# Patient Record
Sex: FEMALE | Race: BLACK OR AFRICAN AMERICAN | NOT HISPANIC OR LATINO | Employment: FULL TIME | URBAN - METROPOLITAN AREA
[De-identification: names, ages, dates, MRNs, and addresses within clinical notes are randomized per-mention and may not be internally consistent; named-entity substitution may affect disease eponyms.]

---

## 2017-01-23 ENCOUNTER — DOCUMENTATION ONLY (OUTPATIENT)
Dept: FAMILY MEDICINE | Facility: CLINIC | Age: 62
End: 2017-01-23

## 2017-01-23 NOTE — PROGRESS NOTES
Pre-Visit Chart Review  For Appointment Scheduled on 1/27/17.    Health Maintenance Due   Topic Date Due    Influenza Vaccine  08/01/2016    Foot Exam  09/02/2016    Zoster Vaccine  09/16/2016    Hemoglobin A1c  10/25/2016

## 2017-03-22 RX ORDER — PEN NEEDLE, DIABETIC 30 GX3/16"
1 NEEDLE, DISPOSABLE MISCELLANEOUS
Qty: 100 EACH | Refills: 11 | Status: SHIPPED | OUTPATIENT
Start: 2017-03-22

## 2018-01-05 ENCOUNTER — LAB VISIT (OUTPATIENT)
Dept: LAB | Facility: HOSPITAL | Age: 63
End: 2018-01-05
Attending: PHYSICIAN ASSISTANT
Payer: COMMERCIAL

## 2018-01-05 ENCOUNTER — OFFICE VISIT (OUTPATIENT)
Dept: ENDOCRINOLOGY | Facility: CLINIC | Age: 63
End: 2018-01-05
Payer: COMMERCIAL

## 2018-01-05 VITALS
HEART RATE: 91 BPM | RESPIRATION RATE: 18 BRPM | SYSTOLIC BLOOD PRESSURE: 159 MMHG | DIASTOLIC BLOOD PRESSURE: 85 MMHG | BODY MASS INDEX: 33.21 KG/M2 | WEIGHT: 219.13 LBS | HEIGHT: 68 IN

## 2018-01-05 DIAGNOSIS — E11.8 UNCONTROLLED TYPE 2 DIABETES MELLITUS WITH COMPLICATION, UNSPECIFIED LONG TERM INSULIN USE STATUS: Primary | ICD-10-CM

## 2018-01-05 DIAGNOSIS — E11.65 UNCONTROLLED TYPE 2 DIABETES MELLITUS WITH COMPLICATION, UNSPECIFIED LONG TERM INSULIN USE STATUS: Primary | ICD-10-CM

## 2018-01-05 DIAGNOSIS — E11.65 UNCONTROLLED TYPE 2 DIABETES MELLITUS WITH COMPLICATION, UNSPECIFIED LONG TERM INSULIN USE STATUS: ICD-10-CM

## 2018-01-05 DIAGNOSIS — E11.59 HYPERTENSION ASSOCIATED WITH DIABETES: ICD-10-CM

## 2018-01-05 DIAGNOSIS — Z78.0 POSTMENOPAUSAL: ICD-10-CM

## 2018-01-05 DIAGNOSIS — E78.5 HYPERLIPIDEMIA LDL GOAL <70: ICD-10-CM

## 2018-01-05 DIAGNOSIS — E11.8 UNCONTROLLED TYPE 2 DIABETES MELLITUS WITH COMPLICATION, UNSPECIFIED LONG TERM INSULIN USE STATUS: ICD-10-CM

## 2018-01-05 DIAGNOSIS — I15.2 HYPERTENSION ASSOCIATED WITH DIABETES: ICD-10-CM

## 2018-01-05 DIAGNOSIS — G62.9 NEUROPATHY: ICD-10-CM

## 2018-01-05 DIAGNOSIS — E66.9 OBESITY, CLASS I, BMI 30-34.9: ICD-10-CM

## 2018-01-05 LAB
25(OH)D3+25(OH)D2 SERPL-MCNC: 23 NG/ML
ALBUMIN SERPL BCP-MCNC: 3.6 G/DL
ALP SERPL-CCNC: 71 U/L
ALT SERPL W/O P-5'-P-CCNC: 28 U/L
ANION GAP SERPL CALC-SCNC: 7 MMOL/L
AST SERPL-CCNC: 24 U/L
BASOPHILS # BLD AUTO: 0.06 K/UL
BASOPHILS NFR BLD: 0.7 %
BILIRUB SERPL-MCNC: 0.5 MG/DL
BUN SERPL-MCNC: 19 MG/DL
C PEPTIDE SERPL-MCNC: 3.56 NG/ML
CA-I BLDV-SCNC: 1.26 MMOL/L
CALCIUM SERPL-MCNC: 9.7 MG/DL
CHLORIDE SERPL-SCNC: 107 MMOL/L
CHOLEST SERPL-MCNC: 180 MG/DL
CHOLEST/HDLC SERPL: 3.2 {RATIO}
CO2 SERPL-SCNC: 28 MMOL/L
CREAT SERPL-MCNC: 1 MG/DL
DIFFERENTIAL METHOD: ABNORMAL
EOSINOPHIL # BLD AUTO: 0.2 K/UL
EOSINOPHIL NFR BLD: 1.9 %
ERYTHROCYTE [DISTWIDTH] IN BLOOD BY AUTOMATED COUNT: 14 %
EST. GFR  (AFRICAN AMERICAN): >60 ML/MIN/1.73 M^2
EST. GFR  (NON AFRICAN AMERICAN): >60 ML/MIN/1.73 M^2
ESTIMATED AVG GLUCOSE: 192 MG/DL
GLUCOSE SERPL-MCNC: 135 MG/DL
HBA1C MFR BLD HPLC: 8.3 %
HCT VFR BLD AUTO: 36.4 %
HDLC SERPL-MCNC: 57 MG/DL
HDLC SERPL: 31.7 %
HGB BLD-MCNC: 11.5 G/DL
IMM GRANULOCYTES # BLD AUTO: 0.03 K/UL
IMM GRANULOCYTES NFR BLD AUTO: 0.3 %
LDLC SERPL CALC-MCNC: 94.2 MG/DL
LYMPHOCYTES # BLD AUTO: 4 K/UL
LYMPHOCYTES NFR BLD: 43.5 %
MCH RBC QN AUTO: 28.1 PG
MCHC RBC AUTO-ENTMCNC: 31.6 G/DL
MCV RBC AUTO: 89 FL
MONOCYTES # BLD AUTO: 0.5 K/UL
MONOCYTES NFR BLD: 5.6 %
NEUTROPHILS # BLD AUTO: 4.4 K/UL
NEUTROPHILS NFR BLD: 48 %
NONHDLC SERPL-MCNC: 123 MG/DL
NRBC BLD-RTO: 0 /100 WBC
PLATELET # BLD AUTO: 333 K/UL
PMV BLD AUTO: 10.1 FL
POTASSIUM SERPL-SCNC: 4 MMOL/L
PROT SERPL-MCNC: 8.4 G/DL
PTH-INTACT SERPL-MCNC: 141 PG/ML
RBC # BLD AUTO: 4.09 M/UL
SODIUM SERPL-SCNC: 142 MMOL/L
TRIGL SERPL-MCNC: 144 MG/DL
TSH SERPL DL<=0.005 MIU/L-ACNC: 1.3 UIU/ML
WBC # BLD AUTO: 9.14 K/UL

## 2018-01-05 PROCEDURE — 80061 LIPID PANEL: CPT

## 2018-01-05 PROCEDURE — 82985 ASSAY OF GLYCATED PROTEIN: CPT

## 2018-01-05 PROCEDURE — 82330 ASSAY OF CALCIUM: CPT

## 2018-01-05 PROCEDURE — 83970 ASSAY OF PARATHORMONE: CPT

## 2018-01-05 PROCEDURE — 84681 ASSAY OF C-PEPTIDE: CPT

## 2018-01-05 PROCEDURE — 99999 PR PBB SHADOW E&M-EST. PATIENT-LVL III: CPT | Mod: PBBFAC,,, | Performed by: PHYSICIAN ASSISTANT

## 2018-01-05 PROCEDURE — 84443 ASSAY THYROID STIM HORMONE: CPT

## 2018-01-05 PROCEDURE — 36415 COLL VENOUS BLD VENIPUNCTURE: CPT | Mod: PO

## 2018-01-05 PROCEDURE — 83036 HEMOGLOBIN GLYCOSYLATED A1C: CPT

## 2018-01-05 PROCEDURE — 85025 COMPLETE CBC W/AUTO DIFF WBC: CPT

## 2018-01-05 PROCEDURE — 80053 COMPREHEN METABOLIC PANEL: CPT

## 2018-01-05 PROCEDURE — 99213 OFFICE O/P EST LOW 20 MIN: CPT | Mod: S$GLB,,, | Performed by: PHYSICIAN ASSISTANT

## 2018-01-05 PROCEDURE — 82306 VITAMIN D 25 HYDROXY: CPT

## 2018-01-05 PROCEDURE — 84378 SUGARS SINGLE QUANT: CPT

## 2018-01-05 RX ORDER — DULAGLUTIDE 1.5 MG/.5ML
INJECTION, SOLUTION SUBCUTANEOUS
COMMUNITY
Start: 2017-12-26 | End: 2018-01-22 | Stop reason: SDUPTHER

## 2018-01-05 RX ORDER — CARVEDILOL 12.5 MG/1
12.5 TABLET ORAL DAILY
Qty: 90 TABLET | Refills: 3 | Status: SHIPPED | OUTPATIENT
Start: 2018-01-05 | End: 2019-01-15 | Stop reason: SDUPTHER

## 2018-01-05 RX ORDER — BACLOFEN 10 MG/1
TABLET ORAL
Refills: 0 | COMMUNITY
Start: 2017-10-26 | End: 2019-11-22

## 2018-01-05 NOTE — PROGRESS NOTES
"CC: This 62 y.o. Black or  female  is here for evaluation of  T2DM along with comorbidities indicated in the Visit Diagnosis section of this encounter.    HPI: Yeny Coats was diagnosed with T2DM in 2006. Metformin started at the time of diagnosis then switched to Janumet. No hospitalizations for DM. + FHx of DM brother, sister, mother. Mother and sister had thyroid disease.    Last seen by CORI Booker in 8/2016. New to me today.    PMHx, PSHx: reviewed in epic.    Social Hx: no ETOH/tobacco use. 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    LAST DIABETES EDUCATION: 9/2015    CURRENT DIABETES MEDICATIONS: Trulicity 1.5 mg weekly, Janumet  mg BID, Glipizide 10 mg BID    Missing medications: 1-2x week. She does not forget to take the Trulicity.    DM COMPLICATIONS: peripheral neuropathy    SIGNIFICANT DIABETES MED HISTORY:   Glimepiride-generalized pain    BG checks 1x/day in the morning. Recall: 107-140    No hypoglycemia.     CURRENT DIET:   BF-oatmeal, grits, eggs, sausage, biscuit   LH- spaghetti with chicken, she usually cooks  DN-leftovers from lunch  Snacks on popcorn and nuts during the day. She drinks milk, juice, water, iced tea.     EXERCISE: walking and riding a stationary bike 15 min 2x/week    BP (!) 159/85   Pulse 91   Resp 18   Ht 5' 7.5" (1.715 m)   Wt 99.4 kg (219 lb 2.2 oz)   BMI 33.82 kg/m²     Last eye exam: 10/17  Last Podiatry exam: n/a    Declines flu vaccine. She is utd on pneumonia vaccine.    Pt had Dexa 2 weeks ago. She will send results.    ROS:   CONSTITUTIONAL:  Appetite good, denies fatigue, no wt changes.  EYES: denies visual disturbances.   RESPIRATORY: No shortness of breath or cough.  CARDIAC: No chest pain or palpitations.   GI: No n/v/d  : no  dysuria   NEURO: + int numbness to feet   Musc: + muscle pain in right arm, muscle spasms in midback (saw ortho)  Endo: no polyphagia, polydipsia, polyuria  OTHER: n/a    PHYSICAL EXAM:  GENERAL: Well " developed, well nourished. No acute distress, AAO x 3.  NEURO: Cranial nerves grossly intact. Speech clear, no tremor.   NECK: Trachea midline, no thyromegaly or lymphadenopathy.   CHEST: Respirations even and unlabored. CTAB.  CARDIOVASCULAR: Regular rate and rhythm. No bruits. No murmur. No edema.   ABDOMEN: Soft, non-distended. Mild obese abdominal cavity.   MS: Gait steady. No clubbing.   SKIN: Normal skin turgor. Skin warm and dry. No areas of breakdown. + acanthosis nigricans.  Foot Exam: no sores or macerations noted.    Protective Sensation (w/ 10 gram monofilament):  Right: Intact  Left: Intact    Visual Inspection:  Normal -  Bilateral, Nails Intact - without Evidence of Foot Deformity- Bilateral, Callus -  Bilateral and Dry Skin -  Bilateral    Pedal Pulses:   Right: Present  Left: Present    Posterior tibialis:   Right:Present  Left: Present     Vibratory Sensation  Right:Positive  Left:Decreased    Hemoglobin A1C   Date Value Ref Range Status   07/25/2016 10.6 (H) 4.5 - 6.2 % Final     Comment:     According to ADA guidelines, hemoglobin A1C <7.0% represents  optimal control in non-pregnant diabetic patients.  Different  metrics may apply to specific populations.   Standards of Medical Care in Diabetes - 2016.  For the purpose of screening for the presence of diabetes:  <5.7%     Consistent with the absence of diabetes  5.7-6.4%  Consistent with increasing risk for diabetes   (prediabetes)  >or=6.5%  Consistent with diabetes  Currently no consensus exists for use of hemoglobin A1C  for diagnosis of diabetes for children.     04/21/2016 9.3 (H) 4.5 - 6.2 % Final   01/29/2016 10.2 (H) 4.5 - 6.2 % Final      Lab Results   Component Value Date    CHOL 286 (H) 07/25/2016    CHOL 275 (H) 04/21/2016    CHOL 294 (H) 09/02/2015     Lab Results   Component Value Date    HDL 52 07/25/2016    HDL 45 04/21/2016    HDL 51 09/02/2015     Lab Results   Component Value Date    LDLCALC 193.4 (H) 07/25/2016    LDLCALC  189.0 (H) 04/21/2016    LDLCALC 168.0 (H) 09/02/2015     Lab Results   Component Value Date    TRIG 203 (H) 07/25/2016    TRIG 205 (H) 04/21/2016    TRIG 375 (H) 09/02/2015     Lab Results   Component Value Date    CHOLHDL 18.2 (L) 07/25/2016    CHOLHDL 16.4 (L) 04/21/2016    CHOLHDL 17.3 (L) 09/02/2015      Lab Results   Component Value Date    TSH 1.122 07/25/2016        Chemistry        Component Value Date/Time     07/25/2016 1122    K 4.0 07/25/2016 1122     07/25/2016 1122    CO2 26 07/25/2016 1122    BUN 13 07/25/2016 1122    CREATININE 0.9 07/25/2016 1122     (H) 07/25/2016 1122        Component Value Date/Time    CALCIUM 9.8 07/25/2016 1122    ALKPHOS 86 07/25/2016 1122    AST 34 07/25/2016 1122    ALT 41 07/25/2016 1122    BILITOT 0.6 07/25/2016 1122    ESTGFRAFRICA >60.0 07/25/2016 1122    EGFRNONAA >60.0 07/25/2016 1122         ASSESSMENT and PLAN:    A1C goal: <7%  Fasting/premeal blood glucose goal:   2 hour post-meal blood glucose goal: less than 180    1. Uncontrolled type 2 diabetes mellitus with complication, unspecified long term insulin use status  Ambulatory consult to Diabetic Education    TSH    Comprehensive metabolic panel    CBC auto differential    C-peptide    HM DIABETES FOOT EXAM    Fructosamine    GlycoMark (TM)    Microalbumin/creatinine urine ratio    Hemoglobin A1c   2. Neuropathy     3. Hyperlipidemia LDL goal <70  CBC auto differential    Lipid panel   4. Hypertension associated with diabetes  CBC auto differential   5. Obesity, Class I, BMI 30-34.9     6. Postmenopausal  Vitamin D    PTH, intact    Calcium, ionized      Type 2 DM  A1c today  Continue current regimen  Will adjust according to a1c    Neuropathy  Optimize glycemic control    Hyperlipidemia  Chronic-LP today  Continue crestor  Advised to take aspirin    Hypertension  Chronic-worsening-start coreg 12. 5 mg daily  Start taking BP at home daily    Obesity  Body mass index is 33.82 kg/m².    Increases insulin resistance    Postmenopausal  Obtain Dexa results  Labs today    Return in about 3 months (around 4/5/2018).

## 2018-01-06 NOTE — PROGRESS NOTES
I have reviewed the notes, assessments, and/or procedures performed by Mayelin Monson PA-C. I agree with the documented clinical case summary and the suggested management plan.

## 2018-01-09 LAB — FRUCTOSAMINE SERPL-SCNC: 301 UMOL /L (ref 0–285)

## 2018-01-10 LAB — GLYCOMARK (TM): 6.7 UG/ML

## 2018-01-22 DIAGNOSIS — E11.8 UNCONTROLLED TYPE 2 DIABETES MELLITUS WITH COMPLICATION, UNSPECIFIED LONG TERM INSULIN USE STATUS: Primary | ICD-10-CM

## 2018-01-22 DIAGNOSIS — E11.65 UNCONTROLLED TYPE 2 DIABETES MELLITUS WITH COMPLICATION, UNSPECIFIED LONG TERM INSULIN USE STATUS: Primary | ICD-10-CM

## 2018-01-22 RX ORDER — METFORMIN HYDROCHLORIDE 500 MG/1
1000 TABLET, EXTENDED RELEASE ORAL 2 TIMES DAILY WITH MEALS
Qty: 360 TABLET | Refills: 3 | Status: SHIPPED | OUTPATIENT
Start: 2018-01-22 | End: 2018-03-23

## 2018-01-22 RX ORDER — GLIPIZIDE 10 MG/1
10 TABLET ORAL 2 TIMES DAILY
Refills: 0 | COMMUNITY
Start: 2017-10-20 | End: 2018-01-22 | Stop reason: SDUPTHER

## 2018-01-22 RX ORDER — SITAGLIPTIN AND METFORMIN HYDROCHLORIDE 500; 50 MG/1; MG/1
TABLET, FILM COATED ORAL
Refills: 0 | COMMUNITY
Start: 2017-10-23 | End: 2018-01-22

## 2018-01-22 RX ORDER — GLIPIZIDE 10 MG/1
10 TABLET ORAL 2 TIMES DAILY
Qty: 180 TABLET | Refills: 3 | Status: SHIPPED | OUTPATIENT
Start: 2018-01-22 | End: 2018-02-02 | Stop reason: SDUPTHER

## 2018-01-22 RX ORDER — SITAGLIPTIN AND METFORMIN HYDROCHLORIDE 500; 50 MG/1; MG/1
1 TABLET, FILM COATED ORAL 2 TIMES DAILY WITH MEALS
Qty: 60 TABLET | Refills: 11 | Status: CANCELLED | OUTPATIENT
Start: 2018-01-22 | End: 2019-01-22

## 2018-01-22 NOTE — TELEPHONE ENCOUNTER
Spoke to pt about change in medication.    ----- Message from Lavonne Argueta sent at 1/22/2018  2:14 PM CST -----  Contact: self  Patient called requesting a medication refill. Please see details below.  Medication Name:Trulicity; and her two oral diabetes medication  Medication Strength:  Preferred pharmacy:[facility]  First time calling about this refill (y/n):y  Call Back Number:[phone] or [phone]  Patient would like to  one TRULICITY pen today due to it missed fired and when all over her bed.  [facility] - [place], [place] - [address]  [address]  [place] [address]  Phone: [phone] Fax: [phone]

## 2018-02-02 DIAGNOSIS — E11.8 UNCONTROLLED TYPE 2 DIABETES MELLITUS WITH COMPLICATION, UNSPECIFIED LONG TERM INSULIN USE STATUS: ICD-10-CM

## 2018-02-02 DIAGNOSIS — E11.65 UNCONTROLLED TYPE 2 DIABETES MELLITUS WITH COMPLICATION, UNSPECIFIED LONG TERM INSULIN USE STATUS: ICD-10-CM

## 2018-02-02 RX ORDER — GLIPIZIDE 10 MG/1
10 TABLET ORAL 2 TIMES DAILY
Qty: 180 TABLET | Refills: 3 | Status: SHIPPED | OUTPATIENT
Start: 2018-02-02 | End: 2019-01-18 | Stop reason: SDUPTHER

## 2018-02-02 NOTE — TELEPHONE ENCOUNTER
----- Message from Rosario Castano sent at 1/31/2018  9:52 AM CST -----  Contact: self [phone]  She only received the metformin.  She thought you were ordering 2 medications for her.  Please call her to clarify.  Thank you!

## 2018-03-16 RX ORDER — SITAGLIPTIN AND METFORMIN HYDROCHLORIDE 500; 50 MG/1; MG/1
TABLET, FILM COATED ORAL
Qty: 180 TABLET | Refills: 3 | Status: SHIPPED | OUTPATIENT
Start: 2018-03-16 | End: 2018-06-01

## 2018-03-23 ENCOUNTER — OFFICE VISIT (OUTPATIENT)
Dept: ENDOCRINOLOGY | Facility: CLINIC | Age: 63
End: 2018-03-23
Payer: COMMERCIAL

## 2018-03-23 ENCOUNTER — LAB VISIT (OUTPATIENT)
Dept: LAB | Facility: HOSPITAL | Age: 63
End: 2018-03-23
Attending: INTERNAL MEDICINE
Payer: COMMERCIAL

## 2018-03-23 ENCOUNTER — HOSPITAL ENCOUNTER (OUTPATIENT)
Dept: RADIOLOGY | Facility: CLINIC | Age: 63
Discharge: HOME | End: 2018-03-23
Attending: PHYSICIAN ASSISTANT
Payer: COMMERCIAL

## 2018-03-23 VITALS
DIASTOLIC BLOOD PRESSURE: 85 MMHG | HEIGHT: 67 IN | WEIGHT: 215.38 LBS | BODY MASS INDEX: 33.8 KG/M2 | SYSTOLIC BLOOD PRESSURE: 157 MMHG | RESPIRATION RATE: 18 BRPM | HEART RATE: 83 BPM

## 2018-03-23 DIAGNOSIS — G62.9 NEUROPATHY: ICD-10-CM

## 2018-03-23 DIAGNOSIS — Z78.0 POSTMENOPAUSAL: ICD-10-CM

## 2018-03-23 DIAGNOSIS — E04.1 NODULAR THYROID DISEASE: Primary | ICD-10-CM

## 2018-03-23 DIAGNOSIS — E66.9 OBESITY, CLASS I, BMI 30-34.9: ICD-10-CM

## 2018-03-23 DIAGNOSIS — I15.2 HYPERTENSION ASSOCIATED WITH DIABETES: ICD-10-CM

## 2018-03-23 DIAGNOSIS — E04.1 NODULAR THYROID DISEASE: ICD-10-CM

## 2018-03-23 DIAGNOSIS — E55.9 HYPOVITAMINOSIS D: ICD-10-CM

## 2018-03-23 DIAGNOSIS — E11.8 UNCONTROLLED TYPE 2 DIABETES MELLITUS WITH COMPLICATION, UNSPECIFIED LONG TERM INSULIN USE STATUS: Primary | ICD-10-CM

## 2018-03-23 DIAGNOSIS — E11.65 UNCONTROLLED TYPE 2 DIABETES MELLITUS WITH COMPLICATION, UNSPECIFIED LONG TERM INSULIN USE STATUS: Primary | ICD-10-CM

## 2018-03-23 DIAGNOSIS — E11.65 UNCONTROLLED TYPE 2 DIABETES MELLITUS WITH COMPLICATION, UNSPECIFIED LONG TERM INSULIN USE STATUS: ICD-10-CM

## 2018-03-23 DIAGNOSIS — E78.5 HYPERLIPIDEMIA LDL GOAL <70: ICD-10-CM

## 2018-03-23 DIAGNOSIS — E11.8 UNCONTROLLED TYPE 2 DIABETES MELLITUS WITH COMPLICATION, UNSPECIFIED LONG TERM INSULIN USE STATUS: ICD-10-CM

## 2018-03-23 DIAGNOSIS — E11.59 HYPERTENSION ASSOCIATED WITH DIABETES: ICD-10-CM

## 2018-03-23 LAB
25(OH)D3+25(OH)D2 SERPL-MCNC: 21 NG/ML
ANION GAP SERPL CALC-SCNC: 9 MMOL/L
BUN SERPL-MCNC: 14 MG/DL
CA-I BLDV-SCNC: 1.33 MMOL/L
CALCIUM SERPL-MCNC: 10 MG/DL
CHLORIDE SERPL-SCNC: 104 MMOL/L
CO2 SERPL-SCNC: 26 MMOL/L
CREAT SERPL-MCNC: 0.8 MG/DL
EST. GFR  (AFRICAN AMERICAN): >60 ML/MIN/1.73 M^2
EST. GFR  (NON AFRICAN AMERICAN): >60 ML/MIN/1.73 M^2
GLUCOSE SERPL-MCNC: 119 MG/DL
POTASSIUM SERPL-SCNC: 3.7 MMOL/L
PTH-INTACT SERPL-MCNC: 57 PG/ML
SODIUM SERPL-SCNC: 139 MMOL/L

## 2018-03-23 PROCEDURE — 99214 OFFICE O/P EST MOD 30 MIN: CPT | Mod: S$GLB,,, | Performed by: PHYSICIAN ASSISTANT

## 2018-03-23 PROCEDURE — 36415 COLL VENOUS BLD VENIPUNCTURE: CPT | Mod: PO

## 2018-03-23 PROCEDURE — 82306 VITAMIN D 25 HYDROXY: CPT

## 2018-03-23 PROCEDURE — 3077F SYST BP >= 140 MM HG: CPT | Mod: CPTII,S$GLB,, | Performed by: PHYSICIAN ASSISTANT

## 2018-03-23 PROCEDURE — 3045F PR MOST RECENT HEMOGLOBIN A1C LEVEL 7.0-9.0%: CPT | Mod: CPTII,S$GLB,, | Performed by: PHYSICIAN ASSISTANT

## 2018-03-23 PROCEDURE — 84378 SUGARS SINGLE QUANT: CPT

## 2018-03-23 PROCEDURE — 82330 ASSAY OF CALCIUM: CPT

## 2018-03-23 PROCEDURE — 82985 ASSAY OF GLYCATED PROTEIN: CPT

## 2018-03-23 PROCEDURE — 76536 US EXAM OF HEAD AND NECK: CPT | Mod: TC,PO

## 2018-03-23 PROCEDURE — 3079F DIAST BP 80-89 MM HG: CPT | Mod: CPTII,S$GLB,, | Performed by: PHYSICIAN ASSISTANT

## 2018-03-23 PROCEDURE — 99999 PR PBB SHADOW E&M-EST. PATIENT-LVL III: CPT | Mod: PBBFAC,,, | Performed by: PHYSICIAN ASSISTANT

## 2018-03-23 PROCEDURE — 80048 BASIC METABOLIC PNL TOTAL CA: CPT

## 2018-03-23 PROCEDURE — 83970 ASSAY OF PARATHORMONE: CPT

## 2018-03-23 PROCEDURE — 76536 US EXAM OF HEAD AND NECK: CPT | Mod: 26,,, | Performed by: RADIOLOGY

## 2018-03-23 RX ORDER — METFORMIN HYDROCHLORIDE 500 MG/1
1000 TABLET, EXTENDED RELEASE ORAL 2 TIMES DAILY WITH MEALS
Qty: 360 TABLET | Refills: 3 | Status: ON HOLD | OUTPATIENT
Start: 2018-03-23 | End: 2019-01-31 | Stop reason: CLARIF

## 2018-03-23 RX ORDER — KETOCONAZOLE 20 MG/ML
SHAMPOO, SUSPENSION TOPICAL
Qty: 120 ML | Refills: 5 | Status: SHIPPED | OUTPATIENT
Start: 2018-03-23 | End: 2019-03-01 | Stop reason: SDUPTHER

## 2018-03-23 NOTE — PROGRESS NOTES
"CC: This 62 y.o. Black or  female  is here for evaluation of  T2DM along with comorbidities indicated in the Visit Diagnosis section of this encounter.    HPI: Yanci Coats was diagnosed with T2DM in . Metformin started at the time of diagnosis then switched to Janumet. No hospitalizations for DM. + FHx of DM brother, sister, mother. Mother and sister had thyroid disease.    PMHx, PSHx: reviewed in epic.    Social Hx: no ETOH/tobacco use. 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    LAST DIABETES EDUCATION: 2015    CURRENT DIABETES MEDICATIONS: Trulicity 1.5 mg weekly, metformin 1 g BID , Glipizide 10 mg BID     BG checks 2x per week.   Fastin-150    Missing medications: She does forget to take the medication occassionally.    She is waiting for her blood pressure medication to come in the mail.    DM COMPLICATIONS: peripheral neuropathy    SIGNIFICANT DIABETES MED HISTORY:   Glimepiride-generalized pain    No hypoglycemia.     CURRENT DIET:   BF-eggs, meat and english muffin or oatmeal with fruit, grits (weekend)  LH- skips  DN-meat and vegetable, beans with brown rice, string beans  Snacks on cheese and nuts during the day. She drinks milk, water, iced tea.     EXERCISE: riding a stationary bike almost every day    BP (!) 157/85 (BP Location: Left arm, Patient Position: Sitting, BP Method: Large (Automatic))   Pulse 83   Resp 18   Ht 5' 7" (1.702 m)   Wt 97.7 kg (215 lb 6.2 oz)   BMI 33.73 kg/m²     Last eye exam: 10/17  Last Podiatry exam: n/a    Last Dexa:     She had a thyroid u/s in  that showed two thyroid nodules that required an FNA, however she never received one.    ROS:   CONSTITUTIONAL:  Appetite good, denies fatigue, no wt changes.  EYES: + floater in left eye (chronic), denies visual disturbances.   RESPIRATORY: No shortness of breath or cough.  CARDIAC: No chest pain or palpitations.   GI: +nausea in morning, +diarrhea, no vomiting  : no  dysuria "   NEURO: + int numbness to feet   Skin: rash in back  Endo: no polyphagia, polydipsia, polyuria  OTHER: n/a    PHYSICAL EXAM:  GENERAL: Well developed, well nourished. No acute distress, AAO x 3.  NEURO: Cranial nerves grossly intact. Speech clear, no tremor. NECK: Trachea midline, no thyromegaly or lymphadenopathy. No AN. Multiple skin tags.Palpable nodule in right thyroid.  CHEST: Respirations even and unlabored. CTAB.  CARDIOVASCULAR: Regular rate and rhythm. No bruits. No murmur. No edema.   ABDOMEN: Soft, non-distended. Mild obese abdominal cavity.   MS: Gait steady. No clubbing.   SKIN: Normal skin turgor. Skin warm and dry. No areas of breakdown. + acanthosis nigricans.  Foot Exam: Previous 1/5/2018, no sores or macerations noted.    Protective Sensation (w/ 10 gram monofilament):  Right: Intact  Left: Intact    Visual Inspection:  Normal -  Bilateral, Nails Intact - without Evidence of Foot Deformity- Bilateral, Callus -  Bilateral and Dry Skin -  Bilateral    Pedal Pulses:   Right: Present  Left: Present    Posterior tibialis:   Right:Present  Left: Present     Vibratory Sensation  Right:Positive  Left:Decreased    Hemoglobin A1C   Date Value Ref Range Status   01/05/2018 8.3 (H) 4.0 - 5.6 % Final     Comment:     According to ADA guidelines, hemoglobin A1c <7.0% represents  optimal control in non-pregnant diabetic patients. Different  metrics may apply to specific patient populations.   Standards of Medical Care in Diabetes-2016.  For the purpose of screening for the presence of diabetes:  <5.7%     Consistent with the absence of diabetes  5.7-6.4%  Consistent with increasing risk for diabetes   (prediabetes)  >or=6.5%  Consistent with diabetes  Currently, no consensus exists for use of hemoglobin A1c  for diagnosis of diabetes for children.  This Hemoglobin A1c assay has significant interference with fetal   hemoglobin   (HbF). The results are invalid for patients with abnormal amounts of   HbF,    including those with known Hereditary Persistence   of Fetal Hemoglobin. Heterozygous hemoglobin variants (HbAS, HbAC,   HbAD, HbAE, HbA2) do not significantly interfere with this assay;   however, presence of multiple variants in a sample may impact the %   interference.     07/25/2016 10.6 (H) 4.5 - 6.2 % Final     Comment:     According to ADA guidelines, hemoglobin A1C <7.0% represents  optimal control in non-pregnant diabetic patients.  Different  metrics may apply to specific populations.   Standards of Medical Care in Diabetes - 2016.  For the purpose of screening for the presence of diabetes:  <5.7%     Consistent with the absence of diabetes  5.7-6.4%  Consistent with increasing risk for diabetes   (prediabetes)  >or=6.5%  Consistent with diabetes  Currently no consensus exists for use of hemoglobin A1C  for diagnosis of diabetes for children.     04/21/2016 9.3 (H) 4.5 - 6.2 % Final      Lab Results   Component Value Date    CHOL 180 01/05/2018    CHOL 286 (H) 07/25/2016    CHOL 275 (H) 04/21/2016     Lab Results   Component Value Date    HDL 57 01/05/2018    HDL 52 07/25/2016    HDL 45 04/21/2016     Lab Results   Component Value Date    LDLCALC 94.2 01/05/2018    LDLCALC 193.4 (H) 07/25/2016    LDLCALC 189.0 (H) 04/21/2016     Lab Results   Component Value Date    TRIG 144 01/05/2018    TRIG 203 (H) 07/25/2016    TRIG 205 (H) 04/21/2016     Lab Results   Component Value Date    CHOLHDL 31.7 01/05/2018    CHOLHDL 18.2 (L) 07/25/2016    CHOLHDL 16.4 (L) 04/21/2016      Lab Results   Component Value Date    TSH 1.298 01/05/2018        Chemistry        Component Value Date/Time     01/05/2018 1037    K 4.0 01/05/2018 1037     01/05/2018 1037    CO2 28 01/05/2018 1037    BUN 19 01/05/2018 1037    CREATININE 1.0 01/05/2018 1037     (H) 01/05/2018 1037        Component Value Date/Time    CALCIUM 9.7 01/05/2018 1037    ALKPHOS 71 01/05/2018 1037    AST 24 01/05/2018 1037    ALT 28 01/05/2018  1037    BILITOT 0.5 01/05/2018 1037    ESTGFRAFRICA >60.0 01/05/2018 1037    EGFRNONAA >60.0 01/05/2018 1037         ASSESSMENT and PLAN:    A1C goal: <7%  Fasting/premeal blood glucose goal:   2 hour post-meal blood glucose goal: less than 180    1. Uncontrolled type 2 diabetes mellitus with complication, unspecified long term insulin use status  Basic metabolic panel    Fructosamine    GlycoMark (TM)    metFORMIN (GLUCOPHAGE-XR) 500 MG 24 hr tablet    dulaglutide (TRULICITY) 1.5 mg/0.5 mL PnIj   2. Neuropathy     3. Hyperlipidemia LDL goal <70     4. Hypertension associated with diabetes  empagliflozin 10 mg Tab   5. Obesity, Class I, BMI 30-34.9     6. Postmenopausal  PTH, intact    Calcium, ionized   7. Hypovitaminosis D  Vitamin D   8. Nodular thyroid disease  US Soft Tissue Head Neck Thyroid      Type 2 DM  Labs today  Continue metformin, glipizide and Trulicity  Start Jardiance 10 mg daily  Referral to DE.  BG checks 2x daily.  Logs to clinic.   Hypoglycemia discussed.    Neuropathy  Optimize glycemic control    Hyperlipidemia  Continue crestor  Advised to take aspirin    Hypertension  Chronic-elevated-Advised to take blood pressure medication.    Obesity  Body mass index is 33.73 kg/m².   Increases insulin resistance  Continue exercise.    Postmenopausal  Obtain Dexa results  Labs today    Hypovitaminosis D  Check vitamin D    Nodular thyroid disease  Thyroid u/s    F/u in 3 months

## 2018-03-23 NOTE — PROGRESS NOTES
I have reviewed the notes, assessments, and/or procedures performed by  Mayelin Monson PA-C. I agree with the documented clinical cases summary and the suggested management plan.

## 2018-03-26 LAB — FRUCTOSAMINE SERPL-SCNC: 320 UMOL /L (ref 151–300)

## 2018-03-27 ENCOUNTER — TELEPHONE (OUTPATIENT)
Dept: ENDOCRINOLOGY | Facility: CLINIC | Age: 63
End: 2018-03-27

## 2018-03-27 NOTE — TELEPHONE ENCOUNTER
----- Message from Ana Paula Sotelo sent at 3/26/2018 12:14 PM CDT -----  Prescription was sent to [facility] / pharmacy advised that she needs a prior authorization  / call  [phone] to advise   [facility] - [place], [place] - [address]  [address]  [place] [address]  Phone: [phone] Fax: [phone]

## 2018-03-28 LAB — GLYCOMARK (TM): 12.7 UG/ML

## 2018-04-12 ENCOUNTER — TELEPHONE (OUTPATIENT)
Dept: ENDOCRINOLOGY | Facility: CLINIC | Age: 63
End: 2018-04-12

## 2018-04-12 NOTE — TELEPHONE ENCOUNTER
----- Message from Angel Guzman sent at 4/12/2018  2:36 PM CDT -----  Contact: pt  She's calling in regards to her scheduled biopsy on Friday 4/13/18, pls advise, [phone] (cell)

## 2018-04-13 NOTE — TELEPHONE ENCOUNTER
----- Message from Shanell Gilliam sent at 4/12/2018  4:45 PM CDT -----  Contact: self  Patient called regarding missed call. Please contact [phone] (higw)

## 2018-06-01 ENCOUNTER — OFFICE VISIT (OUTPATIENT)
Dept: ENDOCRINOLOGY | Facility: CLINIC | Age: 63
End: 2018-06-01
Payer: COMMERCIAL

## 2018-06-01 ENCOUNTER — DOCUMENTATION ONLY (OUTPATIENT)
Dept: ENDOCRINOLOGY | Facility: CLINIC | Age: 63
End: 2018-06-01

## 2018-06-01 ENCOUNTER — LAB VISIT (OUTPATIENT)
Dept: LAB | Facility: HOSPITAL | Age: 63
End: 2018-06-01
Attending: INTERNAL MEDICINE
Payer: COMMERCIAL

## 2018-06-01 VITALS
HEART RATE: 73 BPM | BODY MASS INDEX: 33.35 KG/M2 | HEIGHT: 67 IN | RESPIRATION RATE: 18 BRPM | DIASTOLIC BLOOD PRESSURE: 79 MMHG | WEIGHT: 212.5 LBS | SYSTOLIC BLOOD PRESSURE: 139 MMHG

## 2018-06-01 DIAGNOSIS — G62.9 NEUROPATHY: ICD-10-CM

## 2018-06-01 DIAGNOSIS — K21.9 GASTROESOPHAGEAL REFLUX DISEASE, ESOPHAGITIS PRESENCE NOT SPECIFIED: ICD-10-CM

## 2018-06-01 DIAGNOSIS — E04.1 NODULAR THYROID DISEASE: ICD-10-CM

## 2018-06-01 DIAGNOSIS — E11.65 TYPE 2 DIABETES MELLITUS WITH HYPERGLYCEMIA, WITHOUT LONG-TERM CURRENT USE OF INSULIN: Primary | ICD-10-CM

## 2018-06-01 DIAGNOSIS — E11.59 HYPERTENSION ASSOCIATED WITH DIABETES: ICD-10-CM

## 2018-06-01 DIAGNOSIS — Z78.0 POSTMENOPAUSAL: ICD-10-CM

## 2018-06-01 DIAGNOSIS — E11.69 HYPERLIPIDEMIA ASSOCIATED WITH TYPE 2 DIABETES MELLITUS: ICD-10-CM

## 2018-06-01 DIAGNOSIS — E78.00 HYPERCHOLESTEROLEMIA: ICD-10-CM

## 2018-06-01 DIAGNOSIS — E55.9 HYPOVITAMINOSIS D: ICD-10-CM

## 2018-06-01 DIAGNOSIS — E66.9 OBESITY, CLASS I, BMI 30-34.9: ICD-10-CM

## 2018-06-01 DIAGNOSIS — K76.0 NAFLD (NONALCOHOLIC FATTY LIVER DISEASE): ICD-10-CM

## 2018-06-01 DIAGNOSIS — I15.2 HYPERTENSION ASSOCIATED WITH DIABETES: ICD-10-CM

## 2018-06-01 DIAGNOSIS — E11.65 TYPE 2 DIABETES MELLITUS WITH HYPERGLYCEMIA, WITHOUT LONG-TERM CURRENT USE OF INSULIN: ICD-10-CM

## 2018-06-01 DIAGNOSIS — E78.5 HYPERLIPIDEMIA ASSOCIATED WITH TYPE 2 DIABETES MELLITUS: ICD-10-CM

## 2018-06-01 DIAGNOSIS — R80.9 PROTEINURIA, UNSPECIFIED TYPE: ICD-10-CM

## 2018-06-01 LAB
25(OH)D3+25(OH)D2 SERPL-MCNC: 26 NG/ML
CA-I BLDV-SCNC: 1.3 MMOL/L
PTH-INTACT SERPL-MCNC: 89 PG/ML
THYROGLOB AB SERPL IA-ACNC: <4 IU/ML
THYROPEROXIDASE IGG SERPL-ACNC: <6 IU/ML
TSH SERPL DL<=0.005 MIU/L-ACNC: 1.12 UIU/ML
URATE SERPL-MCNC: 8.3 MG/DL

## 2018-06-01 PROCEDURE — 86800 THYROGLOBULIN ANTIBODY: CPT

## 2018-06-01 PROCEDURE — 86376 MICROSOMAL ANTIBODY EACH: CPT

## 2018-06-01 PROCEDURE — 86316 IMMUNOASSAY TUMOR OTHER: CPT

## 2018-06-01 PROCEDURE — 3008F BODY MASS INDEX DOCD: CPT | Mod: CPTII,S$GLB,, | Performed by: INTERNAL MEDICINE

## 2018-06-01 PROCEDURE — 3078F DIAST BP <80 MM HG: CPT | Mod: CPTII,S$GLB,, | Performed by: INTERNAL MEDICINE

## 2018-06-01 PROCEDURE — 3075F SYST BP GE 130 - 139MM HG: CPT | Mod: CPTII,S$GLB,, | Performed by: INTERNAL MEDICINE

## 2018-06-01 PROCEDURE — 84443 ASSAY THYROID STIM HORMONE: CPT

## 2018-06-01 PROCEDURE — 83018 HEAVY METAL QUAN EACH NES: CPT

## 2018-06-01 PROCEDURE — 82306 VITAMIN D 25 HYDROXY: CPT

## 2018-06-01 PROCEDURE — 82330 ASSAY OF CALCIUM: CPT

## 2018-06-01 PROCEDURE — 82308 ASSAY OF CALCITONIN: CPT

## 2018-06-01 PROCEDURE — 86800 THYROGLOBULIN ANTIBODY: CPT | Mod: 91

## 2018-06-01 PROCEDURE — 99214 OFFICE O/P EST MOD 30 MIN: CPT | Mod: S$GLB,,, | Performed by: INTERNAL MEDICINE

## 2018-06-01 PROCEDURE — 84550 ASSAY OF BLOOD/URIC ACID: CPT

## 2018-06-01 PROCEDURE — 3045F PR MOST RECENT HEMOGLOBIN A1C LEVEL 7.0-9.0%: CPT | Mod: CPTII,S$GLB,, | Performed by: INTERNAL MEDICINE

## 2018-06-01 PROCEDURE — 83970 ASSAY OF PARATHORMONE: CPT

## 2018-06-01 PROCEDURE — 36415 COLL VENOUS BLD VENIPUNCTURE: CPT | Mod: PO

## 2018-06-01 PROCEDURE — 99999 PR PBB SHADOW E&M-EST. PATIENT-LVL V: CPT | Mod: PBBFAC,,, | Performed by: INTERNAL MEDICINE

## 2018-06-01 NOTE — PROGRESS NOTES
I have reviewed the DEXA the patient had done @ [facility] 12/17  Which was normal. Her next ffup study thus should be for ~ 12/19.  The full details of the results are in the media tab section.

## 2018-06-01 NOTE — PROGRESS NOTES
Subjective:      Patient ID: Yanci Coats is a 62 y.o. female.    Chief Complaint:  Diabetes    62 yr old lady with type 2 diabetes and thyroid nodular disease seen in [facility] today.      History of Present Illness    62 yr old postmenopausal lady with type 2 diabetes and thyroid nodular disease seen in [facility] today.  She in addition has the [facility] associated comorbidities;  Patient Active Problem List   Diagnosis    Rhinitis, allergic    GERD (gastroesophageal reflux disease)    Nuclear sclerosis - Both Eyes    Cortical cataract - Right Eye    Blurred vision    Posterior vitreous detachment, right eye    Hyperlipidemia LDL goal <70    Neuropathy    Diabetes mellitus type 2, uncontrolled, with complications    Hypertension associated with diabetes    Hyperlipidemia associated with type 2 diabetes mellitus    NAFLD (nonalcoholic fatty liver disease)    Uterine fibroid    Renal cyst, left    Obesity, Class I, BMI 30-34.9    BMI 32.0-32.9,adult    Postmenopausal    Type 2 diabetes mellitus with hyperglycemia    Hypercholesterolemia     Her latest HBA1c from 01/18 was 8.3 and her current antidiabetic medication regimen consists of metformin XR 1g BID (not taking presently) actually taking Janumet 5-500 BID, jardiance 10mg QD (not actually taking this presently) , Trulicity 1.5mg weekly and glipizide 10mg BID.  She received pneumovax in 2016 and ?? Flu vaccination. Her last opthalmology appt appears to have been from 08/16 with Dr Leonides Moraes. ?? Most recent DEXA was this year which was reportedly normal.. Her most recent thyroid USS from 03/18 showed thyroid nodular disease with several dominant nodules for which she had USS guided FNAB which is till pending.  Patient has not had the biopsy done because of concerns regarding her out of pocket costs.       Review of Systems   Constitutional: Negative for diaphoresis and fatigue.   HENT: Negative for facial swelling, trouble swallowing and voice change.   "  Eyes: Negative for visual disturbance.   Respiratory: Negative for cough and shortness of breath.    Cardiovascular: Negative for chest pain, palpitations and leg swelling.   Gastrointestinal: Negative for abdominal pain, nausea and vomiting.   Endocrine: Negative for polyuria.   Genitourinary: Negative for dysuria and menstrual problem (postmenopausal).   Musculoskeletal: Negative for back pain, gait problem and myalgias.   Skin: Negative for color change, pallor and rash.   Neurological: Negative for seizures and headaches.   Hematological: Does not bruise/bleed easily.   Psychiatric/Behavioral: Negative for sleep disturbance.       Objective: /79 (BP Location: Left arm, Patient Position: Sitting, BP Method: Large (Automatic))   Pulse 73   Resp 18   Ht 5' 7" (1.702 m)   Wt 96.4 kg (212 lb 8.4 oz)   BMI 33.29 kg/m²  Body surface area is 2.13 meters squared. waist circ; 43.5" hip circ; 45" neck circ; 14.5 " waist to hip ratio; 0.97         Physical Exam   Constitutional: She is oriented to person, place, and time. She appears well-developed and well-nourished. No distress.   Pleasant middle aged lady. Not pale, anicteric and afebrile.  Well hydrated. Not in any acute distress.   HENT:   Head: Normocephalic and atraumatic.   Eyes: Conjunctivae and EOM are normal. Pupils are equal, round, and reactive to light. No scleral icterus.   Neck: Normal range of motion. Neck supple. No JVD present. No thyromegaly present.   Cardiovascular: Normal rate, regular rhythm and normal heart sounds.    Pulmonary/Chest: Effort normal and breath sounds normal. No respiratory distress. She has no wheezes.   Abdominal: Soft.   Obese anterior abdominal wall.   Musculoskeletal: Normal range of motion. She exhibits no edema.   Neurological: She is alert and oriented to person, place, and time. No cranial nerve deficit.   Skin: Skin is warm and dry. No rash noted. She is not diaphoretic. No erythema. No pallor.   Psychiatric: " She has a normal mood and affect. Her behavior is normal. Judgment and thought content normal.   Vitals reviewed.      Lab Review:     Results for HARDY BUTLER (MRN [id number]) as of 6/1/2018 08:54   Ref. Range 1/5/2018 10:37 1/5/2018 13:57 3/23/2018 10:58 3/23/2018 13:00   WBC Latest Ref Range: 3.90 - 12.70 K/uL 9.14      RBC Latest Ref Range: 4.00 - 5.40 M/uL 4.09      Hemoglobin Latest Ref Range: 12.0 - 16.0 g/dL 11.5 (L)      Hematocrit Latest Ref Range: 37.0 - 48.5 % 36.4 (L)      MCV Latest Ref Range: 82 - 98 fL 89      MCH Latest Ref Range: 27.0 - 31.0 pg 28.1      MCHC Latest Ref Range: 32.0 - 36.0 g/dL 31.6 (L)      RDW Latest Ref Range: 11.5 - 14.5 % 14.0      Platelets Latest Ref Range: 150 - 350 K/uL 333      MPV Latest Ref Range: 9.2 - 12.9 fL 10.1      Gran% Latest Ref Range: 38.0 - 73.0 % 48.0      Gran # (ANC) Latest Ref Range: 1.8 - 7.7 K/uL 4.4      Immature Granulocytes Latest Ref Range: 0.0 - 0.5 % 0.3      Immature Grans (Abs) Latest Ref Range: 0.00 - 0.04 K/uL 0.03      Lymph% Latest Ref Range: 18.0 - 48.0 % 43.5      Lymph # Latest Ref Range: 1.0 - 4.8 K/uL 4.0      Mono% Latest Ref Range: 4.0 - 15.0 % 5.6      Mono # Latest Ref Range: 0.3 - 1.0 K/uL 0.5      Eosinophil% Latest Ref Range: 0.0 - 8.0 % 1.9      Eos # Latest Ref Range: 0.0 - 0.5 K/uL 0.2      Basophil% Latest Ref Range: 0.0 - 1.9 % 0.7      Baso # Latest Ref Range: 0.00 - 0.20 K/uL 0.06      nRBC Latest Ref Range: 0 /100 WBC 0      Sodium Latest Ref Range: 136 - 145 mmol/L 142  139    Potassium Latest Ref Range: 3.5 - 5.1 mmol/L 4.0  3.7    Chloride Latest Ref Range: 95 - 110 mmol/L 107  104    CO2 Latest Ref Range: 23 - 29 mmol/L 28  26    Anion Gap Latest Ref Range: 8 - 16 mmol/L 7 (L)  9    BUN, Bld Latest Ref Range: 8 - 23 mg/dL 19  14    Creatinine Latest Ref Range: 0.5 - 1.4 mg/dL 1.0  0.8    eGFR if non African American Latest Ref Range: >60 mL/min/1.73 m^2 >60.0  >60.0    eGFR if African American Latest Ref Range: >60  mL/min/1.73 m^2 >60.0  >60.0    Glucose Latest Ref Range: 70 - 110 mg/dL 135 (H)  119 (H)    Calcium Latest Ref Range: 8.7 - 10.5 mg/dL 9.7  10.0    Calcium, Ion Latest Ref Range: 1.06 - 1.42 mmol/L 1.26  1.33    Alkaline Phosphatase Latest Ref Range: 55 - 135 U/L 71      Total Protein Latest Ref Range: 6.0 - 8.4 g/dL 8.4      Albumin Latest Ref Range: 3.5 - 5.2 g/dL 3.6      Total Bilirubin Latest Ref Range: 0.1 - 1.0 mg/dL 0.5      AST Latest Ref Range: 10 - 40 U/L 24      ALT Latest Ref Range: 10 - 44 U/L 28      Triglycerides Latest Ref Range: 30 - 150 mg/dL 144      Cholesterol Latest Ref Range: 120 - 199 mg/dL 180      HDL Latest Ref Range: 40 - 75 mg/dL 57      LDL Cholesterol Latest Ref Range: 63.0 - 159.0 mg/dL 94.2      Total Cholesterol/HDL Ratio Latest Ref Range: 2.0 - 5.0  3.2      Vit D, 25-Hydroxy Latest Ref Range: 30 - 96 ng/mL 23 (L)  21 (L)    Hemoglobin A1C Latest Ref Range: 4.0 - 5.6 % 8.3 (H)      Estimated Avg Glucose Latest Ref Range: 68 - 131 mg/dL 192 (H)      C-Peptide Latest Ref Range: 0.78 - 5.19 ng/mL 3.56      GlycoMark (TM) Latest Units: ug/mL 6.7 (L)  12.7    TSH Latest Ref Range: 0.400 - 4.000 uIU/mL 1.298      PTH Latest Ref Range: 9.0 - 77.0 pg/mL 141.0 (H)  57.0    Microalbum.,U,Random Latest Units: ug/mL  255.0     Microalb Creat Ratio Latest Ref Range: 0.0 - 30.0 ug/mg  167.8 (H)     Fructosamine Latest Ref Range: 151 - 300 umol /L 301 (H)  320 (H)    US SOFT TISSUE HEAD NECK THYROID Unknown    Rpt   Creatinine, Random Ur Latest Ref Range: 15.0 - 325.0 mg/dL  152.0     Differential Method Unknown Automated      HDL/Chol Ratio Latest Ref Range: 20.0 - 50.0 % 31.7      Non-HDL Cholesterol Latest Units: mg/dL 123          Assessment:     1. Type 2 diabetes mellitus with hyperglycemia, without long-term current use of insulin  Calcitonin    Uric acid    Urinalysis    Microalbumin/creatinine urine ratio    Urinalysis    GlycoMark (TM)    Hemoglobin A1c    Fructosamine    Amylase     Lipase    Gamma GT    SITagliptan-metformin (JANUMET)  mg per tablet   2. Neuropathy     3. Hypertension associated with diabetes  Uric acid    Urinalysis    Microalbumin/creatinine urine ratio    Urinalysis   4. Hyperlipidemia associated with type 2 diabetes mellitus  Amylase    Lipase   5. Hypercholesterolemia  Amylase    Lipase   6. Postmenopausal  DXA Bone Density Spine And Hip   7. Obesity, Class I, BMI 30-34.9     8. NAFLD (nonalcoholic fatty liver disease)  Gamma GT   9. Gastroesophageal reflux disease, esophagitis presence not specified  DXA Bone Density Spine And Hip   10. Nodular thyroid disease  Anti-thyroglobulin antibody    Thyroglobulin    Thyroid peroxidase antibody    Calcitonin    Chromogranin A    Iodine, Serum    TSH    Uric acid   11. Hypovitaminosis D  Vitamin D    Calcium, ionized    PTH, intact    DXA Bone Density Spine And Hip   12. Proteinuria, unspecified type          Type 2 DM  Labs today  Continue Janumet, glipizide and Trulicity  May Start Jardiance 10 mg daily if latest HBA1c is not at goal  Referral to DE.  BG checks 2x daily.  Logs to clinic.   Hypoglycemia discussed.     Neuropathy  Optimize glycemic control     Hyperlipidemia  Continue crestor  Advised to take aspirin     Hypertension  Chronic-elevated-Advised to take blood pressure medication.     Obesity  Body mass index is 33.73 kg/m².   Increases insulin resistance  Continue exercise.     Postmenopausal  To retrieve outside Dexa results for our review       Hypovitaminosis D  Check vitamin D     Nodular thyroid disease  To await insurance approval for patient to have USS guided FNAB.    Plan:       FFup in ~ 3mths

## 2018-06-02 LAB — CALCIT SERPL-MCNC: <5 PG/ML

## 2018-06-04 LAB
CGA SERPL-MCNC: 63 NG/ML (ref 0–95)
IODINE SERPL-MCNC: 79 NG/ML (ref 40–92)
THRYOGLOBULIN INTERPRETATION: ABNORMAL
THYROGLOB AB SERPL-ACNC: <1.8 IU/ML
THYROGLOB SERPL-MCNC: 445 NG/ML

## 2018-06-21 DIAGNOSIS — Z12.39 BREAST CANCER SCREENING: ICD-10-CM

## 2018-10-09 RX ORDER — AMLODIPINE BESYLATE 10 MG/1
10 TABLET ORAL DAILY
Qty: 90 TABLET | Refills: 3 | Status: SHIPPED | OUTPATIENT
Start: 2018-10-09 | End: 2019-07-07 | Stop reason: SDUPTHER

## 2018-10-09 NOTE — TELEPHONE ENCOUNTER
Called patient and she says that she does not have any other provider at the moment and is out of her Amlodipine 10 mg medication and would like to know if Ermias CHU will send a prescription to the [facility] pharmacy on file.

## 2018-10-09 NOTE — TELEPHONE ENCOUNTER
----- Message from Jagdish Cheung sent at 10/9/2018  2:25 PM CDT -----  Contact: pt  Pt is requesting a refill on her amlodipine (NORVASC) 10 MG tablet, pt is currently out of medication,pls call back and advise  Call Back#[phone]  Thanks    [facility] - WILL[facility] Inman - [address] AT [address] [place] [address]  [facility] [address]  Phone: [phone] Fax: [phone]

## 2018-10-18 RX ORDER — VALSARTAN AND HYDROCHLOROTHIAZIDE 320; 25 MG/1; MG/1
1 TABLET, FILM COATED ORAL DAILY
Qty: 90 TABLET | Refills: 3 | Status: SHIPPED | OUTPATIENT
Start: 2018-10-18 | End: 2018-10-19 | Stop reason: SDUPTHER

## 2018-10-18 NOTE — TELEPHONE ENCOUNTER
----- Message from Shanell Gilliam sent at 10/18/2018 10:28 AM CDT -----  Type:  RX Refill Request    Who Called:  Patient  Refill or New Rx:  refill  RX Name and Strength:  valsartan-hydrochlorothiazide (DIOVAN-HCT) 320-25 mg per tablet  How is the patient currently taking it? (ex. 1XDay):  Na  Is this a 30 day or 90 day RX:  90  Preferred Pharmacy with phone number:    [facility] - [place], [place] - [address]  [address]  [place] [address]  Phone: [phone] Fax: [phone]  Local or Mail Order: local  Ordering Provider: Ermias  Best Call Back Number:  [phone] (home)     Additional Information:  Stating she is out of the medication

## 2018-10-19 RX ORDER — VALSARTAN AND HYDROCHLOROTHIAZIDE 320; 25 MG/1; MG/1
1 TABLET, FILM COATED ORAL DAILY
Qty: 30 TABLET | Refills: 11 | Status: SHIPPED | OUTPATIENT
Start: 2018-10-19 | End: 2019-03-01

## 2018-10-19 NOTE — TELEPHONE ENCOUNTER
----- Message from Shanell Gilliam sent at 10/19/2018 12:10 PM CDT -----  Type:  RX Refill Request    Who Called:Patient  Refill or New Rx:  refill  RX Name and Strength:  valsartan-hydrochlorothiazide (DIOVAN-HCT) 320-25 mg per tablet  How is the patient currently taking it? (ex. 1XDay):  Na  Is this a 30 day or 90 day RX:  30  Preferred Pharmacy with phone number:    [facility] [place] - [address]  [address]  [place] [address]  Phone: [phone] Fax: [phone]  Local or Mail Order: [facility]  Ordering Provider:  Ermias Atkinson Call Back Number:  [phone] (home)       Additional Information:  Stating submitted multiple messages no callback, need medication refilled as soon as possible. Almost out

## 2018-11-01 ENCOUNTER — HOSPITAL ENCOUNTER (OUTPATIENT)
Dept: RADIOLOGY | Facility: CLINIC | Age: 63
Discharge: HOME | End: 2018-11-01
Attending: PHYSICIAN ASSISTANT
Payer: COMMERCIAL

## 2018-11-01 ENCOUNTER — OFFICE VISIT (OUTPATIENT)
Dept: ENDOCRINOLOGY | Facility: CLINIC | Age: 63
End: 2018-11-01
Payer: COMMERCIAL

## 2018-11-01 VITALS
SYSTOLIC BLOOD PRESSURE: 144 MMHG | WEIGHT: 218.94 LBS | DIASTOLIC BLOOD PRESSURE: 76 MMHG | TEMPERATURE: 98 F | BODY MASS INDEX: 34.36 KG/M2 | HEART RATE: 67 BPM | RESPIRATION RATE: 18 BRPM | HEIGHT: 67 IN

## 2018-11-01 DIAGNOSIS — E04.1 NODULAR THYROID DISEASE: ICD-10-CM

## 2018-11-01 DIAGNOSIS — Z88.9 H/O MULTIPLE ALLERGIES: ICD-10-CM

## 2018-11-01 DIAGNOSIS — I15.2 HYPERTENSION ASSOCIATED WITH DIABETES: ICD-10-CM

## 2018-11-01 DIAGNOSIS — E55.9 HYPOVITAMINOSIS D: ICD-10-CM

## 2018-11-01 DIAGNOSIS — E78.5 HYPERLIPIDEMIA ASSOCIATED WITH TYPE 2 DIABETES MELLITUS: ICD-10-CM

## 2018-11-01 DIAGNOSIS — G62.9 NEUROPATHY: ICD-10-CM

## 2018-11-01 DIAGNOSIS — E11.69 HYPERLIPIDEMIA ASSOCIATED WITH TYPE 2 DIABETES MELLITUS: ICD-10-CM

## 2018-11-01 DIAGNOSIS — Z78.0 POSTMENOPAUSAL: ICD-10-CM

## 2018-11-01 DIAGNOSIS — E66.9 OBESITY, CLASS I, BMI 30-34.9: ICD-10-CM

## 2018-11-01 DIAGNOSIS — E11.65 TYPE 2 DIABETES MELLITUS WITH HYPERGLYCEMIA, WITHOUT LONG-TERM CURRENT USE OF INSULIN: Primary | ICD-10-CM

## 2018-11-01 DIAGNOSIS — E11.59 HYPERTENSION ASSOCIATED WITH DIABETES: ICD-10-CM

## 2018-11-01 PROCEDURE — 76536 US EXAM OF HEAD AND NECK: CPT | Mod: TC,PO

## 2018-11-01 PROCEDURE — 3077F SYST BP >= 140 MM HG: CPT | Mod: CPTII,S$GLB,, | Performed by: PHYSICIAN ASSISTANT

## 2018-11-01 PROCEDURE — 99214 OFFICE O/P EST MOD 30 MIN: CPT | Mod: S$GLB,,, | Performed by: PHYSICIAN ASSISTANT

## 2018-11-01 PROCEDURE — 3008F BODY MASS INDEX DOCD: CPT | Mod: CPTII,S$GLB,, | Performed by: PHYSICIAN ASSISTANT

## 2018-11-01 PROCEDURE — 76536 US EXAM OF HEAD AND NECK: CPT | Mod: 26,,, | Performed by: RADIOLOGY

## 2018-11-01 PROCEDURE — 99999 PR PBB SHADOW E&M-EST. PATIENT-LVL IV: CPT | Mod: PBBFAC,,, | Performed by: PHYSICIAN ASSISTANT

## 2018-11-01 PROCEDURE — 3045F PR MOST RECENT HEMOGLOBIN A1C LEVEL 7.0-9.0%: CPT | Mod: CPTII,S$GLB,, | Performed by: PHYSICIAN ASSISTANT

## 2018-11-01 PROCEDURE — 3078F DIAST BP <80 MM HG: CPT | Mod: CPTII,S$GLB,, | Performed by: PHYSICIAN ASSISTANT

## 2018-11-01 RX ORDER — FLUTICASONE PROPIONATE 50 MCG
2 SPRAY, SUSPENSION (ML) NASAL DAILY
Qty: 3 BOTTLE | Refills: 3 | Status: SHIPPED | OUTPATIENT
Start: 2018-11-01 | End: 2018-11-30

## 2018-11-01 RX ORDER — ROSUVASTATIN CALCIUM 5 MG/1
5 TABLET, COATED ORAL DAILY
Qty: 90 TABLET | Refills: 3 | Status: SHIPPED | OUTPATIENT
Start: 2018-11-01 | End: 2019-09-12 | Stop reason: SDUPTHER

## 2018-11-01 RX ORDER — GABAPENTIN 600 MG/1
600 TABLET ORAL 3 TIMES DAILY
COMMUNITY
End: 2018-11-01 | Stop reason: SDUPTHER

## 2018-11-01 RX ORDER — GABAPENTIN 600 MG/1
600 TABLET ORAL 3 TIMES DAILY
Qty: 270 TABLET | Refills: 3 | Status: SHIPPED | OUTPATIENT
Start: 2018-11-01 | End: 2019-09-06 | Stop reason: ALTCHOICE

## 2018-11-01 NOTE — PROGRESS NOTES
Subjective:      Patient ID: Yanci Coats is a 63 y.o. female.    Chief Complaint:  Type 2 DM/nodular thyroid disease        History of Present Illness  Yanic Coats was diagnosed with T2DM in . Metformin started at the time of diagnosis then switched to Janumet. No hospitalizations for DM. + FHx of DM brother, sister, mother. Mother and sister had thyroid disease.    Last HBA1c from  was 8.3 and her current antidiabetic medication regimen consists of actually taking Janumet  BID, Trulicity 1.5mg weekly and glipizide 10mg BID. Jardiance 10 mg (not been able to receive yet).    BG checks 1x per day. Fastin-150  Hypoglycemia: none    Thyroid USS from  showed thyroid nodular disease with several dominant nodules for which she had USS guided FNAB which is till pending.  Patient has not had the biopsy done because of concerns regarding her out of pocket costs.     She is walking one mile five times per week at a track by her house.   DEXA  wnl at [facility]. Not taking vitamin D.  Diabetes education: 9/15  Last eye exam: Dr. Purvis     She received pneumovax in  and flu vaccination .    Review of Systems   Constitutional: Negative for diaphoresis and fatigue.   HENT: Negative for facial swelling, trouble swallowing and voice change.    Eyes: Negative for visual disturbance.   Respiratory: Negative for cough and shortness of breath.    Cardiovascular: Negative for chest pain, palpitations and leg swelling.   Gastrointestinal: Positive for diarrhea. Negative for abdominal pain, nausea and vomiting.   Endocrine: Negative for polyuria.   Genitourinary: Negative for dysuria and menstrual problem (postmenopausal).   Musculoskeletal: Positive for back pain. Negative for gait problem and myalgias.   Skin: Negative for color change, pallor and rash.   Neurological: Negative for seizures and headaches.   Hematological: Does not bruise/bleed easily.   Psychiatric/Behavioral: Negative for  "sleep disturbance.     Objective: BP (!) 144/76 (BP Location: Right arm, Patient Position: Sitting, BP Method: Large (Automatic))   Pulse 67   Temp 98.4 °F (36.9 °C) (Oral)   Resp 18   Ht 5' 7" (1.702 m)   Wt 99.3 kg (218 lb 14.7 oz)   BMI 34.29 kg/m²  Body surface area is 2.17 meters squared.        PE:  GENERAL: middle aged female, well developed, well nourished  NECK: Supple neck, normal thyroid. No bruit  LYMPHATIC: No cervical or supraclavicular lymphadenopathy  CARDIOVASCULAR: Normal heart sounds, no pedal edema  RESPIRATORY: Normal effort, clear to auscultation  ABDOMEN: soft, non-tender, non-distended.  FEET: appropriate footwear.   PSYCH: normal mood and affect    Lab Review:   Personally reviewed labs below:  No visits with results within 3 Month(s) from this visit.   Latest known visit with results is:   Lab Visit on 06/01/2018   Component Date Value Ref Range Status    Thyroglobulin Ab Screen 06/01/2018 <4.0  0.0 - 3.9 IU/mL Final    Thyroglobulin, Tumor Marker 06/01/2018 445* ng/mL Final    Comment: -------------------REFERENCE VALUE--------------------------  Athyrotic <0.1   Intact Thyroid <=33      Thyroglobulin Antibody Screen 06/01/2018 <1.8  <4.0 IU/mL Final    Thyroglobulin Interpretation 06/01/2018 SEE BELOW   Final    Comment: Thyroglobulin (Tg) levels must be interpreted in the context  of TSH levels, serial Tg measurements and radioiodine   ablation status.  Tg levels of > or = 10 ng/mL in athyrotic  individuals on suppressive therapy indicate a significant   (>25%) risk of clinically detectable recurrent   papillary/follicular thyroid cancer.  -------------------ADDITIONAL INFORMATION-------------------  PLEASE NOTE: Thyroglobulin flagging is based on athyrotic  reference values.  The thyroglobulin and thyroglobulin antibody testing   methods are immunoenzymatic assays manufactured by [employer] and performed on the [employer] .  Values obtained from different assay " methods or kits  may be different and cannot be used interchangeably.  The results cannot be interpreted as absolute evidence  for the presence or absence of malignant disease.  Test Performed by:  [facility] - [address] [address]      Thyroperoxidase Antibodies 06/01/2018 <6.0  <6.0 IU/mL Final    Calcitonin 06/01/2018 <5.0  <=7.6 pg/mL Final    Comment: -------------------ADDITIONAL INFORMATION-------------------  The testing method is an electrochemiluminescence   assay manufactured by [employer] and   performed on the [employer] system.   Values obtained with different assay methods or kits   may be different and cannot be used interchangeably.  Test results cannot be interpreted as absolute evidence   for the presence or absence of malignant disease.  Test Performed by:  [facility] - [address] [address]      Chromogranin A 06/01/2018 63  0 - 95 ng/mL Final    Comment: INTERPRETIVE INFORMATION:  Chromogranin A  This test is performed using the [employer] [id number] kit.   Results obtained with different methods or kits cannot be   used interchangeably.  See Compliance Statement D: [employer]/  Performed by [facility],                              [address] [place] [id number] [phone]                    [email or web address], Jame Sharma MD - Lab. Director      Iodine, Serum 06/01/2018 79  40 - 92 ng/mL Final    Comment: -------------------ADDITIONAL INFORMATION-------------------  This test was developed and its performance characteristics   determined by [facility] in a manner consistent with CLIA   requirements. This test has not been cleared or approved by   the U.S. Food and Drug Administration.  Test Performed by:  [facility] - [address] [address]      TSH 06/01/2018 1.123  0.400 - 4.000 uIU/mL  Final    Uric Acid 06/01/2018 8.3* 2.4 - 5.7 mg/dL Final    Vit D, 25-Hydroxy 06/01/2018 26* 30 - 96 ng/mL Final    Comment: Vitamin D deficiency.........<10 ng/mL                              Vitamin D insufficiency......10-29 ng/mL       Vitamin D sufficiency........> or equal to 30 ng/mL  Vitamin D toxicity............>100 ng/mL      Calcium, Ion 06/01/2018 1.30  1.06 - 1.42 mmol/L Final    PTH, Intact 06/01/2018 89.0* 9.0 - 77.0 pg/mL Final      Lab Results   Component Value Date    CHOL 180 01/05/2018    CHOL 286 (H) 07/25/2016    CHOL 275 (H) 04/21/2016     Lab Results   Component Value Date    HDL 57 01/05/2018    HDL 52 07/25/2016    HDL 45 04/21/2016     Lab Results   Component Value Date    LDLCALC 94.2 01/05/2018    LDLCALC 193.4 (H) 07/25/2016    LDLCALC 189.0 (H) 04/21/2016     Lab Results   Component Value Date    TRIG 144 01/05/2018    TRIG 203 (H) 07/25/2016    TRIG 205 (H) 04/21/2016     Lab Results   Component Value Date    CHOLHDL 31.7 01/05/2018    CHOLHDL 18.2 (L) 07/25/2016    CHOLHDL 16.4 (L) 04/21/2016        Assessment:     1. Type 2 diabetes mellitus with hyperglycemia, without long-term current use of insulin  Hemoglobin A1c    GlycoMark (TM)    Fructosamine    Basic metabolic panel    Ambulatory referral to Ophthalmology    empagliflozin (JARDIANCE) 10 mg Tab   2. Neuropathy  gabapentin (NEURONTIN) 600 MG tablet   3. Hyperlipidemia associated with type 2 diabetes mellitus  rosuvastatin (CRESTOR) 5 MG tablet   4. Hypertension associated with diabetes     5. Obesity, Class I, BMI 30-34.9     6. Postmenopausal     7. Hypovitaminosis D  Vitamin D   8. Nodular thyroid disease  US Soft Tissue Head Neck Thyroid   9. H/O multiple allergies  fluticasone (FLONASE) 50 mcg/actuation nasal spray      Type 2 DM-A1c today.Continue Janumet, glipizide and Trulicity  Resend jardiance. Declines DE. Referral to DE. BG checks 2x daily.  Logs to clinic.    Neuropathy-Continue gabapentin.Optimize glycemic  control   Rfgmmpnjtzfoxw-topbpc-Fwmqyrqc crestor. Advised to take aspirin   Hypertension-Chronic-elevated-send log in 2 weeks  Obesity-Body mass index is 34.29 kg/m². Continue walking. Increase to 2 miles daily.  Increases insulin resistance  Postmenopausal-repeat DEXA 12/19  Hypovitaminosis D-Check vitamin D  Nodular thyroid disease-repeat thyroid u/s. Will decide if FNA is still needed.  Allergies-continue flonase  Plan:   F/u in ~ 3 mths

## 2018-11-30 ENCOUNTER — OFFICE VISIT (OUTPATIENT)
Dept: FAMILY MEDICINE | Facility: CLINIC | Age: 63
End: 2018-11-30
Payer: COMMERCIAL

## 2018-11-30 VITALS
SYSTOLIC BLOOD PRESSURE: 134 MMHG | HEART RATE: 82 BPM | OXYGEN SATURATION: 97 % | DIASTOLIC BLOOD PRESSURE: 75 MMHG | TEMPERATURE: 98 F

## 2018-11-30 DIAGNOSIS — J32.4 PANSINUSITIS, UNSPECIFIED CHRONICITY: Primary | ICD-10-CM

## 2018-11-30 PROCEDURE — 99214 OFFICE O/P EST MOD 30 MIN: CPT | Mod: S$GLB,,, | Performed by: FAMILY MEDICINE

## 2018-11-30 PROCEDURE — 99999 PR PBB SHADOW E&M-EST. PATIENT-LVL III: CPT | Mod: PBBFAC,,, | Performed by: FAMILY MEDICINE

## 2018-11-30 PROCEDURE — 3075F SYST BP GE 130 - 139MM HG: CPT | Mod: CPTII,S$GLB,, | Performed by: FAMILY MEDICINE

## 2018-11-30 PROCEDURE — 3078F DIAST BP <80 MM HG: CPT | Mod: CPTII,S$GLB,, | Performed by: FAMILY MEDICINE

## 2018-11-30 RX ORDER — FLUTICASONE PROPIONATE 50 MCG
2 SPRAY, SUSPENSION (ML) NASAL DAILY
Qty: 1 BOTTLE | Refills: 5 | Status: SHIPPED | OUTPATIENT
Start: 2018-11-30 | End: 2020-03-13 | Stop reason: SDUPTHER

## 2018-11-30 RX ORDER — DOXYCYCLINE 100 MG/1
100 CAPSULE ORAL 2 TIMES DAILY
Qty: 20 CAPSULE | Refills: 0 | Status: SHIPPED | OUTPATIENT
Start: 2018-11-30 | End: 2018-12-04

## 2018-11-30 NOTE — PROGRESS NOTES
Subjective:       Patient ID: Yanci Coats is a 63 y.o. female.    Chief Complaint: Cough and Nasal Congestion    New to me patient here for UC visit.      Sinusitis   This is a new problem. The current episode started in the past 7 days. The problem has been gradually worsening since onset. Maximum temperature: low grade. The fever has been present for 3 to 4 days. Associated symptoms include congestion, coughing, sinus pressure and a sore throat. Pertinent negatives include no shortness of breath. (Purulent PND and jaw/tooth pain)     Review of Systems   Constitutional: Negative for fever.   HENT: Positive for congestion, sinus pressure and sore throat.    Respiratory: Positive for cough. Negative for shortness of breath.    Cardiovascular: Negative for chest pain.   Gastrointestinal: Positive for nausea. Negative for abdominal pain and vomiting.   Skin: Negative for rash.   All other systems reviewed and are negative.      Objective:      Physical Exam   Constitutional: She appears well-developed. No distress.   HENT:   Right Ear: Tympanic membrane normal. Tympanic membrane is not erythematous.   Left Ear: Tympanic membrane normal. Tympanic membrane is not erythematous.   Nose: Mucosal edema present. Right sinus exhibits maxillary sinus tenderness. Left sinus exhibits maxillary sinus tenderness.   Mouth/Throat: Posterior oropharyngeal erythema present.   Neck: Neck supple.   Cardiovascular: Normal rate and regular rhythm.   No murmur heard.  Pulmonary/Chest: Effort normal and breath sounds normal.   Lymphadenopathy:     She has no cervical adenopathy.   Skin: Skin is warm and dry.       Assessment:       1. Pansinusitis, unspecified chronicity        Plan:       Yanci was seen today for cough and nasal congestion.    Diagnoses and all orders for this visit:    Pansinusitis, unspecified chronicity    Other orders  -     doxycycline (MONODOX) 100 MG capsule; Take 1 capsule (100 mg total) by mouth 2 (two)  times daily.  -     fluticasone (FLONASE) 50 mcg/actuation nasal spray; 2 sprays (100 mcg total) by Each Nare route once daily.      There are no Patient Instructions on file for this visit.

## 2018-11-30 NOTE — LETTER
November 30, 2018    Employer               [place] [facility]  Family Medicine  [address]  [place] [address]  Phone: [phone]  Fax: [phone]   November 30, 2018     Patient: Yanci Coats   YOB: 1955   Date of Visit: 11/30/2018       To Whom it May Concern:    Yanci Coats was seen in my clinic on 11/30/2018. She may return to work on 11/4/2018.    If you have any questions or concerns, please don't hesitate to call.    Sincerely,         Mahesh Kevin MD

## 2018-12-03 ENCOUNTER — TELEPHONE (OUTPATIENT)
Dept: FAMILY MEDICINE | Facility: CLINIC | Age: 63
End: 2018-12-03

## 2018-12-03 ENCOUNTER — DOCUMENTATION ONLY (OUTPATIENT)
Dept: FAMILY MEDICINE | Facility: CLINIC | Age: 63
End: 2018-12-03

## 2018-12-03 NOTE — TELEPHONE ENCOUNTER
KATERINA Israel LPN   Caller: Unspecified (Today, 11:05 AM)               Thank you for the information. She should see Dr. Kevin again if her symptoms are not improving.

## 2018-12-03 NOTE — TELEPHONE ENCOUNTER
"The pt was seen by Dr. Kevin on Friday 11/30/18.Rx of doxycycline and fluticasone was given to pt. She stated that she's still not feeling better. She's still very congested. Advised pt to finish the abx treatment, the pt stated that doxycycline is "too weak and not doing anything." Advised pt that I will send message to her PCP, Dr. Araya, the pt stated that she only saw Dr. Araya once. She stated "that lady didn't do anything for me." Pt further stated "Ermias is my PCP" and insist message be sent to Ermias. Please advise.   "

## 2018-12-03 NOTE — TELEPHONE ENCOUNTER
Called pt, informed her of Ermias's message. Scheduled pt to come in tomorrow 12/04/18. Pt verbalized understanding.

## 2018-12-03 NOTE — TELEPHONE ENCOUNTER
----- Message from Roger Borden sent at 12/3/2018 10:48 AM CST -----  Type: Needs Medical Advice    Who Called:  Patient  Best Call Back Number: [phone] (home)   Additional Information: Patient not getting any better

## 2018-12-03 NOTE — PROGRESS NOTES
Pre-Visit Chart Review  For Appointment Scheduled on 12/04/2018    Health Maintenance Due   Topic Date Due    Zoster Vaccine  09/16/2016    Eye Exam  08/31/2017    Mammogram  09/04/2017    Colonoscopy  04/26/2018    Pap Smear with HPV Cotest  09/25/2018    Foot Exam  01/05/2019

## 2018-12-04 ENCOUNTER — HOSPITAL ENCOUNTER (OUTPATIENT)
Dept: RADIOLOGY | Facility: CLINIC | Age: 63
Discharge: HOME | End: 2018-12-04
Attending: PHYSICIAN ASSISTANT
Payer: COMMERCIAL

## 2018-12-04 ENCOUNTER — OFFICE VISIT (OUTPATIENT)
Dept: FAMILY MEDICINE | Facility: CLINIC | Age: 63
End: 2018-12-04
Payer: COMMERCIAL

## 2018-12-04 VITALS
HEIGHT: 67 IN | BODY MASS INDEX: 33.74 KG/M2 | DIASTOLIC BLOOD PRESSURE: 79 MMHG | SYSTOLIC BLOOD PRESSURE: 133 MMHG | OXYGEN SATURATION: 98 % | WEIGHT: 214.94 LBS | TEMPERATURE: 98 F | HEART RATE: 90 BPM

## 2018-12-04 DIAGNOSIS — J32.4 PANSINUSITIS, UNSPECIFIED CHRONICITY: ICD-10-CM

## 2018-12-04 DIAGNOSIS — H65.90 FLUID LEVEL BEHIND TYMPANIC MEMBRANE, UNSPECIFIED LATERALITY: ICD-10-CM

## 2018-12-04 DIAGNOSIS — J32.4 PANSINUSITIS, UNSPECIFIED CHRONICITY: Primary | ICD-10-CM

## 2018-12-04 PROCEDURE — 70220 X-RAY EXAM OF SINUSES: CPT | Mod: 26,,, | Performed by: RADIOLOGY

## 2018-12-04 PROCEDURE — 99999 PR PBB SHADOW E&M-EST. PATIENT-LVL V: CPT | Mod: PBBFAC,,, | Performed by: PHYSICIAN ASSISTANT

## 2018-12-04 PROCEDURE — 70220 X-RAY EXAM OF SINUSES: CPT | Mod: TC,FY,PO

## 2018-12-04 PROCEDURE — 3075F SYST BP GE 130 - 139MM HG: CPT | Mod: CPTII,S$GLB,, | Performed by: PHYSICIAN ASSISTANT

## 2018-12-04 PROCEDURE — 99213 OFFICE O/P EST LOW 20 MIN: CPT | Mod: S$GLB,,, | Performed by: PHYSICIAN ASSISTANT

## 2018-12-04 PROCEDURE — 3078F DIAST BP <80 MM HG: CPT | Mod: CPTII,S$GLB,, | Performed by: PHYSICIAN ASSISTANT

## 2018-12-04 PROCEDURE — 3008F BODY MASS INDEX DOCD: CPT | Mod: CPTII,S$GLB,, | Performed by: PHYSICIAN ASSISTANT

## 2018-12-04 RX ORDER — AZITHROMYCIN 250 MG/1
TABLET, FILM COATED ORAL
Qty: 6 TABLET | Refills: 0 | Status: SHIPPED | OUTPATIENT
Start: 2018-12-04 | End: 2018-12-04

## 2018-12-04 RX ORDER — GUAIFENESIN 1200 MG/1
1 TABLET, EXTENDED RELEASE ORAL 2 TIMES DAILY
Qty: 20 TABLET | Refills: 0 | Status: SHIPPED | OUTPATIENT
Start: 2018-12-04 | End: 2018-12-14

## 2018-12-04 RX ORDER — AZITHROMYCIN 250 MG/1
TABLET, FILM COATED ORAL
Qty: 6 TABLET | Refills: 0 | Status: SHIPPED | OUTPATIENT
Start: 2018-12-04 | End: 2018-12-10

## 2018-12-04 NOTE — PROGRESS NOTES
Subjective:       Patient ID: Yanci Coats is a 63 y.o. female.    Chief Complaint: Chest Congestion    Sinus Problem   This is a recurrent problem. The current episode started more than 1 month ago. The problem is unchanged. There has been no fever. Associated symptoms include congestion, coughing, headaches and sinus pressure. Pertinent negatives include no chills, diaphoresis, ear pain, hoarse voice, neck pain, shortness of breath, sneezing, sore throat or swollen glands. Past treatments include antibiotics. The treatment provided no relief.     Review of Systems   Constitutional: Positive for activity change, appetite change and fatigue. Negative for chills, diaphoresis, fever and unexpected weight change.   HENT: Positive for congestion, rhinorrhea, sinus pressure and sinus pain. Negative for ear pain, hoarse voice, postnasal drip, sneezing, sore throat, tinnitus, trouble swallowing and voice change.    Respiratory: Positive for cough. Negative for chest tightness, shortness of breath and wheezing.    Cardiovascular: Negative for chest pain, palpitations and leg swelling.   Gastrointestinal: Positive for nausea. Negative for abdominal pain and vomiting.   Musculoskeletal: Negative for neck pain.   Neurological: Positive for headaches. Negative for dizziness, weakness and light-headedness.       Objective:      Physical Exam   Constitutional: She appears well-developed and well-nourished. She is cooperative. She appears ill. No distress.   HENT:   Head: Normocephalic and atraumatic.   Right Ear: External ear and ear canal normal. A middle ear effusion is present.   Left Ear: External ear and ear canal normal. A middle ear effusion is present.   Nose: Mucosal edema present. No rhinorrhea. Right sinus exhibits maxillary sinus tenderness and frontal sinus tenderness. Left sinus exhibits maxillary sinus tenderness and frontal sinus tenderness.   Mouth/Throat: Oropharynx is clear and moist. Mucous membranes are  not dry. No oropharyngeal exudate, posterior oropharyngeal edema or posterior oropharyngeal erythema.   Cardiovascular: Normal rate and regular rhythm.   No murmur heard.  Pulmonary/Chest: Effort normal and breath sounds normal. She has no wheezes. She has no rales.   Lymphadenopathy:        Head (right side): No tonsillar adenopathy present.        Head (left side): No tonsillar adenopathy present.     She has no cervical adenopathy.   Neurological: She is alert.   Skin: Skin is warm and dry.   Nursing note and vitals reviewed.      Assessment:       1. Pansinusitis, unspecified chronicity    2. Fluid level behind tympanic membrane, unspecified laterality        Plan:       Yanci was seen today for chest congestion.    Diagnoses and all orders for this visit:    Pansinusitis, unspecified chronicity  -     X-Ray Sinuses Min 3 Views; Future    Fluid level behind tympanic membrane, unspecified laterality  -     X-Ray Sinuses Min 3 Views; Future      -     guaiFENesin (MUCINEX) 1,200 mg Ta12; Take 1 tablet by mouth 2 (two) times daily. for 10 days  -     azithromycin (ZITHROMAX Z-LINDA) 250 MG tablet; Tad

## 2018-12-04 NOTE — PATIENT INSTRUCTIONS

## 2018-12-05 ENCOUNTER — TELEPHONE (OUTPATIENT)
Dept: FAMILY MEDICINE | Facility: CLINIC | Age: 63
End: 2018-12-05

## 2018-12-05 NOTE — TELEPHONE ENCOUNTER
Patient stated that after she took the 2 antibiotic pills today she started feeling a little better and her mucus was getting a little better but now the color started to get darker again. She wanted to know if Arabella could increase her dose and she can take 2 pills everyday. I advised the patient that she needs to take the medication as directed and it works over a 10 day period. She wanted to know if I had discussed this with Arabella or not. I advised her that I did not, it's going to take more than one day for her symptoms to get better and she has a follow up with Arabella on Monday for her to be reassesed and at that point she will determine if additional antibiotics are need or not. Patient verbalized understanding.

## 2018-12-07 ENCOUNTER — TELEPHONE (OUTPATIENT)
Dept: FAMILY MEDICINE | Facility: CLINIC | Age: 63
End: 2018-12-07

## 2018-12-07 ENCOUNTER — DOCUMENTATION ONLY (OUTPATIENT)
Dept: FAMILY MEDICINE | Facility: CLINIC | Age: 63
End: 2018-12-07

## 2018-12-07 NOTE — PROGRESS NOTES
Pre-Visit Chart Review  For Appointment Scheduled on 12/10/2018    Health Maintenance Due   Topic Date Due    Zoster Vaccine  09/16/2016    Eye Exam  08/31/2017    Mammogram  09/04/2017    Colonoscopy  04/26/2018    Pap Smear with HPV Cotest  09/25/2018    Foot Exam  01/05/2019

## 2018-12-07 NOTE — TELEPHONE ENCOUNTER
Spoke with patient advised that she should continue the entire course of antibiotics.  I would not expect much improvement after only 1 days treatment.  Advised that taken antibiotics will give her the cumulative affect.  She is on a Z-Samir and the antibiotic will last for 10 days.  She has not been taking Mucinex because she cannot find it without the decongestant.  I encouraged her to look around as this will help to thin her mucus.  I also encouraged her to increase her fluid intake and use nasal saline spray every 3-4 hour she will follow up as scheduled on Monday  She was agreeable to this plan

## 2018-12-10 ENCOUNTER — OFFICE VISIT (OUTPATIENT)
Dept: FAMILY MEDICINE | Facility: CLINIC | Age: 63
End: 2018-12-10
Payer: COMMERCIAL

## 2018-12-10 ENCOUNTER — TELEPHONE (OUTPATIENT)
Dept: FAMILY MEDICINE | Facility: CLINIC | Age: 63
End: 2018-12-10

## 2018-12-10 VITALS
SYSTOLIC BLOOD PRESSURE: 130 MMHG | WEIGHT: 213.19 LBS | BODY MASS INDEX: 33.46 KG/M2 | HEIGHT: 67 IN | DIASTOLIC BLOOD PRESSURE: 77 MMHG | HEART RATE: 94 BPM | TEMPERATURE: 98 F

## 2018-12-10 DIAGNOSIS — J01.01 ACUTE RECURRENT MAXILLARY SINUSITIS: Primary | ICD-10-CM

## 2018-12-10 DIAGNOSIS — Z12.11 SCREEN FOR COLON CANCER: ICD-10-CM

## 2018-12-10 DIAGNOSIS — Z12.4 SCREENING FOR CERVICAL CANCER: ICD-10-CM

## 2018-12-10 DIAGNOSIS — Z12.39 SCREENING FOR BREAST CANCER: ICD-10-CM

## 2018-12-10 PROCEDURE — 3008F BODY MASS INDEX DOCD: CPT | Mod: CPTII,S$GLB,, | Performed by: PHYSICIAN ASSISTANT

## 2018-12-10 PROCEDURE — 3078F DIAST BP <80 MM HG: CPT | Mod: CPTII,S$GLB,, | Performed by: PHYSICIAN ASSISTANT

## 2018-12-10 PROCEDURE — 3075F SYST BP GE 130 - 139MM HG: CPT | Mod: CPTII,S$GLB,, | Performed by: PHYSICIAN ASSISTANT

## 2018-12-10 PROCEDURE — 99213 OFFICE O/P EST LOW 20 MIN: CPT | Mod: S$GLB,,, | Performed by: PHYSICIAN ASSISTANT

## 2018-12-10 PROCEDURE — 99999 PR PBB SHADOW E&M-EST. PATIENT-LVL V: CPT | Mod: PBBFAC,,, | Performed by: PHYSICIAN ASSISTANT

## 2018-12-10 NOTE — PROGRESS NOTES
Subjective:       Patient ID: Yanci Coats is a 63 y.o. female.    Chief Complaint: Follow-up    Sinus Problem   This is a recurrent problem. The current episode started 1 to 4 weeks ago. The problem has been gradually improving since onset. There has been no fever. Associated symptoms include congestion. Pertinent negatives include no chills, coughing, ear pain, shortness of breath, sinus pressure or sneezing. Past treatments include antibiotics and oral decongestants. The treatment provided moderate relief.     Review of Systems   Constitutional: Positive for fatigue. Negative for activity change, appetite change, chills and fever.   HENT: Positive for congestion. Negative for ear pain, postnasal drip, rhinorrhea, sinus pressure, sneezing and voice change.    Respiratory: Negative for cough, chest tightness, shortness of breath and wheezing.    Cardiovascular: Negative for chest pain.   Gastrointestinal: Negative for nausea and vomiting.       Objective:      Physical Exam   Constitutional: She appears well-developed and well-nourished. No distress.   HENT:   Head: Normocephalic and atraumatic.   Right Ear: Tympanic membrane, external ear and ear canal normal.   Left Ear: Tympanic membrane, external ear and ear canal normal.   Nose: Mucosal edema present. No rhinorrhea. Right sinus exhibits no maxillary sinus tenderness and no frontal sinus tenderness. Left sinus exhibits no maxillary sinus tenderness and no frontal sinus tenderness.   Mouth/Throat: Oropharynx is clear and moist. Mucous membranes are not dry. No oropharyngeal exudate, posterior oropharyngeal edema or posterior oropharyngeal erythema. Tonsils are 1+ on the right. Tonsils are 1+ on the left.   Cardiovascular: Normal rate and regular rhythm.   No murmur heard.  Pulmonary/Chest: Effort normal and breath sounds normal. She has no wheezes. She has no rales.   Lymphadenopathy:     She has no cervical adenopathy.   Skin: Skin is warm and dry.    Nursing note and vitals reviewed.      Assessment:       1. Acute recurrent maxillary sinusitis        Plan:     Yanci was seen today for follow-up.    Diagnoses and all orders for this visit:    Acute recurrent maxillary sinusitis clinically resolved  -     Ambulatory referral to ENT    Continue Mucinex and Flonase

## 2018-12-10 NOTE — TELEPHONE ENCOUNTER
Please let patient know that I did a review of her routine health maintenance and she is due for mammogram, Pap smear, colonoscopy.  I have placed sees orders please help her schedule.  Also asked her who her eye doctor is so that we can obtain most recent eye exam report

## 2018-12-12 NOTE — TELEPHONE ENCOUNTER
Rcvd mammogram result from [facility], pt's last mammogram was done on 12/15/17. Advised pt of last mammo date, she stated that she doesn't want to do it this year. Already scheduled pt for colonoscopy and pap smear.

## 2018-12-12 NOTE — TELEPHONE ENCOUNTER
Scheduled pt for Colonoscopy and Pap smear. She stated that she got her mammo done last year at [facility]. Faxed over record request @ [phone] - will call pt back to schedule mammo once last mammo date is obtained. The pt doesn't remember which eye doctor she saw last. She stated she is seeing one in January and will give us a call once she saw them.

## 2018-12-19 ENCOUNTER — OFFICE VISIT (OUTPATIENT)
Dept: SPINE | Facility: CLINIC | Age: 63
End: 2018-12-19
Payer: COMMERCIAL

## 2018-12-19 VITALS
DIASTOLIC BLOOD PRESSURE: 86 MMHG | BODY MASS INDEX: 33.46 KG/M2 | HEIGHT: 67 IN | SYSTOLIC BLOOD PRESSURE: 160 MMHG | WEIGHT: 213.19 LBS | HEART RATE: 77 BPM

## 2018-12-19 DIAGNOSIS — M54.41 CHRONIC MIDLINE LOW BACK PAIN WITH RIGHT-SIDED SCIATICA: Primary | ICD-10-CM

## 2018-12-19 DIAGNOSIS — G89.29 CHRONIC MIDLINE LOW BACK PAIN WITH RIGHT-SIDED SCIATICA: Primary | ICD-10-CM

## 2018-12-19 DIAGNOSIS — M54.2 NECK PAIN: ICD-10-CM

## 2018-12-19 PROCEDURE — 3008F BODY MASS INDEX DOCD: CPT | Mod: ,,, | Performed by: PHYSICAL MEDICINE & REHABILITATION

## 2018-12-19 PROCEDURE — 99204 OFFICE O/P NEW MOD 45 MIN: CPT | Mod: ,,, | Performed by: PHYSICAL MEDICINE & REHABILITATION

## 2018-12-19 PROCEDURE — 3077F SYST BP >= 140 MM HG: CPT | Mod: ,,, | Performed by: PHYSICAL MEDICINE & REHABILITATION

## 2018-12-19 PROCEDURE — 3079F DIAST BP 80-89 MM HG: CPT | Mod: ,,, | Performed by: PHYSICAL MEDICINE & REHABILITATION
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
## 2018-12-21 ENCOUNTER — OFFICE VISIT (OUTPATIENT)
Dept: FAMILY MEDICINE | Facility: CLINIC | Age: 63
End: 2018-12-21
Payer: COMMERCIAL

## 2018-12-21 VITALS
BODY MASS INDEX: 33.49 KG/M2 | HEART RATE: 81 BPM | SYSTOLIC BLOOD PRESSURE: 163 MMHG | WEIGHT: 213.38 LBS | DIASTOLIC BLOOD PRESSURE: 80 MMHG | HEIGHT: 67 IN

## 2018-12-21 DIAGNOSIS — Z01.419 ENCOUNTER FOR WELL WOMAN EXAM: Primary | ICD-10-CM

## 2018-12-21 PROCEDURE — 87624 HPV HI-RISK TYP POOLED RSLT: CPT

## 2018-12-21 PROCEDURE — 99999 PR PBB SHADOW E&M-EST. PATIENT-LVL V: CPT | Mod: PBBFAC,,, | Performed by: NURSE PRACTITIONER

## 2018-12-21 PROCEDURE — 99386 PREV VISIT NEW AGE 40-64: CPT | Mod: S$GLB,,, | Performed by: NURSE PRACTITIONER

## 2018-12-21 PROCEDURE — 3079F DIAST BP 80-89 MM HG: CPT | Mod: CPTII,S$GLB,, | Performed by: NURSE PRACTITIONER

## 2018-12-21 PROCEDURE — 3077F SYST BP >= 140 MM HG: CPT | Mod: CPTII,S$GLB,, | Performed by: NURSE PRACTITIONER

## 2018-12-21 PROCEDURE — 88175 CYTOPATH C/V AUTO FLUID REDO: CPT

## 2018-12-21 NOTE — PROGRESS NOTES
Chief Complaint   Patient presents with    Well Woman       History of Present Illness: Yanci Coats is a 63 y.o. female that presents today 12/21/2018 for well gyn visit.  Pt presents today for well woman exam. G0. Pt has not had an exam in 15 years. She denies any abnormal pap smears in the past. She stopped having cycles at age 59 and has never been on any hormone replacement. Pt is due for a mammogram screening, but reports that she does not wish to have one done. She had one at [facility] imaging last year - will request record. No other complaints or concerns noted.       Past Medical History:   Diagnosis Date    Allergy history unknown     Back pain     Diabetes mellitus type II     GERD (gastroesophageal reflux disease)     Hypertension     LPRD (laryngopharyngeal reflux disease)     Nuclear sclerosis - Both Eyes 7/9/2013       Past Surgical History:   Procedure Laterality Date    COLONOSCOPY N/A 4/26/2013    Performed by [facility] at [facility] END[facility] (4TH FLR)    EGD (ESOPHAGOGASTRODUODENOSCOPY) N/A 4/26/2013    Performed by Bryan Lynn MD at [facility] ENDO (4TH FLR)       Family History   Problem Relation Age of Onset    ANNA disease Father     Heart disease Father     Heart disease Mother     Diabetes Mother     Blindness Mother     Cataracts Mother     Hypertension Mother     Cholelithiasis Sister     ANNA disease Sister     Blindness Maternal Aunt     Diabetes Maternal Aunt     Blindness Paternal Aunt     Stroke Paternal Grandmother     Glaucoma Paternal Grandmother     Cervical cancer Unknown         aunt    Celiac disease Neg Hx     Cirrhosis Neg Hx     Colon cancer Neg Hx     Colon polyps Neg Hx     Crohn's disease Neg Hx     Cystic fibrosis Neg Hx     Esophageal cancer Neg Hx     Hemochromatosis Neg Hx     Inflammatory bowel disease Neg Hx     Irritable bowel syndrome Neg Hx     Liver cancer Neg Hx     Liver disease Neg Hx     Rectal cancer Neg Hx     Stomach  cancer Neg Hx     Ulcerative colitis Neg Hx     Clive's disease Neg Hx     Melanoma Neg Hx     Amblyopia Neg Hx     Cancer Neg Hx     Macular degeneration Neg Hx     Retinal detachment Neg Hx     Strabismus Neg Hx     Thyroid disease Neg Hx     Psoriasis Neg Hx     Lupus Neg Hx     Eczema Neg Hx        Social History     Socioeconomic History    Marital status: Single     Spouse name: None    Number of children: None    Years of education: None    Highest education level: None   Social Needs    Financial resource strain: None    Food insecurity - worry: None    Food insecurity - inability: None    Transportation needs - medical: None    Transportation needs - non-medical: None   Occupational History     Employer: [employer]   Tobacco Use    Smoking status: Never Smoker    Smokeless tobacco: Never Used   Substance and Sexual Activity    Alcohol use: Yes     Comment: occ    Drug use: No    Sexual activity: Not Currently     Partners: Male     Birth control/protection: Post-menopausal   Other Topics Concern    Are you pregnant or think you may be? No    Breast-feeding No   Social History Narrative    Lives with significant other.    No kids.    3 dogs and cat.    No exercise.    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       OB History    Para Term  AB Living   0 0 0 0 0 0   SAB TAB Ectopic Multiple Live Births   0 0 0 0               Current Outpatient Medications   Medication Sig Dispense Refill    amLODIPine (NORVASC) 10 MG tablet Take 1 tablet (10 mg total) by mouth once daily. 90 tablet 3    azelastine (ASTELIN) 137 mcg nasal spray 2 sprays (274 mcg total) by Nasal route 2 (two) times daily. 30 mL 12    baclofen (LIORESAL) 10 MG tablet take 1 tablet by mouth three times a day with food or milk  0    blood sugar diagnostic Strp Test glucose 2x/day. Dispense strips and fastclix lancets for accuchek evelyn meter. 200 each 3    carvedilol (COREG) 12.5 MG tablet Take 1 tablet  "(12.5 mg total) by mouth once daily. 90 tablet 3    dulaglutide (TRULICITY) 1.5 mg/0.5 mL PnIj Inject 1.5 mg into the skin once a week. 12 Syringe 3    empagliflozin (JARDIANCE) 10 mg Tab Take 10 mg by mouth before breakfast. 90 tablet 3    fluticasone (FLONASE) 50 mcg/actuation nasal spray 2 sprays (100 mcg total) by Each Nare route once daily. 1 Bottle 5    furosemide (LASIX) 20 MG tablet Take 2 tablets (40 mg total) by mouth daily as needed. 180 tablet 3    gabapentin (NEURONTIN) 600 MG tablet Take 1 tablet (600 mg total) by mouth 3 (three) times daily. 270 tablet 3    glipiZIDE (GLUCOTROL) 10 MG tablet Take 1 tablet (10 mg total) by mouth 2 (two) times daily. 180 tablet 3    hydrocodone-acetaminophen 7.5-325mg (NORCO) 7.5-325 mg per tablet Take 1 tablet by mouth 2 (two) times daily as needed.       ketoconazole (NIZORAL) 2 % shampoo Lather scalp, let sit 5 min before rinsing. Use 2-3 times per week. 120 mL 5    lancets Misc Please dispense accu chek evelyn lancets , patient test twice a day, ICD E11.9 ,.92 180 each 3    linaclotide (LINZESS) 145 mcg Cap capsule Take 145 mcg by mouth once daily.      metFORMIN (GLUCOPHAGE-XR) 500 MG 24 hr tablet Take 2 tablets (1,000 mg total) by mouth 2 (two) times daily with meals. 360 tablet 3    omeprazole (PRILOSEC) 20 MG capsule Take 2 capsules (40 mg total) by mouth 2 (two) times daily. 180 capsule 1    orphenadrine (NORFLEX) 100 mg tablet Take 100 mg by mouth 2 (two) times daily as needed.       pen needle, diabetic (BD ULTRA-FINE EVELYN PEN NEEDLES) 32 gauge x 5/32" Ndle 1 Device by Misc.(Non-Drug; Combo Route) route 4 (four) times daily with meals and nightly. 100 each 11    rosuvastatin (CRESTOR) 5 MG tablet Take 1 tablet (5 mg total) by mouth once daily. 90 tablet 3    SITagliptan-metformin (JANUMET)  mg per tablet Take 1 tablet by mouth 2 (two) times daily with meals. 180 tablet 3    valsartan-hydrochlorothiazide (DIOVAN-HCT) 320-25 mg per " "tablet Take 1 tablet by mouth once daily. 30 tablet 11     No current facility-administered medications for this visit.        Review of patient's allergies indicates:   Allergen Reactions    Lipitor [atorvastatin]     Penicillins Other (See Comments)     Unknown reaction       Review of Symptoms:  GENERAL: Denies weight gain or weight loss. Feeling well overall.   SKIN: Denies rash or lesions.   HEAD: Denies head injury or headache.   CHEST: Denies chest pain or shortness of breath.   CARDIOVASCULAR: Denies palpitations or left sided chest pain.   ABDOMEN: No abdominal pain, constipation, diarrhea, nausea, vomiting or rectal bleeding.   URINARY: No frequency, dysuria, hematuria, or burning on urination.    BP (!) 163/80   Pulse 81   Ht 5' 7" (1.702 m)   Wt 96.8 kg (213 lb 6.5 oz)     Physical Exam:  APPEARANCE: Well nourished, well developed, in no acute distress.  SKIN: Normal skin turgor, no lesions.  RESPIRATORY: Normal respiratory effort with no retractions or use of accessory muscles  ABDOMEN: Soft. No tenderness or masses. No hepatosplenomegaly. No hernias.  BREASTS: Symmetrical, no skin changes or visible lesions. No palpable masses, nipple discharge or adenopathy bilaterally.  PELVIC: Normal external female genitalia without lesions. Normal hair distribution. Adequate perineal body, normal urethral meatus. Urethra with no masses.  Bladder nontender. Vagina dry with minimal rugae; without lesions or discharge. Cervix pink and without lesions. No significant cystocele or rectocele. Bimanual exam showed uterus normal size, shape, position, mobile and nontender. Adnexa without masses or tenderness. Urethra and bladder normal. PAP DONE    ASSESSMENT/PLAN:  Encounter for well woman exam  -     Liquid-based pap smear, screening  -     HPV High Risk Genotypes, PCR          Patient was counseled today on Pap guidelines, recommendation for pelvic exams, mammograms starting annually at age 40, Colonoscopy after " the age of 50, Dexa Bone Scan and calcium and vitamin D supplementation in menopause and to see her PCP for other health maintenance.       Follow-up:  RTC in 1 year for well woman exam or as needed

## 2018-12-28 LAB
HPV HR 12 DNA CVX QL NAA+PROBE: NEGATIVE
HPV16 AG SPEC QL: NEGATIVE
HPV18 DNA SPEC QL NAA+PROBE: NEGATIVE

## 2019-01-04 ENCOUNTER — OFFICE VISIT (OUTPATIENT)
Dept: OPTOMETRY | Facility: CLINIC | Age: 64
End: 2019-01-04
Payer: COMMERCIAL

## 2019-01-04 ENCOUNTER — OFFICE VISIT (OUTPATIENT)
Dept: SPINE | Facility: CLINIC | Age: 64
End: 2019-01-04
Payer: COMMERCIAL

## 2019-01-04 VITALS — WEIGHT: 213.38 LBS | HEIGHT: 67 IN | BODY MASS INDEX: 33.49 KG/M2

## 2019-01-04 DIAGNOSIS — H35.371 EPIRETINAL MEMBRANE (ERM) OF RIGHT EYE: ICD-10-CM

## 2019-01-04 DIAGNOSIS — M54.12 CERVICAL RADICULOPATHY AT C5: Primary | ICD-10-CM

## 2019-01-04 DIAGNOSIS — H25.13 NUCLEAR SCLEROSIS, BILATERAL: ICD-10-CM

## 2019-01-04 DIAGNOSIS — E11.3391 MODERATE NONPROLIFERATIVE DIABETIC RETINOPATHY OF RIGHT EYE WITHOUT MACULAR EDEMA ASSOCIATED WITH TYPE 2 DIABETES MELLITUS: Primary | ICD-10-CM

## 2019-01-04 DIAGNOSIS — M54.41 CHRONIC MIDLINE LOW BACK PAIN WITH RIGHT-SIDED SCIATICA: ICD-10-CM

## 2019-01-04 DIAGNOSIS — H52.7 REFRACTIVE ERROR: ICD-10-CM

## 2019-01-04 DIAGNOSIS — E11.3292 MILD NONPROLIFERATIVE DIABETIC RETINOPATHY OF LEFT EYE WITHOUT MACULAR EDEMA ASSOCIATED WITH TYPE 2 DIABETES MELLITUS: ICD-10-CM

## 2019-01-04 DIAGNOSIS — G89.29 CHRONIC MIDLINE LOW BACK PAIN WITH RIGHT-SIDED SCIATICA: ICD-10-CM

## 2019-01-04 PROCEDURE — 99999 PR PBB SHADOW E&M-EST. PATIENT-LVL II: ICD-10-PCS | Mod: PBBFAC,,, | Performed by: OPTOMETRIST

## 2019-01-04 PROCEDURE — 92134 POSTERIOR SEGMENT OCT RETINA (OCULAR COHERENCE TOMOGRAPHY)-BOTH EYES: ICD-10-PCS | Mod: S$GLB,,, | Performed by: OPTOMETRIST

## 2019-01-04 PROCEDURE — 99213 OFFICE O/P EST LOW 20 MIN: CPT | Mod: ,,, | Performed by: PHYSICAL MEDICINE & REHABILITATION

## 2019-01-04 PROCEDURE — 3008F BODY MASS INDEX DOCD: CPT | Mod: ,,, | Performed by: PHYSICAL MEDICINE & REHABILITATION

## 2019-01-04 PROCEDURE — 92015 PR REFRACTION: ICD-10-PCS | Mod: S$GLB,,, | Performed by: OPTOMETRIST

## 2019-01-04 PROCEDURE — 92015 DETERMINE REFRACTIVE STATE: CPT | Mod: S$GLB,,, | Performed by: OPTOMETRIST

## 2019-01-04 PROCEDURE — 3008F PR BODY MASS INDEX (BMI) DOCUMENTED: ICD-10-PCS | Mod: ,,, | Performed by: PHYSICAL MEDICINE & REHABILITATION

## 2019-01-04 PROCEDURE — 92134 CPTRZ OPH DX IMG PST SGM RTA: CPT | Mod: S$GLB,,, | Performed by: OPTOMETRIST

## 2019-01-04 PROCEDURE — 99213 PR OFFICE/OUTPT VISIT, EST, LEVL III, 20-29 MIN: ICD-10-PCS | Mod: ,,, | Performed by: PHYSICAL MEDICINE & REHABILITATION

## 2019-01-04 PROCEDURE — 92014 PR EYE EXAM, EST PATIENT,COMPREHESV: ICD-10-PCS | Mod: S$GLB,,, | Performed by: OPTOMETRIST

## 2019-01-04 PROCEDURE — 99999 PR PBB SHADOW E&M-EST. PATIENT-LVL II: CPT | Mod: PBBFAC,,, | Performed by: OPTOMETRIST

## 2019-01-04 PROCEDURE — 92014 COMPRE OPH EXAM EST PT 1/>: CPT | Mod: S$GLB,,, | Performed by: OPTOMETRIST

## 2019-01-04 NOTE — LETTER
January 4, 2019      KATHERINE Monson PA-C  [id number] [facility] [address]           [facility] - Optometry  [address] [place] [address] Drive [address]  [facility] [address]  Phone: [phone]          Patient: Yanci Coats   MR Number: [id number]   YOB: 1955   Date of Visit: 1/4/2019       Dear KATHERINE Monson:    Thank you for referring Yanci Coats to me for evaluation. Attached you will find relevant portions of my assessment and plan of care.    If you have questions, please do not hesitate to call me. I look forward to following Yanci Coats along with you.    Sincerely,    Leonides Moraes, OD    Enclosure  CC:  No Recipients    If you would like to receive this communication electronically, please contact [email or web address] or [phone] to request more information on [employer] access.    For providers and/or their staff who would like to refer a patient to [facility], please contact us through our one-stop-shop provider referral line, Julio Oscar, at [phone].    If you feel you have received this communication in error or would no longer like to receive these types of communications, please e-mail [email or web address]

## 2019-01-04 NOTE — PROGRESS NOTES
SUBJECTIVE:    Patient ID: Yanci Coats is a 63 y.o. female.    Chief Complaint: Results (MRI)    She is here to review her cervical and lumbar MRIs which were done to evaluate her complaints of posterior neck discomfort radiating into the shoulders on both sides.  Also her complaint of low back pain on the right at the lumbosacral junction radiating into the right leg down to the calf.  Unfortunately the images are not available.  I have they report that shows at L3-4 diffuse disc bulge with facet disease causing moderate right neural foraminal narrowing 1 bilateral lateral recess encroachment.  L4-5 diffuse disc bulge with facet disease causing moderate left neural foraminal narrowing mild right neural foraminal narrowing and bilateral lateral recess encroachment.  L5-S1 mild facet disease without disc bulge disc protrusion or evidence of neural foraminal stenosis/central canal stenosis.  Incidentally noted was a large pelvic mass likely uterine fibroid.  Patient says that she is aware that she has fibroids.  I still instructed her to take a copy of the MRI report to her gynecologist.  She can't recall who that is at this time.  The MRI of the cervical spine shows cervical spondylosis primarily at C4-3 for an C4-5.  At C3-4 there is diffuse disc osteophyte complex causing mild left neural foraminal narrowing and at C4-5 diffuse disc osteophyte complex and facet disease causing moderate neural foraminal narrowing bilaterally and mild central canal encroachment.              Past Medical History:   Diagnosis Date    Allergy history unknown     Back pain     Diabetes mellitus type II     GERD (gastroesophageal reflux disease)     Hypertension     LPRD (laryngopharyngeal reflux disease)     Nuclear sclerosis - Both Eyes 7/9/2013     Social History     Socioeconomic History    Marital status: Single     Spouse name: Not on file    Number of children: Not on file    Years of education: Not on file     Highest education level: Not on file   Social Needs    Financial resource strain: Not on file    Food insecurity - worry: Not on file    Food insecurity - inability: Not on file    Transportation needs - medical: Not on file    Transportation needs - non-medical: Not on file   Occupational History     Employer: [employer]   Tobacco Use    Smoking status: Never Smoker    Smokeless tobacco: Never Used   Substance and Sexual Activity    Alcohol use: Yes     Comment: occ    Drug use: No    Sexual activity: Not Currently     Partners: Male     Birth control/protection: Post-menopausal   Other Topics Concern    Are you pregnant or think you may be? No    Breast-feeding No   Social History Narrative    Lives with significant other.    No kids.    3 dogs and cat.    No exercise.    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     Past Surgical History:   Procedure Laterality Date    COLONOSCOPY N/A 4/26/2013    Performed by Bryan Lynn MD at [facility] (4TH FLR)    EGD (ESOPHAGOGASTRODUODENOSCOPY) N/A 4/26/2013    Performed by Bryan Lynn MD at [facility] (4TH FLR)     Family History   Problem Relation Age of Onset    ANNA disease Father     Heart disease Father     Heart disease Mother     Diabetes Mother     Blindness Mother     Cataracts Mother     Hypertension Mother     Cholelithiasis Sister     ANNA disease Sister     Blindness Maternal Aunt     Diabetes Maternal Aunt     Blindness Paternal Aunt     Stroke Paternal Grandmother     Glaucoma Paternal Grandmother     Cervical cancer Unknown         aunt    Celiac disease Neg Hx     Cirrhosis Neg Hx     Colon cancer Neg Hx     Colon polyps Neg Hx     Crohn's disease Neg Hx     Cystic fibrosis Neg Hx     Esophageal cancer Neg Hx     Hemochromatosis Neg Hx     Inflammatory bowel disease Neg Hx     Irritable bowel syndrome Neg Hx     Liver cancer Neg Hx     Liver disease Neg Hx     Rectal cancer Neg Hx     Stomach cancer Neg Hx      "Ulcerative colitis Neg Hx     Clive's disease Neg Hx     Melanoma Neg Hx     Amblyopia Neg Hx     Cancer Neg Hx     Macular degeneration Neg Hx     Retinal detachment Neg Hx     Strabismus Neg Hx     Thyroid disease Neg Hx     Psoriasis Neg Hx     Lupus Neg Hx     Eczema Neg Hx      Vitals:    01/04/19 1031   Weight: 96.8 kg (213 lb 6.5 oz)   Height: 5' 7" (1.702 m)       Review of Systems   Constitutional: Negative for chills, diaphoresis, fatigue, fever and unexpected weight change.   HENT: Negative for trouble swallowing.    Eyes: Negative for visual disturbance.   Respiratory: Negative for shortness of breath.    Cardiovascular: Negative for chest pain.   Gastrointestinal: Negative for abdominal pain, constipation, nausea and vomiting.   Genitourinary: Negative for difficulty urinating.   Musculoskeletal: Negative for arthralgias, back pain, gait problem, joint swelling, myalgias, neck pain and neck stiffness.   Neurological: Negative for dizziness, speech difficulty, weakness, light-headedness, numbness and headaches.          Objective:      Physical Exam   Constitutional: She appears well-developed and well-nourished.   No change           Assessment:       1. Cervical radiculopathy at C5    2. Chronic midline low back pain with right-sided sciatica           Plan:     we will get EMG and nerve conduction study looking for evidence of C5 radiculopathy and also of right S1 radiculopathy.  I will review her films when they become available.  She can follow up with me after the study.      Cervical radiculopathy at C5  -     EMG W/ ULTRASOUND AND NERVE CONDUCTION TEST 3 Extremities; Future    Chronic midline low back pain with right-sided sciatica  -     EMG W/ ULTRASOUND AND NERVE CONDUCTION TEST 3 Extremities; Future        "

## 2019-01-04 NOTE — PROGRESS NOTES
HPI     Presenting Complaint: Pt here today for yearly diabetic eye exam.    Lab Results       Component                Value               Date                       HGBA1C                   7.4 (H)             11/01/2018                 HGBA1C                   8.3 (H)             01/05/2018                 HGBA1C                   10.6 (H)            07/25/2016              Pt stats vision has been stable with currents.     Ophthalmic medication / drops: Allergies drops as needed for itching.    (-) headaches  (-) diplopia   (-) flashes / (+) hx of floaters / right eye worse than left       Last edited by Leonides Moraes, OD on 1/4/2019  2:07 PM. (History)            Assessment /Plan     For exam results, see Encounter Report.    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 Mild NPDR OS. No CSME OU. Discussed possible ocular affects of uncontrolled blood sugar with patient. Recommended continued strong blood sugar control and continued care with PCP. Return in 4-6 months for DM DFE.    Mild NS OU, not yet visually significant. Discussed possible ocular affects of cataracts. Acceptable BCVA OU. Discussed treatment options. Surgery not recommended at this time. Monitor yearly.     Dispensed updated spectacle Rx. Discussed various spectacle lens options. Discussed adaptation period to new specs.     ERM OD. Discussed findings and treatment options. BCVA 20/25 OD. Recheck in 4-6 months.      RTC in 4-6 months for DFE, or sooner prn.

## 2019-01-07 ENCOUNTER — DOCUMENTATION ONLY (OUTPATIENT)
Dept: FAMILY MEDICINE | Facility: CLINIC | Age: 64
End: 2019-01-07

## 2019-01-07 ENCOUNTER — TELEPHONE (OUTPATIENT)
Dept: GASTROENTEROLOGY | Facility: CLINIC | Age: 64
End: 2019-01-07

## 2019-01-07 ENCOUNTER — OFFICE VISIT (OUTPATIENT)
Dept: FAMILY MEDICINE | Facility: CLINIC | Age: 64
End: 2019-01-07
Payer: COMMERCIAL

## 2019-01-07 VITALS
DIASTOLIC BLOOD PRESSURE: 91 MMHG | SYSTOLIC BLOOD PRESSURE: 151 MMHG | HEIGHT: 67 IN | TEMPERATURE: 98 F | WEIGHT: 211.88 LBS | HEART RATE: 80 BPM | BODY MASS INDEX: 33.26 KG/M2

## 2019-01-07 DIAGNOSIS — J01.01 ACUTE RECURRENT MAXILLARY SINUSITIS: Primary | ICD-10-CM

## 2019-01-07 PROCEDURE — 99213 OFFICE O/P EST LOW 20 MIN: CPT | Mod: S$GLB,,, | Performed by: PHYSICIAN ASSISTANT

## 2019-01-07 PROCEDURE — 3080F PR MOST RECENT DIASTOLIC BLOOD PRESSURE >= 90 MM HG: ICD-10-PCS | Mod: CPTII,S$GLB,, | Performed by: PHYSICIAN ASSISTANT

## 2019-01-07 PROCEDURE — 99999 PR PBB SHADOW E&M-EST. PATIENT-LVL V: ICD-10-PCS | Mod: PBBFAC,,, | Performed by: PHYSICIAN ASSISTANT

## 2019-01-07 PROCEDURE — 3077F PR MOST RECENT SYSTOLIC BLOOD PRESSURE >= 140 MM HG: ICD-10-PCS | Mod: CPTII,S$GLB,, | Performed by: PHYSICIAN ASSISTANT

## 2019-01-07 PROCEDURE — 99999 PR PBB SHADOW E&M-EST. PATIENT-LVL V: CPT | Mod: PBBFAC,,, | Performed by: PHYSICIAN ASSISTANT

## 2019-01-07 PROCEDURE — 3008F PR BODY MASS INDEX (BMI) DOCUMENTED: ICD-10-PCS | Mod: CPTII,S$GLB,, | Performed by: PHYSICIAN ASSISTANT

## 2019-01-07 PROCEDURE — 3077F SYST BP >= 140 MM HG: CPT | Mod: CPTII,S$GLB,, | Performed by: PHYSICIAN ASSISTANT

## 2019-01-07 PROCEDURE — 3008F BODY MASS INDEX DOCD: CPT | Mod: CPTII,S$GLB,, | Performed by: PHYSICIAN ASSISTANT

## 2019-01-07 PROCEDURE — 3080F DIAST BP >= 90 MM HG: CPT | Mod: CPTII,S$GLB,, | Performed by: PHYSICIAN ASSISTANT

## 2019-01-07 PROCEDURE — 99213 PR OFFICE/OUTPT VISIT, EST, LEVL III, 20-29 MIN: ICD-10-PCS | Mod: S$GLB,,, | Performed by: PHYSICIAN ASSISTANT

## 2019-01-07 RX ORDER — AZITHROMYCIN 250 MG/1
TABLET, FILM COATED ORAL
Qty: 6 TABLET | Refills: 0 | Status: ON HOLD | OUTPATIENT
Start: 2019-01-07 | End: 2019-01-31 | Stop reason: CLARIF

## 2019-01-07 NOTE — TELEPHONE ENCOUNTER
Contacted pt in regards to scheduling a nurse visit for screening colonoscopy. Nurse visit scheduled for 1/14/19, ok for pt to come in at 1pm.

## 2019-01-07 NOTE — PROGRESS NOTES
Pre-Visit Chart Review  For Appointment Scheduled on 01/07/2019    Health Maintenance Due   Topic Date Due    Zoster Vaccine  09/16/2016    Mammogram  09/04/2017    Colonoscopy  04/26/2018    Foot Exam  01/05/2019    Lipid Panel  01/05/2019

## 2019-01-07 NOTE — PROGRESS NOTES
Subjective:       Patient ID: Yanci Coats is a 63 y.o. female.    Chief Complaint: URI    Sinus Problem   This is a new problem. The current episode started 1 to 4 weeks ago. The problem is unchanged. There has been no fever. Associated symptoms include congestion, ear pain and sinus pressure. Pertinent negatives include no chills, coughing, shortness of breath, sneezing or sore throat. Past treatments include spray decongestants and oral decongestants. The treatment provided no relief.     Review of Systems   Constitutional: Positive for fatigue. Negative for activity change, appetite change, chills and fever.   HENT: Positive for congestion, ear pain, postnasal drip, sinus pressure and sinus pain. Negative for rhinorrhea, sneezing, sore throat and voice change.    Respiratory: Negative for cough, shortness of breath and wheezing.    Cardiovascular: Negative for chest pain.   Gastrointestinal: Negative for nausea and vomiting.       Objective:      Physical Exam   Constitutional: She appears well-developed and well-nourished. She is cooperative. She does not have a sickly appearance. She appears ill. No distress.   HENT:   Head: Normocephalic and atraumatic.   Right Ear: External ear and ear canal normal. A middle ear effusion is present.   Left Ear: External ear and ear canal normal.  No middle ear effusion.   Nose: Mucosal edema present. No rhinorrhea. Right sinus exhibits maxillary sinus tenderness and frontal sinus tenderness. Left sinus exhibits maxillary sinus tenderness and frontal sinus tenderness.   Mouth/Throat: Mucous membranes are not dry. Posterior oropharyngeal edema present. No oropharyngeal exudate or posterior oropharyngeal erythema. Tonsils are 2+ on the right. Tonsils are 2+ on the left.   Cardiovascular: Normal rate and regular rhythm.   No murmur heard.  Pulmonary/Chest: Effort normal and breath sounds normal. She has no wheezes. She has no rales.   Lymphadenopathy:        Head (right  side): No tonsillar adenopathy present.        Head (left side): No tonsillar adenopathy present.     She has no cervical adenopathy.   Neurological: She is alert.   Skin: Skin is warm and dry.   Nursing note and vitals reviewed.      Assessment:       1. Acute recurrent maxillary sinusitis         Plan:       Yanci was seen today for uri.    Diagnoses and all orders for this visit:    Acute recurrent maxillary sinusitis  -     azithromycin (ZITHROMAX Z-LINDA) 250 MG tablet; Tad    Continue flonase

## 2019-01-15 ENCOUNTER — PROCEDURE VISIT (OUTPATIENT)
Dept: PHYSICAL MEDICINE AND REHAB | Facility: CLINIC | Age: 64
End: 2019-01-15
Payer: COMMERCIAL

## 2019-01-15 DIAGNOSIS — Z86.010 HISTORY OF COLON POLYPS: Primary | ICD-10-CM

## 2019-01-15 DIAGNOSIS — G89.29 CHRONIC MIDLINE LOW BACK PAIN WITH RIGHT-SIDED SCIATICA: ICD-10-CM

## 2019-01-15 DIAGNOSIS — M54.16 LUMBAR RADICULITIS: ICD-10-CM

## 2019-01-15 DIAGNOSIS — M54.12 CERVICAL RADICULOPATHY AT C5: ICD-10-CM

## 2019-01-15 DIAGNOSIS — M54.16 LUMBAR RADICULOPATHY: ICD-10-CM

## 2019-01-15 DIAGNOSIS — M54.41 CHRONIC MIDLINE LOW BACK PAIN WITH RIGHT-SIDED SCIATICA: ICD-10-CM

## 2019-01-15 PROCEDURE — 95886 PR EMG COMPLETE, W/ NERVE CONDUCTION STUDIES, 5+ MUSCLES: ICD-10-PCS | Mod: S$GLB,,, | Performed by: PHYSICAL MEDICINE & REHABILITATION

## 2019-01-15 PROCEDURE — 95912 NRV CNDJ TEST 11-12 STUDIES: CPT | Mod: S$GLB,,, | Performed by: PHYSICAL MEDICINE & REHABILITATION

## 2019-01-15 PROCEDURE — 95912 PR NERVE CONDUCTION STUDY; 11 -12 STUDIES: ICD-10-PCS | Mod: S$GLB,,, | Performed by: PHYSICAL MEDICINE & REHABILITATION

## 2019-01-15 PROCEDURE — 95886 MUSC TEST DONE W/N TEST COMP: CPT | Mod: S$GLB,,, | Performed by: PHYSICAL MEDICINE & REHABILITATION

## 2019-01-15 RX ORDER — CARVEDILOL 12.5 MG/1
TABLET ORAL
Qty: 90 TABLET | Refills: 3 | Status: SHIPPED | OUTPATIENT
Start: 2019-01-15 | End: 2020-03-11

## 2019-01-15 NOTE — PROCEDURES
Procedures     [facility]  Brie England D.O.  Physical Medicine and Rehab  Phone: [phone]  Fax: [phone]    Neurography & Electromyography Report              Patient: Yanci Coats   Patient ID: [id number]   Sex:     Date of Birth:     Age:     Notes:     Last visit date: 1/15/2019             Visit date and time: 1/15/2019 08:48   Patient Age on Visit Date:     Referring Physician:     Diagnoses:        Arm and leg pain and weakness        Sensory NCS      Nerve / Sites Rec. Site Onset Lat Peak Lat Ref. NP Amp Ref. PP Amp Ref. Segments Distance Velocity     ms ms ms µV µV µV µV  cm m/s   R Median - Digit II (Antidromic)      Wrist Dig II 3.65 4.17 ?3.40 11.1 ?15.0 43.8 ?20.0 Wrist - Dig II 13 36   L Median - Digit II (Antidromic)      Wrist Dig II 3.18 3.91 ?3.40 16.5 ?15.0 42.9 ?20.0 Wrist - Dig II 13 41   R Ulnar - Digit V (Antidromic)      Wrist Dig V 2.40 3.07 ?3.10 22.3 ?10.0 53.0 ?15.0 Wrist - Dig V 11 46   L Ulnar - Digit V (Antidromic)      Wrist Dig V 2.24 3.02 ?3.10 21.1 ?10.0 45.2 ?15.0 Wrist - Dig V 11 49   R Sural - Ankle (Calf)      Calf Ankle 3.07 3.75 ?4.20 3.2 ?5.0 10.2 ?5.0 Calf - Ankle 14 46   R Superficial peroneal - Ankle      Lat leg Ankle NR NR ?4.40 NR ?5.0 NR ?5.0 Lat leg - Ankle 14 NR           Motor NCS      Nerve / Sites Muscle Latency Ref. Amplitude Ref. Amp % Duration Segments Distance Lat Diff Velocity Ref.     ms ms mV mV % ms  cm ms m/s m/s   L Median - APB      Wrist APB 3.39 ?4.40 12.2 ?4.0 100 7.14 Wrist - APB 7         Elbow APB 7.86  12.1  99 7.03 Elbow - Wrist 23 4.48 51 ?49   R Median - APB      Wrist APB 3.70 ?4.40 12.6 ?4.0 100 7.76 Wrist - APB 7         Elbow APB 8.75  12.5  98.9 6.72 Elbow - Wrist 23 5.05 46 ?49   L Ulnar - ADM      Wrist ADM 3.07 ?3.60 7.2 ?5.0 100 7.19 Wrist - ADM 7         B.Elbow ADM 6.98  7.1  98.3 7.81 B.Elbow - Wrist 23 3.91 59 ?49      A.Elbow ADM 9.11  7.1  98.6 7.81 A.Elbow - B.Elbow 10 2.14 47 ?49   R Ulnar - ADM       Wrist ADM 2.97 ?3.60 10.0 ?5.0 100 6.93 Wrist - ADM 7         B.Elbow ADM 7.40  9.7  97.1 7.08 B.Elbow - Wrist 22 4.43 50 ?49      A.Elbow ADM 9.27  9.3  93.2 7.40 A.Elbow - B.Elbow 10 1.88 53 ?49   R Peroneal - EDB      Ankle EDB 4.74 ?6.20 5.3 ?2.0 100 5.63 Ankle - EDB 8         Fib head EDB 13.33  2.6  48.4  Fib head - Ankle 33 8.59 38 ?39      Pop fossa EDB 14.79  3.8  70.7  Pop fossa - Fib head 10 1.46 69 ?39   R Tibial - AH      Ankle AH 5.73 ?6.00 1.2 ?3.0 100 6.56 Ankle - AH 8         Pop fossa AH 12.08  0.4  36.6  Pop fossa - Ankle 36 6.35 57 ?39           F  Wave      Nerve Fmin Ref.    ms ms   R Tibial - AH 66.15 ?58.00           EMG Summary Table     Spontaneous MUAP Recruitment   Muscle IA Fib PSW Fasc H.F. Amp Dur. PPP Pattern   L. Triceps brachii N None None None None N N N N   R. Triceps brachii N None None None None N N N N   L. Deltoid N None None None None 1+ 1+ 1+ Reduced   R. Deltoid N None None None None 1+ 1+ 1+ Reduced   L. Biceps brachii N None None None None N N N N   R. Biceps brachii N None None None None N N N Reduced   L. Extensor carpi radialis brevis N None None None None N N N N   R. Extensor carpi radialis brevis N None None None None N N N N   L. Brachioradialis N None None None None 1+ 1+ 1+ Reduced   R. Brachioradialis N None None None None 1+ 1+ 1+ Reduced   L. Pronator teres N None None None None N N N N   R. Pronator teres N None None None None 1+ 1+ 1+ Reduced   L. Cervical paraspinals (mid) 1+ None None None None N N N N   R. Cervical paraspinals (mid) 1+ None None None None N N N N   L. Cervical paraspinals (low) 1+ None None None None N N N N   R. Cervical paraspinals (low) 1+ None None None None N N N N   L. First dorsal interosseous N None None None None N N N N   R. First dorsal interosseous N None None None None N N N N   R. Vastus lateralis N None None None None 1+ 1+ 1+ Reduced   R. Biceps femoris (short head) N None None None None 1+ 1+ 1+ Reduced   R. Rectus  femoris N None None None None 1+ 1+ 1+ Reduced   R. Gastrocnemius (Medial head) N None None None None N N N N   R. Tibialis anterior N None None None None 1+ 1+ 1+ Reduced   R. Lumbar paraspinals (mid) 1+ None None None None N N N N   R. Lumbar paraspinals (low) 1+ None None None None N N N N           Summary    The motor conduction test was performed on 6 nerve(s). The results were normal in 2 nerve(s): L Median - APB, R Ulnar - ADM. Results outside the specified normal range were found in 4 nerve(s), as follows:   In the R Median - APB study  o the take off velocity result was reduced for Elbow - Wrist segment   In the L Ulnar - ADM study  o the take off velocity result was reduced for A.Elbow - B.Elbow segment   In the R Peroneal - EDB study  o the take off velocity result was reduced for Fib head - Ankle segment   In the R Tibial - AH study  o the peak amplitude result was reduced for Ankle stimulation    The sensory conduction test was performed on 6 nerve(s). The results were normal in 2 nerve(s): R Ulnar - Digit V (Antidromic), L Ulnar - Digit V (Antidromic). Results outside the specified normal range were found in 4 nerve(s), as follows:   In the R Median - Digit II (Antidromic) study  o the peak latency result was increased for Wrist stimulation  o the peak amplitude result was reduced for Wrist stimulation   In the L Median - Digit II (Antidromic) study  o the peak latency result was increased for Wrist stimulation   In the R Sural - Ankle (Calf) study  o the peak amplitude result was reduced for Calf stimulation   In the R Superficial peroneal - Ankle study  o the response was considered absent for Lat leg stimulation    The F wave study was performed on 2 nerve(s). There were no results within the specified normal range. Findings were unremarkable in 1 nerve(s): R Peroneal - EDB. Results outside the specified normal range were found in 1 nerve(s), as follows:   In the R Tibial - AH  study  o cursor 1 latency result was increased    The needle EMG examination was performed in 25 muscles. It was normal in 9 muscle(s): L. Triceps brachii, R. Triceps brachii, L. Biceps brachii, L. Extensor carpi radialis brevis, R. Extensor carpi radialis brevis, L. Pronator teres, L. First dorsal interosseous, R. First dorsal interosseous, R. Gastrocnemius (Medial head). The study was abnormal in 16 muscle(s), with the following distribution:   Abnormal spontaneous/insertional activity was found in L. Cervical paraspinals (mid), R. Cervical paraspinals (mid), L. Cervical paraspinals (low), R. Cervical paraspinals (low), R. Lumbar paraspinals (mid), R. Lumbar paraspinals (low).   The MUP waveform abnormality was found in L. Deltoid, R. Deltoid, L. Brachioradialis, R. Brachioradialis, R. Pronator teres, R. Vastus lateralis, R. Biceps femoris (short head), R. Rectus femoris, R. Tibialis anterior.   Abnormal interference pattern was found in L. Deltoid, R. Deltoid, R. Biceps brachii, L. Brachioradialis, R. Brachioradialis, R. Pronator teres, R. Vastus lateralis, R. Biceps femoris (short head), R. Rectus femoris, R. Tibialis anterior.              Conclusion:     Moderate right carpal tunnel syndrome  Mild left carpal tunnel syndrome  Mild left ulnar neuropathy at elbow  Moderate bilateral C5/C6 chronic radiculopathy with no active denervation  Moderate right L4/L5 chronic radiculopathy with no active denervation  Superimposed mild sensorimotor axonal neuropathy        ____________________________  Brie England D.O.

## 2019-01-18 ENCOUNTER — TELEPHONE (OUTPATIENT)
Dept: PAIN MEDICINE | Facility: CLINIC | Age: 64
End: 2019-01-18

## 2019-01-18 ENCOUNTER — OFFICE VISIT (OUTPATIENT)
Dept: SPINE | Facility: CLINIC | Age: 64
End: 2019-01-18
Payer: COMMERCIAL

## 2019-01-18 VITALS — HEIGHT: 67 IN | WEIGHT: 211.88 LBS | BODY MASS INDEX: 33.26 KG/M2

## 2019-01-18 DIAGNOSIS — E11.65 UNCONTROLLED TYPE 2 DIABETES MELLITUS WITH HYPERGLYCEMIA: ICD-10-CM

## 2019-01-18 DIAGNOSIS — G56.03 CARPAL TUNNEL SYNDROME, BILATERAL: ICD-10-CM

## 2019-01-18 DIAGNOSIS — M54.12 CERVICAL RADICULOPATHY AT C5: Primary | ICD-10-CM

## 2019-01-18 DIAGNOSIS — M54.41 CHRONIC MIDLINE LOW BACK PAIN WITH RIGHT-SIDED SCIATICA: ICD-10-CM

## 2019-01-18 DIAGNOSIS — G89.29 CHRONIC MIDLINE LOW BACK PAIN WITH RIGHT-SIDED SCIATICA: ICD-10-CM

## 2019-01-18 PROCEDURE — 99213 PR OFFICE/OUTPT VISIT, EST, LEVL III, 20-29 MIN: ICD-10-PCS | Mod: ,,, | Performed by: PHYSICAL MEDICINE & REHABILITATION

## 2019-01-18 PROCEDURE — 99213 OFFICE O/P EST LOW 20 MIN: CPT | Mod: ,,, | Performed by: PHYSICAL MEDICINE & REHABILITATION

## 2019-01-18 PROCEDURE — 3008F BODY MASS INDEX DOCD: CPT | Mod: ,,, | Performed by: PHYSICAL MEDICINE & REHABILITATION

## 2019-01-18 PROCEDURE — 3008F PR BODY MASS INDEX (BMI) DOCUMENTED: ICD-10-PCS | Mod: ,,, | Performed by: PHYSICAL MEDICINE & REHABILITATION

## 2019-01-18 RX ORDER — GLIPIZIDE 10 MG/1
10 TABLET ORAL 2 TIMES DAILY
Qty: 180 TABLET | Refills: 3 | Status: SHIPPED | OUTPATIENT
Start: 2019-01-18 | End: 2019-03-04 | Stop reason: SDUPTHER

## 2019-01-18 NOTE — PROGRESS NOTES
SUBJECTIVE:    Patient ID: Yanci Coats is a 63 y.o. female.    Chief Complaint: Follow-up (MRI and EMG )    She is here to review her EMG and nerve conduction studies which were done to evaluate her complaints of bilateral upper arm discomfort and right leg discomfort.  We reviewed the EMG line by line and also had the opportunity to review her cervical and lumbar MRIs.  The EMG shows moderate right carpal tunnel syndrome and mild left carpal tunnel syndrome.  The patient continues to have intermittent numbness and tingling in both hands.  I will send her for evaluation with Dr. England to consider carpal tunnel injection.  There was a finding of mild left ulnar neuropathy at the elbow.  The patient has no clear symptoms referable to the ulnar nerve distribution.  I am not recommending further intervention for that issue at this time.  There was moderate bilateral C5/C6 chronic radiculopathy with no active denervation.  The patient continues to have some discomfort into the bilateral shoulders which maybe C5 related.  I am recommending interlaminar epidural steroid injection of the cervical spine.  There was moderate right L4/L5 chronic radiculopathy with no active denervation.  Clinically the patient has intermittent right leg discomfort that at times radiates into the posterior portion of the calf.  Recommendations there will be right transforaminal epidural injection at L4-5 and L5-S1.  Lastly she had a superimposed mild sensory motor axonal neuropathy likely on the basis of her 15 year history of diabetes.           Past Medical History:   Diagnosis Date    Allergy history unknown     Back pain     Diabetes mellitus type II     GERD (gastroesophageal reflux disease)     Hypertension     LPRD (laryngopharyngeal reflux disease)     Nuclear sclerosis - Both Eyes 7/9/2013     Social History     Socioeconomic History    Marital status: Single     Spouse name: Not on file    Number of children: Not on  file    Years of education: Not on file    Highest education level: Not on file   Social Needs    Financial resource strain: Not on file    Food insecurity - worry: Not on file    Food insecurity - inability: Not on file    Transportation needs - medical: Not on file    Transportation needs - non-medical: Not on file   Occupational History     Employer: [employer]   Tobacco Use    Smoking status: Never Smoker    Smokeless tobacco: Never Used   Substance and Sexual Activity    Alcohol use: Yes     Comment: occ    Drug use: No    Sexual activity: Not Currently     Partners: Male     Birth control/protection: Post-menopausal   Other Topics Concern    Are you pregnant or think you may be? No    Breast-feeding No   Social History Narrative    Lives with significant other.    No kids.    3 dogs and cat.    No exercise.    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     Past Surgical History:   Procedure Laterality Date    COLONOSCOPY N/A 4/26/2013    Performed by [facility] at [facility] (4TH FLR)    EGD (ESOPHAGOGASTRODUODENOSCOPY) N/A 4/26/2013    Performed by Bryan [facility] at [facility] ENDO (4TH FLR)     Family History   Problem Relation Age of Onset    ANNA disease Father     Heart disease Father     Heart disease Mother     Diabetes Mother     Blindness Mother     Cataracts Mother     Hypertension Mother     Cholelithiasis Sister     ANNA disease Sister     Blindness Maternal Aunt     Diabetes Maternal Aunt     Blindness Paternal Aunt     Stroke Paternal Grandmother     Glaucoma Paternal Grandmother     Cervical cancer Unknown         aunt    Retinal detachment Brother     Celiac disease Neg Hx     Cirrhosis Neg Hx     Colon cancer Neg Hx     Colon polyps Neg Hx     Crohn's disease Neg Hx     Cystic fibrosis Neg Hx     Esophageal cancer Neg Hx     Hemochromatosis Neg Hx     Inflammatory bowel disease Neg Hx     Irritable bowel syndrome Neg Hx     Liver cancer Neg Hx      "Liver disease Neg Hx     Rectal cancer Neg Hx     Stomach cancer Neg Hx     Ulcerative colitis Neg Hx     Clive's disease Neg Hx     Melanoma Neg Hx     Amblyopia Neg Hx     Cancer Neg Hx     Macular degeneration Neg Hx     Strabismus Neg Hx     Thyroid disease Neg Hx     Psoriasis Neg Hx     Lupus Neg Hx     Eczema Neg Hx      Vitals:    01/18/19 1049   Weight: 96.1 kg (211 lb 13.8 oz)   Height: 5' 7" (1.702 m)       Review of Systems   Constitutional: Negative for chills, diaphoresis, fatigue, fever and unexpected weight change.   HENT: Negative for trouble swallowing.    Eyes: Negative for visual disturbance.   Respiratory: Negative for shortness of breath.    Cardiovascular: Negative for chest pain.   Gastrointestinal: Negative for abdominal pain, constipation, nausea and vomiting.   Genitourinary: Negative for difficulty urinating.   Musculoskeletal: Negative for arthralgias, back pain, gait problem, joint swelling, myalgias, neck pain and neck stiffness.   Neurological: Negative for dizziness, speech difficulty, weakness, light-headedness, numbness and headaches.          Objective:      Physical Exam   Constitutional: She appears well-developed and well-nourished.   Not examined           Assessment:       1. Cervical radiculopathy at C5    2. Chronic midline low back pain with right-sided sciatica    3. Carpal tunnel syndrome, bilateral           Plan:     I am going to send her for consultation with Dr. Barrientos to consider injection of the carpal tunnels.  The we will arrange transforaminal epidural steroid injections on the right at L4-5 and L5-S1.  The patient is concerned about her employment in so much as she occasionally needs to work from home due to flare ups of low back pain.  Patient certainly has enough degenerative changes in the lumbar spine and EMG evidence of radiculopathy to expect intermittent bouts of low back pain which I suspect at times could be debilitating enough to " require her to work from home.        Cervical radiculopathy at C5    Chronic midline low back pain with right-sided sciatica    Carpal tunnel syndrome, bilateral

## 2019-01-18 NOTE — TELEPHONE ENCOUNTER
----- Message from Ayana Flores sent at 1/18/2019 11:54 AM CST -----  Contact: [phone]  Patient requesting a refill on glipizide.    Patient will be using   [facility] - [place], [place] - [address]  [address]  [place] [address]  Phone: [phone] Fax: [phone]    Please call patient at [phone]. Thanks!

## 2019-01-18 NOTE — TELEPHONE ENCOUNTER
----- Message from Chaitanya Mc MD sent at 1/18/2019 11:15 AM CST -----  Please schedule for transforaminal SANTY at L4-5 and L5-S1 on the right

## 2019-01-24 ENCOUNTER — INITIAL CONSULT (OUTPATIENT)
Dept: PHYSICAL MEDICINE AND REHAB | Facility: CLINIC | Age: 64
End: 2019-01-24
Payer: COMMERCIAL

## 2019-01-24 VITALS
SYSTOLIC BLOOD PRESSURE: 154 MMHG | HEIGHT: 67 IN | HEART RATE: 90 BPM | WEIGHT: 211 LBS | BODY MASS INDEX: 33.12 KG/M2 | DIASTOLIC BLOOD PRESSURE: 84 MMHG

## 2019-01-24 DIAGNOSIS — R52 PAIN: Primary | ICD-10-CM

## 2019-01-24 PROCEDURE — 99499 UNLISTED E&M SERVICE: CPT | Mod: S$GLB,,, | Performed by: PHYSICAL MEDICINE & REHABILITATION

## 2019-01-24 PROCEDURE — 99999 PR PBB SHADOW E&M-EST. PATIENT-LVL IV: ICD-10-PCS | Mod: PBBFAC,,, | Performed by: PHYSICAL MEDICINE & REHABILITATION

## 2019-01-24 PROCEDURE — 99499 NO LOS: ICD-10-PCS | Mod: S$GLB,,, | Performed by: PHYSICAL MEDICINE & REHABILITATION

## 2019-01-24 PROCEDURE — 99999 PR PBB SHADOW E&M-EST. PATIENT-LVL IV: CPT | Mod: PBBFAC,,, | Performed by: PHYSICAL MEDICINE & REHABILITATION

## 2019-01-28 ENCOUNTER — OFFICE VISIT (OUTPATIENT)
Dept: SPINE | Facility: CLINIC | Age: 64
End: 2019-01-28
Payer: COMMERCIAL

## 2019-01-28 VITALS
BODY MASS INDEX: 33.04 KG/M2 | HEART RATE: 74 BPM | SYSTOLIC BLOOD PRESSURE: 154 MMHG | WEIGHT: 211 LBS | DIASTOLIC BLOOD PRESSURE: 85 MMHG

## 2019-01-28 DIAGNOSIS — M54.5 ACUTE LEFT-SIDED LOW BACK PAIN, WITH SCIATICA PRESENCE UNSPECIFIED: Primary | ICD-10-CM

## 2019-01-28 PROCEDURE — 3079F PR MOST RECENT DIASTOLIC BLOOD PRESSURE 80-89 MM HG: ICD-10-PCS | Mod: ,,, | Performed by: PHYSICAL MEDICINE & REHABILITATION

## 2019-01-28 PROCEDURE — 3008F BODY MASS INDEX DOCD: CPT | Mod: ,,, | Performed by: PHYSICAL MEDICINE & REHABILITATION

## 2019-01-28 PROCEDURE — 3008F PR BODY MASS INDEX (BMI) DOCUMENTED: ICD-10-PCS | Mod: ,,, | Performed by: PHYSICAL MEDICINE & REHABILITATION

## 2019-01-28 PROCEDURE — 99213 OFFICE O/P EST LOW 20 MIN: CPT | Mod: ,,, | Performed by: PHYSICAL MEDICINE & REHABILITATION

## 2019-01-28 PROCEDURE — 3077F SYST BP >= 140 MM HG: CPT | Mod: ,,, | Performed by: PHYSICAL MEDICINE & REHABILITATION

## 2019-01-28 PROCEDURE — 3077F PR MOST RECENT SYSTOLIC BLOOD PRESSURE >= 140 MM HG: ICD-10-PCS | Mod: ,,, | Performed by: PHYSICAL MEDICINE & REHABILITATION

## 2019-01-28 PROCEDURE — 99213 PR OFFICE/OUTPT VISIT, EST, LEVL III, 20-29 MIN: ICD-10-PCS | Mod: ,,, | Performed by: PHYSICAL MEDICINE & REHABILITATION

## 2019-01-28 PROCEDURE — 3079F DIAST BP 80-89 MM HG: CPT | Mod: ,,, | Performed by: PHYSICAL MEDICINE & REHABILITATION

## 2019-01-28 RX ORDER — METHYLPREDNISOLONE ACETATE 40 MG/ML
40 INJECTION, SUSPENSION INTRA-ARTICULAR; INTRALESIONAL; INTRAMUSCULAR; SOFT TISSUE
Status: DISCONTINUED | OUTPATIENT
Start: 2019-01-28 | End: 2019-05-08

## 2019-01-28 NOTE — PROGRESS NOTES
SUBJECTIVE:    Patient ID: Yanci Coats is a 63 y.o. female.    Chief Complaint: Back Pain (mid lower back pain radiating in L leg  )    She is here with complaints of acute primarily left-sided low back pain.  She says that she picked up a 12 lb box yesterday and had mild pain at the lumbosacral junction.  This morning she woke up still feeling a bit of pain in the lower back.  She managed to go to work today and decided she would go for a walk at lunch and when she went to put her left leg into her sweat pants at got hung up on the inside of the pat leg and she had acute worsening of the left-sided low back pain.  She presents to me now with primarily left-sided low back pain at the lumbosacral junction with some radiating discomfort into the left leg particularly the left lateral hip.  She has no new or progressive neurological problems.  Of note she was scheduled to have a carpal tunnel injection with Dr. Barrientos but the patient was rescheduled due to having an elevated blood pressure.  Also I note that she is scheduled for a colonoscopy this week so we need to avoid nonsteroidal drugs.          Past Medical History:   Diagnosis Date    Allergy history unknown     Back pain     Diabetes mellitus type II     GERD (gastroesophageal reflux disease)     Hypertension     LPRD (laryngopharyngeal reflux disease)     Nuclear sclerosis - Both Eyes 7/9/2013     Social History     Socioeconomic History    Marital status: Single     Spouse name: Not on file    Number of children: Not on file    Years of education: Not on file    Highest education level: Not on file   Social Needs    Financial resource strain: Not on file    Food insecurity - worry: Not on file    Food insecurity - inability: Not on file    Transportation needs - medical: Not on file    Transportation needs - non-medical: Not on file   Occupational History     Employer: [employer]   Tobacco Use    Smoking status: Never Smoker     Smokeless tobacco: Never Used   Substance and Sexual Activity    Alcohol use: Yes     Comment: occ    Drug use: No    Sexual activity: Not Currently     Partners: Male     Birth control/protection: Post-menopausal   Other Topics Concern    Are you pregnant or think you may be? No    Breast-feeding No   Social History Narrative    Lives with significant other.    No kids.    3 dogs and cat.    No exercise.    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     Past Surgical History:   Procedure Laterality Date    COLONOSCOPY N/A 4/26/2013    Performed by [facility] at [facility] (4TH FLR)    EGD (ESOPHAGOGASTRODUODENOSCOPY) N/A 4/26/2013    Performed by Bryan [facility] at [facility] END[facility] (4TH FLR)     Family History   Problem Relation Age of Onset    ANNA disease Father     Heart disease Father     Heart disease Mother     Diabetes Mother     Blindness Mother     Cataracts Mother     Hypertension Mother     Cholelithiasis Sister     ANNA disease Sister     Blindness Maternal Aunt     Diabetes Maternal Aunt     Blindness Paternal Aunt     Stroke Paternal Grandmother     Glaucoma Paternal Grandmother     Cervical cancer Unknown         aunt    Retinal detachment Brother     Celiac disease Neg Hx     Cirrhosis Neg Hx     Colon cancer Neg Hx     Colon polyps Neg Hx     Crohn's disease Neg Hx     Cystic fibrosis Neg Hx     Esophageal cancer Neg Hx     Hemochromatosis Neg Hx     Inflammatory bowel disease Neg Hx     Irritable bowel syndrome Neg Hx     Liver cancer Neg Hx     Liver disease Neg Hx     Rectal cancer Neg Hx     Stomach cancer Neg Hx     Ulcerative colitis Neg Hx     Clive's disease Neg Hx     Melanoma Neg Hx     Amblyopia Neg Hx     Cancer Neg Hx     Macular degeneration Neg Hx     Strabismus Neg Hx     Thyroid disease Neg Hx     Psoriasis Neg Hx     Lupus Neg Hx     Eczema Neg Hx      Vitals:    01/28/19 1305   BP: (!) 154/85   Pulse: 74   Weight: 95.7 kg (210 lb 15.7  oz)       Review of Systems   Constitutional: Negative for chills, diaphoresis, fatigue, fever and unexpected weight change.   HENT: Negative for trouble swallowing.    Eyes: Negative for visual disturbance.   Respiratory: Negative for shortness of breath.    Cardiovascular: Negative for chest pain.   Gastrointestinal: Negative for abdominal pain, constipation, nausea and vomiting.   Genitourinary: Negative for difficulty urinating.   Musculoskeletal: Negative for arthralgias, back pain, gait problem, joint swelling, myalgias, neck pain and neck stiffness.   Neurological: Negative for dizziness, speech difficulty, weakness, light-headedness, numbness and headaches.          Objective:      Physical Exam   Constitutional: She appears well-developed and well-nourished.   No change           Assessment:       1. Acute left-sided low back pain, with sciatica presence unspecified           Plan:     she is having an acute flare up of chronic low back pain.  We will give her shot of Depo-Medrol here in the office.  I do not think that her current blood pressure prohibits the use of steroids.  She can continue gabapentin which seems to work for her.  She is reluctant to take tramadol which has not traditionally helped her or hydrocodone which causes too much sedation.  Consider outpatient physical therapy.  She can contact the office in 1-2 days with an update on her progress.  Consider trying to get her bumped up for epidural steroid injection      Acute left-sided low back pain, with sciatica presence unspecified    Other orders  -     methylPREDNISolone acetate injection 40 mg

## 2019-01-31 ENCOUNTER — HOSPITAL ENCOUNTER (OUTPATIENT)
Facility: HOSPITAL | Age: 64
Discharge: HOME | End: 2019-01-31
Attending: INTERNAL MEDICINE | Admitting: INTERNAL MEDICINE
Payer: COMMERCIAL

## 2019-01-31 ENCOUNTER — ANESTHESIA (OUTPATIENT)
Dept: ENDOSCOPY | Facility: HOSPITAL | Age: 64
End: 2019-01-31
Payer: COMMERCIAL

## 2019-01-31 ENCOUNTER — ANESTHESIA EVENT (OUTPATIENT)
Dept: ENDOSCOPY | Facility: HOSPITAL | Age: 64
End: 2019-01-31
Payer: COMMERCIAL

## 2019-01-31 VITALS
DIASTOLIC BLOOD PRESSURE: 74 MMHG | OXYGEN SATURATION: 100 % | SYSTOLIC BLOOD PRESSURE: 147 MMHG | RESPIRATION RATE: 18 BRPM | TEMPERATURE: 98 F | HEART RATE: 76 BPM | WEIGHT: 208 LBS | HEIGHT: 68 IN | BODY MASS INDEX: 31.52 KG/M2

## 2019-01-31 DIAGNOSIS — K57.30 DIVERTICULOSIS OF LARGE INTESTINE WITHOUT HEMORRHAGE: ICD-10-CM

## 2019-01-31 DIAGNOSIS — K63.5 POLYP OF COLON, UNSPECIFIED PART OF COLON, UNSPECIFIED TYPE: Primary | ICD-10-CM

## 2019-01-31 DIAGNOSIS — Z86.010 HISTORY OF COLON POLYPS: ICD-10-CM

## 2019-01-31 DIAGNOSIS — K64.8 INTERNAL HEMORRHOIDS: ICD-10-CM

## 2019-01-31 PROBLEM — Z86.0100 HISTORY OF COLON POLYPS: Status: ACTIVE | Noted: 2019-01-31

## 2019-01-31 PROCEDURE — 45385 COLONOSCOPY W/LESION REMOVAL: CPT | Mod: 22,,, | Performed by: INTERNAL MEDICINE

## 2019-01-31 PROCEDURE — 27201028 HC NEEDLE, SCLERO: Performed by: INTERNAL MEDICINE

## 2019-01-31 PROCEDURE — 45381 PR COLONOSCPY,FLEX,W/DIR SUBMUC INJECT: ICD-10-PCS | Mod: 22,51,, | Performed by: INTERNAL MEDICINE

## 2019-01-31 PROCEDURE — 37000009 HC ANESTHESIA EA ADD 15 MINS: Performed by: INTERNAL MEDICINE

## 2019-01-31 PROCEDURE — D9220A PRA ANESTHESIA: ICD-10-PCS | Mod: 33,CRNA,, | Performed by: NURSE ANESTHETIST, CERTIFIED REGISTERED

## 2019-01-31 PROCEDURE — 27201089 HC SNARE, DISP (ANY): Performed by: INTERNAL MEDICINE

## 2019-01-31 PROCEDURE — 25000003 PHARM REV CODE 250: Performed by: INTERNAL MEDICINE

## 2019-01-31 PROCEDURE — D9220A PRA ANESTHESIA: Mod: 33,ANES,, | Performed by: ANESTHESIOLOGY

## 2019-01-31 PROCEDURE — 63600175 PHARM REV CODE 636 W HCPCS: Performed by: NURSE ANESTHETIST, CERTIFIED REGISTERED

## 2019-01-31 PROCEDURE — 37000008 HC ANESTHESIA 1ST 15 MINUTES: Performed by: INTERNAL MEDICINE

## 2019-01-31 PROCEDURE — 27200997: Performed by: INTERNAL MEDICINE

## 2019-01-31 PROCEDURE — 88305 TISSUE SPECIMEN TO PATHOLOGY - SURGERY: ICD-10-PCS | Mod: 26,,, | Performed by: PATHOLOGY

## 2019-01-31 PROCEDURE — D9220A PRA ANESTHESIA: Mod: 33,CRNA,, | Performed by: NURSE ANESTHETIST, CERTIFIED REGISTERED

## 2019-01-31 PROCEDURE — 45385 PR COLONOSCOPY,REMV LESN,SNARE: ICD-10-PCS | Mod: 22,,, | Performed by: INTERNAL MEDICINE

## 2019-01-31 PROCEDURE — D9220A PRA ANESTHESIA: ICD-10-PCS | Mod: 33,ANES,, | Performed by: ANESTHESIOLOGY

## 2019-01-31 PROCEDURE — 88305 TISSUE EXAM BY PATHOLOGIST: CPT | Performed by: PATHOLOGY

## 2019-01-31 PROCEDURE — 45385 COLONOSCOPY W/LESION REMOVAL: CPT | Performed by: INTERNAL MEDICINE

## 2019-01-31 PROCEDURE — 45381 COLONOSCOPY SUBMUCOUS NJX: CPT | Performed by: INTERNAL MEDICINE

## 2019-01-31 PROCEDURE — 45381 COLONOSCOPY SUBMUCOUS NJX: CPT | Mod: 22,51,, | Performed by: INTERNAL MEDICINE

## 2019-01-31 RX ORDER — LIDOCAINE HCL/PF 100 MG/5ML
SYRINGE (ML) INTRAVENOUS
Status: DISCONTINUED | OUTPATIENT
Start: 2019-01-31 | End: 2019-01-31

## 2019-01-31 RX ORDER — LIDOCAINE HYDROCHLORIDE 20 MG/ML
INJECTION, SOLUTION EPIDURAL; INFILTRATION; INTRACAUDAL; PERINEURAL
Status: DISCONTINUED
Start: 2019-01-31 | End: 2019-01-31 | Stop reason: HOSPADM

## 2019-01-31 RX ORDER — SODIUM CHLORIDE 9 MG/ML
INJECTION, SOLUTION INTRAVENOUS CONTINUOUS
Status: DISCONTINUED | OUTPATIENT
Start: 2019-01-31 | End: 2019-01-31 | Stop reason: HOSPADM

## 2019-01-31 RX ORDER — PROPOFOL 10 MG/ML
VIAL (ML) INTRAVENOUS
Status: DISCONTINUED | OUTPATIENT
Start: 2019-01-31 | End: 2019-01-31

## 2019-01-31 RX ORDER — PROPOFOL 10 MG/ML
INJECTION, EMULSION INTRAVENOUS
Status: COMPLETED
Start: 2019-01-31 | End: 2019-01-31

## 2019-01-31 RX ORDER — NAPROXEN SODIUM 220 MG/1
81 TABLET, FILM COATED ORAL DAILY
COMMUNITY

## 2019-01-31 RX ADMIN — LIDOCAINE HYDROCHLORIDE 100 MG: 20 INJECTION, SOLUTION INTRAVENOUS at 09:01

## 2019-01-31 RX ADMIN — PROPOFOL 50 MG: 10 INJECTION, EMULSION INTRAVENOUS at 09:01

## 2019-01-31 RX ADMIN — PROPOFOL 100 MG: 10 INJECTION, EMULSION INTRAVENOUS at 09:01

## 2019-01-31 RX ADMIN — SODIUM CHLORIDE: 0.9 INJECTION, SOLUTION INTRAVENOUS at 08:01

## 2019-01-31 RX ADMIN — PROPOFOL 50 MG: 10 INJECTION, EMULSION INTRAVENOUS at 10:01

## 2019-01-31 NOTE — TRANSFER OF CARE
"Anesthesia Transfer of Care Note    Patient: Yanci Coats    Procedure(s) Performed: Procedure(s) (LRB):  COLONOSCOPY (N/A)    Patient location: PACU    Anesthesia Type: general    Transport from OR: Transported from OR on room air with adequate spontaneous ventilation    Post pain: adequate analgesia    Post assessment: no apparent anesthetic complications and tolerated procedure well    Post vital signs: stable    Level of consciousness: awake, alert and oriented    Nausea/Vomiting: no nausea/vomiting    Complications: none    Transfer of care protocol was followed      Last vitals:   Visit Vitals  /72   Pulse 88   Temp 36.7 °C (98.1 °F) (Skin)   Resp 18   Ht 5' 8" (1.727 m)   Wt 94.3 kg (208 lb)   SpO2 (!) 94%   Breastfeeding? No   BMI 31.63 kg/m²     "

## 2019-01-31 NOTE — DISCHARGE INSTRUCTIONS
Discharge Instructions: Eating a High-Fiber Diet  Your health care provider has prescribed a high-fiber diet for you. Fiber is what gives strength and structure to plants. Most grains, beans, vegetables, and fruits contain fiber. Foods rich in fiber are often low in calories and fat, but they fill you up more. These foods may also reduce the risk of certain health problems.  There are two types of fiber:  · Insoluble fiber. This is found in whole grains, cereals, and certain fruits and vegetables (such as apple skins, corn, and beans). Insoluble fiber is made up mainly of plant cell walls. It may prevent constipation and reduce the risk of certain types of cancer.  · Soluble fiber. This type of fiber is found in oats, beans, nuts, and certain fruits and vegetables (such as strawberries and peas). Soluble fiber turns to gel in the digestive system, slowing the movement of the digestive tract. It helps control blood sugar levels and can reduce cholesterol, which may help lower the risk of heart disease. Soluble fiber can also help control appetite.     Home care  · Know how much fiber you need a day. The recommended daily amount of fiber is 25 grams for women and 38 grams for men. After age 50, daily fiber needs drop to 21 grams for women and 30 grams for men.  · Ask your doctor about a fiber supplement. (Always take fiber supplements with a large glass of water.)  · Keep track of how much fiber you eat.  · Eat a variety of foods high in fiber.  · Learn to read and understand food labels.  · Ask your healthcare provider how much water you should be drinking.  · Look for these high-fiber foods:  ¨ Whole-grain breads and cereals  § 6 ounces a day give you about 18 grams of fiber (1 ounce is equal to 1 slice of bread, 1 cup of dry cereal, or 1/2 cup of cooked rice).  § Include wheat and oat bran cereals, whole-wheat muffins or toast, and corn tortillas in your meals.  ¨ Fruits   § 2 cups a day give you about 8 grams of  fiber.  § Apples, oranges, strawberries, pears, and bananas are good sources.  § Fruit juice does not contain as much fiber as the fruit it was made from.  ¨ Vegetables  § 2½ cups a day give you about 11 grams of fiber. Add asparagus, carrots, broccoli, peas, and corn to your meals.  ¨ Legumes  § 1/4 cup a day (in place of meat) gives you about 4 grams of fiber. Try navy beans, lentils, chickpeas, and soybeans.  ¨ Seeds   § A small handful of seeds gives you about 3 grams of fiber. Try sunflower seeds.  Follow-up  Make a follow-up appointment with a nutritionist as directed by our staff.  Date Last Reviewed: 6/1/2015 © 2000-2017 [employer]. [address] [place] [address]. All rights reserved. This information is not intended as a substitute for professional medical care. Always follow your healthcare professional's instructions.        Diverticulosis    Diverticulosis means that small pouches have formed in the wall of your large intestine (colon). Most often, this problem causes no symptoms and is common as people age. But the pouches in the colon are at risk of becoming infected. When this happens, the condition is called diverticulitis. Although most people with diverticulosis never develop diverticulitis, it is still not uncommon. Rectal bleeding can also occur and in less common situations, a type of colon inflammation called colitis.  While most people do not have symptoms, some people with diverticulosis may have:  · Abdominal cramps and pain  · Bloating  · Constipation  · Change in bowel habits  Causes  The exact cause of diverticulosis (and diverticulitis) has not been proved, but a few things are associated with the condition:  · Low-fiber diet  · Constipation  · Lack of exercise  Your healthcare provider will talk with you about how to manage your condition. Diet changes may be all that are needed to help control diverticulosis and prevent progression to diverticulitis. If you  develop diverticulitis, you will likely need other treatments.  Home care  You may be told to take fiber supplements daily. Fiber adds bulk to the stool so that it passes through the colon more easily. Stool softeners may be recommended. You may also be given medications for pain relief. Be sure to take all medications as directed.  In the past, people were told to avoid corn, nuts, and seeds. This is no longer necessary.  Follow these guidelines when caring for yourself at home:  · Eat unprocessed foods that are high in fiber. Whole grains, fruits, and vegetables are good choices.  · Drink 6 to 8 glasses of water every day unless your healthcare provider has you limit how much fluid you should have.  · Watch for changes in your bowel movements. Tell your provider if you notice any changes.  · Begin an exercise program. Ask your provider how to get started. Generally, walking is the best.  · Get plenty of rest and sleep.  Follow-up care  Follow up with your healthcare provider, or as advised. Regular visits may be needed to check on your health. Sometimes special procedures such as colonoscopy, are needed after an episode of diverticulitis or blooding. Be sure to keep all your appointments.  If a stool sample was taken, or cultures were done, you should be told if they are positive, or if your treatment needs to be changed. You can call as directed for the results.  If X-rays were done, a radiologist will look at them. You will be told if there is a change in your treatment.  If antibiotics were prescribed, be sure to finish them all.  When to seek medical advice  Call your healthcare provider right away if any of these occur:  · Fever of 100.4°F (38°C) or higher, or as directed by your healthcare provider  · Severe cramps in the lower left side of the abdomen or pain that is getting worse  · Tenderness in the lower left side of the abdomen or worsening pain throughout the abdomen  · Diarrhea or constipation that  doesn't get better within 24 hours  · Nausea and vomiting  · Bleeding from the rectum  Call 911  Call emergency services if any of the following occur:  · Trouble breathing  · Confusion  · Very drowsy or trouble awakening  · Fainting or loss of consciousness  · Rapid heart rate  · Chest pain  Date Last Reviewed: 12/30/2015 © 2000-2017 [employer]. [address] [place] [address], [place] [address]. All rights reserved. This information is not intended as a substitute for professional medical care. Always follow your healthcare professional's instructions.        Hemorrhoids    Hemorrhoids are swollen and inflamed veins inside the rectum and near the anus. The rectum is the last several inches of the colon. The anus is the passage between the rectum and the outside of the body.  Causes  The veins can become swollen due to increased pressure in them. This is most often caused by:  · Chronic constipation or diarrhea  · Straining when having a bowel movement  · Sitting too long on the toilet  · A low-fiber diet  · Pregnancy  Symptoms  · Bleeding from the rectum (this may be noticeable after bowel movements)  · Lump near the anus  · Itching around the anus  · Pain around the anus  There are different types of hemorrhoids. Depending on the type you have and the severity, you may be able to treat yourself at home. In some cases, a procedure may be the best treatment option. Your healthcare provider can tell you more about this, if needed.  Home care  General care  · To get relief from pain or itching, try:  ¨ Topical products. Your healthcare provider may prescribe or recommend creams, ointments, or pads that can be applied to the hemorrhoid. Use these exactly as directed.  ¨ Medicines. Your healthcare provider may recommend stool softeners, suppositories, or laxatives to help manage constipation. Use these exactly as directed.  ¨ Sitz baths. A sitz bath involves sitting in a few inches of warm bath water. Be careful  not to make the water so hot that you burn yourself--test it before sitting in it. Soak for about 10 to 15 minutes a few times a day. This may help relieve pain.  Tips to help prevent hemorrhoids  · Eat more fiber. Fiber adds bulk to stool and absorbs water as it moves through your colon. This makes stool softer and easier to pass.  ¨ Increase the fiber in your diet with more fiber-rich foods. These include fresh fruit, vegetables, and whole grains.  ¨ Take a fiber supplement or bulking agent, if advised to by your provider. These include products such as psyllium or methylcellulose.  · Drink plenty of water, if directed to by your provider. This can help keep stool soft.  · Be more active. Frequent exercise aids digestion and helps prevent constipation. It may also help make bowel movements more regular.  · Dont strain during bowel movements. This can make hemorrhoids more likely. Also, dont sit on the toilet for long periods of time.  Follow-up care  Follow up with your healthcare provider, or as advised. If a culture or imaging tests were done, you will be notified of the results when they are ready. This may take a few days or longer.  When to seek medical advice  Call your healthcare provider right away if any of these occur:  · Increased bleeding from the rectum  · Increased pain around the rectum or anus  · Weakness or dizziness  Call 911  Call 911 or return to the emergency department right away if any of these occur:  · Trouble breathing or swallowing  · Fainting or loss of consciousness  · Unusually fast heart rate  · Vomiting blood  · Large amounts of blood in stool  Date Last Reviewed: 6/22/2015 © 2000-2017 [employer]. [address] [place] [address], [place] [address]. All rights reserved. This information is not intended as a substitute for professional medical care. Always follow your healthcare professional's instructions.        Understanding Colon and Rectal Polyps    The colon (also  called the large intestine) is a muscular tube that forms the last part of the digestive tract. It absorbs water and stores food waste. The colon is about 4 to 6 feet long. The rectum is the last 6 inches of the colon. The colon and rectum have a smooth lining composed of millions of cells. Changes in these cells can lead to growths in the colon that can become cancerous and should be removed. Multiple tests are available to screen for colon cancer, but the colonoscopy is the most recommended test. During colonoscopy, these polyps can be removed. How often you need this test depends on many things including your condition, your family history, symptoms, and what the findings were at the previous colonoscopy.   When the colon lining changes  Changes that happen in the cells that line the colon or rectum can lead to growths called polyps. Over a period of years, polyps can turn cancerous. Removing polyps early may prevent cancer from ever forming.  Polyps  Polyps are fleshy clumps of tissue that form on the lining of the colon or rectum. Small polyps are usually benign (not cancerous). However, over time, cells in a polyp can change and become cancerous. Certain types of polyps known as adenomatous polyps are premalignant. The risk for invasive cancer increases with the size of the polyp and certain cell and gene features. This means that they can become cancerous if they're not removed. Hyperplastic polyps are benign. They can grow quite large and not turn cancerous.   Cancer  Almost all colorectal cancers start when polyp cells begin growing abnormally. As a cancerous tumor grows, it may involve more and more of the colon or rectum. In time, cancer can also grow beyond the colon or rectum and spread to nearby organs or to glands called lymph nodes. The cells can also travel to other parts of the body. This is known as metastasis. The earlier a cancerous tumor is removed, the better the chance of preventing its  spread.    Date Last Reviewed: 8/1/2016  © 9140-0019 [employer]. [address] [place] [address], [place] [address]. All rights reserved. This information is not intended as a substitute for professional medical care. Always follow your healthcare professional's instructions.        Colonoscopy     A camera attached to a flexible tube with a viewing lens is used to take video pictures.     Colonoscopy is a test to view the inside of your lower digestive tract (colon and rectum). Sometimes it can show the last part of the small intestine (ileum). During the test, small pieces of tissue may be removed for testing. This is called a biopsy. Small growths, such as polyps, may also be removed.   Why is colonoscopy done?  The test is done to help look for colon cancer. And it can help find the source of abdominal pain, bleeding, and changes in bowel habits. It may be needed once a year, depending on factors such as your:  · Age  · Health history  · Family health history  · Symptoms  · Results from any prior colonoscopy  Risks and possible complications  These include:  · Bleeding               · A puncture or tear in the colon   · Risks of anesthesia  · A cancer lesion not being seen  Getting ready   To prepare for the test:  · Talk with your healthcare provider about the risks of the test (see below). Also ask your healthcare provider about alternatives to the test.  · Tell your healthcare provider about any medicines you take. Also tell him or her about any health conditions you may have.  · Make sure your rectum and colon are empty for the test. Follow the diet and bowel prep instructions exactly. If you dont, the test may need to be rescheduled.  · Plan for a friend or family member to drive you home after the test.     Colonoscopy provides an inside view of the entire colon.     You may discuss the results with your doctor right away or at a future visit.  During the test   The test is usually done in the  hospital on an outpatient basis. This means you go home the same day. The procedure takes about 30 minutes. During that time:  · You are given relaxing (sedating) medicine through an IV line. You may be drowsy, or fully asleep.  · The healthcare provider will first give you a physical exam to check for anal and rectal problems.  · Then the anus is lubricated and the scope inserted.  · If you are awake, you may have a feeling similar to needing to have a bowel movement. You may also feel pressure as air is pumped into the colon. Its OK to pass gas during the procedure.  · Biopsy, polyp removal, or other treatments may be done during the test.  After the test   You may have gas right after the test. It can help to try to pass it to help prevent later bloating. Your healthcare provider may discuss the results with you right away. Or you may need to schedule a follow-up visit to talk about the results. After the test, you can go back to your normal eating and other activities. You may be tired from the sedation and need to rest for a few hours.  Date Last Reviewed: 11/1/2016  © 0515-5014 [employer]. [address] [place] [address], [place] [address]. All rights reserved. This information is not intended as a substitute for professional medical care. Always follow your healthcare professional's instructions.

## 2019-01-31 NOTE — H&P
CC: History of colon polyps - last scope in 2013    63 year old female with above. States that symptoms are absent, no alleviating/exacerbating factors. No family history of CA. Positive personal history of polyps. No bleeding or weight loss.     ROS:  No headache, no fever/chills, no chest pain/SOB, no nausea/vomiting/diarrhea/constipation/GI bleeding/abdominal pain, no dysuria/hematuria.    VSSAF   Exam:   Alert and oriented x 3; no apparent distress   PERRLA, sclera anicteric  CV: Regular rate/rhythm, normal PMI   Lungs: Clear bilaterally with no wheeze/rales   Abdomen: Soft, NT/ND, normal bowel sounds   Ext: No cyanosis, clubbing     Impression:   As above    Plan:   Proceed with endoscopy. Further recs to follow.

## 2019-01-31 NOTE — ANESTHESIA PREPROCEDURE EVALUATION
01/31/2019  Yanci Coats is a 63 y.o., female.    Pre-op Assessment    I have reviewed the Patient Summary Reports.     I have reviewed the Nursing Notes.   I have reviewed the Medications.     Review of Systems  Anesthesia Hx:  No problems with previous Anesthesia    Hematology/Oncology:  Hematology Normal   Oncology Normal     EENT/Dental:EENT/Dental Normal   Cardiovascular:  Cardiovascular Normal Hypertension     Pulmonary:  Pulmonary Normal    Renal/:  Renal/ Normal     Hepatic/GI:  Hepatic/GI Normal GERD    Musculoskeletal:  Musculoskeletal Normal    Neurological:  Neurology Normal    Endocrine:  Endocrine Normal Diabetes    Dermatological:  Skin Normal    Psych:  Psychiatric Normal                                                                                                                    01/31/2019  Yanci Coats is a 63 y.o., female.    [facility] Anesthesia Evaluation               I have reviewed the Patient Summary Reports.               I have reviewed the Nursing Notes.   I have reviewed the Patient Summary Reports.     I have reviewed the Nursing Notes.   I have reviewed the Medications.                       Review of Systems      Anesthesia Hx:      No problems with previous Anesthesia                  Social:            Hematology/Oncology:      Hematology Normal    Oncology Normal          EENT/Dental:        EENT/Dental Normal                  Cardiovascular:      Cardiovascular Normal    Hypertension                               Pulmonary:      Pulmonary Normal                           Renal/:      Renal/ Normal                      Hepatic/GI:      Hepatic/GI Normal    GERD                   Endocrine:      Endocrine Normal  Diabetes                      Psych:      Psychiatric Normal               Neurological:      Neurology Normal                               Musculoskeletal:      Musculoskeletal Normal                             Dermatological:      Skin Normal              OB/PEDS:              Anesthesia Physical Exam    General:  Well nourished      Airway/Jaw/Neck:  Airway Findings:  Mouth Opening: Normal  Tongue: Normal  General Airway Assessment: Adult  Mallampati: II    Jaw/Neck Findings:  Neck ROM: Normal ROM    Eyes/Ears/Nose:  Eyes/Ears/Nose Findings:    Dental:  Dental Findings:  In tact    Chest/Lungs:      Heart/Vascular:      Abdomen:      Musculoskeletal:      Skin:      Mental Status:  Mental Status Findings:  Cooperative and Alert and Oriented                      Anesthesia Plan    Type of Anesthesia, risks & benefits discussed:    Anesthesia Type:  MAC  Patient's Preference:  Mac  Post Op Pain Control Plan:    Induction:   IV  Informed Consent:  Patient understands risks and agrees with Anesthesia plan.  Questions answered.    Anesthesia consent signed with patient.    ASA Score: 2       Day of Surgery Review of History & Physical:   and have interviewed and examined the patient.                 H&P update referred to the provider.    Ready For Surgery From Anesthesia Perspective.                                      Physical Exam  General:  Well nourished    Airway/Jaw/Neck:  Airway Findings: Mouth Opening: Normal Tongue: Normal  General Airway Assessment: Adult  Mallampati: II  Jaw/Neck Findings:  Neck ROM: Normal ROM     Eyes/Ears/Nose:  Eyes/Ears/Nose Findings:    Dental:  Dental Findings: In tact        Mental Status:  Mental Status Findings:  Cooperative, Alert and Oriented         Anesthesia Plan  Type of Anesthesia, risks & benefits discussed:  Anesthesia Type:  general  Patient's Preference: Mac  Intra-op Monitoring Plan:   Intra-op Monitoring Plan Comments:   Post Op Pain Control Plan:   Post Op Pain Control Plan Comments:   Induction:   IV  Beta Blocker:         Informed Consent: Patient understands risks and agrees with Anesthesia plan.   Questions answered. Anesthesia consent signed with patient.  ASA Score: 2     Day of Surgery Review of History & Physical:    H&P update referred to the provider.         Ready For Surgery From Anesthesia Perspective.

## 2019-01-31 NOTE — ANESTHESIA POSTPROCEDURE EVALUATION
"Anesthesia Post Evaluation    Patient: Yanci Coats    Procedure(s) Performed: Procedure(s) (LRB):  COLONOSCOPY (N/A)    Final Anesthesia Type: general  Patient location during evaluation: PACU  Patient participation: Yes- Able to Participate  Level of consciousness: awake and alert and oriented  Post-procedure vital signs: reviewed and stable  Pain management: adequate  Airway patency: patent  PONV status at discharge: No PONV  Anesthetic complications: no      Cardiovascular status: blood pressure returned to baseline  Respiratory status: unassisted, spontaneous ventilation and room air  Hydration status: euvolemic  Follow-up not needed.        Visit Vitals  BP (!) 144/68   Pulse 86   Temp 36.8 °C (98.3 °F) (Oral)   Resp 18   Ht 5' 8" (1.727 m)   Wt 94.3 kg (208 lb)   SpO2 100%   Breastfeeding? No   BMI 31.63 kg/m²       Pain/Soco Score: Soco Score: 10 (1/31/2019 10:30 AM)        "

## 2019-01-31 NOTE — [employer] PATIENT INSTRUCTIONS
Discharge Summary/Instructions after an Endoscopic Procedure  Patient Name: Yanci Coats  Patient MRN: [id number]  Patient YOB: 1955 Thursday, January 31, 2019  Ronak Avila MD  RESTRICTIONS:  During your procedure today, you received medications for sedation.  These   medications may affect your judgment, balance and coordination.  Therefore,   for 24 hours, you have the following restrictions:   - DO NOT drive a car, operate machinery, make legal/financial decisions,   sign important papers or drink alcohol.    ACTIVITY:  Today: no heavy lifting, straining or running due to procedural   sedation/anesthesia.  The following day: return to full activity including work.  DIET:  Eat and drink normally unless instructed otherwise.     TREATMENT FOR COMMON SIDE EFFECTS:  - Mild abdominal pain, nausea, belching, bloating or excessive gas:  rest,   eat lightly and use a heating pad.  - Sore Throat: treat with throat lozenges and/or gargle with warm salt   water.  - Because air was used during the procedure, expelling large amounts of air   from your rectum or belching is normal.  - If a bowel prep was taken, you may not have a bowel movement for 1-3 days.    This is normal.  SYMPTOMS TO WATCH FOR AND REPORT TO YOUR PHYSICIAN:  1. Abdominal pain or bloating, other than gas cramps.  2. Chest pain.  3. Back pain.  4. Signs of infection such as: chills or fever occurring within 24 hours   after the procedure.  5. Rectal bleeding, which would show as bright red, maroon, or black stools.   (A tablespoon of blood from the rectum is not serious, especially if   hemorrhoids are present.)  6. Vomiting.  7. Weakness or dizziness.  GO DIRECTLY TO THE NEAREST EMERGENCY ROOM IF YOU HAVE ANY OF THE FOLLOWING:      Difficulty breathing              Chills and/or fever over 101 F   Persistent vomiting and/or vomiting blood   Severe abdominal pain   Severe chest pain   Black, tarry stools   Bleeding- more than one  tablespoon   Any other symptom or condition that you feel may need urgent attention  Your doctor recommends these additional instructions:  If any biopsies were taken, your doctors clinic will contact you in 1 to 2   weeks with any results.  - Patient has a contact number available for emergencies.  The signs and   symptoms of potential delayed complications were discussed with the   patient.  Return to normal activities tomorrow.  Written discharge   instructions were provided to the patient.   - High fiber diet.   - No aspirin, ibuprofen, naproxen, or other non-steroidal anti-inflammatory   drugs for 2 weeks after polyp removal.   - Await pathology results.   - Repeat colonoscopy in 3 months with one hour time slot for surveillance   after piecemeal polypectomy.   - Discharge patient to home (ambulatory).   - Return to my office after studies are complete.  For questions, problems or results please call your physician - Ronak Avila MD at Work:  [phone].  [facility], EMERGENCY ROOM PHONE NUMBER: [phone]  IF A COMPLICATION OR EMERGENCY SITUATION ARISES AND YOU ARE UNABLE TO REACH   YOUR PHYSICIAN - GO DIRECTLY TO THE EMERGENCY ROOM.  Ronak Avila MD  1/31/2019 10:24:28 AM  This report has been verified and signed electronically.  [employer]

## 2019-02-12 ENCOUNTER — PATIENT MESSAGE (OUTPATIENT)
Dept: GASTROENTEROLOGY | Facility: CLINIC | Age: 64
End: 2019-02-12

## 2019-03-01 ENCOUNTER — TELEPHONE (OUTPATIENT)
Dept: ENDOCRINOLOGY | Facility: CLINIC | Age: 64
End: 2019-03-01

## 2019-03-01 ENCOUNTER — OFFICE VISIT (OUTPATIENT)
Dept: ENDOCRINOLOGY | Facility: CLINIC | Age: 64
End: 2019-03-01
Payer: COMMERCIAL

## 2019-03-01 ENCOUNTER — LAB VISIT (OUTPATIENT)
Dept: LAB | Facility: HOSPITAL | Age: 64
End: 2019-03-01
Attending: INTERNAL MEDICINE
Payer: COMMERCIAL

## 2019-03-01 VITALS
WEIGHT: 211.75 LBS | BODY MASS INDEX: 32.09 KG/M2 | HEART RATE: 67 BPM | TEMPERATURE: 98 F | SYSTOLIC BLOOD PRESSURE: 143 MMHG | RESPIRATION RATE: 18 BRPM | DIASTOLIC BLOOD PRESSURE: 86 MMHG | HEIGHT: 68 IN

## 2019-03-01 DIAGNOSIS — E55.9 HYPOVITAMINOSIS D: ICD-10-CM

## 2019-03-01 DIAGNOSIS — G62.9 NEUROPATHY: ICD-10-CM

## 2019-03-01 DIAGNOSIS — E04.1 NODULAR THYROID DISEASE: ICD-10-CM

## 2019-03-01 DIAGNOSIS — E11.59 HYPERTENSION ASSOCIATED WITH DIABETES: ICD-10-CM

## 2019-03-01 DIAGNOSIS — E11.69 HYPERLIPIDEMIA ASSOCIATED WITH TYPE 2 DIABETES MELLITUS: ICD-10-CM

## 2019-03-01 DIAGNOSIS — E11.65 TYPE 2 DIABETES MELLITUS WITH HYPERGLYCEMIA, WITHOUT LONG-TERM CURRENT USE OF INSULIN: Primary | ICD-10-CM

## 2019-03-01 DIAGNOSIS — Z78.0 POSTMENOPAUSAL: ICD-10-CM

## 2019-03-01 DIAGNOSIS — I15.2 HYPERTENSION ASSOCIATED WITH DIABETES: ICD-10-CM

## 2019-03-01 DIAGNOSIS — E66.9 OBESITY, CLASS I, BMI 30-34.9: ICD-10-CM

## 2019-03-01 DIAGNOSIS — E11.65 TYPE 2 DIABETES MELLITUS WITH HYPERGLYCEMIA, WITHOUT LONG-TERM CURRENT USE OF INSULIN: ICD-10-CM

## 2019-03-01 DIAGNOSIS — E78.5 HYPERLIPIDEMIA ASSOCIATED WITH TYPE 2 DIABETES MELLITUS: ICD-10-CM

## 2019-03-01 DIAGNOSIS — K59.00 CONSTIPATION, UNSPECIFIED CONSTIPATION TYPE: ICD-10-CM

## 2019-03-01 LAB
ALBUMIN SERPL BCP-MCNC: 3.7 G/DL
ANION GAP SERPL CALC-SCNC: 11 MMOL/L
BUN SERPL-MCNC: 23 MG/DL
CALCIUM SERPL-MCNC: 10.4 MG/DL
CHLORIDE SERPL-SCNC: 101 MMOL/L
CO2 SERPL-SCNC: 26 MMOL/L
CREAT SERPL-MCNC: 1 MG/DL
EST. GFR  (AFRICAN AMERICAN): >60 ML/MIN/1.73 M^2
EST. GFR  (NON AFRICAN AMERICAN): >60 ML/MIN/1.73 M^2
ESTIMATED AVG GLUCOSE: 171 MG/DL
GLUCOSE SERPL-MCNC: 145 MG/DL
HBA1C MFR BLD HPLC: 7.6 %
PHOSPHATE SERPL-MCNC: 3.7 MG/DL
POTASSIUM SERPL-SCNC: 4.1 MMOL/L
SODIUM SERPL-SCNC: 138 MMOL/L

## 2019-03-01 PROCEDURE — 3008F PR BODY MASS INDEX (BMI) DOCUMENTED: ICD-10-PCS | Mod: CPTII,S$GLB,, | Performed by: PHYSICIAN ASSISTANT

## 2019-03-01 PROCEDURE — 82985 ASSAY OF GLYCATED PROTEIN: CPT

## 2019-03-01 PROCEDURE — 3045F PR MOST RECENT HEMOGLOBIN A1C LEVEL 7.0-9.0%: ICD-10-PCS | Mod: CPTII,S$GLB,, | Performed by: PHYSICIAN ASSISTANT

## 2019-03-01 PROCEDURE — 3077F PR MOST RECENT SYSTOLIC BLOOD PRESSURE >= 140 MM HG: ICD-10-PCS | Mod: CPTII,S$GLB,, | Performed by: PHYSICIAN ASSISTANT

## 2019-03-01 PROCEDURE — 83036 HEMOGLOBIN GLYCOSYLATED A1C: CPT

## 2019-03-01 PROCEDURE — 3079F DIAST BP 80-89 MM HG: CPT | Mod: CPTII,S$GLB,, | Performed by: PHYSICIAN ASSISTANT

## 2019-03-01 PROCEDURE — 99214 PR OFFICE/OUTPT VISIT, EST, LEVL IV, 30-39 MIN: ICD-10-PCS | Mod: S$GLB,,, | Performed by: PHYSICIAN ASSISTANT

## 2019-03-01 PROCEDURE — 84378 SUGARS SINGLE QUANT: CPT

## 2019-03-01 PROCEDURE — 80069 RENAL FUNCTION PANEL: CPT

## 2019-03-01 PROCEDURE — 3008F BODY MASS INDEX DOCD: CPT | Mod: CPTII,S$GLB,, | Performed by: PHYSICIAN ASSISTANT

## 2019-03-01 PROCEDURE — 3079F PR MOST RECENT DIASTOLIC BLOOD PRESSURE 80-89 MM HG: ICD-10-PCS | Mod: CPTII,S$GLB,, | Performed by: PHYSICIAN ASSISTANT

## 2019-03-01 PROCEDURE — 99214 OFFICE O/P EST MOD 30 MIN: CPT | Mod: S$GLB,,, | Performed by: PHYSICIAN ASSISTANT

## 2019-03-01 PROCEDURE — 99999 PR PBB SHADOW E&M-EST. PATIENT-LVL IV: CPT | Mod: PBBFAC,,, | Performed by: PHYSICIAN ASSISTANT

## 2019-03-01 PROCEDURE — 3045F PR MOST RECENT HEMOGLOBIN A1C LEVEL 7.0-9.0%: CPT | Mod: CPTII,S$GLB,, | Performed by: PHYSICIAN ASSISTANT

## 2019-03-01 PROCEDURE — 3077F SYST BP >= 140 MM HG: CPT | Mod: CPTII,S$GLB,, | Performed by: PHYSICIAN ASSISTANT

## 2019-03-01 PROCEDURE — 99999 PR PBB SHADOW E&M-EST. PATIENT-LVL IV: ICD-10-PCS | Mod: PBBFAC,,, | Performed by: PHYSICIAN ASSISTANT

## 2019-03-01 RX ORDER — TELMISARTAN AND HYDROCHLORTHIAZIDE 80; 25 MG/1; MG/1
1 TABLET ORAL DAILY
Qty: 90 TABLET | Refills: 3 | Status: SHIPPED | OUTPATIENT
Start: 2019-03-01 | End: 2019-03-06 | Stop reason: SDUPTHER

## 2019-03-01 RX ORDER — KETOCONAZOLE 20 MG/ML
SHAMPOO, SUSPENSION TOPICAL
Qty: 120 ML | Refills: 5 | Status: SHIPPED | OUTPATIENT
Start: 2019-03-01 | End: 2019-11-04 | Stop reason: SDUPTHER

## 2019-03-01 RX ORDER — FUROSEMIDE 20 MG/1
40 TABLET ORAL DAILY PRN
Qty: 180 TABLET | Refills: 3 | Status: SHIPPED | OUTPATIENT
Start: 2019-03-01 | End: 2021-01-25

## 2019-03-01 RX ORDER — LANCETS
EACH MISCELLANEOUS
Qty: 180 EACH | Refills: 3 | Status: SHIPPED | OUTPATIENT
Start: 2019-03-01 | End: 2023-08-08 | Stop reason: SDUPTHER

## 2019-03-01 RX ORDER — FLASH GLUCOSE SCANNING READER
EACH MISCELLANEOUS
Qty: 1 EACH | Refills: 0 | Status: SHIPPED | OUTPATIENT
Start: 2019-03-01 | End: 2019-03-06 | Stop reason: SDUPTHER

## 2019-03-01 RX ORDER — FLASH GLUCOSE SENSOR
KIT MISCELLANEOUS
Qty: 6 KIT | Refills: 3 | Status: SHIPPED | OUTPATIENT
Start: 2019-03-01 | End: 2019-03-06 | Stop reason: SDUPTHER

## 2019-03-01 NOTE — PROGRESS NOTES
Subjective:      Subjective:      Patient ID: Yanci Coats is a 63 y.o. female.    Chief Complaint:  Type 2 DM/nodular thyroid disease    History of Present Illness  Yanci Coats was diagnosed with T2DM in . Metformin started at the time of diagnosis then switched to Janumet. No hospitalizations for DM. + FHx of DM brother, sister, mother. Mother and sister had thyroid disease.    She had a steroid injection in her back in .     Last HBA1c from  was 8.3 and her current antidiabetic medication regimen consists of actually taking Janumet  BID, Trulicity 1.5mg weekly and glipizide 10mg BID. Jardiance 10 mg (not been able to receive yet).    BG checks 1x per day.   Fastin    Hypoglycemia: none    Thyroid USS from  showed thyroid nodular disease with several dominant nodules for which she had USS guided FNAB which is till pending.  Patient has not had the biopsy done because of concerns regarding her out of pocket costs.     She has not been walking but plans to start today.     DEXA  wnl at [facility].   Diabetes education: 9/15  Last eye exam: Dr. Purvis     She received pneumovax in  and flu vaccination .    Review of Systems   Constitutional: Negative for diaphoresis and fatigue.   HENT: Negative for facial swelling, trouble swallowing and voice change.    Eyes: Negative for visual disturbance.   Respiratory: Negative for cough and shortness of breath.    Cardiovascular: Negative for chest pain, palpitations and leg swelling.   Gastrointestinal: Negative for abdominal pain, nausea and vomiting.   Endocrine: Negative for polyuria.   Genitourinary: Negative for dysuria and menstrual problem (postmenopausal).   Musculoskeletal: Positive for back pain. Negative for gait problem and myalgias.   Skin: Negative for color change, pallor and rash.   Neurological: Negative for seizures and headaches.   Hematological: Does not bruise/bleed easily.   Psychiatric/Behavioral:  "Negative for sleep disturbance.     Objective: BP (!) 143/86 (BP Location: Left arm, Patient Position: Sitting, BP Method: Medium (Automatic))   Pulse 67   Temp 98 °F (36.7 °C) (Oral)   Resp 18   Ht 5' 8" (1.727 m)   Wt 96.1 kg (211 lb 12 oz)   BMI 32.20 kg/m²  Body surface area is 2.15 meters squared.        PE:  GENERAL: middle aged female, well developed, well nourished  NECK: Supple neck, normal thyroid. No bruit  LYMPHATIC: No cervical or supraclavicular lymphadenopathy  CARDIOVASCULAR: Normal heart sounds, no pedal edema  RESPIRATORY: Normal effort, clear to auscultation  ABDOMEN: soft, non-tender, non-distended.  FEET: appropriate footwear.   PSYCH: normal mood and affect    Lab Review:   Personally reviewed labs below:  Office Visit on 12/21/2018   Component Date Value Ref Range Status    HPV High Risk type 16, PCR 12/21/2018 Negative  Negative Final    HPV High Risk type 18, PCR 12/21/2018 Negative  Negative Final    HPV other High Risk types, PCR 12/21/2018 Negative  Negative Final    Comment: Other HPV genotypes include:   31,33,35,39,45,51,52,56,58,59,66 and 68.        Lab Results   Component Value Date    CHOL 180 01/05/2018    CHOL 286 (H) 07/25/2016    CHOL 275 (H) 04/21/2016     Lab Results   Component Value Date    HDL 57 01/05/2018    HDL 52 07/25/2016    HDL 45 04/21/2016     Lab Results   Component Value Date    LDLCALC 94.2 01/05/2018    LDLCALC 193.4 (H) 07/25/2016    LDLCALC 189.0 (H) 04/21/2016     Lab Results   Component Value Date    TRIG 144 01/05/2018    TRIG 203 (H) 07/25/2016    TRIG 205 (H) 04/21/2016     Lab Results   Component Value Date    CHOLHDL 31.7 01/05/2018    CHOLHDL 18.2 (L) 07/25/2016    CHOLHDL 16.4 (L) 04/21/2016        Assessment:     1. Type 2 diabetes mellitus with hyperglycemia, without long-term current use of insulin  lancets Misc    Hemoglobin A1c    GlycoMark (TM)    Fructosamine    Renal function panel    Calcitonin    Hemoglobin A1c    GlycoMark (TM)    " Fructosamine    TSH    T4, free    T3    Microalbumin/creatinine urine ratio    FREESTYLE ANDRES 14 DAY SENSOR Kit    FREESTYLE ANDRES 14 DAY READER Misc   2. Neuropathy     3. Hyperlipidemia associated with type 2 diabetes mellitus     4. Hypertension associated with diabetes  telmisartan-hydrochlorothiazide (MICARDIS HCT) 80-25 mg per tablet    furosemide (LASIX) 20 MG tablet   5. Obesity, Class I, BMI 30-34.9     6. Postmenopausal     7. Hypovitaminosis D  Vitamin D   8. Nodular thyroid disease     9. Constipation, unspecified constipation type  linaclotide (LINZESS) 145 mcg Cap capsule      Type 2 DM-A1c today.Continue Janumet, glipizide and Trulicity  Resend jardiance. Declines DE. Referral to DE. BG checks 2x daily.  Logs to clinic.    Neuropathy-Continue gabapentin.Optimize glycemic control   Gozuvejukrioqd-zgexbs-Dtqdvmpu crestor. Advised to take aspirin   Hypertension-Chronic-elevated-change Diovan-HCT to Micardis-HCT 80-25 mg daily  Obesity-Body mass index is 32.2 kg/m². Continue walking. Increase to 2 miles daily.  Increases insulin resistance  Postmenopausal-repeat DEXA 12/19  Hypovitaminosis D-Check vitamin D  Nodular thyroid disease-FNA of nodules in right lobe  Constipation-continue Linzess    Plan:   F/u in ~ 3 mths

## 2019-03-02 DIAGNOSIS — E11.65 TYPE 2 DIABETES MELLITUS WITH HYPERGLYCEMIA, WITHOUT LONG-TERM CURRENT USE OF INSULIN: Primary | ICD-10-CM

## 2019-03-02 LAB — FRUCTOSAMINE SERPL-SCNC: 318 UMOL /L (ref 151–300)

## 2019-03-02 RX ORDER — METFORMIN HYDROCHLORIDE 500 MG/1
500 TABLET, EXTENDED RELEASE ORAL
Qty: 90 TABLET | Refills: 3 | Status: SHIPPED | OUTPATIENT
Start: 2019-03-02 | End: 2019-03-02 | Stop reason: SDUPTHER

## 2019-03-02 RX ORDER — METFORMIN HYDROCHLORIDE 500 MG/1
1000 TABLET, EXTENDED RELEASE ORAL 2 TIMES DAILY WITH MEALS
Qty: 360 TABLET | Refills: 3 | Status: SHIPPED | OUTPATIENT
Start: 2019-03-02 | End: 2019-03-06

## 2019-03-04 ENCOUNTER — TELEPHONE (OUTPATIENT)
Dept: ENDOCRINOLOGY | Facility: CLINIC | Age: 64
End: 2019-03-04

## 2019-03-04 DIAGNOSIS — E11.65 UNCONTROLLED TYPE 2 DIABETES MELLITUS WITH HYPERGLYCEMIA: ICD-10-CM

## 2019-03-04 RX ORDER — GLIPIZIDE 10 MG/1
10 TABLET ORAL 2 TIMES DAILY
Qty: 180 TABLET | Refills: 3 | Status: SHIPPED | OUTPATIENT
Start: 2019-03-04 | End: 2020-01-20

## 2019-03-04 NOTE — TELEPHONE ENCOUNTER
Patient states that she needs a refill on these 3 medications. She is still taking the janumet. It was discontinued from her medication list by you 3-28-18. Do you still want her to take it?

## 2019-03-04 NOTE — TELEPHONE ENCOUNTER
Called [facility] pharmacy and they are wanting to verify the directions on the patients metformin medication.

## 2019-03-04 NOTE — TELEPHONE ENCOUNTER
----- Message from Twila Lobato sent at 3/4/2019  8:48 AM CST -----  Contact: Gretchen from [facility] prescription  Type:  Pharmacy Calling to Clarify an RX    Name of Caller:  Gretchen  Pharmacy Name:    [facility] - [place] - [address] AT Portal to Registered [place] [address]  [facility] [address]  Phone: [phone] Fax: [phone]    Prescription Name:  metFORMIN (GLUCOPHAGE-XR) 500 MG 24 hr tablet  What do they need to clarify?:  Verify the instructions  Best Call Back Number:  [phone] ref number [id number]  Additional Information:

## 2019-03-06 ENCOUNTER — TELEPHONE (OUTPATIENT)
Dept: ENDOCRINOLOGY | Facility: CLINIC | Age: 64
End: 2019-03-06

## 2019-03-06 DIAGNOSIS — I15.2 HYPERTENSION ASSOCIATED WITH DIABETES: ICD-10-CM

## 2019-03-06 DIAGNOSIS — E11.59 HYPERTENSION ASSOCIATED WITH DIABETES: ICD-10-CM

## 2019-03-06 DIAGNOSIS — E11.65 TYPE 2 DIABETES MELLITUS WITH HYPERGLYCEMIA, WITHOUT LONG-TERM CURRENT USE OF INSULIN: Primary | ICD-10-CM

## 2019-03-06 LAB — GLYCOMARK (TM): 15 UG/ML

## 2019-03-06 RX ORDER — FLASH GLUCOSE SCANNING READER
EACH MISCELLANEOUS
Qty: 1 EACH | Refills: 0 | Status: SHIPPED | OUTPATIENT
Start: 2019-03-06 | End: 2023-05-24

## 2019-03-06 RX ORDER — TELMISARTAN AND HYDROCHLORTHIAZIDE 80; 25 MG/1; MG/1
1 TABLET ORAL DAILY
Qty: 90 TABLET | Refills: 3 | Status: SHIPPED | OUTPATIENT
Start: 2019-03-06 | End: 2020-03-11

## 2019-03-06 RX ORDER — FLASH GLUCOSE SENSOR
KIT MISCELLANEOUS
Qty: 6 KIT | Refills: 3 | Status: SHIPPED | OUTPATIENT
Start: 2019-03-06 | End: 2023-05-24

## 2019-03-06 NOTE — TELEPHONE ENCOUNTER
----- Message from Solange Gregory sent at 3/6/2019  8:08 AM CST -----  Contact: [facility] pharmacy[facility]  Placed call to Danelle garcía need to verify patient metformin please call back at [phone] option 2 reference number [id number]

## 2019-03-06 NOTE — TELEPHONE ENCOUNTER
Called the [facility] pharmacy and spoke to Pharmacist Sagrario and informed her of the directions per Ermias CHU for the patients Metformin 500 mg medication and discontinued the Janumet medication. Sagrario verbalized understanding. Called patient and she says that she can not take metformin as it causes her bad diarrhea. The patient is still taking the Janumet and glipizide. Also the patient would like the Free style marcello sensors and reader sent to [facility], it to expensive at her [facility] pharmacy. Patient says that [facility] does not have the recent blood pressure medication sent by Ermias CHU as well.

## 2019-03-08 NOTE — TELEPHONE ENCOUNTER
Called patient and informed her of the instructions from Ermias CHU. Patient verbalized understanding. Will call insurance and see if Jardiance is covered.

## 2019-03-21 ENCOUNTER — INITIAL CONSULT (OUTPATIENT)
Dept: PHYSICAL MEDICINE AND REHAB | Facility: CLINIC | Age: 64
End: 2019-03-21
Payer: COMMERCIAL

## 2019-03-21 VITALS
WEIGHT: 211 LBS | SYSTOLIC BLOOD PRESSURE: 146 MMHG | DIASTOLIC BLOOD PRESSURE: 83 MMHG | BODY MASS INDEX: 31.98 KG/M2 | HEART RATE: 76 BPM | HEIGHT: 68 IN

## 2019-03-21 DIAGNOSIS — G56.01 CARPAL TUNNEL SYNDROME OF RIGHT WRIST: ICD-10-CM

## 2019-03-21 PROCEDURE — 76942 PR U/S GUIDANCE FOR NEEDLE GUIDANCE: ICD-10-PCS | Mod: 26,S$GLB,, | Performed by: PHYSICAL MEDICINE & REHABILITATION

## 2019-03-21 PROCEDURE — 99999 PR PBB SHADOW E&M-EST. PATIENT-LVL IV: CPT | Mod: PBBFAC,,, | Performed by: PHYSICAL MEDICINE & REHABILITATION

## 2019-03-21 PROCEDURE — 3079F PR MOST RECENT DIASTOLIC BLOOD PRESSURE 80-89 MM HG: ICD-10-PCS | Mod: CPTII,S$GLB,, | Performed by: PHYSICAL MEDICINE & REHABILITATION

## 2019-03-21 PROCEDURE — 99213 PR OFFICE/OUTPT VISIT, EST, LEVL III, 20-29 MIN: ICD-10-PCS | Mod: 25,S$GLB,, | Performed by: PHYSICAL MEDICINE & REHABILITATION

## 2019-03-21 PROCEDURE — 3079F DIAST BP 80-89 MM HG: CPT | Mod: CPTII,S$GLB,, | Performed by: PHYSICAL MEDICINE & REHABILITATION

## 2019-03-21 PROCEDURE — 3008F BODY MASS INDEX DOCD: CPT | Mod: CPTII,S$GLB,, | Performed by: PHYSICAL MEDICINE & REHABILITATION

## 2019-03-21 PROCEDURE — 3077F PR MOST RECENT SYSTOLIC BLOOD PRESSURE >= 140 MM HG: ICD-10-PCS | Mod: CPTII,S$GLB,, | Performed by: PHYSICAL MEDICINE & REHABILITATION

## 2019-03-21 PROCEDURE — 99213 OFFICE O/P EST LOW 20 MIN: CPT | Mod: 25,S$GLB,, | Performed by: PHYSICAL MEDICINE & REHABILITATION

## 2019-03-21 PROCEDURE — 76942 ECHO GUIDE FOR BIOPSY: CPT | Mod: 26,S$GLB,, | Performed by: PHYSICAL MEDICINE & REHABILITATION

## 2019-03-21 PROCEDURE — 99999 PR PBB SHADOW E&M-EST. PATIENT-LVL IV: ICD-10-PCS | Mod: PBBFAC,,, | Performed by: PHYSICAL MEDICINE & REHABILITATION

## 2019-03-21 PROCEDURE — 3008F PR BODY MASS INDEX (BMI) DOCUMENTED: ICD-10-PCS | Mod: CPTII,S$GLB,, | Performed by: PHYSICAL MEDICINE & REHABILITATION

## 2019-03-21 PROCEDURE — 20526 THER INJECTION CARP TUNNEL: CPT | Mod: RT,S$GLB,, | Performed by: PHYSICAL MEDICINE & REHABILITATION

## 2019-03-21 PROCEDURE — 3077F SYST BP >= 140 MM HG: CPT | Mod: CPTII,S$GLB,, | Performed by: PHYSICAL MEDICINE & REHABILITATION

## 2019-03-21 PROCEDURE — 20526 PR INJECT CARPAL TUNNEL: ICD-10-PCS | Mod: RT,S$GLB,, | Performed by: PHYSICAL MEDICINE & REHABILITATION

## 2019-03-21 RX ORDER — METHYLPREDNISOLONE ACETATE 40 MG/ML
40 INJECTION, SUSPENSION INTRA-ARTICULAR; INTRALESIONAL; INTRAMUSCULAR; SOFT TISSUE
Status: COMPLETED | OUTPATIENT
Start: 2019-03-21 | End: 2019-03-21

## 2019-03-21 RX ADMIN — METHYLPREDNISOLONE ACETATE 40 MG: 40 INJECTION, SUSPENSION INTRA-ARTICULAR; INTRALESIONAL; INTRAMUSCULAR; SOFT TISSUE at 10:03

## 2019-03-21 NOTE — PROGRESS NOTES
HPI:  Patient is a 63 y.o. year old female w. R>L hand numbness. She states this started 12 yrs ago. It has worsened over time.  She describes pain along her wrist 6-7/10. It is worse at night and when waking up. Wearing a carpal tunnel splint helps. 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    EMG findings  Moderate right carpal tunnel syndrome  Mild left carpal tunnel syndrome  Mild left ulnar neuropathy at elbow  Moderate bilateral C5/C6 chronic radiculopathy with no active denervation  Moderate right L4/L5 chronic radiculopathy with no active denervation  Superimposed mild sensorimotor axonal neuropathy    Labs  hgbaic 7.6    Past Medical History:   Diagnosis Date    Allergy history unknown     Arthritis     DDD    Back pain     Diabetes mellitus type II     GERD (gastroesophageal reflux disease)     Hypertension     LPRD (laryngopharyngeal reflux disease)     Nuclear sclerosis - Both Eyes 7/9/2013     Past Surgical History:   Procedure Laterality Date    COLONOSCOPY N/A 1/31/2019    Per[facility]ios [facility] ENDO    COLONOSCOPY N/A 4/26/2013    Performed by [facility] at [facility] ENDO (4TH FLR)    EGD (ESOPHAGOGASTRODUODENOSCOPY) N/A 4/26/2013    Performed by Bryan Lynn MD at [facility] ENDO (4TH FLR)     Family History   Problem Relation Age of Onset    ANNA disease Father     Heart disease Father     Heart disease Mother     Diabetes Mother     Blindness Mother     Cataracts Mother     Hypertension Mother     Cholelithiasis Sister     ANNA disease Sister     Blindness Maternal Aunt     Diabetes Maternal Aunt     Blindness Paternal Aunt     Stroke Paternal Grandmother     Glaucoma Paternal Grandmother     Cervical cancer Unknown         aunt    Retinal detachment Brother     Celiac disease Neg Hx     Cirrhosis Neg Hx     Colon cancer Neg Hx     Colon polyps Neg Hx     Crohn's disease Neg Hx     Cystic fibrosis Neg Hx     Esophageal cancer Neg Hx      Hemochromatosis Neg Hx     Inflammatory bowel disease Neg Hx     Irritable bowel syndrome Neg Hx     Liver cancer Neg Hx     Liver disease Neg Hx     Rectal cancer Neg Hx     Stomach cancer Neg Hx     Ulcerative colitis Neg Hx     Clive's disease Neg Hx     Melanoma Neg Hx     Amblyopia Neg Hx     Cancer Neg Hx     Macular degeneration Neg Hx     Strabismus Neg Hx     Thyroid disease Neg Hx     Psoriasis Neg Hx     Lupus Neg Hx     Eczema Neg Hx      Social History     Socioeconomic History    Marital status: Single     Spouse name: Not on file    Number of children: Not on file    Years of education: Not on file    Highest education level: Not on file   Social Needs    Financial resource strain: Not on file    Food insecurity - worry: Not on file    Food insecurity - inability: Not on file    Transportation needs - medical: Not on file    Transportation needs - non-medical: Not on file   Occupational History     Employer: [employer]   Tobacco Use    Smoking status: Never Smoker    Smokeless tobacco: Never Used   Substance and Sexual Activity    Alcohol use: Yes     Comment: occ    Drug use: No    Sexual activity: Not Currently     Partners: Male     Birth control/protection: Post-menopausal   Other Topics Concern    Are you pregnant or think you may be? No    Breast-feeding No   Social History Narrative    Lives with significant other.    No kids.    3 dogs and cat.    No exercise.    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       Review of patient's allergies indicates:   Allergen Reactions    Lipitor [atorvastatin] Other (See Comments)     Muscle pain    Penicillins Other (See Comments)     Unknown reaction       Current Outpatient Medications:     amLODIPine (NORVASC) 10 MG tablet, Take 1 tablet (10 mg total) by mouth once daily., Disp: 90 tablet, Rfl: 3    aspirin 81 MG Chew, Take 81 mg by mouth once daily., Disp: , Rfl:     azelastine (ASTELIN) 137 mcg nasal spray, 2 sprays  (274 mcg total) by Nasal route 2 (two) times daily., Disp: 30 mL, Rfl: 12    baclofen (LIORESAL) 10 MG tablet, take 1 tablet by mouth three times a day with food or milk, Disp: , Rfl: 0    blood sugar diagnostic Strp, Test glucose 2x/day. Dispense strips and fastclix lancets for accuchek evelyn meter., Disp: 200 each, Rfl: 3    carvedilol (COREG) 12.5 MG tablet, TAKE ONE TABLET BY MOUTH ONCE DAILY, Disp: 90 tablet, Rfl: 3    dulaglutide (TRULICITY) 1.5 mg/0.5 mL PnIj, Inject 1.5 mg into the skin once a week., Disp: 12 Syringe, Rfl: 3    empagliflozin (JARDIANCE) 10 mg Tab, Take 10 mg by mouth before breakfast., Disp: 90 tablet, Rfl: 3    fluticasone (FLONASE) 50 mcg/actuation nasal spray, 2 sprays (100 mcg total) by Each Nare route once daily., Disp: 1 Bottle, Rfl: 5    FREESTYLE ANDRES 14 DAY READER Misc, Use to test glucoses., Disp: 1 each, Rfl: 0    FREESTYLE ANDRES 14 DAY SENSOR Kit, Wear one sensor for 14 days., Disp: 6 kit, Rfl: 3    furosemide (LASIX) 20 MG tablet, Take 2 tablets (40 mg total) by mouth daily as needed., Disp: 180 tablet, Rfl: 3    gabapentin (NEURONTIN) 600 MG tablet, Take 1 tablet (600 mg total) by mouth 3 (three) times daily., Disp: 270 tablet, Rfl: 3    glipiZIDE (GLUCOTROL) 10 MG tablet, Take 1 tablet (10 mg total) by mouth 2 (two) times daily., Disp: 180 tablet, Rfl: 3    hydrocodone-acetaminophen 7.5-325mg (NORCO) 7.5-325 mg per tablet, Take 1 tablet by mouth 2 (two) times daily as needed. , Disp: , Rfl:     ketoconazole (NIZORAL) 2 % shampoo, Lather scalp, let sit 5 min before rinsing. Use 2-3 times per week., Disp: 120 mL, Rfl: 5    lancets Misc, Please dispense accu chek evelyn lancets , patient test twice a day, ICD E11.9 ,.92, Disp: 180 each, Rfl: 3    linaclotide (LINZESS) 145 mcg Cap capsule, Take 1 capsule (145 mcg total) by mouth once daily., Disp: 90 capsule, Rfl: 0    omeprazole (PRILOSEC) 20 MG capsule, Take 2 capsules (40 mg total) by mouth 2 (two) times  "daily., Disp: 180 capsule, Rfl: 1    orphenadrine (NORFLEX) 100 mg tablet, Take 100 mg by mouth 2 (two) times daily as needed. , Disp: , Rfl:     pen needle, diabetic (BD ULTRA-FINE ANTONY PEN NEEDLES) 32 gauge x 5/32" Ndle, 1 Device by Misc.(Non-Drug; Combo Route) route 4 (four) times daily with meals and nightly., Disp: 100 each, Rfl: 11    rosuvastatin (CRESTOR) 5 MG tablet, Take 1 tablet (5 mg total) by mouth once daily., Disp: 90 tablet, Rfl: 3    SITagliptan-metformin (JANUMET) 50-1,000 mg per tablet, Take 1 tablet by mouth 2 (two) times daily with meals., Disp: 180 tablet, Rfl: 3    telmisartan-hydrochlorothiazide (MICARDIS HCT) 80-25 mg per tablet, Take 1 tablet by mouth once daily., Disp: 90 tablet, Rfl: 3    Current Facility-Administered Medications:     methylPREDNISolone acetate injection 40 mg, 40 mg, Intramuscular, 1 time in Clinic/HOD, Chaitanya Mc MD      Review of Systems:    No nausea, vomiting, + fevers, chills , contipation, diarrhea or sweats,no weight change,  no chest pain, no sob, no change of bowel or bladder habits,no coordination issues      Physical Exam:      Vitals:    03/21/19 0832   BP: (!) 146/83   Pulse: 76     alert and oriented ×4 follows commands answers all questions appropriately,affect wnl  Manual muscle test 5 out of 5 sensation to light touch grossly intact  No hypothenar or thenar eminence atrophy  +median compression right  +tinnels b/l elbows  -tinnels at elbow  -spurlings  Nl gait  No C/C/E        Assessment:  B/l cts R>L  DM2  Plan:  Carpal tunnel injection today- will use ultrasound to first visualize artery +nerve and neena area for injection accordingly  Schedule left side next eval  Keep strict diabetic diet as cortisone can increase her bg    PROCEDURE NOTE:  Risk and benefits of carpal tunnel injection given to patient. Ultrasound was used to first visualize artery +nerve and neena area for injection accordingly. Injection performed after sterile prep w. " chlorohexedine.  Verbal consent obtained.  Injections given to the the right carpal tunnel w. A 30G needle w. 0.75ml of prednisone 40mg. No complications.

## 2019-03-21 NOTE — LETTER
March 21, 2019      Chaitanya Mc MD  [address] [place] [address] Dr  [address]  [place] [address]           [place] [facility]  [address] [place] [address] [address]  [place] [address]  Phone: [phone]  Fax: [phone]          Patient: Yanci Coats   MR Number: [id number]   YOB: 1955   Date of Visit: 3/21/2019       Dear Dr. Chaitanya Mc:    Thank you for referring Yanci Coats to me for evaluation. Attached you will find relevant portions of my assessment and plan of care.    If you have questions, please do not hesitate to call me. I look forward to following Yanci Coats along with you.    Sincerely,    Brie England,     Enclosure  CC:  No Recipients    If you would like to receive this communication electronically, please contact [email or web address] or [phone] to request more information on [facility] access.    For providers and/or their staff who would like to refer a patient to [facility], please contact us through our one-stop-shop provider referral line, [facility] , at [phone].    If you feel you have received this communication in error or would no longer like to receive these types of communications, please e-mail [email or web address]

## 2019-03-25 NOTE — TELEPHONE ENCOUNTER
Called patients insurance and was informed that I will need to fill out a Step- Edit form and fax It back on the patients Jardiance medication. Paperwork filled out and ready for Ermias CHU to sign to be faxed back.

## 2019-03-28 ENCOUNTER — TELEPHONE (OUTPATIENT)
Dept: SPINE | Facility: CLINIC | Age: 64
End: 2019-03-28

## 2019-03-29 ENCOUNTER — OFFICE VISIT (OUTPATIENT)
Dept: OPTOMETRY | Facility: CLINIC | Age: 64
End: 2019-03-29
Payer: COMMERCIAL

## 2019-03-29 DIAGNOSIS — E11.3293 MILD NONPROLIFERATIVE DIABETIC RETINOPATHY OF BOTH EYES WITHOUT MACULAR EDEMA ASSOCIATED WITH TYPE 2 DIABETES MELLITUS: Primary | ICD-10-CM

## 2019-03-29 DIAGNOSIS — H25.13 NUCLEAR SCLEROSIS, BILATERAL: ICD-10-CM

## 2019-03-29 DIAGNOSIS — H53.10 SUBJECTIVE VISUAL DISTURBANCE: ICD-10-CM

## 2019-03-29 DIAGNOSIS — H35.371 EPIRETINAL MEMBRANE (ERM) OF RIGHT EYE: ICD-10-CM

## 2019-03-29 DIAGNOSIS — H26.9 CORTICAL CATARACT: ICD-10-CM

## 2019-03-29 DIAGNOSIS — H04.123 DRY EYE SYNDROME, BILATERAL: ICD-10-CM

## 2019-03-29 PROCEDURE — 92014 COMPRE OPH EXAM EST PT 1/>: CPT | Mod: S$GLB,,, | Performed by: OPTOMETRIST

## 2019-03-29 PROCEDURE — 99999 PR PBB SHADOW E&M-EST. PATIENT-LVL II: CPT | Mod: PBBFAC,,, | Performed by: OPTOMETRIST

## 2019-03-29 PROCEDURE — 92014 PR EYE EXAM, EST PATIENT,COMPREHESV: ICD-10-PCS | Mod: S$GLB,,, | Performed by: OPTOMETRIST

## 2019-03-29 PROCEDURE — 99999 PR PBB SHADOW E&M-EST. PATIENT-LVL II: ICD-10-PCS | Mod: PBBFAC,,, | Performed by: OPTOMETRIST

## 2019-03-29 NOTE — PROGRESS NOTES
HPI     Presenting Complaint: Pt here today 4 month dilated exam for moderate   nonproliferative diabetic retinopathy f/u. Pt states she noticed what   looks like water droplets in her vision on the left side over the last few   months occasionally, only when watching tv, has window off to the left   side.     (+) Pt states both eyes have been feeling dry and tired.     Ophthalmic medication / drops: None    Hemoglobin A1C       Date                     Value               Ref Range             Status                2019               7.6 (H)             4.0 - 5.6 %           Final                 2018               7.4 (H)             4.0 - 5.6 %           Final                 2018               8.3 (H)             4.0 - 5.6 %           Final              (-) headaches  (-) diplopia   (-) flashes / (+) hx of floaters right eye      Last edited by Leonides Moraes, OD on 3/29/2019 10:37 AM. (History)            Assessment /Plan     For exam results, see Encounter Report.    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 OD > OS. Discussed possible ocular affects of uncontrolled blood sugar with patient. Pt starting Jardiance, has not gotten yet. Recommended continued strong blood sugar control and continued care with PCP. Monitor yearly.     Mild NS OU, not yet visually significant. Discussed possible ocular affects of cataracts. Acceptable BCVA OU. Discussed treatment options. Surgery not recommended at this time. Monitor yearly.     ERM OD, stable from previous. Discussed findings and treatment options. BCVA 20/25 OD. Recheck in 4-6 months.     OS, notices occasionally, only when watching tv with window on left. Discussed possible etiologies.  Retina in tact, no holes, tears, breaks, RD. Return if worsening or no resolution.    DRISS OU. Discussed ocular affects of dry eyes. Recommend OTC blink tears four times a day in OU. Discussed chronicity of DRISS. RTC if symptoms not alleviated by continued use of artificial tears.       RTC in 5 months for DM DFE, or sooner prn.

## 2019-05-08 ENCOUNTER — OFFICE VISIT (OUTPATIENT)
Dept: SPINE | Facility: CLINIC | Age: 64
End: 2019-05-08
Payer: COMMERCIAL

## 2019-05-08 VITALS
SYSTOLIC BLOOD PRESSURE: 151 MMHG | HEART RATE: 84 BPM | WEIGHT: 211 LBS | DIASTOLIC BLOOD PRESSURE: 82 MMHG | BODY MASS INDEX: 31.98 KG/M2 | HEIGHT: 68 IN

## 2019-05-08 DIAGNOSIS — M54.5 ACUTE LEFT-SIDED LOW BACK PAIN, WITH SCIATICA PRESENCE UNSPECIFIED: Primary | ICD-10-CM

## 2019-05-08 PROCEDURE — 3077F PR MOST RECENT SYSTOLIC BLOOD PRESSURE >= 140 MM HG: ICD-10-PCS | Mod: ,,, | Performed by: PHYSICAL MEDICINE & REHABILITATION

## 2019-05-08 PROCEDURE — 3077F SYST BP >= 140 MM HG: CPT | Mod: ,,, | Performed by: PHYSICAL MEDICINE & REHABILITATION

## 2019-05-08 PROCEDURE — 3079F DIAST BP 80-89 MM HG: CPT | Mod: ,,, | Performed by: PHYSICAL MEDICINE & REHABILITATION

## 2019-05-08 PROCEDURE — 99213 OFFICE O/P EST LOW 20 MIN: CPT | Mod: 25,,, | Performed by: PHYSICAL MEDICINE & REHABILITATION

## 2019-05-08 PROCEDURE — 96372 THER/PROPH/DIAG INJ SC/IM: CPT | Mod: ,,, | Performed by: PHYSICAL MEDICINE & REHABILITATION

## 2019-05-08 PROCEDURE — 3008F PR BODY MASS INDEX (BMI) DOCUMENTED: ICD-10-PCS | Mod: ,,, | Performed by: PHYSICAL MEDICINE & REHABILITATION

## 2019-05-08 PROCEDURE — 3079F PR MOST RECENT DIASTOLIC BLOOD PRESSURE 80-89 MM HG: ICD-10-PCS | Mod: ,,, | Performed by: PHYSICAL MEDICINE & REHABILITATION

## 2019-05-08 PROCEDURE — 96372 PR INJECTION,THERAP/PROPH/DIAG2ST, IM OR SUBCUT: ICD-10-PCS | Mod: ,,, | Performed by: PHYSICAL MEDICINE & REHABILITATION

## 2019-05-08 PROCEDURE — 3008F BODY MASS INDEX DOCD: CPT | Mod: ,,, | Performed by: PHYSICAL MEDICINE & REHABILITATION

## 2019-05-08 PROCEDURE — 99213 PR OFFICE/OUTPT VISIT, EST, LEVL III, 20-29 MIN: ICD-10-PCS | Mod: 25,,, | Performed by: PHYSICAL MEDICINE & REHABILITATION

## 2019-05-08 RX ORDER — METHYLPREDNISOLONE ACETATE 40 MG/ML
40 INJECTION, SUSPENSION INTRA-ARTICULAR; INTRALESIONAL; INTRAMUSCULAR; SOFT TISSUE
Status: COMPLETED | OUTPATIENT
Start: 2019-05-08 | End: 2019-05-08

## 2019-05-08 RX ADMIN — METHYLPREDNISOLONE ACETATE 40 MG: 40 INJECTION, SUSPENSION INTRA-ARTICULAR; INTRALESIONAL; INTRAMUSCULAR; SOFT TISSUE at 03:05

## 2019-05-08 NOTE — PROGRESS NOTES
SUBJECTIVE:    Patient ID: Yanci Coats is a 63 y.o. female.    Chief Complaint: Back Pain (Lower left sidedx 1 day)    She presents today with acute onset of left-sided low back pain at the lumbosacral junction.  Similar to the episode she had on last office visit but not quite as severe.  She says she was walking her dog and the dog sort of jerked her yesterday.  No radicular symptoms or weakness.  No bowel or bladder dysfunction fever chills or sweats.  The last time she had this happen she responded well to Depo-Medrol injection.  So thats what she wants today        Past Medical History:   Diagnosis Date    Allergy history unknown     Arthritis     DDD    Back pain     Diabetes mellitus type II     GERD (gastroesophageal reflux disease)     Hypertension     LPRD (laryngopharyngeal reflux disease)     Nuclear sclerosis - Both Eyes 7/9/2013     Social History     Socioeconomic History    Marital status: Single     Spouse name: Not on file    Number of children: Not on file    Years of education: Not on file    Highest education level: Not on file   Occupational History     Employer: [employer]   Social Needs    Financial resource strain: Not on file    Food insecurity:     Worry: Not on file     Inability: Not on file    Transportation needs:     Medical: Not on file     Non-medical: Not on file   Tobacco Use    Smoking status: Never Smoker    Smokeless tobacco: Never Used   Substance and Sexual Activity    Alcohol use: Yes     Comment: occ    Drug use: No    Sexual activity: Not Currently     Partners: Male     Birth control/protection: Post-menopausal   Lifestyle    Physical activity:     Days per week: Not on file     Minutes per session: Not on file    Stress: Not on file   Relationships    Social connections:     Talks on phone: Not on file     Gets together: Not on file     Attends Latter day service: Not on file     Active member of club or organization: Not on file      "Attends meetings of clubs or organizations: Not on file     Relationship status: Not on file   Other Topics Concern    Are you pregnant or think you may be? No    Breast-feeding No   Social History Narrative    Lives with significant other.    No kids.    3 dogs and cat.    No exercise.    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     Past Surgical History:   Procedure Laterality Date    COLONOSCOPY N/A 1/31/2019    [facility]O    COLONOSCOPY N/A 4/26/2013    [facility] (4TH FLR)    EGD (ESOPHAGOGASTRODUODENOSCOPY) N/A 4/26/2013    Per[facility] by [facility] at [facility] ENDO (4TH FLR)     Family History   Problem Relation Age of Onset    ANNA disease Father     Heart disease Father     Heart disease Mother     Diabetes Mother     Blindness Mother     Cataracts Mother     Hypertension Mother     Cholelithiasis Sister     ANNA disease Sister     Blindness Maternal Aunt     Diabetes Maternal Aunt     Blindness Paternal Aunt     Stroke Paternal Grandmother     Glaucoma Paternal Grandmother     Cervical cancer Unknown         aunt    Retinal detachment Brother     Celiac disease Neg Hx     Cirrhosis Neg Hx     Colon cancer Neg Hx     Colon polyps Neg Hx     Crohn's disease Neg Hx     Cystic fibrosis Neg Hx     Esophageal cancer Neg Hx     Hemochromatosis Neg Hx     Inflammatory bowel disease Neg Hx     Irritable bowel syndrome Neg Hx     Liver cancer Neg Hx     Liver disease Neg Hx     Rectal cancer Neg Hx     Stomach cancer Neg Hx     Ulcerative colitis Neg Hx     Clive's disease Neg Hx     Melanoma Neg Hx     Amblyopia Neg Hx     Cancer Neg Hx     Macular degeneration Neg Hx     Strabismus Neg Hx     Thyroid disease Neg Hx     Psoriasis Neg Hx     Lupus Neg Hx     Eczema Neg Hx      Vitals:    05/08/19 1454   BP: (!) 151/82   Pulse: 84   Weight: 95.7 kg (210 lb 15.7 oz)   Height: 5' 8" (1.727 m)       Review of " Systems   Constitutional: Negative for chills, diaphoresis, fatigue, fever and unexpected weight change.   HENT: Negative for trouble swallowing.    Eyes: Negative for visual disturbance.   Respiratory: Negative for shortness of breath.    Cardiovascular: Negative for chest pain.   Gastrointestinal: Negative for abdominal pain, constipation, nausea and vomiting.   Genitourinary: Negative for difficulty urinating.   Musculoskeletal: Negative for arthralgias, back pain, gait problem, joint swelling, myalgias, neck pain and neck stiffness.   Neurological: Negative for dizziness, speech difficulty, weakness, light-headedness, numbness and headaches.          Objective:      Physical Exam   Constitutional: She is oriented to person, place, and time. She appears well-developed and well-nourished.   Neurological: She is alert and oriented to person, place, and time.   She is awake and in no acute distress  No point tenderness or palpable masses about the lumbar spine  Deep tendon reflexes +2 at the knees and +1 at the ankles  Strength is normal in both lower extremities           Assessment:       1. Acute left-sided low back pain, with sciatica presence unspecified           Plan:   We will give her a shot of Depo-Medrol here in the office today.  Follow-up by phone in 1 week.  I note that she did have some improvement in her carpal tunnel syndromes following the injection of the right carpal tunnel      Acute left-sided low back pain, with sciatica presence unspecified    Other orders  -     methylPREDNISolone acetate injection 40 mg

## 2019-05-22 ENCOUNTER — PATIENT OUTREACH (OUTPATIENT)
Dept: ADMINISTRATIVE | Facility: HOSPITAL | Age: 64
End: 2019-05-22

## 2019-07-08 ENCOUNTER — OFFICE VISIT (OUTPATIENT)
Dept: SPINE | Facility: CLINIC | Age: 64
End: 2019-07-08
Payer: COMMERCIAL

## 2019-07-08 VITALS
BODY MASS INDEX: 31.98 KG/M2 | SYSTOLIC BLOOD PRESSURE: 142 MMHG | HEART RATE: 92 BPM | DIASTOLIC BLOOD PRESSURE: 85 MMHG | HEIGHT: 68 IN | WEIGHT: 211 LBS

## 2019-07-08 DIAGNOSIS — M54.2 NECK PAIN: Primary | ICD-10-CM

## 2019-07-08 DIAGNOSIS — G89.29 CHRONIC MIDLINE LOW BACK PAIN WITH RIGHT-SIDED SCIATICA: ICD-10-CM

## 2019-07-08 DIAGNOSIS — M54.41 CHRONIC MIDLINE LOW BACK PAIN WITH RIGHT-SIDED SCIATICA: ICD-10-CM

## 2019-07-08 PROCEDURE — 3008F PR BODY MASS INDEX (BMI) DOCUMENTED: ICD-10-PCS | Mod: ,,, | Performed by: PHYSICAL MEDICINE & REHABILITATION

## 2019-07-08 PROCEDURE — 3079F PR MOST RECENT DIASTOLIC BLOOD PRESSURE 80-89 MM HG: ICD-10-PCS | Mod: ,,, | Performed by: PHYSICAL MEDICINE & REHABILITATION

## 2019-07-08 PROCEDURE — 3079F DIAST BP 80-89 MM HG: CPT | Mod: ,,, | Performed by: PHYSICAL MEDICINE & REHABILITATION

## 2019-07-08 PROCEDURE — 99213 PR OFFICE/OUTPT VISIT, EST, LEVL III, 20-29 MIN: ICD-10-PCS | Mod: 25,,, | Performed by: PHYSICAL MEDICINE & REHABILITATION

## 2019-07-08 PROCEDURE — 96372 PR INJECTION,THERAP/PROPH/DIAG2ST, IM OR SUBCUT: ICD-10-PCS | Mod: ,,, | Performed by: PHYSICAL MEDICINE & REHABILITATION

## 2019-07-08 PROCEDURE — 3077F PR MOST RECENT SYSTOLIC BLOOD PRESSURE >= 140 MM HG: ICD-10-PCS | Mod: ,,, | Performed by: PHYSICAL MEDICINE & REHABILITATION

## 2019-07-08 PROCEDURE — 96372 THER/PROPH/DIAG INJ SC/IM: CPT | Mod: ,,, | Performed by: PHYSICAL MEDICINE & REHABILITATION

## 2019-07-08 PROCEDURE — 3077F SYST BP >= 140 MM HG: CPT | Mod: ,,, | Performed by: PHYSICAL MEDICINE & REHABILITATION

## 2019-07-08 PROCEDURE — 3008F BODY MASS INDEX DOCD: CPT | Mod: ,,, | Performed by: PHYSICAL MEDICINE & REHABILITATION

## 2019-07-08 PROCEDURE — 99213 OFFICE O/P EST LOW 20 MIN: CPT | Mod: 25,,, | Performed by: PHYSICAL MEDICINE & REHABILITATION

## 2019-07-08 RX ORDER — METHYLPREDNISOLONE ACETATE 40 MG/ML
40 INJECTION, SUSPENSION INTRA-ARTICULAR; INTRALESIONAL; INTRAMUSCULAR; SOFT TISSUE ONCE
Status: COMPLETED | OUTPATIENT
Start: 2019-07-08 | End: 2019-07-08

## 2019-07-08 RX ORDER — AMLODIPINE BESYLATE 10 MG/1
TABLET ORAL
Qty: 90 TABLET | Refills: 3 | Status: SHIPPED | OUTPATIENT
Start: 2019-07-08 | End: 2020-03-12 | Stop reason: SDUPTHER

## 2019-07-08 RX ADMIN — METHYLPREDNISOLONE ACETATE 40 MG: 40 INJECTION, SUSPENSION INTRA-ARTICULAR; INTRALESIONAL; INTRAMUSCULAR; SOFT TISSUE at 02:07

## 2019-07-08 NOTE — PROGRESS NOTES
SUBJECTIVE:    Patient ID: Yanci Coats is a 63 y.o. female.    Chief Complaint: Back Pain and Leg Pain (right )    She complains of increased posterior neck and low back pain associated with right leg pain after cooking over the 4th of July.  No new or progressive problems.  She just wants a Depo-Medrol shot            Past Medical History:   Diagnosis Date    Allergy history unknown     Arthritis     DDD    Back pain     Diabetes mellitus type II     GERD (gastroesophageal reflux disease)     Hypertension     LPRD (laryngopharyngeal reflux disease)     Nuclear sclerosis - Both Eyes 7/9/2013     Social History     Socioeconomic History    Marital status: Single     Spouse name: Not on file    Number of children: Not on file    Years of education: Not on file    Highest education level: Not on file   Occupational History     Employer: [employer]   Social Needs    Financial resource strain: Not on file    Food insecurity:     Worry: Not on file     Inability: Not on file    Transportation needs:     Medical: Not on file     Non-medical: Not on file   Tobacco Use    Smoking status: Never Smoker    Smokeless tobacco: Never Used   Substance and Sexual Activity    Alcohol use: Yes     Comment: occ    Drug use: No    Sexual activity: Not Currently     Partners: Male     Birth control/protection: Post-menopausal   Lifestyle    Physical activity:     Days per week: Not on file     Minutes per session: Not on file    Stress: Not on file   Relationships    Social connections:     Talks on phone: Not on file     Gets together: Not on file     Attends Jain service: Not on file     Active member of club or organization: Not on file     Attends meetings of clubs or organizations: Not on file     Relationship status: Not on file   Other Topics Concern    Are you pregnant or think you may be? No    Breast-feeding No   Social History Narrative    Lives with significant other.    No kids.  "   3 dogs and cat.    No exercise.    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     Past Surgical History:   Procedure Laterality Date    COLONOSCOPY N/A 1/31/2019    Performed by [facility]acios [facility] at [facility] ENDO    COLONOSCOPY N/A 4/26/2013    Performed by [facility] at [facility] ENDO (4TH FLR)    EGD (ESOPHAGOGASTRODUODENOSCOPY) N/A 4/26/2013    Performed by [facility] at [facility] ENDO (4TH FLR)     Family History   Problem Relation Age of Onset    ANNA disease Father     Heart disease Father     Heart disease Mother     Diabetes Mother     Blindness Mother     Cataracts Mother     Hypertension Mother     Cholelithiasis Sister     ANNA disease Sister     Blindness Maternal Aunt     Diabetes Maternal Aunt     Blindness Paternal Aunt     Stroke Paternal Grandmother     Glaucoma Paternal Grandmother     Cervical cancer Unknown         aunt    Retinal detachment Brother     Celiac disease Neg Hx     Cirrhosis Neg Hx     Colon cancer Neg Hx     Colon polyps Neg Hx     Crohn's disease Neg Hx     Cystic fibrosis Neg Hx     Esophageal cancer Neg Hx     Hemochromatosis Neg Hx     Inflammatory bowel disease Neg Hx     Irritable bowel syndrome Neg Hx     Liver cancer Neg Hx     Liver disease Neg Hx     Rectal cancer Neg Hx     Stomach cancer Neg Hx     Ulcerative colitis Neg Hx     Clive's disease Neg Hx     Melanoma Neg Hx     Amblyopia Neg Hx     Cancer Neg Hx     Macular degeneration Neg Hx     Strabismus Neg Hx     Thyroid disease Neg Hx     Psoriasis Neg Hx     Lupus Neg Hx     Eczema Neg Hx      Vitals:    07/08/19 1416   BP: (!) 142/85   Pulse: 92   Weight: 95.7 kg (210 lb 15.7 oz)   Height: 5' 8" (1.727 m)       Review of Systems   Constitutional: Negative for chills, diaphoresis, fatigue, fever and unexpected weight change.   HENT: Negative for trouble swallowing.    Eyes: Negative for visual disturbance.   Respiratory: Negative for shortness of breath.    Cardiovascular: " Negative for chest pain.   Gastrointestinal: Negative for abdominal pain, constipation, nausea and vomiting.   Genitourinary: Negative for difficulty urinating.   Musculoskeletal: Negative for arthralgias, back pain, gait problem, joint swelling, myalgias, neck pain and neck stiffness.   Neurological: Negative for dizziness, speech difficulty, weakness, light-headedness, numbness and headaches.          Objective:      Physical Exam   Constitutional: She is oriented to person, place, and time. She appears well-developed and well-nourished.   Neurological: She is alert and oriented to person, place, and time.           Assessment:       1. Neck pain    2. Chronic midline low back pain with right-sided sciatica           Plan:     we will give her shot of Depo-Medrol.  She is not interested in epidural steroid injections.  Likewise not interested in surgery for carpal tunnel.  Follow-up p.r.n.      Neck pain    Chronic midline low back pain with right-sided sciatica    Other orders  -     methylPREDNISolone acetate injection 40 mg

## 2019-07-23 ENCOUNTER — TELEPHONE (OUTPATIENT)
Dept: ENDOCRINOLOGY | Facility: CLINIC | Age: 64
End: 2019-07-23

## 2019-07-23 NOTE — TELEPHONE ENCOUNTER
----- Message from Karla Reinoso sent at 7/23/2019  9:06 AM CDT -----  [facility]  Type:  Pharmacy Calling to Clarify an RX    Name of Caller:  Clementina  Pharmacy Name:  [facility]  Prescription Name:  [email or web address]   What do they need to clarify?:  Clarification for duplicate therapy  Best Call Back Number:  [phone]  Additional Information:  This is the pharmacy third and final attempt regarding the rx.

## 2019-09-06 ENCOUNTER — OFFICE VISIT (OUTPATIENT)
Dept: OPTOMETRY | Facility: CLINIC | Age: 64
End: 2019-09-06
Payer: COMMERCIAL

## 2019-09-06 ENCOUNTER — OFFICE VISIT (OUTPATIENT)
Dept: SPINE | Facility: CLINIC | Age: 64
End: 2019-09-06
Payer: COMMERCIAL

## 2019-09-06 VITALS
HEART RATE: 93 BPM | WEIGHT: 211 LBS | BODY MASS INDEX: 31.98 KG/M2 | SYSTOLIC BLOOD PRESSURE: 157 MMHG | HEIGHT: 68 IN | DIASTOLIC BLOOD PRESSURE: 91 MMHG

## 2019-09-06 DIAGNOSIS — H26.9 CORTICAL CATARACT: ICD-10-CM

## 2019-09-06 DIAGNOSIS — M54.5 ACUTE LEFT-SIDED LOW BACK PAIN, WITH SCIATICA PRESENCE UNSPECIFIED: Primary | ICD-10-CM

## 2019-09-06 DIAGNOSIS — H25.13 NUCLEAR SCLEROSIS, BILATERAL: ICD-10-CM

## 2019-09-06 DIAGNOSIS — H35.371 EPIRETINAL MEMBRANE (ERM) OF RIGHT EYE: ICD-10-CM

## 2019-09-06 DIAGNOSIS — E11.3293 MILD NONPROLIFERATIVE DIABETIC RETINOPATHY OF BOTH EYES WITHOUT MACULAR EDEMA ASSOCIATED WITH TYPE 2 DIABETES MELLITUS: Primary | ICD-10-CM

## 2019-09-06 PROCEDURE — 3077F PR MOST RECENT SYSTOLIC BLOOD PRESSURE >= 140 MM HG: ICD-10-PCS | Mod: S$GLB,,, | Performed by: PHYSICAL MEDICINE & REHABILITATION

## 2019-09-06 PROCEDURE — 99213 PR OFFICE/OUTPT VISIT, EST, LEVL III, 20-29 MIN: ICD-10-PCS | Mod: 25,S$GLB,, | Performed by: PHYSICAL MEDICINE & REHABILITATION

## 2019-09-06 PROCEDURE — 3008F PR BODY MASS INDEX (BMI) DOCUMENTED: ICD-10-PCS | Mod: S$GLB,,, | Performed by: PHYSICAL MEDICINE & REHABILITATION

## 2019-09-06 PROCEDURE — 3077F SYST BP >= 140 MM HG: CPT | Mod: S$GLB,,, | Performed by: PHYSICAL MEDICINE & REHABILITATION

## 2019-09-06 PROCEDURE — 92014 PR EYE EXAM, EST PATIENT,COMPREHESV: ICD-10-PCS | Mod: S$GLB,,, | Performed by: OPTOMETRIST

## 2019-09-06 PROCEDURE — 3080F DIAST BP >= 90 MM HG: CPT | Mod: S$GLB,,, | Performed by: PHYSICAL MEDICINE & REHABILITATION

## 2019-09-06 PROCEDURE — 3008F BODY MASS INDEX DOCD: CPT | Mod: S$GLB,,, | Performed by: PHYSICAL MEDICINE & REHABILITATION

## 2019-09-06 PROCEDURE — 3080F PR MOST RECENT DIASTOLIC BLOOD PRESSURE >= 90 MM HG: ICD-10-PCS | Mod: S$GLB,,, | Performed by: PHYSICAL MEDICINE & REHABILITATION

## 2019-09-06 PROCEDURE — 92014 COMPRE OPH EXAM EST PT 1/>: CPT | Mod: S$GLB,,, | Performed by: OPTOMETRIST

## 2019-09-06 PROCEDURE — 99999 PR PBB SHADOW E&M-EST. PATIENT-LVL II: ICD-10-PCS | Mod: PBBFAC,,, | Performed by: OPTOMETRIST

## 2019-09-06 PROCEDURE — 99213 OFFICE O/P EST LOW 20 MIN: CPT | Mod: 25,S$GLB,, | Performed by: PHYSICAL MEDICINE & REHABILITATION

## 2019-09-06 PROCEDURE — 99999 PR PBB SHADOW E&M-EST. PATIENT-LVL II: CPT | Mod: PBBFAC,,, | Performed by: OPTOMETRIST

## 2019-09-06 PROCEDURE — 96372 PR INJECTION,THERAP/PROPH/DIAG2ST, IM OR SUBCUT: ICD-10-PCS | Mod: S$GLB,,, | Performed by: PHYSICAL MEDICINE & REHABILITATION

## 2019-09-06 PROCEDURE — 96372 THER/PROPH/DIAG INJ SC/IM: CPT | Mod: S$GLB,,, | Performed by: PHYSICAL MEDICINE & REHABILITATION

## 2019-09-06 RX ORDER — PREGABALIN 75 MG/1
75 CAPSULE ORAL NIGHTLY
Qty: 30 CAPSULE | Refills: 0 | Status: SHIPPED | OUTPATIENT
Start: 2019-09-06 | End: 2019-11-22

## 2019-09-06 RX ORDER — METHYLPREDNISOLONE ACETATE 40 MG/ML
40 INJECTION, SUSPENSION INTRA-ARTICULAR; INTRALESIONAL; INTRAMUSCULAR; SOFT TISSUE
Status: COMPLETED | OUTPATIENT
Start: 2019-09-06 | End: 2019-09-06

## 2019-09-06 RX ADMIN — METHYLPREDNISOLONE ACETATE 40 MG: 40 INJECTION, SUSPENSION INTRA-ARTICULAR; INTRALESIONAL; INTRAMUSCULAR; SOFT TISSUE at 11:09

## 2019-09-06 NOTE — PROGRESS NOTES
SUBJECTIVE:    Patient ID: Yanci Coats is a 63 y.o. female.    Chief Complaint: Back Pain and Leg Pain (Left)    He presents today with complaints of left-sided low back pain and left leg pain radiating into the heel.  No weakness.  She has gabapentin available but has not been taking it consistently because it gives her bizarre dreams and makes her drowsy        Past Medical History:   Diagnosis Date    Allergy history unknown     Arthritis     DDD    Back pain     Cataract     OU    Diabetes mellitus type II     GERD (gastroesophageal reflux disease)     Hypertension     LPRD (laryngopharyngeal reflux disease)     Nuclear sclerosis - Both Eyes 7/9/2013     Social History     Socioeconomic History    Marital status: Single     Spouse name: Not on file    Number of children: Not on file    Years of education: Not on file    Highest education level: Not on file   Occupational History     Employer: [employer]   Social Needs    Financial resource strain: Not on file    Food insecurity:     Worry: Not on file     Inability: Not on file    Transportation needs:     Medical: Not on file     Non-medical: Not on file   Tobacco Use    Smoking status: Never Smoker    Smokeless tobacco: Never Used   Substance and Sexual Activity    Alcohol use: Yes     Comment: occ    Drug use: No    Sexual activity: Not Currently     Partners: Male     Birth control/protection: Post-menopausal   Lifestyle    Physical activity:     Days per week: Not on file     Minutes per session: Not on file    Stress: Not on file   Relationships    Social connections:     Talks on phone: Not on file     Gets together: Not on file     Attends Restorationist service: Not on file     Active member of club or organization: Not on file     Attends meetings of clubs or organizations: Not on file     Relationship status: Not on file   Other Topics Concern    Are you pregnant or think you may be? No    Breast-feeding No  "  Social History Narrative    Lives with significant other.    No kids.    3 dogs and cat.    No exercise.    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     Past Surgical History:   Procedure Laterality Date    COLONOSCOPY N/A 1/31/2019    [facility] ENDO    COLONOSCOPY N/A 4/26/2013    Performed by Bryan Lynn MD at [facility] ENDO (4TH FLR)    EGD (ESOPHAGOGASTRODUODENOSCOPY) N/A 4/26/2013    Performed by Bryan Lynn MD at [facility] ENDO (4TH FLR)     Family History   Problem Relation Age of Onset    ANNA disease Father     Heart disease Father     Heart disease Mother     Diabetes Mother     Blindness Mother     Cataracts Mother     Hypertension Mother     Cholelithiasis Sister     ANNA disease Sister     Blindness Maternal Aunt     Diabetes Maternal Aunt     Blindness Paternal Aunt     Stroke Paternal Grandmother     Glaucoma Paternal Grandmother     Cervical cancer Unknown         aunt    Retinal detachment Brother     Celiac disease Neg Hx     Cirrhosis Neg Hx     Colon cancer Neg Hx     Colon polyps Neg Hx     Crohn's disease Neg Hx     Cystic fibrosis Neg Hx     Esophageal cancer Neg Hx     Hemochromatosis Neg Hx     Inflammatory bowel disease Neg Hx     Irritable bowel syndrome Neg Hx     Liver cancer Neg Hx     Liver disease Neg Hx     Rectal cancer Neg Hx     Stomach cancer Neg Hx     Ulcerative colitis Neg Hx     Clive's disease Neg Hx     Melanoma Neg Hx     Amblyopia Neg Hx     Cancer Neg Hx     Macular degeneration Neg Hx     Strabismus Neg Hx     Thyroid disease Neg Hx     Psoriasis Neg Hx     Lupus Neg Hx     Eczema Neg Hx      Vitals:    09/06/19 1043   BP: (!) 157/91   Pulse: 93   Weight: 95.7 kg (210 lb 15.7 oz)   Height: 5' 8" (1.727 m)       Review of Systems   Constitutional: Negative for chills, diaphoresis, fatigue, fever and unexpected weight change.   HENT: Negative for trouble swallowing.    Eyes: Negative for visual disturbance. "   Respiratory: Negative for shortness of breath.    Cardiovascular: Negative for chest pain.   Gastrointestinal: Negative for abdominal pain, constipation, nausea and vomiting.   Genitourinary: Negative for difficulty urinating.   Musculoskeletal: Negative for arthralgias, back pain, gait problem, joint swelling, myalgias, neck pain and neck stiffness.   Neurological: Negative for dizziness, speech difficulty, weakness, light-headedness, numbness and headaches.          Objective:      Physical Exam   Constitutional: She is oriented to person, place, and time. She appears well-developed and well-nourished.   Neurological: She is alert and oriented to person, place, and time.           Assessment:       1. Acute left-sided low back pain, with sciatica presence unspecified           Plan:     we will give her shot of Depo-Medrol here in the office.  Discontinue gabapentin and start Lyrica 75 mg q.h.s. consider interlaminar epidural steroid injections at L5-S1.  She is still undecided about that.  She can eat contact the office by phone in 2 weeks and let me know how she is doing      Acute left-sided low back pain, with sciatica presence unspecified    Other orders  -     pregabalin (LYRICA) 75 MG capsule; Take 1 capsule (75 mg total) by mouth nightly.  Dispense: 30 capsule; Refill: 0  -     methylPREDNISolone acetate injection 40 mg

## 2019-09-06 NOTE — PROGRESS NOTES
HPI     Concerns About Ocular Health      Additional comments: DFE for NPDR // pt states no visual complaints               Headache      Additional comments: sinus related              Comments     Hemoglobin A1C       Date                     Value               Ref Range             Status                03/01/2019               7.6 (H)             4.0 - 5.6 %           Final                LBS ~157 AM fasting          Last edited by Leonides Moraes, OD on 9/6/2019  9:12 AM. (History)            Assessment /Plan     For exam results, see Encounter Report.    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 Improved from previous, few dot hemes seen. Discussed possible ocular affects of uncontrolled blood sugar with patient. Recommended continued strong blood sugar control and continued care with PCP. Return in 6 months for DM DFE.     Mild NS OU, not yet visually significant. Discussed possible ocular affects of cataracts. Acceptable BCVA OU. Discussed treatment options. Surgery not recommended at this time. Monitor yearly.     ERM OD, stable from previous. Discussed findings and treatment options. BCVA 20/25 OD. Recheck in 6 months.      RTC in 5 months for DM DFE, or sooner prn.

## 2019-09-12 DIAGNOSIS — E11.69 HYPERLIPIDEMIA ASSOCIATED WITH TYPE 2 DIABETES MELLITUS: ICD-10-CM

## 2019-09-12 DIAGNOSIS — E78.5 HYPERLIPIDEMIA ASSOCIATED WITH TYPE 2 DIABETES MELLITUS: ICD-10-CM

## 2019-09-12 RX ORDER — ROSUVASTATIN CALCIUM 5 MG/1
TABLET, COATED ORAL
Qty: 90 TABLET | Refills: 3 | Status: SHIPPED | OUTPATIENT
Start: 2019-09-12 | End: 2020-07-20

## 2019-09-20 ENCOUNTER — TELEPHONE (OUTPATIENT)
Dept: SPINE | Facility: CLINIC | Age: 64
End: 2019-09-20

## 2019-09-20 NOTE — TELEPHONE ENCOUNTER
Patient stated that the shot did not help much. Patient has not started Lyrica. Instructed patient to call about 2 weeks after she starts Lyrica to give update on how she is tolerating it. Patient wishes to discuss the option of injections at her next visit.

## 2019-09-20 NOTE — TELEPHONE ENCOUNTER
----- Message from Antonieta De Guzman LPN sent at 9/6/2019 11:10 AM CDT -----  Regarding: Check on status  Check on status as to how patient is doing

## 2019-10-25 ENCOUNTER — TELEPHONE (OUTPATIENT)
Dept: PAIN MEDICINE | Facility: CLINIC | Age: 64
End: 2019-10-25

## 2019-10-25 ENCOUNTER — OFFICE VISIT (OUTPATIENT)
Dept: SPINE | Facility: CLINIC | Age: 64
End: 2019-10-25
Payer: COMMERCIAL

## 2019-10-25 VITALS
BODY MASS INDEX: 31.83 KG/M2 | HEIGHT: 68 IN | HEART RATE: 98 BPM | WEIGHT: 210 LBS | SYSTOLIC BLOOD PRESSURE: 177 MMHG | DIASTOLIC BLOOD PRESSURE: 93 MMHG

## 2019-10-25 DIAGNOSIS — K21.9 LPRD (LARYNGOPHARYNGEAL REFLUX DISEASE): ICD-10-CM

## 2019-10-25 DIAGNOSIS — G89.29 CHRONIC MIDLINE LOW BACK PAIN WITH RIGHT-SIDED SCIATICA: Primary | ICD-10-CM

## 2019-10-25 DIAGNOSIS — M54.41 CHRONIC MIDLINE LOW BACK PAIN WITH RIGHT-SIDED SCIATICA: Primary | ICD-10-CM

## 2019-10-25 PROCEDURE — 99213 OFFICE O/P EST LOW 20 MIN: CPT | Mod: S$GLB,,, | Performed by: PHYSICAL MEDICINE & REHABILITATION

## 2019-10-25 PROCEDURE — 3077F SYST BP >= 140 MM HG: CPT | Mod: S$GLB,,, | Performed by: PHYSICAL MEDICINE & REHABILITATION

## 2019-10-25 PROCEDURE — 3008F BODY MASS INDEX DOCD: CPT | Mod: S$GLB,,, | Performed by: PHYSICAL MEDICINE & REHABILITATION

## 2019-10-25 PROCEDURE — 3008F PR BODY MASS INDEX (BMI) DOCUMENTED: ICD-10-PCS | Mod: S$GLB,,, | Performed by: PHYSICAL MEDICINE & REHABILITATION

## 2019-10-25 PROCEDURE — 99213 PR OFFICE/OUTPT VISIT, EST, LEVL III, 20-29 MIN: ICD-10-PCS | Mod: S$GLB,,, | Performed by: PHYSICAL MEDICINE & REHABILITATION

## 2019-10-25 PROCEDURE — 3080F PR MOST RECENT DIASTOLIC BLOOD PRESSURE >= 90 MM HG: ICD-10-PCS | Mod: S$GLB,,, | Performed by: PHYSICAL MEDICINE & REHABILITATION

## 2019-10-25 PROCEDURE — 3077F PR MOST RECENT SYSTOLIC BLOOD PRESSURE >= 140 MM HG: ICD-10-PCS | Mod: S$GLB,,, | Performed by: PHYSICAL MEDICINE & REHABILITATION

## 2019-10-25 PROCEDURE — 3080F DIAST BP >= 90 MM HG: CPT | Mod: S$GLB,,, | Performed by: PHYSICAL MEDICINE & REHABILITATION

## 2019-10-25 RX ORDER — OMEPRAZOLE 20 MG/1
40 CAPSULE, DELAYED RELEASE ORAL 2 TIMES DAILY
Qty: 180 CAPSULE | Refills: 1 | Status: SHIPPED | OUTPATIENT
Start: 2019-10-25 | End: 2020-03-13

## 2019-10-25 RX ORDER — OMEPRAZOLE 20 MG/1
40 CAPSULE, DELAYED RELEASE ORAL 2 TIMES DAILY
Qty: 180 CAPSULE | Refills: 1 | Status: SHIPPED | OUTPATIENT
Start: 2019-10-25 | End: 2019-10-25 | Stop reason: SDUPTHER

## 2019-10-25 NOTE — PROGRESS NOTES
SUBJECTIVE:    Patient ID: Yanci Coats is a 64 y.o. female.    Chief Complaint: Follow-up (having pains// pains occur when sitting for long period of time//PT caused the pain her (B) legs //)    She presents today with complaints of low low back pain at the lumbosacral junction radiating out into the left gluteal region.  No distal radicular symptoms.  Also complains of bilateral knee pains just started this week.  No swelling or increased warmth of the knees.        Past Medical History:   Diagnosis Date    Allergy history unknown     Arthritis     DDD    Back pain     Cataract     OU    Diabetes mellitus type II     GERD (gastroesophageal reflux disease)     Hypertension     LPRD (laryngopharyngeal reflux disease)     Nuclear sclerosis - Both Eyes 7/9/2013     Social History     Socioeconomic History    Marital status: Single     Spouse name: Not on file    Number of children: Not on file    Years of education: Not on file    Highest education level: Not on file   Occupational History     Employer: [employer]   Social Needs    Financial resource strain: Not on file    Food insecurity:     Worry: Not on file     Inability: Not on file    Transportation needs:     Medical: Not on file     Non-medical: Not on file   Tobacco Use    Smoking status: Never Smoker    Smokeless tobacco: Never Used   Substance and Sexual Activity    Alcohol use: Yes     Comment: occ    Drug use: No    Sexual activity: Not Currently     Partners: Male     Birth control/protection: Post-menopausal   Lifestyle    Physical activity:     Days per week: Not on file     Minutes per session: Not on file    Stress: Not on file   Relationships    Social connections:     Talks on phone: Not on file     Gets together: Not on file     Attends Episcopalian service: Not on file     Active member of club or organization: Not on file     Attends meetings of clubs or organizations: Not on file     Relationship status: Not  "on file   Other Topics Concern    Are you pregnant or think you may be? No    Breast-feeding No   Social History Narrative    Lives with significant other.    No kids.    3 dogs and cat.    No exercise.    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     Past Surgical History:   Procedure Laterality Date    COLONOSCOPY N/A 1/31/2019    Procedure: COLONOSCOPY;  Surgeon: Ronak Palacios MD;  Location: [facility];  Service: Endoscopy;  Laterality: N/A;     Family History   Problem Relation Age of Onset    ANNA disease Father     Heart disease Father     Heart disease Mother     Diabetes Mother     Blindness Mother     Cataracts Mother     Hypertension Mother     Cholelithiasis Sister     ANNA disease Sister     Blindness Maternal Aunt     Diabetes Maternal Aunt     Blindness Paternal Aunt     Stroke Paternal Grandmother     Glaucoma Paternal Grandmother     Cervical cancer Unknown         aunt    Retinal detachment Brother     Celiac disease Neg Hx     Cirrhosis Neg Hx     Colon cancer Neg Hx     Colon polyps Neg Hx     Crohn's disease Neg Hx     Cystic fibrosis Neg Hx     Esophageal cancer Neg Hx     Hemochromatosis Neg Hx     Inflammatory bowel disease Neg Hx     Irritable bowel syndrome Neg Hx     Liver cancer Neg Hx     Liver disease Neg Hx     Rectal cancer Neg Hx     Stomach cancer Neg Hx     Ulcerative colitis Neg Hx     Clive's disease Neg Hx     Melanoma Neg Hx     Amblyopia Neg Hx     Cancer Neg Hx     Macular degeneration Neg Hx     Strabismus Neg Hx     Thyroid disease Neg Hx     Psoriasis Neg Hx     Lupus Neg Hx     Eczema Neg Hx      Vitals:    10/25/19 1359   BP: (!) 177/93   Pulse: 98   Weight: 95.3 kg (210 lb)   Height: 5' 8" (1.727 m)       Review of Systems   Constitutional: Negative for chills, diaphoresis, fatigue, fever and unexpected weight change.   HENT: Negative for trouble swallowing.    Eyes: Negative for visual disturbance.   Respiratory: Negative for shortness of " breath.    Cardiovascular: Negative for chest pain.   Gastrointestinal: Negative for abdominal pain, constipation, nausea and vomiting.   Genitourinary: Negative for difficulty urinating.   Musculoskeletal: Negative for arthralgias, back pain, gait problem, joint swelling, myalgias, neck pain and neck stiffness.   Neurological: Negative for dizziness, speech difficulty, weakness, light-headedness, numbness and headaches.          Objective:      Physical Exam   Constitutional: She is oriented to person, place, and time. She appears well-developed and well-nourished.   Neurological: She is alert and oriented to person, place, and time.           Assessment:       1. Chronic midline low back pain with right-sided sciatica    2. LPRD (laryngopharyngeal reflux disease)           Plan:     will schedule her for interlaminar epidural steroid injection at L5-S1.  Consider medial branch blocks and subsequent radiofrequency ablation of the left L4-5 and L5-S1 facet joints.  She asked me to refill her Prilosec prescription as well.  I note her elevated blood pressure since she switch from Diovan to Micardis.  She should take that up with endocrinology who made the switch      Chronic midline low back pain with right-sided sciatica    LPRD (laryngopharyngeal reflux disease)  -     Discontinue: omeprazole (PRILOSEC) 20 MG capsule; Take 2 capsules (40 mg total) by mouth 2 (two) times daily.  Dispense: 180 capsule; Refill: 1  -     omeprazole (PRILOSEC) 20 MG capsule; Take 2 capsules (40 mg total) by mouth 2 (two) times daily.  Dispense: 180 capsule; Refill: 1

## 2019-10-25 NOTE — TELEPHONE ENCOUNTER
----- Message from Chaitanya Mc MD sent at 10/25/2019  2:23 PM CDT -----  Please schedule for interlaminar epidural steroid injection at L5-S1

## 2019-10-28 DIAGNOSIS — M54.16 LUMBAR RADICULOPATHY: Primary | ICD-10-CM

## 2019-10-31 ENCOUNTER — DOCUMENTATION ONLY (OUTPATIENT)
Dept: FAMILY MEDICINE | Facility: CLINIC | Age: 64
End: 2019-10-31

## 2019-10-31 NOTE — PROGRESS NOTES
Pre-Visit Chart Review  For Appointment Scheduled on (11/4/19)    Health Maintenance Due   Topic Date Due    Foot Exam  01/05/2019    Lipid Panel  01/05/2019    Mammogram  01/15/2019    Hemoglobin A1c  06/01/2019

## 2019-11-04 ENCOUNTER — OFFICE VISIT (OUTPATIENT)
Dept: FAMILY MEDICINE | Facility: CLINIC | Age: 64
End: 2019-11-04
Payer: COMMERCIAL

## 2019-11-04 VITALS
WEIGHT: 225.5 LBS | HEART RATE: 98 BPM | HEIGHT: 68 IN | SYSTOLIC BLOOD PRESSURE: 134 MMHG | BODY MASS INDEX: 34.17 KG/M2 | TEMPERATURE: 98 F | DIASTOLIC BLOOD PRESSURE: 72 MMHG | OXYGEN SATURATION: 97 %

## 2019-11-04 DIAGNOSIS — J30.9 ALLERGIC RHINITIS, UNSPECIFIED SEASONALITY, UNSPECIFIED TRIGGER: ICD-10-CM

## 2019-11-04 DIAGNOSIS — M54.40 CHRONIC MIDLINE LOW BACK PAIN WITH SCIATICA, SCIATICA LATERALITY UNSPECIFIED: ICD-10-CM

## 2019-11-04 DIAGNOSIS — E11.59 HYPERTENSION ASSOCIATED WITH DIABETES: Primary | ICD-10-CM

## 2019-11-04 DIAGNOSIS — E78.5 HYPERLIPIDEMIA LDL GOAL <70: ICD-10-CM

## 2019-11-04 DIAGNOSIS — I15.2 HYPERTENSION ASSOCIATED WITH DIABETES: Primary | ICD-10-CM

## 2019-11-04 DIAGNOSIS — G89.29 CHRONIC MIDLINE LOW BACK PAIN WITH SCIATICA, SCIATICA LATERALITY UNSPECIFIED: ICD-10-CM

## 2019-11-04 DIAGNOSIS — F32.A DEPRESSION, UNSPECIFIED DEPRESSION TYPE: ICD-10-CM

## 2019-11-04 DIAGNOSIS — K59.00 CONSTIPATION, UNSPECIFIED CONSTIPATION TYPE: ICD-10-CM

## 2019-11-04 PROCEDURE — 99213 OFFICE O/P EST LOW 20 MIN: CPT | Mod: 25,S$GLB,, | Performed by: PHYSICIAN ASSISTANT

## 2019-11-04 PROCEDURE — 96372 THER/PROPH/DIAG INJ SC/IM: CPT | Mod: S$GLB,,, | Performed by: PHYSICIAN ASSISTANT

## 2019-11-04 PROCEDURE — 3075F SYST BP GE 130 - 139MM HG: CPT | Mod: CPTII,S$GLB,, | Performed by: PHYSICIAN ASSISTANT

## 2019-11-04 PROCEDURE — 3008F PR BODY MASS INDEX (BMI) DOCUMENTED: ICD-10-PCS | Mod: CPTII,S$GLB,, | Performed by: PHYSICIAN ASSISTANT

## 2019-11-04 PROCEDURE — 3075F PR MOST RECENT SYSTOLIC BLOOD PRESS GE 130-139MM HG: ICD-10-PCS | Mod: CPTII,S$GLB,, | Performed by: PHYSICIAN ASSISTANT

## 2019-11-04 PROCEDURE — 3008F BODY MASS INDEX DOCD: CPT | Mod: CPTII,S$GLB,, | Performed by: PHYSICIAN ASSISTANT

## 2019-11-04 PROCEDURE — 99999 PR PBB SHADOW E&M-EST. PATIENT-LVL V: ICD-10-PCS | Mod: PBBFAC,,, | Performed by: PHYSICIAN ASSISTANT

## 2019-11-04 PROCEDURE — 96372 PR INJECTION,THERAP/PROPH/DIAG2ST, IM OR SUBCUT: ICD-10-PCS | Mod: S$GLB,,, | Performed by: PHYSICIAN ASSISTANT

## 2019-11-04 PROCEDURE — 99213 PR OFFICE/OUTPT VISIT, EST, LEVL III, 20-29 MIN: ICD-10-PCS | Mod: 25,S$GLB,, | Performed by: PHYSICIAN ASSISTANT

## 2019-11-04 PROCEDURE — 99999 PR PBB SHADOW E&M-EST. PATIENT-LVL V: CPT | Mod: PBBFAC,,, | Performed by: PHYSICIAN ASSISTANT

## 2019-11-04 PROCEDURE — 3078F DIAST BP <80 MM HG: CPT | Mod: CPTII,S$GLB,, | Performed by: PHYSICIAN ASSISTANT

## 2019-11-04 PROCEDURE — 3078F PR MOST RECENT DIASTOLIC BLOOD PRESSURE < 80 MM HG: ICD-10-PCS | Mod: CPTII,S$GLB,, | Performed by: PHYSICIAN ASSISTANT

## 2019-11-04 RX ORDER — DULOXETIN HYDROCHLORIDE 30 MG/1
30 CAPSULE, DELAYED RELEASE ORAL DAILY
Qty: 30 CAPSULE | Refills: 0 | Status: SHIPPED | OUTPATIENT
Start: 2019-11-04 | End: 2019-11-04

## 2019-11-04 RX ORDER — KETOCONAZOLE 20 MG/ML
SHAMPOO, SUSPENSION TOPICAL
Qty: 120 ML | Refills: 5 | Status: SHIPPED | OUTPATIENT
Start: 2019-11-04 | End: 2020-03-13 | Stop reason: SDUPTHER

## 2019-11-04 RX ORDER — METHYLPREDNISOLONE ACETATE 40 MG/ML
40 INJECTION, SUSPENSION INTRA-ARTICULAR; INTRALESIONAL; INTRAMUSCULAR; SOFT TISSUE
Status: COMPLETED | OUTPATIENT
Start: 2019-11-04 | End: 2019-11-04

## 2019-11-04 RX ORDER — DULOXETIN HYDROCHLORIDE 30 MG/1
30 CAPSULE, DELAYED RELEASE ORAL DAILY
Qty: 30 CAPSULE | Refills: 0 | Status: SHIPPED | OUTPATIENT
Start: 2019-11-04 | End: 2019-11-06 | Stop reason: SDUPTHER

## 2019-11-04 RX ADMIN — METHYLPREDNISOLONE ACETATE 40 MG: 40 INJECTION, SUSPENSION INTRA-ARTICULAR; INTRALESIONAL; INTRAMUSCULAR; SOFT TISSUE at 01:11

## 2019-11-04 NOTE — PROGRESS NOTES
Subjective:       Patient ID: Yanci Coats is a 64 y.o. female.    Chief Complaint: Hypertension; Back Pain (4 days); Sinus Problem; and Depression (intermit)    Patient with hypertension, type 2 diabetes, chronic low back pain presents for routine follow-up.  Patient states that her blood pressure has been elevated at times at other doctors visits.  She is compliant with her medications.  She has not been able to exercise as much as she would like secondary to her low back pain. She is scheduled to have epidural steroid injection.  She has currently having increased low back pain.  Pain radiates into the right lower extremity.  She is taking gabapentin 600 mg twice daily without relief of her pain. Patient is also complaining of intermittent depression.  She states that she has had this for several years.  She states there is no sees analyses to the depression.  She states that she has lack of motivation and loss of interest in pleasurable activities.  She denies any mood swings or crying spells.  She denies any nervousness or anxiety.  She has never been treated for depression.  She has chronic nasal allergies and is having increased nasal congestion postnasal drip and cough.  She is taking Flonase and over-the-counter antihistamine without relief  Patients patient medical/surgical, social and family histories have been reviewed       Review of Systems   Constitutional: Positive for fatigue. Negative for activity change, appetite change, chills, diaphoresis and fever.   HENT: Positive for congestion and postnasal drip. Negative for rhinorrhea, sinus pressure and sore throat.    Respiratory: Positive for cough. Negative for chest tightness, shortness of breath and wheezing.    Cardiovascular: Negative for chest pain, palpitations and leg swelling.   Musculoskeletal: Negative for gait problem.   Neurological: Negative for dizziness, light-headedness and headaches.   Psychiatric/Behavioral: Positive for  dysphoric mood and sleep disturbance.       Objective:      Physical Exam   Constitutional: She is cooperative.   Neurological: She is alert.   Psychiatric: Her speech is normal. Judgment and thought content normal. Her affect is blunt (flat ). She is slowed. Cognition and memory are normal.   Vitals reviewed.      Assessment:       1. Hypertension associated with diabetes    2. Hyperlipidemia LDL goal <70    3. Chronic midline low back pain with sciatica, sciatica laterality unspecified    4. Depression, unspecified depression type    5. Allergic rhinitis, unspecified seasonality, unspecified trigger        Plan:       Yanci was seen today for hypertension, back pain, sinus problem and depression.    Diagnoses and all orders for this visit:    Hypertension associated with diabetes  BP at goal,   Monitor and 4 week follow up   Hyperlipidemia LDL goal <70  Continue current med   Chronic midline low back pain with sciatica, sciatica laterality unspecified  -     methylPREDNISolone acetate injection 40 mg    Depression, unspecified depression type  -     DULoxetine (CYMBALTA) 30 MG capsule; Take 1 capsule (30 mg total) by mouth once daily.    Allergic rhinitis, unspecified seasonality, unspecified trigger  flonase otc antihistamine       Follow up for 4 weeks me follow up on new med - 40 min , estab PCP next avail .   DISCLAIMER: This note was prepared with Desall voice recognition transcription software. Garbled syntax, mangled pronouns, and other bizarre constructions may be attributed to that software system

## 2019-11-04 NOTE — PROGRESS NOTES
Identified patient's name and . Administered 40 mg methylprednisolone, IM. Patient tolerated well, aseptic technique maintained. Pain scale 0/10 with injection.

## 2019-11-06 DIAGNOSIS — F32.A DEPRESSION, UNSPECIFIED DEPRESSION TYPE: ICD-10-CM

## 2019-11-06 RX ORDER — DULOXETIN HYDROCHLORIDE 30 MG/1
30 CAPSULE, DELAYED RELEASE ORAL DAILY
Qty: 30 CAPSULE | Refills: 0 | Status: SHIPPED | OUTPATIENT
Start: 2019-11-06 | End: 2020-03-13

## 2019-11-06 NOTE — TELEPHONE ENCOUNTER
----- Message from Francesca Briggs sent at 11/6/2019  3:24 PM CST -----  Type: Needs Medical Advice    Who Called:  Patient  Best Call Back Number: [phone]  Additional Information: Patient stated medication:DULoxetine (CYMBALTA) 30 MG capsule was originally ordered through mail order but was suppose to be ordered at [facility]/patient cancelled mail order but needs three month supply reordered at [facility]/please reorder and call patient back to discuss other medications.

## 2019-11-07 ENCOUNTER — TELEPHONE (OUTPATIENT)
Dept: PAIN MEDICINE | Facility: CLINIC | Age: 64
End: 2019-11-07

## 2019-11-07 NOTE — TELEPHONE ENCOUNTER
Audrey Pemberton Staff             Case Status Medically Urgent     DOS:11/15/2019   Procedure:Pr Inj Lumbar/Sacral, W/Imaging Guidance     Pt decline  A payment plan, Pt declined being transferred to a financial counselor   Pt wants to cancel , Pt was transferred to      Please collect Deposit: $  914.24   Self Pay Balance     0   Collectible Bad Debt Balance   Pt verified demos/procedure date /insurance provider(s).          Pt has no additional medical coverage.

## 2019-11-07 NOTE — TELEPHONE ENCOUNTER
Spoke to pt states she wants to cancel procedure scheduled for 11/15/19 due to 5,000 cost. Pt scheduled follow up to discuss different options

## 2019-11-08 ENCOUNTER — TELEPHONE (OUTPATIENT)
Dept: FAMILY MEDICINE | Facility: CLINIC | Age: 64
End: 2019-11-08

## 2019-11-08 NOTE — TELEPHONE ENCOUNTER
PA for Linzess 145mcg has been DENIED due to the follow reason:  The dx of unspecified constipation does not establish medical necessity for this drug.     Please advise.

## 2019-11-08 NOTE — TELEPHONE ENCOUNTER
Please let patient know that we tried to get her Linzess approved but it was denied.  What else has she taken for chronic constipation?  Has she seen or is she followed by a gastroenterologist?

## 2019-11-11 NOTE — TELEPHONE ENCOUNTER
Pt appt scheduled for establishing care with PCP. Pt requested provider [facility].

## 2019-11-11 NOTE — TELEPHONE ENCOUNTER
Start MiraLax 1 scoop each evening.  If this does not help she can increase to every morning and evening.  Patient needs to schedule appointment to establish care with a PCP.

## 2019-11-11 NOTE — TELEPHONE ENCOUNTER
t advised of denial. Pt state that she has not taken anything else and she has not seen a GI provider. Please advise.

## 2019-11-15 ENCOUNTER — DOCUMENTATION ONLY (OUTPATIENT)
Dept: FAMILY MEDICINE | Facility: CLINIC | Age: 64
End: 2019-11-15

## 2019-11-15 NOTE — PROGRESS NOTES
Pre-Visit Chart Review  For Appointment Scheduled on 11/20/19    Health Maintenance Due   Topic Date Due    Foot Exam  01/05/2019    Lipid Panel  01/05/2019    Mammogram  01/15/2019    Hemoglobin A1c  06/01/2019

## 2019-11-20 ENCOUNTER — TELEPHONE (OUTPATIENT)
Dept: FAMILY MEDICINE | Facility: CLINIC | Age: 64
End: 2019-11-20

## 2019-11-20 NOTE — TELEPHONE ENCOUNTER
----- Message from Jocelyn Moralez sent at 11/19/2019  5:29 PM CST -----  Pt is requesting a call back.pt needs to reschedule appointment for November 20 for another day,pt prefer's a Friday.please call and advise        Thank you

## 2019-11-22 ENCOUNTER — OFFICE VISIT (OUTPATIENT)
Dept: FAMILY MEDICINE | Facility: CLINIC | Age: 64
End: 2019-11-22
Payer: COMMERCIAL

## 2019-11-22 VITALS
SYSTOLIC BLOOD PRESSURE: 130 MMHG | WEIGHT: 220.88 LBS | TEMPERATURE: 98 F | HEIGHT: 68 IN | DIASTOLIC BLOOD PRESSURE: 72 MMHG | OXYGEN SATURATION: 96 % | BODY MASS INDEX: 33.48 KG/M2 | HEART RATE: 82 BPM

## 2019-11-22 DIAGNOSIS — D21.9 FIBROIDS: ICD-10-CM

## 2019-11-22 DIAGNOSIS — E11.59 HYPERTENSION ASSOCIATED WITH DIABETES: ICD-10-CM

## 2019-11-22 DIAGNOSIS — E78.5 HYPERLIPIDEMIA ASSOCIATED WITH TYPE 2 DIABETES MELLITUS: ICD-10-CM

## 2019-11-22 DIAGNOSIS — E11.65 TYPE 2 DIABETES MELLITUS WITH HYPERGLYCEMIA, WITHOUT LONG-TERM CURRENT USE OF INSULIN: Primary | ICD-10-CM

## 2019-11-22 DIAGNOSIS — M54.50 CHRONIC BILATERAL LOW BACK PAIN WITHOUT SCIATICA: ICD-10-CM

## 2019-11-22 DIAGNOSIS — E11.69 HYPERLIPIDEMIA ASSOCIATED WITH TYPE 2 DIABETES MELLITUS: ICD-10-CM

## 2019-11-22 DIAGNOSIS — Z12.39 SCREENING FOR BREAST CANCER: ICD-10-CM

## 2019-11-22 DIAGNOSIS — I15.2 HYPERTENSION ASSOCIATED WITH DIABETES: ICD-10-CM

## 2019-11-22 DIAGNOSIS — R53.83 FATIGUE, UNSPECIFIED TYPE: ICD-10-CM

## 2019-11-22 DIAGNOSIS — J31.0 CHRONIC RHINITIS: ICD-10-CM

## 2019-11-22 DIAGNOSIS — M54.9 BACK PAIN, UNSPECIFIED BACK LOCATION, UNSPECIFIED BACK PAIN LATERALITY, UNSPECIFIED CHRONICITY: ICD-10-CM

## 2019-11-22 DIAGNOSIS — G89.29 CHRONIC BILATERAL LOW BACK PAIN WITHOUT SCIATICA: ICD-10-CM

## 2019-11-22 PROCEDURE — 99999 PR PBB SHADOW E&M-EST. PATIENT-LVL V: CPT | Mod: PBBFAC,,, | Performed by: FAMILY MEDICINE

## 2019-11-22 PROCEDURE — 99999 PR PBB SHADOW E&M-EST. PATIENT-LVL V: ICD-10-PCS | Mod: PBBFAC,,, | Performed by: FAMILY MEDICINE

## 2019-11-22 PROCEDURE — 3078F DIAST BP <80 MM HG: CPT | Mod: CPTII,S$GLB,, | Performed by: FAMILY MEDICINE

## 2019-11-22 PROCEDURE — 99213 PR OFFICE/OUTPT VISIT, EST, LEVL III, 20-29 MIN: ICD-10-PCS | Mod: S$GLB,,, | Performed by: FAMILY MEDICINE

## 2019-11-22 PROCEDURE — 3075F PR MOST RECENT SYSTOLIC BLOOD PRESS GE 130-139MM HG: ICD-10-PCS | Mod: CPTII,S$GLB,, | Performed by: FAMILY MEDICINE

## 2019-11-22 PROCEDURE — 3078F PR MOST RECENT DIASTOLIC BLOOD PRESSURE < 80 MM HG: ICD-10-PCS | Mod: CPTII,S$GLB,, | Performed by: FAMILY MEDICINE

## 2019-11-22 PROCEDURE — 3075F SYST BP GE 130 - 139MM HG: CPT | Mod: CPTII,S$GLB,, | Performed by: FAMILY MEDICINE

## 2019-11-22 PROCEDURE — 99213 OFFICE O/P EST LOW 20 MIN: CPT | Mod: S$GLB,,, | Performed by: FAMILY MEDICINE

## 2019-11-22 RX ORDER — CYCLOBENZAPRINE HCL 10 MG
10 TABLET ORAL NIGHTLY
Qty: 30 TABLET | Refills: 1 | Status: SHIPPED | OUTPATIENT
Start: 2019-11-22 | End: 2019-12-22

## 2019-11-22 RX ORDER — GABAPENTIN 300 MG/1
300 CAPSULE ORAL 3 TIMES DAILY PRN
Qty: 90 CAPSULE | Refills: 11
Start: 2019-11-22 | End: 2021-03-15 | Stop reason: SDUPTHER

## 2019-11-22 NOTE — PROGRESS NOTES
Subjective:   Patient ID: Yanci Coats is a 64 y.o. female     Chief Complaint:Establish Care      Patient with multiple complaints today.  Patient also here to establish care for checkup for diabetes hypertension.  Patient with lower back pain which is her main complaint.  Discussed in length treatment options for this.  Patient also with issues with her sinuses.  Patient also with issues with sleeping at night.  Patient also with issues with poor energy during the day.    Review of Systems   Constitutional: Positive for fatigue. Negative for chills and fever.   HENT: Negative for sore throat.    Eyes: Negative for visual disturbance.   Respiratory: Negative for shortness of breath.    Cardiovascular: Negative for chest pain.   Gastrointestinal: Negative for abdominal pain.   Endocrine: Negative for polyuria.   Genitourinary: Negative for dysuria.   Musculoskeletal: Positive for back pain.   Skin: Negative for color change.   Neurological: Negative for headaches.   Psychiatric/Behavioral: Positive for sleep disturbance. Negative for agitation and confusion.     Past Medical History:   Diagnosis Date    Allergy history unknown     Arthritis     DDD    Back pain     Cataract     OU    Diabetes mellitus type II     GERD (gastroesophageal reflux disease)     Hypertension     LPRD (laryngopharyngeal reflux disease)     Nuclear sclerosis - Both Eyes 7/9/2013     Objective:     Vitals:    11/22/19 1117   BP: 130/72   Pulse:    Temp:      Body mass index is 33.59 kg/m².  Physical Exam   Constitutional: No distress.   HENT:   Head: Normocephalic and atraumatic.   Eyes: EOM are normal.   Neck: Neck supple. No tracheal deviation present.   Cardiovascular: Normal rate, regular rhythm and normal heart sounds.   Pulmonary/Chest: Effort normal. No respiratory distress.   Abdominal: Bowel sounds are normal.   Musculoskeletal: Normal range of motion. She exhibits no edema.   Neurological: She is alert.    Psychiatric: She has a normal mood and affect.   Poor sleep at night     Assessment:     1. Type 2 diabetes mellitus with hyperglycemia, without long-term current use of insulin    2. Hypertension associated with diabetes    3. Hyperlipidemia associated with type 2 diabetes mellitus    4. Fibroids    5. Back pain, unspecified back location, unspecified back pain laterality, unspecified chronicity    6. Screening for breast cancer    7. Fatigue, unspecified type    8. Chronic bilateral low back pain without sciatica    9. Chronic rhinitis      Plan:   Type 2 diabetes mellitus with hyperglycemia, without long-term current use of insulin  -     Hemoglobin A1c; Future; Expected date: 11/22/2019  -     Lipid panel; Future; Expected date: 11/22/2019  -     Comprehensive metabolic panel; Future; Expected date: 02/22/2020  Reviewed blood work from March 2019 does show poorly controlled blood sugar though within an intermediate acceptable range.  Will continue monitor this every 6 months.    Hypertension associated with diabetes  Patient hypertension well controlled today on oral medication.  Reviewed blood work from March 2019 shows no signs end organ damage such as kidney disease indicating controlled BP has reduced patients risk of hypertensive comorbidity    Hyperlipidemia associated with type 2 diabetes mellitus  Patient with hyperlipidemia currently treated with statin.  Reviewed blood work from January 2018 shows an LDL cholesterol 94 which is at goal.  Will continue to monitor this regularly.    Fibroids  -     Ambulatory referral to Obstetrics / Gynecology  Patient questions regarding her fibroids.  Reviewed ultrasound that did show she had enlarged uterus and fibroids.  Patient's H med further treatment for this.    Back pain, unspecified back location, unspecified back pain laterality, unspecified chronicity  -     Ambulatory referral to Physical Medicine Rehab  Patient with chronic back pain.  Has been in to  PMNR for other joint issues.  Patient also questions about prolotherapy    Screening for breast cancer  -     Mammo Digital Screening Bilateral With CAD; Future; Expected date: 11/22/2019    Fatigue, unspecified type  -     CBC auto differential; Future; Expected date: 11/22/2019  -     Ferritin; Future; Expected date: 11/22/2019  -     TSH; Future; Expected date: 11/22/2019  -     Vitamin D; Future; Expected date: 11/22/2019  -     Vitamin B12; Future; Expected date: 11/22/2019  -     Folate; Future; Expected date: 11/22/2019    Chronic bilateral low back pain without sciatica  -     gabapentin (NEURONTIN) 300 MG capsule; Take 1 capsule (300 mg total) by mouth 3 (three) times daily as needed.  Dispense: 90 capsule; Refill: 11  -     cyclobenzaprine (FLEXERIL) 10 MG tablet; Take 1 tablet (10 mg total) by mouth every evening.  Dispense: 30 tablet; Refill: 1    Chronic rhinitis  Discussed in length treatment options including intranasal steroids, 2nd generation antihistamines, monitor casts, and nasal saline flushes.  Patient elected to try nasal saline flushes.  Advised patient that if this does not work she is exhausted the primary care aspect of treatment for this and would recommend follow-up with ear nose and throat.      Time spent with patient: 45 minutes and over half of that time was spent on counseling an coordination of care.    Anurag Nye MD  11/22/2019    Portions of this note have been dictated with ADELAIDA Guzman

## 2019-11-25 ENCOUNTER — TELEPHONE (OUTPATIENT)
Dept: PHYSICAL MEDICINE AND REHAB | Facility: CLINIC | Age: 64
End: 2019-11-25

## 2019-11-25 ENCOUNTER — OFFICE VISIT (OUTPATIENT)
Dept: SPINE | Facility: CLINIC | Age: 64
End: 2019-11-25
Payer: COMMERCIAL

## 2019-11-25 VITALS — HEIGHT: 68 IN | WEIGHT: 220.88 LBS | BODY MASS INDEX: 33.48 KG/M2

## 2019-11-25 DIAGNOSIS — G89.29 CHRONIC MIDLINE LOW BACK PAIN WITH RIGHT-SIDED SCIATICA: Primary | ICD-10-CM

## 2019-11-25 DIAGNOSIS — M54.2 NECK PAIN: ICD-10-CM

## 2019-11-25 DIAGNOSIS — M54.41 CHRONIC MIDLINE LOW BACK PAIN WITH RIGHT-SIDED SCIATICA: Primary | ICD-10-CM

## 2019-11-25 PROCEDURE — 99213 OFFICE O/P EST LOW 20 MIN: CPT | Mod: S$GLB,,, | Performed by: PHYSICAL MEDICINE & REHABILITATION

## 2019-11-25 PROCEDURE — 3008F PR BODY MASS INDEX (BMI) DOCUMENTED: ICD-10-PCS | Mod: S$GLB,,, | Performed by: PHYSICAL MEDICINE & REHABILITATION

## 2019-11-25 PROCEDURE — 99213 PR OFFICE/OUTPT VISIT, EST, LEVL III, 20-29 MIN: ICD-10-PCS | Mod: S$GLB,,, | Performed by: PHYSICAL MEDICINE & REHABILITATION

## 2019-11-25 PROCEDURE — 3008F BODY MASS INDEX DOCD: CPT | Mod: S$GLB,,, | Performed by: PHYSICAL MEDICINE & REHABILITATION

## 2019-11-25 RX ORDER — TRAMADOL HYDROCHLORIDE 50 MG/1
50 TABLET ORAL EVERY 6 HOURS PRN
Qty: 60 TABLET | Refills: 1 | Status: SHIPPED | OUTPATIENT
Start: 2019-11-25 | End: 2021-06-09 | Stop reason: ALTCHOICE

## 2019-11-25 NOTE — PROGRESS NOTES
SUBJECTIVE:    Patient ID: Yanci Coats is a 64 y.o. female.    Chief Complaint: Back Pain    She is here with persisting complaints of neck and low back pain.  She canceled her epidural injections secondary to cost.  She is interested in prolotherapy and has been referred to Dr. Barrientos.  She is also interested in evaluation with pain management.  She takes gabapentin and Excedrin for pain which helps some.  She is interested in additional pain medication.  She is considering breast reduction surgery to help with low back pain.  She is going to see her gynecologist regarding uterine fibroids.  I told her there is no guarantee that either of those things has anything to do with her back pain.        Past Medical History:   Diagnosis Date    Allergy history unknown     Arthritis     DDD    Back pain     Cataract     OU    Diabetes mellitus type II     GERD (gastroesophageal reflux disease)     Hypertension     LPRD (laryngopharyngeal reflux disease)     Nuclear sclerosis - Both Eyes 7/9/2013     Social History     Socioeconomic History    Marital status: Significant Other     Spouse name: Not on file    Number of children: Not on file    Years of education: Not on file    Highest education level: Not on file   Occupational History     Employer: [employer]   Social Needs    Financial resource strain: Not on file    Food insecurity:     Worry: Not on file     Inability: Not on file    Transportation needs:     Medical: Not on file     Non-medical: Not on file   Tobacco Use    Smoking status: Never Smoker    Smokeless tobacco: Never Used   Substance and Sexual Activity    Alcohol use: Yes     Comment: occ    Drug use: No    Sexual activity: Not Currently     Partners: Male     Birth control/protection: Post-menopausal   Lifestyle    Physical activity:     Days per week: Not on file     Minutes per session: Not on file    Stress: Only a little   Relationships    Social connections:  "    Talks on phone: Not on file     Gets together: Not on file     Attends Baptism service: Not on file     Active member of club or organization: Not on file     Attends meetings of clubs or organizations: Not on file     Relationship status: Not on file   Other Topics Concern    Are you pregnant or think you may be? No    Breast-feeding No   Social History Narrative    Lives with significant other.    No kids.    3 dogs and cat.    No exercise.    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     Past Surgical History:   Procedure Laterality Date    COLONOSCOPY N/A 1/31/2019    Procedure: COLONOSCOPY;  Surgeon: Ronak Palacios MD;  Location: [facility];  Service: Endoscopy;  Laterality: N/A;     Family History   Problem Relation Age of Onset    ANNA disease Father     Heart disease Father     Heart disease Mother     Diabetes Mother     Blindness Mother     Cataracts Mother     Hypertension Mother     Cholelithiasis Sister     ANNA disease Sister     Blindness Maternal Aunt     Diabetes Maternal Aunt     Blindness Paternal Aunt     Stroke Paternal Grandmother     Glaucoma Paternal Grandmother     Cervical cancer Unknown         aunt    Retinal detachment Brother     Celiac disease Neg Hx     Cirrhosis Neg Hx     Colon cancer Neg Hx     Colon polyps Neg Hx     Crohn's disease Neg Hx     Cystic fibrosis Neg Hx     Esophageal cancer Neg Hx     Hemochromatosis Neg Hx     Inflammatory bowel disease Neg Hx     Irritable bowel syndrome Neg Hx     Liver cancer Neg Hx     Liver disease Neg Hx     Rectal cancer Neg Hx     Stomach cancer Neg Hx     Ulcerative colitis Neg Hx     Clive's disease Neg Hx     Melanoma Neg Hx     Amblyopia Neg Hx     Cancer Neg Hx     Macular degeneration Neg Hx     Strabismus Neg Hx     Thyroid disease Neg Hx     Psoriasis Neg Hx     Lupus Neg Hx     Eczema Neg Hx      Vitals:    11/25/19 1125   Weight: 100.2 kg (220 lb 14.4 oz)   Height: 5' 8" (1.727 m)       Review " of Systems   Constitutional: Negative for chills, diaphoresis, fatigue, fever and unexpected weight change.   HENT: Negative for trouble swallowing.    Eyes: Negative for visual disturbance.   Respiratory: Negative for shortness of breath.    Cardiovascular: Negative for chest pain.   Gastrointestinal: Negative for abdominal pain, constipation, nausea and vomiting.   Genitourinary: Negative for difficulty urinating.   Musculoskeletal: Negative for arthralgias, back pain, gait problem, joint swelling, myalgias, neck pain and neck stiffness.   Neurological: Negative for dizziness, speech difficulty, weakness, light-headedness, numbness and headaches.          Objective:      Physical Exam   Constitutional: She is oriented to person, place, and time. She appears well-developed and well-nourished.   Neurological: She is alert and oriented to person, place, and time.           Assessment:       1. Chronic midline low back pain with right-sided sciatica    2. Neck pain           Plan:     I am going to give her prescription for tramadol for pain.  She can seek the Prolotherapy treatment through Dr. Iliana wolf office.  I agree with referral to pain management.  She can follow up with me in 3 months or as needed      Chronic midline low back pain with right-sided sciatica  -     traMADol (ULTRAM) 50 mg tablet; Take 1 tablet (50 mg total) by mouth every 6 (six) hours as needed for Pain.  Dispense: 60 tablet; Refill: 1    Neck pain  -     traMADol (ULTRAM) 50 mg tablet; Take 1 tablet (50 mg total) by mouth every 6 (six) hours as needed for Pain.  Dispense: 60 tablet; Refill: 1

## 2019-11-25 NOTE — TELEPHONE ENCOUNTER
----- Message from Chaitanya Mc MD sent at 11/25/2019 11:54 AM CST -----  This is a patient who would be interested in prolotherapy for low back pain.

## 2019-11-25 NOTE — LETTER
November 25, 2019      Anurag Nye MD  [address]  [place] [address]           [place] [facility]  [address] [place] [address] [address]  [place] [address]  Phone: [phone]  Fax: [phone]          Patient: Yanci Coats   MR Number: [id number]   YOB: 1955   Date of Visit: 11/25/2019       Dear Dr. Anurag Nye:    Thank you for referring Yanci Coats to me for evaluation. Attached you will find relevant portions of my assessment and plan of care.    If you have questions, please do not hesitate to call me. I look forward to following Yanci Coats along with you.    Sincerely,    Chaitanya Mc MD    Enclosure  CC:  No Recipients    If you would like to receive this communication electronically, please contact [email or web address] or [phone] to request more information on [employer] access.    For providers and/or their staff who would like to refer a patient to [facility], please contact us through our one-stop-shop provider referral line, [facility], at [phone].    If you feel you have received this communication in error or would no longer like to receive these types of communications, please e-mail [email or web address]

## 2019-12-09 ENCOUNTER — TELEPHONE (OUTPATIENT)
Dept: SPINE | Facility: CLINIC | Age: 64
End: 2019-12-09

## 2019-12-09 NOTE — TELEPHONE ENCOUNTER
----- Message from Stacey M Lefort sent at 12/9/2019  9:46 AM CST -----  [facility] needs a call back on the pt.  They can be reached at [phone].  Thank you.

## 2019-12-13 ENCOUNTER — INITIAL CONSULT (OUTPATIENT)
Dept: PHYSICAL MEDICINE AND REHAB | Facility: CLINIC | Age: 64
End: 2019-12-13
Payer: COMMERCIAL

## 2019-12-13 VITALS
WEIGHT: 220 LBS | HEIGHT: 68 IN | HEART RATE: 72 BPM | SYSTOLIC BLOOD PRESSURE: 151 MMHG | DIASTOLIC BLOOD PRESSURE: 85 MMHG | BODY MASS INDEX: 33.34 KG/M2

## 2019-12-13 DIAGNOSIS — M25.551 PAIN OF BOTH HIP JOINTS: Primary | ICD-10-CM

## 2019-12-13 DIAGNOSIS — M53.3 SACROILIAC DYSFUNCTION: ICD-10-CM

## 2019-12-13 DIAGNOSIS — M25.552 PAIN OF BOTH HIP JOINTS: Primary | ICD-10-CM

## 2019-12-13 PROCEDURE — 3077F SYST BP >= 140 MM HG: CPT | Mod: CPTII,S$GLB,, | Performed by: PHYSICAL MEDICINE & REHABILITATION

## 2019-12-13 PROCEDURE — 3079F PR MOST RECENT DIASTOLIC BLOOD PRESSURE 80-89 MM HG: ICD-10-PCS | Mod: CPTII,S$GLB,, | Performed by: PHYSICAL MEDICINE & REHABILITATION

## 2019-12-13 PROCEDURE — 99999 PR PBB SHADOW E&M-EST. PATIENT-LVL IV: ICD-10-PCS | Mod: PBBFAC,,, | Performed by: PHYSICAL MEDICINE & REHABILITATION

## 2019-12-13 PROCEDURE — 20553 PR INJECT TRIGGER POINTS, > 3: ICD-10-PCS | Mod: S$GLB,,, | Performed by: PHYSICAL MEDICINE & REHABILITATION

## 2019-12-13 PROCEDURE — 3079F DIAST BP 80-89 MM HG: CPT | Mod: CPTII,S$GLB,, | Performed by: PHYSICAL MEDICINE & REHABILITATION

## 2019-12-13 PROCEDURE — 3008F BODY MASS INDEX DOCD: CPT | Mod: CPTII,S$GLB,, | Performed by: PHYSICAL MEDICINE & REHABILITATION

## 2019-12-13 PROCEDURE — 98925 PR OSTEOPATHIC MANIP,1-2 BODY REGN: ICD-10-PCS | Mod: S$GLB,,, | Performed by: PHYSICAL MEDICINE & REHABILITATION

## 2019-12-13 PROCEDURE — 99214 OFFICE O/P EST MOD 30 MIN: CPT | Mod: 25,S$GLB,, | Performed by: PHYSICAL MEDICINE & REHABILITATION

## 2019-12-13 PROCEDURE — 98925 OSTEOPATH MANJ 1-2 REGIONS: CPT | Mod: S$GLB,,, | Performed by: PHYSICAL MEDICINE & REHABILITATION

## 2019-12-13 PROCEDURE — 20553 NJX 1/MLT TRIGGER POINTS 3/>: CPT | Mod: S$GLB,,, | Performed by: PHYSICAL MEDICINE & REHABILITATION

## 2019-12-13 PROCEDURE — 3077F PR MOST RECENT SYSTOLIC BLOOD PRESSURE >= 140 MM HG: ICD-10-PCS | Mod: CPTII,S$GLB,, | Performed by: PHYSICAL MEDICINE & REHABILITATION

## 2019-12-13 PROCEDURE — 3008F PR BODY MASS INDEX (BMI) DOCUMENTED: ICD-10-PCS | Mod: CPTII,S$GLB,, | Performed by: PHYSICAL MEDICINE & REHABILITATION

## 2019-12-13 PROCEDURE — 99999 PR PBB SHADOW E&M-EST. PATIENT-LVL IV: CPT | Mod: PBBFAC,,, | Performed by: PHYSICAL MEDICINE & REHABILITATION

## 2019-12-13 PROCEDURE — 99214 PR OFFICE/OUTPT VISIT, EST, LEVL IV, 30-39 MIN: ICD-10-PCS | Mod: 25,S$GLB,, | Performed by: PHYSICAL MEDICINE & REHABILITATION

## 2019-12-13 RX ORDER — TIZANIDINE HYDROCHLORIDE 4 MG/1
4 CAPSULE, GELATIN COATED ORAL 3 TIMES DAILY PRN
Qty: 90 CAPSULE | Refills: 1 | Status: SHIPPED | OUTPATIENT
Start: 2019-12-13 | End: 2019-12-23

## 2019-12-13 RX ORDER — LIDOCAINE HYDROCHLORIDE 10 MG/ML
3 INJECTION INFILTRATION; PERINEURAL ONCE
Status: COMPLETED | OUTPATIENT
Start: 2019-12-13 | End: 2019-12-13

## 2019-12-13 RX ADMIN — LIDOCAINE HYDROCHLORIDE 3 ML: 10 INJECTION INFILTRATION; PERINEURAL at 10:12

## 2019-12-13 NOTE — PROGRESS NOTES
HPI:  Patient is a 64 y.o. year old female w. Low back pain. She previously was evaluated by Dr Keene who treated her w. Prolotherapy. This has been the only thing that has helped her. She has seen other doctors who performed procedures or gave her oral medication without much improvement. She has been in a lot of pain for the last 3 yrs, since  stopped practicing. The pain is located in her low back L>R. It radiates to her buttocks. She also has difficulty sleeping on her left side. She denies weakness, trauma or falls.    imaging  12/2018  Mild lumbar spondylosis    Labs  hgbaic 7.6  Gluc 143  egfr cr nl  lft's nl    Past Medical History:   Diagnosis Date    Allergy history unknown     Arthritis     DDD    Back pain     Cataract     OU    Diabetes mellitus type II     GERD (gastroesophageal reflux disease)     Hypertension     LPRD (laryngopharyngeal reflux disease)     Nuclear sclerosis - Both Eyes 7/9/2013     Past Surgical History:   Procedure Laterality Date    COLONOSCOPY N/A 1/31/2019    Procedure: COLONOSCOPY;  Surgeon: Ronak Palacios MD;  Location: [facility];  Service: Endoscopy;  Laterality: N/A;     Family History   Problem Relation Age of Onset    ANNA disease Father     Heart disease Father     Heart disease Mother     Diabetes Mother     Blindness Mother     Cataracts Mother     Hypertension Mother     Cholelithiasis Sister     ANNA disease Sister     Blindness Maternal Aunt     Diabetes Maternal Aunt     Blindness Paternal Aunt     Stroke Paternal Grandmother     Glaucoma Paternal Grandmother     Cervical cancer Unknown         aunt    Retinal detachment Brother     Celiac disease Neg Hx     Cirrhosis Neg Hx     Colon cancer Neg Hx     Colon polyps Neg Hx     Crohn's disease Neg Hx     Cystic fibrosis Neg Hx     Esophageal cancer Neg Hx     Hemochromatosis Neg Hx     Inflammatory bowel disease Neg Hx     Irritable bowel syndrome Neg Hx     Liver cancer  Neg Hx     Liver disease Neg Hx     Rectal cancer Neg Hx     Stomach cancer Neg Hx     Ulcerative colitis Neg Hx     Clive's disease Neg Hx     Melanoma Neg Hx     Amblyopia Neg Hx     Cancer Neg Hx     Macular degeneration Neg Hx     Strabismus Neg Hx     Thyroid disease Neg Hx     Psoriasis Neg Hx     Lupus Neg Hx     Eczema Neg Hx      Social History     Socioeconomic History    Marital status: Significant Other     Spouse name: Not on file    Number of children: Not on file    Years of education: Not on file    Highest education level: Not on file   Occupational History     Employer: [employer]   Social Needs    Financial resource strain: Not on file    Food insecurity:     Worry: Not on file     Inability: Not on file    Transportation needs:     Medical: Not on file     Non-medical: Not on file   Tobacco Use    Smoking status: Never Smoker    Smokeless tobacco: Never Used   Substance and Sexual Activity    Alcohol use: Yes     Comment: occ    Drug use: No    Sexual activity: Not Currently     Partners: Male     Birth control/protection: Post-menopausal   Lifestyle    Physical activity:     Days per week: Not on file     Minutes per session: Not on file    Stress: Only a little   Relationships    Social connections:     Talks on phone: Not on file     Gets together: Not on file     Attends Pentecostalism service: Not on file     Active member of club or organization: Not on file     Attends meetings of clubs or organizations: Not on file     Relationship status: Not on file   Other Topics Concern    Are you pregnant or think you may be? No    Breast-feeding No   Social History Narrative    Lives with significant other.    No kids.    3 dogs and cat.    No exercise.    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       Review of patient's allergies indicates:   Allergen Reactions    Lipitor [atorvastatin] Other (See Comments)     Muscle pain    Penicillins Other (See Comments)     Unknown  reaction       Current Outpatient Medications:     amLODIPine (NORVASC) 10 MG tablet, TAKE 1 TABLET BY MOUTH ONCE DAILY, Disp: 90 tablet, Rfl: 3    aspirin 81 MG Chew, Take 81 mg by mouth once daily., Disp: , Rfl:     blood sugar diagnostic Strp, Test glucose 2x/day. Dispense strips and fastclix lancets for accuchek evelyn meter., Disp: 200 each, Rfl: 3    carvedilol (COREG) 12.5 MG tablet, TAKE ONE TABLET BY MOUTH ONCE DAILY, Disp: 90 tablet, Rfl: 3    cyclobenzaprine (FLEXERIL) 10 MG tablet, Take 1 tablet (10 mg total) by mouth every evening., Disp: 30 tablet, Rfl: 1    dulaglutide (TRULICITY) 1.5 mg/0.5 mL PnIj, Inject 1.5 mg into the skin once a week., Disp: 12 Syringe, Rfl: 3    DULoxetine (CYMBALTA) 30 MG capsule, Take 1 capsule (30 mg total) by mouth once daily., Disp: 30 capsule, Rfl: 0    fluticasone (FLONASE) 50 mcg/actuation nasal spray, 2 sprays (100 mcg total) by Each Nare route once daily., Disp: 1 Bottle, Rfl: 5    FREESTYLE ANDRES 14 DAY READER Misc, Use to test glucoses., Disp: 1 each, Rfl: 0    FREESTYLE ANDRES 14 DAY SENSOR Kit, Wear one sensor for 14 days., Disp: 6 kit, Rfl: 3    furosemide (LASIX) 20 MG tablet, Take 2 tablets (40 mg total) by mouth daily as needed., Disp: 180 tablet, Rfl: 3    gabapentin (NEURONTIN) 300 MG capsule, Take 1 capsule (300 mg total) by mouth 3 (three) times daily as needed., Disp: 90 capsule, Rfl: 11    glipiZIDE (GLUCOTROL) 10 MG tablet, Take 1 tablet (10 mg total) by mouth 2 (two) times daily., Disp: 180 tablet, Rfl: 3    ketoconazole (NIZORAL) 2 % shampoo, Lather scalp, let sit 5 min before rinsing. Use 2-3 times per week., Disp: 120 mL, Rfl: 5    lancets Misc, Please dispense accu chek evelyn lancets , patient test twice a day, ICD E11.9 ,.92, Disp: 180 each, Rfl: 3    omeprazole (PRILOSEC) 20 MG capsule, Take 2 capsules (40 mg total) by mouth 2 (two) times daily., Disp: 180 capsule, Rfl: 1    orphenadrine (NORFLEX) 100 mg tablet, Take 100 mg by  "mouth 2 (two) times daily as needed. , Disp: , Rfl:     pen needle, diabetic (BD ULTRA-FINE ANTONY PEN NEEDLES) 32 gauge x 5/32" Ndle, 1 Device by Misc.(Non-Drug; Combo Route) route 4 (four) times daily with meals and nightly., Disp: 100 each, Rfl: 11    rosuvastatin (CRESTOR) 5 MG tablet, TAKE 1 TABLET DAILY, Disp: 90 tablet, Rfl: 3    SITagliptan-metformin (JANUMET) 50-1,000 mg per tablet, Take 1 tablet by mouth 2 (two) times daily with meals., Disp: 180 tablet, Rfl: 3    telmisartan-hydrochlorothiazide (MICARDIS HCT) 80-25 mg per tablet, Take 1 tablet by mouth once daily., Disp: 90 tablet, Rfl: 3    traMADol (ULTRAM) 50 mg tablet, Take 1 tablet (50 mg total) by mouth every 6 (six) hours as needed for Pain., Disp: 60 tablet, Rfl: 1    Review of Systems  No nausea, vomiting, fevers, Chills , contipation, diarrhea or sweats    Physical Exam:      Vitals:    12/13/19 0934   BP: (!) 151/85   Pulse: 72     alert and oriented ×4 follows commands answers all questions appropriately,affect wnl  Manual muscle test 5 out of 5 sensation to light touch grossly intact  + excruciate tenderness b/l sijt's, R>L greater trochanters, and QL  antalgic gait  -slR b/l  +JONATHAN  dec hip ROM in ER on the right  babinsky down  No clonus  DTR's symmetric   No C/C/E   + unleveled iliac crests  +standing flexion test  Poor psis mobility  +leg length discrepancy resolved after OMT         Assessment:  sijt dysfunction  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 Tylenol for added relief      pROCEDURE NOTE:  Risk and benefit of trigger point injections given to pt. Injections performed w. A 1.5" 25G needle after sterile prep " w. chlorohexedine, verbal consent obtained . NO complications. B/l Quadratus Lumborum  AND ILIOCOSTALIS were inJected with a total of 3ML of 1% Lidocaine

## 2019-12-16 ENCOUNTER — TELEPHONE (OUTPATIENT)
Dept: SPINE | Facility: CLINIC | Age: 64
End: 2019-12-16

## 2019-12-16 DIAGNOSIS — M54.41 CHRONIC MIDLINE LOW BACK PAIN WITH RIGHT-SIDED SCIATICA: Primary | ICD-10-CM

## 2019-12-16 DIAGNOSIS — G89.29 CHRONIC MIDLINE LOW BACK PAIN WITH RIGHT-SIDED SCIATICA: Primary | ICD-10-CM

## 2019-12-16 RX ORDER — HYDROCODONE BITARTRATE AND ACETAMINOPHEN 5; 325 MG/1; MG/1
1 TABLET ORAL EVERY 6 HOURS PRN
Qty: 28 TABLET | Refills: 0 | Status: SHIPPED | OUTPATIENT
Start: 2019-12-16 | End: 2021-06-09 | Stop reason: ALTCHOICE

## 2019-12-16 NOTE — TELEPHONE ENCOUNTER
Pt has medication interaction between Tramadol and Cymbalta. Pharmacy wants to know if you would like to change Tramadol to something else.

## 2019-12-16 NOTE — TELEPHONE ENCOUNTER
----- Message from Stacey M Lefort sent at 12/13/2019  3:54 PM CST -----  Contact: [facility]  [facility] pharmacy would like a call back regarding a medication interaction.  They can be reached at [phone].  Thank you.

## 2019-12-16 NOTE — TELEPHONE ENCOUNTER
Spoke to pt and advised to discontinue tramadol and that the provider has given her a new prescription. Pt advised she no longer needed prescription. She received a new prescription from Dr. England.

## 2019-12-16 NOTE — TELEPHONE ENCOUNTER
She should discontinue the tramadol.  We can give her a low dose short-term hydrocodone.  We will leave a prescription upfront

## 2020-01-13 RX ORDER — DULAGLUTIDE 1.5 MG/.5ML
INJECTION, SOLUTION SUBCUTANEOUS
Qty: 6 ML | Refills: 3 | Status: SHIPPED | OUTPATIENT
Start: 2020-01-13 | End: 2020-01-13 | Stop reason: SDUPTHER

## 2020-01-14 RX ORDER — SITAGLIPTIN AND METFORMIN HYDROCHLORIDE 1000; 50 MG/1; MG/1
TABLET, FILM COATED ORAL
Qty: 180 TABLET | Refills: 1 | Status: SHIPPED | OUTPATIENT
Start: 2020-01-14 | End: 2020-03-12 | Stop reason: SDUPTHER

## 2020-01-17 DIAGNOSIS — E11.65 UNCONTROLLED TYPE 2 DIABETES MELLITUS WITH HYPERGLYCEMIA: ICD-10-CM

## 2020-01-20 ENCOUNTER — OFFICE VISIT (OUTPATIENT)
Dept: OBSTETRICS AND GYNECOLOGY | Facility: CLINIC | Age: 65
End: 2020-01-20
Payer: COMMERCIAL

## 2020-01-20 VITALS
DIASTOLIC BLOOD PRESSURE: 70 MMHG | BODY MASS INDEX: 33.12 KG/M2 | WEIGHT: 218.5 LBS | SYSTOLIC BLOOD PRESSURE: 130 MMHG | HEIGHT: 68 IN

## 2020-01-20 DIAGNOSIS — D25.9 UTERINE LEIOMYOMA, UNSPECIFIED LOCATION: ICD-10-CM

## 2020-01-20 DIAGNOSIS — Z12.4 PAP SMEAR FOR CERVICAL CANCER SCREENING: ICD-10-CM

## 2020-01-20 DIAGNOSIS — N85.2 BULKY OR ENLARGED UTERUS: ICD-10-CM

## 2020-01-20 DIAGNOSIS — Z12.31 VISIT FOR SCREENING MAMMOGRAM: Primary | ICD-10-CM

## 2020-01-20 PROCEDURE — 99396 PR PREVENTIVE VISIT,EST,40-64: ICD-10-PCS | Mod: S$GLB,,, | Performed by: SPECIALIST

## 2020-01-20 PROCEDURE — 3075F SYST BP GE 130 - 139MM HG: CPT | Mod: CPTII,S$GLB,, | Performed by: SPECIALIST

## 2020-01-20 PROCEDURE — 3078F PR MOST RECENT DIASTOLIC BLOOD PRESSURE < 80 MM HG: ICD-10-PCS | Mod: CPTII,S$GLB,, | Performed by: SPECIALIST

## 2020-01-20 PROCEDURE — 3075F PR MOST RECENT SYSTOLIC BLOOD PRESS GE 130-139MM HG: ICD-10-PCS | Mod: CPTII,S$GLB,, | Performed by: SPECIALIST

## 2020-01-20 PROCEDURE — 3008F PR BODY MASS INDEX (BMI) DOCUMENTED: ICD-10-PCS | Mod: CPTII,S$GLB,, | Performed by: SPECIALIST

## 2020-01-20 PROCEDURE — 99999 PR PBB SHADOW E&M-EST. PATIENT-LVL V: CPT | Mod: PBBFAC,,, | Performed by: SPECIALIST

## 2020-01-20 PROCEDURE — 3008F BODY MASS INDEX DOCD: CPT | Mod: CPTII,S$GLB,, | Performed by: SPECIALIST

## 2020-01-20 PROCEDURE — 99999 PR PBB SHADOW E&M-EST. PATIENT-LVL V: ICD-10-PCS | Mod: PBBFAC,,, | Performed by: SPECIALIST

## 2020-01-20 PROCEDURE — 99396 PREV VISIT EST AGE 40-64: CPT | Mod: S$GLB,,, | Performed by: SPECIALIST

## 2020-01-20 PROCEDURE — 88175 CYTOPATH C/V AUTO FLUID REDO: CPT

## 2020-01-20 PROCEDURE — 3078F DIAST BP <80 MM HG: CPT | Mod: CPTII,S$GLB,, | Performed by: SPECIALIST

## 2020-01-20 RX ORDER — GLIPIZIDE 10 MG/1
TABLET ORAL
Qty: 180 TABLET | Refills: 3 | Status: SHIPPED | OUTPATIENT
Start: 2020-01-20 | End: 2020-03-12 | Stop reason: SDUPTHER

## 2020-01-20 NOTE — PROGRESS NOTES
65 yo BF presents for gyn evaluation and in addition, c/o lower back pain, pelvic pressure, abdominal discomfort and what appears to be nocturia with pt describing waking up wet.  Denies vaginal d/c, dysuria, PMB,melena, weight loss/gain,.  Past Medical History:   Diagnosis Date    Allergy history unknown     Arthritis     DDD    Back pain     Cataract     OU    Diabetes mellitus type II     GERD (gastroesophageal reflux disease)     Hypertension     LPRD (laryngopharyngeal reflux disease)     Nuclear sclerosis - Both Eyes 7/9/2013       Past Surgical History:   Procedure Laterality Date    COLONOSCOPY N/A 1/31/2019    Procedure: COLONOSCOPY;  Surgeon: Ronak Palacios MD;  Location: [facility];  Service: Endoscopy;  Laterality: N/A;       Family History   Problem Relation Age of Onset    ANNA disease Father     Heart disease Father     Heart disease Mother     Diabetes Mother     Blindness Mother     Cataracts Mother     Hypertension Mother     Cholelithiasis Sister     ANNA disease Sister     Blindness Maternal Aunt     Diabetes Maternal Aunt     Blindness Paternal Aunt     Stroke Paternal Grandmother     Glaucoma Paternal Grandmother     Cervical cancer Unknown         aunt    Retinal detachment Brother     Celiac disease Neg Hx     Cirrhosis Neg Hx     Colon cancer Neg Hx     Colon polyps Neg Hx     Crohn's disease Neg Hx     Cystic fibrosis Neg Hx     Esophageal cancer Neg Hx     Hemochromatosis Neg Hx     Inflammatory bowel disease Neg Hx     Irritable bowel syndrome Neg Hx     Liver cancer Neg Hx     Liver disease Neg Hx     Rectal cancer Neg Hx     Stomach cancer Neg Hx     Ulcerative colitis Neg Hx     Clive's disease Neg Hx     Melanoma Neg Hx     Amblyopia Neg Hx     Cancer Neg Hx     Macular degeneration Neg Hx     Strabismus Neg Hx     Thyroid disease Neg Hx     Psoriasis Neg Hx     Lupus Neg Hx     Eczema Neg Hx     Breast cancer Neg Hx     Ovarian  cancer Neg Hx        Social History     Socioeconomic History    Marital status: Significant Other     Spouse name: Not on file    Number of children: Not on file    Years of education: Not on file    Highest education level: Not on file   Occupational History     Employer: [employer]   Social Needs    Financial resource strain: Not on file    Food insecurity:     Worry: Not on file     Inability: Not on file    Transportation needs:     Medical: Not on file     Non-medical: Not on file   Tobacco Use    Smoking status: Never Smoker    Smokeless tobacco: Never Used   Substance and Sexual Activity    Alcohol use: Yes     Comment: occ    Drug use: No    Sexual activity: Not Currently     Partners: Male     Birth control/protection: Post-menopausal   Lifestyle    Physical activity:     Days per week: Not on file     Minutes per session: Not on file    Stress: Only a little   Relationships    Social connections:     Talks on phone: Not on file     Gets together: Not on file     Attends Judaism service: Not on file     Active member of club or organization: Not on file     Attends meetings of clubs or organizations: Not on file     Relationship status: Not on file   Other Topics Concern    Are you pregnant or think you may be? No    Breast-feeding No   Social History Narrative    Lives with significant other.    No kids.    3 dogs and cat.    No exercise.    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       Current Outpatient Medications   Medication Sig Dispense Refill    amLODIPine (NORVASC) 10 MG tablet TAKE 1 TABLET BY MOUTH ONCE DAILY 90 tablet 3    aspirin 81 MG Chew Take 81 mg by mouth once daily.      blood sugar diagnostic Strp Test glucose 2x/day. Dispense strips and fastclix lancets for accuchek evelyn meter. 200 each 3    carvedilol (COREG) 12.5 MG tablet TAKE ONE TABLET BY MOUTH ONCE DAILY 90 tablet 3    dulaglutide (TRULICITY) 1.5 mg/0.5 mL PnIj INJECT 0.5ML (=1.5 MG)     SUBCUTANEOUSLY ONCE  "WEEKLY 6 mL 1    fluticasone (FLONASE) 50 mcg/actuation nasal spray 2 sprays (100 mcg total) by Each Nare route once daily. 1 Bottle 5    FREESTYLE ANDRES 14 DAY READER [facility] Use to test glucoses. 1 each 0    FREESTYLE ANDRES 14 DAY SENSOR Kit Wear one sensor for 14 days. 6 kit 3    gabapentin (NEURONTIN) 300 MG capsule Take 1 capsule (300 mg total) by mouth 3 (three) times daily as needed. 90 capsule 11    glipiZIDE (GLUCOTROL) 10 MG tablet TAKE 1 TABLET TWICE A  tablet 3    HYDROcodone-acetaminophen (NORCO) 5-325 mg per tablet Take 1 tablet by mouth every 6 (six) hours as needed. 28 tablet 0    JANUMET 50-1,000 mg per tablet TAKE 1 TABLET TWICE DAILY  WITH MEALS 180 tablet 1    ketoconazole (NIZORAL) 2 % shampoo Lather scalp, let sit 5 min before rinsing. Use 2-3 times per week. 120 mL 5    lancets Misc Please dispense accu chek evelyn lancets , patient test twice a day, ICD E11.9 ,.92 180 each 3    omeprazole (PRILOSEC) 20 MG capsule Take 2 capsules (40 mg total) by mouth 2 (two) times daily. 180 capsule 1    pen needle, diabetic (BD ULTRA-FINE EVELYN PEN NEEDLES) 32 gauge x 5/32" Ndle 1 Device by Misc.(Non-Drug; Combo Route) route 4 (four) times daily with meals and nightly. 100 each 11    rosuvastatin (CRESTOR) 5 MG tablet TAKE 1 TABLET DAILY 90 tablet 3    telmisartan-hydrochlorothiazide (MICARDIS HCT) 80-25 mg per tablet Take 1 tablet by mouth once daily. 90 tablet 3    DULoxetine (CYMBALTA) 30 MG capsule Take 1 capsule (30 mg total) by mouth once daily. (Patient not taking: Reported on 1/20/2020) 30 capsule 0    furosemide (LASIX) 20 MG tablet Take 2 tablets (40 mg total) by mouth daily as needed. (Patient not taking: Reported on 1/20/2020) 180 tablet 3    traMADol (ULTRAM) 50 mg tablet Take 1 tablet (50 mg total) by mouth every 6 (six) hours as needed for Pain. (Patient not taking: Reported on 1/20/2020) 60 tablet 1     No current facility-administered medications for this visit.  "       Review of patient's allergies indicates:   Allergen Reactions    Lipitor [atorvastatin] Other (See Comments)     Muscle pain    Penicillins Other (See Comments)     Unknown reaction       Review of System:   General: no chills, fever, night sweats, weight gain or weight loss  Psychological: no depression or suicidal ideation  Breasts: no new or changing breast lumps, nipple discharge or masses.  Respiratory: no cough, shortness of breath, or wheezing  Cardiovascular: no chest pain or dyspnea on exertion  Gastrointestinal: no abdominal pain, change in bowel habits, or black or bloody stools  Genito-Urinary: POSITIVE  incontinence,positive urinary frequency/urgency   Musculoskeletal: no gait disturbance or muscular weakness                                              General Appearance    A and O x 4, Cooperative, no distress   Breasts    Abdomen   Symmetrical, no masses, no discharge, skin changes , erythema or retraction. No adenopathy  Soft, non-tender,DISTENDED bowel sounds active all four quadrants, WITH MIDLINE MASS PALPABLE    Genitourinary:   External rectal exam shows no thrombosed external hemorrhoids.   Pelvic exam was performed with patient supine.  No labial fusion.  There is no rash, lesion or injury on the right labia.  There is no rash, lesion or injury on the left labia.  No bleeding and no signs of injury around the vaginal introitus, urethra is without lesions and well supported. The cervix is visualized with no discharge, lesions or friability.  No vaginal discharge found.  No significant Cystocele, Enterocele or rectocele, and uterus well supported.  Bimanual exam:  The urethra is normal to palpation and there are no palpable vaginal wall masses.  Uterus is not deviated, not enlarged, not fixed, normal shape and not tender.  Cervix exhibits no motion tenderness.   Right adnexum displays no mass and no tenderness.  Left adnexum displays no mass and no tenderness.   Extremities: Extremities  normal, atraumatic, no cyanosis or edema                                                               General Appearance    A and O x 4, Cooperative, no distress   Breasts    Abdomen   Symmetrical, no masses, no discharge, skin changes , erythema or retraction. No adenopathy  Soft, non-tender, bowel sounds active all four quadrants,  no masses, no organomegaly    Adnexa not palpable   Extremities: Extremities normal, atraumatic, no cyanosis or edema                         I discussed large uteromegaly and likely responsible for the above s/s  I discussed tx options but first rec pelvic u/s to assess further.  I will discussed POSSIBLE surgical tx pending the above results

## 2020-01-20 NOTE — LETTER
January 20, 2020      Anurag Nye MD  [address] [facility] [address]           [address] [address]  Phone: [phone]  Fax: [phone]          Patient: Yanci Coats   MR Number: [id number]   YOB: 1955   Date of Visit: 1/20/2020       Dear Dr. Anurag Nye:    Thank you for referring Yanci Coats to me for evaluation. Attached you will find relevant portions of my assessment and plan of care.    If you have questions, please do not hesitate to call me. I look forward to following Yanci Coats along with you.    Sincerely,    Sreekanth Bond MD    Enclosure  CC:  No Recipients    If you would like to receive this communication electronically, please contact [email or web address] or [phone] to request more information on [facility] access.    For providers and/or their staff who would like to refer a patient to [facility], please contact us through our one-stop-shop provider referral line, [facility], at [phone].    If you feel you have received this communication in error or would no longer like to receive these types of communications, please e-mail [email or web address]

## 2020-01-23 ENCOUNTER — OFFICE VISIT (OUTPATIENT)
Dept: PHYSICAL MEDICINE AND REHAB | Facility: CLINIC | Age: 65
End: 2020-01-23
Payer: COMMERCIAL

## 2020-01-23 ENCOUNTER — HOSPITAL ENCOUNTER (OUTPATIENT)
Dept: RADIOLOGY | Facility: CLINIC | Age: 65
Discharge: HOME | End: 2020-01-23
Attending: FAMILY MEDICINE
Payer: COMMERCIAL

## 2020-01-23 ENCOUNTER — HOSPITAL ENCOUNTER (OUTPATIENT)
Dept: RADIOLOGY | Facility: CLINIC | Age: 65
Discharge: HOME | End: 2020-01-23
Attending: SPECIALIST
Payer: COMMERCIAL

## 2020-01-23 ENCOUNTER — OFFICE VISIT (OUTPATIENT)
Dept: SPINE | Facility: CLINIC | Age: 65
End: 2020-01-23
Payer: COMMERCIAL

## 2020-01-23 VITALS
WEIGHT: 218.06 LBS | HEART RATE: 73 BPM | DIASTOLIC BLOOD PRESSURE: 81 MMHG | SYSTOLIC BLOOD PRESSURE: 166 MMHG | BODY MASS INDEX: 33.05 KG/M2 | HEIGHT: 68 IN

## 2020-01-23 VITALS
SYSTOLIC BLOOD PRESSURE: 154 MMHG | BODY MASS INDEX: 33.04 KG/M2 | WEIGHT: 218 LBS | HEIGHT: 68 IN | DIASTOLIC BLOOD PRESSURE: 84 MMHG | HEART RATE: 76 BPM

## 2020-01-23 DIAGNOSIS — Z12.39 SCREENING FOR BREAST CANCER: ICD-10-CM

## 2020-01-23 DIAGNOSIS — M54.41 CHRONIC MIDLINE LOW BACK PAIN WITH RIGHT-SIDED SCIATICA: Primary | ICD-10-CM

## 2020-01-23 DIAGNOSIS — G56.00 CARPAL TUNNEL SYNDROME, UNSPECIFIED LATERALITY: Primary | ICD-10-CM

## 2020-01-23 DIAGNOSIS — D25.9 UTERINE LEIOMYOMA, UNSPECIFIED LOCATION: ICD-10-CM

## 2020-01-23 DIAGNOSIS — G89.29 CHRONIC MIDLINE LOW BACK PAIN WITH RIGHT-SIDED SCIATICA: Primary | ICD-10-CM

## 2020-01-23 DIAGNOSIS — M25.511 RIGHT SHOULDER PAIN, UNSPECIFIED CHRONICITY: ICD-10-CM

## 2020-01-23 PROCEDURE — 99214 OFFICE O/P EST MOD 30 MIN: CPT | Mod: 25,S$GLB,, | Performed by: PHYSICAL MEDICINE & REHABILITATION

## 2020-01-23 PROCEDURE — 3077F SYST BP >= 140 MM HG: CPT | Mod: CPTII,S$GLB,, | Performed by: PHYSICAL MEDICINE & REHABILITATION

## 2020-01-23 PROCEDURE — 77067 SCR MAMMO BI INCL CAD: CPT | Mod: TC,PO

## 2020-01-23 PROCEDURE — 77067 SCR MAMMO BI INCL CAD: CPT | Mod: 26,,, | Performed by: RADIOLOGY

## 2020-01-23 PROCEDURE — 3079F DIAST BP 80-89 MM HG: CPT | Mod: CPTII,S$GLB,, | Performed by: PHYSICAL MEDICINE & REHABILITATION

## 2020-01-23 PROCEDURE — 3079F DIAST BP 80-89 MM HG: CPT | Mod: S$GLB,,, | Performed by: PHYSICAL MEDICINE & REHABILITATION

## 2020-01-23 PROCEDURE — 3079F PR MOST RECENT DIASTOLIC BLOOD PRESSURE 80-89 MM HG: ICD-10-PCS | Mod: S$GLB,,, | Performed by: PHYSICAL MEDICINE & REHABILITATION

## 2020-01-23 PROCEDURE — 77063 MAMMO DIGITAL SCREENING BILAT WITH TOMOSYNTHESIS_CAD: ICD-10-PCS | Mod: 26,,, | Performed by: RADIOLOGY

## 2020-01-23 PROCEDURE — 20611 DRAIN/INJ JOINT/BURSA W/US: CPT | Mod: RT,S$GLB,, | Performed by: PHYSICAL MEDICINE & REHABILITATION

## 2020-01-23 PROCEDURE — 3008F BODY MASS INDEX DOCD: CPT | Mod: CPTII,S$GLB,, | Performed by: PHYSICAL MEDICINE & REHABILITATION

## 2020-01-23 PROCEDURE — 3008F PR BODY MASS INDEX (BMI) DOCUMENTED: ICD-10-PCS | Mod: CPTII,S$GLB,, | Performed by: PHYSICAL MEDICINE & REHABILITATION

## 2020-01-23 PROCEDURE — 99213 OFFICE O/P EST LOW 20 MIN: CPT | Mod: S$GLB,,, | Performed by: PHYSICAL MEDICINE & REHABILITATION

## 2020-01-23 PROCEDURE — 99213 PR OFFICE/OUTPT VISIT, EST, LEVL III, 20-29 MIN: ICD-10-PCS | Mod: S$GLB,,, | Performed by: PHYSICAL MEDICINE & REHABILITATION

## 2020-01-23 PROCEDURE — 20611 PR DRAIN/ASP/INJECT MAJOR JOINT/BURSA W/US GUIDANCE: ICD-10-PCS | Mod: RT,S$GLB,, | Performed by: PHYSICAL MEDICINE & REHABILITATION

## 2020-01-23 PROCEDURE — 3077F SYST BP >= 140 MM HG: CPT | Mod: S$GLB,,, | Performed by: PHYSICAL MEDICINE & REHABILITATION

## 2020-01-23 PROCEDURE — 99999 PR PBB SHADOW E&M-EST. PATIENT-LVL IV: CPT | Mod: PBBFAC,,, | Performed by: PHYSICAL MEDICINE & REHABILITATION

## 2020-01-23 PROCEDURE — 3008F BODY MASS INDEX DOCD: CPT | Mod: S$GLB,,, | Performed by: PHYSICAL MEDICINE & REHABILITATION

## 2020-01-23 PROCEDURE — 76856 US PELVIS COMPLETE NON OB: ICD-10-PCS | Mod: 26,,, | Performed by: RADIOLOGY

## 2020-01-23 PROCEDURE — 3077F PR MOST RECENT SYSTOLIC BLOOD PRESSURE >= 140 MM HG: ICD-10-PCS | Mod: S$GLB,,, | Performed by: PHYSICAL MEDICINE & REHABILITATION

## 2020-01-23 PROCEDURE — 99214 PR OFFICE/OUTPT VISIT, EST, LEVL IV, 30-39 MIN: ICD-10-PCS | Mod: 25,S$GLB,, | Performed by: PHYSICAL MEDICINE & REHABILITATION

## 2020-01-23 PROCEDURE — 76856 US EXAM PELVIC COMPLETE: CPT | Mod: 26,,, | Performed by: RADIOLOGY

## 2020-01-23 PROCEDURE — 77067 MAMMO DIGITAL SCREENING BILAT WITH TOMOSYNTHESIS_CAD: ICD-10-PCS | Mod: 26,,, | Performed by: RADIOLOGY

## 2020-01-23 PROCEDURE — 99999 PR PBB SHADOW E&M-EST. PATIENT-LVL IV: ICD-10-PCS | Mod: PBBFAC,,, | Performed by: PHYSICAL MEDICINE & REHABILITATION

## 2020-01-23 PROCEDURE — 3079F PR MOST RECENT DIASTOLIC BLOOD PRESSURE 80-89 MM HG: ICD-10-PCS | Mod: CPTII,S$GLB,, | Performed by: PHYSICAL MEDICINE & REHABILITATION

## 2020-01-23 PROCEDURE — 3077F PR MOST RECENT SYSTOLIC BLOOD PRESSURE >= 140 MM HG: ICD-10-PCS | Mod: CPTII,S$GLB,, | Performed by: PHYSICAL MEDICINE & REHABILITATION

## 2020-01-23 PROCEDURE — 77063 BREAST TOMOSYNTHESIS BI: CPT | Mod: 26,,, | Performed by: RADIOLOGY

## 2020-01-23 PROCEDURE — 76856 US EXAM PELVIC COMPLETE: CPT | Mod: TC,PO

## 2020-01-23 PROCEDURE — 3008F PR BODY MASS INDEX (BMI) DOCUMENTED: ICD-10-PCS | Mod: S$GLB,,, | Performed by: PHYSICAL MEDICINE & REHABILITATION

## 2020-01-23 RX ORDER — METHYLPREDNISOLONE ACETATE 40 MG/ML
40 INJECTION, SUSPENSION INTRA-ARTICULAR; INTRALESIONAL; INTRAMUSCULAR; SOFT TISSUE ONCE
Status: COMPLETED | OUTPATIENT
Start: 2020-01-23 | End: 2020-01-23

## 2020-01-23 RX ORDER — LIDOCAINE HYDROCHLORIDE 10 MG/ML
1 INJECTION INFILTRATION; PERINEURAL ONCE
Status: COMPLETED | OUTPATIENT
Start: 2020-01-23 | End: 2020-01-23

## 2020-01-23 RX ADMIN — LIDOCAINE HYDROCHLORIDE 1 ML: 10 INJECTION INFILTRATION; PERINEURAL at 02:01

## 2020-01-23 RX ADMIN — METHYLPREDNISOLONE ACETATE 40 MG: 40 INJECTION, SUSPENSION INTRA-ARTICULAR; INTRALESIONAL; INTRAMUSCULAR; SOFT TISSUE at 02:01

## 2020-01-23 NOTE — PROGRESS NOTES
SUBJECTIVE:    Patient ID: Yanci Coats is a 64 y.o. female.    Chief Complaint: Back Pain    She is here for follow-up.  Multiple complaints.  Complaining of bilateral shoulder discomfort and weakness in her hands.  Has known carpal tunnel syndrome.  Dr. Barrientos is referring her to hand surgery to address that.  Dr. Barrientos did injections in her right shoulder today.  Considering tenotomy.  I defer those issues to Dr. Barrientos.  She also complains of persistent low back pain which is worse with sitting.  She gets stiffness when she goes from sitting to standing.  Sometimes has radiating discomfort down the legs.  We know that she has degenerative disc disease of the lumbar spine.  We know that she has chronic L4/L5 radiculopathy.  Symptoms are not new.  She is considering an having a hysterectomy because she has on a fairly large uterine fibroid which she and her gynecologist feel may be contributing to her back pain.  Certainly possible that it is, she will not know until she has a hysterectomy.  She has not schedule that procedure yet.  I note that she has been offered epidural steroid injections for complaints of back pain which she decline secondary to cost        Past Medical History:   Diagnosis Date    Allergy history unknown     Arthritis     DDD    Back pain     Cataract     OU    Diabetes mellitus type II     GERD (gastroesophageal reflux disease)     Hypertension     LPRD (laryngopharyngeal reflux disease)     Nuclear sclerosis - Both Eyes 7/9/2013     Social History     Socioeconomic History    Marital status: Significant Other     Spouse name: Not on file    Number of children: Not on file    Years of education: Not on file    Highest education level: Not on file   Occupational History     Employer: [employer]   Social Needs    Financial resource strain: Not on file    Food insecurity:     Worry: Not on file     Inability: Not on file    Transportation needs:     Medical: Not  on file     Non-medical: Not on file   Tobacco Use    Smoking status: Never Smoker    Smokeless tobacco: Never Used   Substance and Sexual Activity    Alcohol use: Yes     Comment: occ    Drug use: No    Sexual activity: Not Currently     Partners: Male     Birth control/protection: Post-menopausal   Lifestyle    Physical activity:     Days per week: Not on file     Minutes per session: Not on file    Stress: Only a little   Relationships    Social connections:     Talks on phone: Not on file     Gets together: Not on file     Attends Denominational service: Not on file     Active member of club or organization: Not on file     Attends meetings of clubs or organizations: Not on file     Relationship status: Not on file   Other Topics Concern    Are you pregnant or think you may be? No    Breast-feeding No   Social History Narrative    Lives with significant other.    No kids.    3 dogs and cat.    No exercise.    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     Past Surgical History:   Procedure Laterality Date    COLONOSCOPY N/A 1/31/2019    Procedure: COLONOSCOPY;  Surgeon: Ronak Palacios MD;  Location: [facility];  Service: Endoscopy;  Laterality: N/A;     Family History   Problem Relation Age of Onset    ANNA disease Father     Heart disease Father     Heart disease Mother     Diabetes Mother     Blindness Mother     Cataracts Mother     Hypertension Mother     Cholelithiasis Sister     ANNA disease Sister     Blindness Maternal Aunt     Diabetes Maternal Aunt     Blindness Paternal Aunt     Stroke Paternal Grandmother     Glaucoma Paternal Grandmother     Cervical cancer Unknown         aunt    Retinal detachment Brother     Celiac disease Neg Hx     Cirrhosis Neg Hx     Colon cancer Neg Hx     Colon polyps Neg Hx     Crohn's disease Neg Hx     Cystic fibrosis Neg Hx     Esophageal cancer Neg Hx     Hemochromatosis Neg Hx     Inflammatory bowel disease Neg Hx     Irritable bowel syndrome Neg Hx   "   Liver cancer Neg Hx     Liver disease Neg Hx     Rectal cancer Neg Hx     Stomach cancer Neg Hx     Ulcerative colitis Neg Hx     Clive's disease Neg Hx     Melanoma Neg Hx     Amblyopia Neg Hx     Cancer Neg Hx     Macular degeneration Neg Hx     Strabismus Neg Hx     Thyroid disease Neg Hx     Psoriasis Neg Hx     Lupus Neg Hx     Eczema Neg Hx     Breast cancer Neg Hx     Ovarian cancer Neg Hx      Vitals:    01/23/20 1504   BP: (!) 166/81   Pulse: 73   Weight: 98.9 kg (218 lb 0.6 oz)   Height: 5' 8" (1.727 m)       Review of Systems   Constitutional: Negative for chills, diaphoresis, fatigue, fever and unexpected weight change.   HENT: Negative for trouble swallowing.    Eyes: Negative for visual disturbance.   Respiratory: Negative for shortness of breath.    Cardiovascular: Negative for chest pain.   Gastrointestinal: Negative for abdominal pain, constipation, nausea and vomiting.   Genitourinary: Negative for difficulty urinating.   Musculoskeletal: Negative for arthralgias, back pain, gait problem, joint swelling, myalgias, neck pain and neck stiffness.   Neurological: Negative for dizziness, speech difficulty, weakness, light-headedness, numbness and headaches.          Objective:      Physical Exam   Constitutional: She is oriented to person, place, and time. She appears well-developed and well-nourished.   Neurological: She is alert and oriented to person, place, and time.           Assessment:       1. Chronic midline low back pain with right-sided sciatica           Plan:     consider epidural steroid injections at her discretion.  Otherwise follow-up as needed      Chronic midline low back pain with right-sided sciatica        "

## 2020-01-23 NOTE — PROGRESS NOTES
HPI:  Patient is a 64 y.o. year old female w. B/l cts. She only had partial improvement w. Cortisone injection to her right. Today, she is complaining of right shoulder pain. She has difficulty w. Overhead activities due to pain. She would like to hold off on any procedures at this time, because she is awaiting to be scheduled for  OBGyn surgery     Labs  hgbaic 7.6 elev  Cr 1.0  egfr nl      Past Medical History:   Diagnosis Date    Allergy history unknown     Arthritis     DDD    Back pain     Cataract     OU    Diabetes mellitus type II     GERD (gastroesophageal reflux disease)     Hypertension     LPRD (laryngopharyngeal reflux disease)     Nuclear sclerosis - Both Eyes 7/9/2013     Past Surgical History:   Procedure Laterality Date    COLONOSCOPY N/A 1/31/2019    Procedure: COLONOSCOPY;  Surgeon: Ronak Palacios MD;  Location: [facility];  Service: Endoscopy;  Laterality: N/A;     Family History   Problem Relation Age of Onset    ANNA disease Father     Heart disease Father     Heart disease Mother     Diabetes Mother     Blindness Mother     Cataracts Mother     Hypertension Mother     Cholelithiasis Sister     ANNA disease Sister     Blindness Maternal Aunt     Diabetes Maternal Aunt     Blindness Paternal Aunt     Stroke Paternal Grandmother     Glaucoma Paternal Grandmother     Cervical cancer Unknown         aunt    Retinal detachment Brother     Celiac disease Neg Hx     Cirrhosis Neg Hx     Colon cancer Neg Hx     Colon polyps Neg Hx     Crohn's disease Neg Hx     Cystic fibrosis Neg Hx     Esophageal cancer Neg Hx     Hemochromatosis Neg Hx     Inflammatory bowel disease Neg Hx     Irritable bowel syndrome Neg Hx     Liver cancer Neg Hx     Liver disease Neg Hx     Rectal cancer Neg Hx     Stomach cancer Neg Hx     Ulcerative colitis Neg Hx     Clive's disease Neg Hx     Melanoma Neg Hx     Amblyopia Neg Hx     Cancer Neg Hx     Macular degeneration Neg Hx      Strabismus Neg Hx     Thyroid disease Neg Hx     Psoriasis Neg Hx     Lupus Neg Hx     Eczema Neg Hx     Breast cancer Neg Hx     Ovarian cancer Neg Hx      Social History     Socioeconomic History    Marital status: Significant Other     Spouse name: Not on file    Number of children: Not on file    Years of education: Not on file    Highest education level: Not on file   Occupational History     Employer: [email or web address]   Social Needs    Financial resource strain: Not on file    Food insecurity:     Worry: Not on file     Inability: Not on file    Transportation needs:     Medical: Not on file     Non-medical: Not on file   Tobacco Use    Smoking status: Never Smoker    Smokeless tobacco: Never Used   Substance and Sexual Activity    Alcohol use: Yes     Comment: occ    Drug use: No    Sexual activity: Not Currently     Partners: Male     Birth control/protection: Post-menopausal   Lifestyle    Physical activity:     Days per week: Not on file     Minutes per session: Not on file    Stress: Only a little   Relationships    Social connections:     Talks on phone: Not on file     Gets together: Not on file     Attends Zoroastrianism service: Not on file     Active member of club or organization: Not on file     Attends meetings of clubs or organizations: Not on file     Relationship status: Not on file   Other Topics Concern    Are you pregnant or think you may be? No    Breast-feeding No   Social History Narrative    Lives with significant other.    No kids.    3 dogs and cat.    No exercise.    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       Review of patient's allergies indicates:   Allergen Reactions    Lipitor [atorvastatin] Other (See Comments)     Muscle pain    Penicillins Other (See Comments)     Unknown reaction       Current Outpatient Medications:     amLODIPine (NORVASC) 10 MG tablet, TAKE 1 TABLET BY MOUTH ONCE DAILY, Disp: 90 tablet, Rfl: 3    aspirin 81 MG Chew, Take 81 mg by mouth  "once daily., Disp: , Rfl:     blood sugar diagnostic Strp, Test glucose 2x/day. Dispense strips and fastclix lancets for accuchek evelyn meter., Disp: 200 each, Rfl: 3    carvedilol (COREG) 12.5 MG tablet, TAKE ONE TABLET BY MOUTH ONCE DAILY, Disp: 90 tablet, Rfl: 3    dulaglutide (TRULICITY) 1.5 mg/0.5 mL PnIj, INJECT 0.5ML (=1.5 MG)     SUBCUTANEOUSLY ONCE WEEKLY, Disp: 6 mL, Rfl: 1    DULoxetine (CYMBALTA) 30 MG capsule, Take 1 capsule (30 mg total) by mouth once daily., Disp: 30 capsule, Rfl: 0    fluticasone (FLONASE) 50 mcg/actuation nasal spray, 2 sprays (100 mcg total) by Each Nare route once daily., Disp: 1 Bottle, Rfl: 5    FREESTYLE ANDRES 14 DAY READER Misc, Use to test glucoses., Disp: 1 each, Rfl: 0    FREESTYLE ANDRES 14 DAY SENSOR Kit, Wear one sensor for 14 days., Disp: 6 kit, Rfl: 3    furosemide (LASIX) 20 MG tablet, Take 2 tablets (40 mg total) by mouth daily as needed., Disp: 180 tablet, Rfl: 3    gabapentin (NEURONTIN) 300 MG capsule, Take 1 capsule (300 mg total) by mouth 3 (three) times daily as needed., Disp: 90 capsule, Rfl: 11    glipiZIDE (GLUCOTROL) 10 MG tablet, TAKE 1 TABLET TWICE A DAY, Disp: 180 tablet, Rfl: 3    HYDROcodone-acetaminophen (NORCO) 5-325 mg per tablet, Take 1 tablet by mouth every 6 (six) hours as needed., Disp: 28 tablet, Rfl: 0    JANUMET 50-1,000 mg per tablet, TAKE 1 TABLET TWICE DAILY  WITH MEALS, Disp: 180 tablet, Rfl: 1    ketoconazole (NIZORAL) 2 % shampoo, Lather scalp, let sit 5 min before rinsing. Use 2-3 times per week., Disp: 120 mL, Rfl: 5    lancets Misc, Please dispense accu chek evelyn lancets , patient test twice a day, ICD E11.9 ,.92, Disp: 180 each, Rfl: 3    omeprazole (PRILOSEC) 20 MG capsule, Take 2 capsules (40 mg total) by mouth 2 (two) times daily., Disp: 180 capsule, Rfl: 1    pen needle, diabetic (BD ULTRA-FINE EVELYN PEN NEEDLES) 32 gauge x 5/32" Ndle, 1 Device by Misc.(Non-Drug; Combo Route) route 4 (four) times daily with " "meals and nightly., Disp: 100 each, Rfl: 11    rosuvastatin (CRESTOR) 5 MG tablet, TAKE 1 TABLET DAILY, Disp: 90 tablet, Rfl: 3    telmisartan-hydrochlorothiazide (MICARDIS HCT) 80-25 mg per tablet, Take 1 tablet by mouth once daily., Disp: 90 tablet, Rfl: 3    traMADol (ULTRAM) 50 mg tablet, Take 1 tablet (50 mg total) by mouth every 6 (six) hours as needed for Pain., Disp: 60 tablet, Rfl: 1    Review of Systems  No nausea, vomiting, fevers, Chills , contipation, diarrhea or sweats      Physical Exam:      Vitals:    01/23/20 1353   BP: (!) 154/84   Pulse: 76     alert and oriented ×4 follows commands answers all questions appropriately,affect wnl  Manual muscle test 5 out of 5 except for left shoulder abduction and fwd flexion sensation to light touch grossly intact  Dec rom right >Lshoulder all directions  +impingement 90deg in abduction and fwd flexion  +hawkin's +neers  Nl gait  No C/C/E        Assessment:  Right >L rtc impingement  B/l cts  cevical radic  DM2  Plan:  Right shoulder xray  Refer to dr mosher for carpal tunnel release  Cortisone injection to right subacromial bursa  She may be a candidate for percutaneous tenotomy but she would like to wait until her carpal tunnel and her obgyn issues have been addressed  She will keep a strict diabetic diet  Check blood glucose daily for 2 wks  rtc 2wks          Procedure note: Risk and benefit of US guided right subacromial bursa  injection given to pt. 21 g 2" utilized. Injection performed after sterile prep w. chlorohexedine , numbing wheal,verbal consent explained, no complications. Depomedrol 40mg 1ml and lidocaine 1ml were used, US guidance was used since it has been shown to have greater efficiency w. Accurate needle placement  Ultrasound interpretation was performed prior to the procedure to identify the target and any adjacent neurovascular structures.  Subsequently, interpretation was performed during real- time needle guidance confirming placement. " Post- intervention interpretation was also performed confirming appropriate injectate flow and hemostasis.  Images indicating needle placement have been saved in [employer].

## 2020-02-04 ENCOUNTER — TELEPHONE (OUTPATIENT)
Dept: ORTHOPEDICS | Facility: CLINIC | Age: 65
End: 2020-02-04

## 2020-02-04 ENCOUNTER — PATIENT OUTREACH (OUTPATIENT)
Dept: ADMINISTRATIVE | Facility: OTHER | Age: 65
End: 2020-02-04

## 2020-02-04 DIAGNOSIS — M25.539 PAIN IN WRIST, UNSPECIFIED LATERALITY: Primary | ICD-10-CM

## 2020-02-05 ENCOUNTER — OFFICE VISIT (OUTPATIENT)
Dept: ORTHOPEDICS | Facility: CLINIC | Age: 65
End: 2020-02-05
Attending: ORTHOPAEDIC SURGERY
Payer: COMMERCIAL

## 2020-02-05 ENCOUNTER — HOSPITAL ENCOUNTER (OUTPATIENT)
Dept: RADIOLOGY | Facility: OTHER | Age: 65
Discharge: HOME | End: 2020-02-05
Attending: ORTHOPAEDIC SURGERY
Payer: COMMERCIAL

## 2020-02-05 VITALS — HEIGHT: 68 IN | BODY MASS INDEX: 33.04 KG/M2 | WEIGHT: 218 LBS

## 2020-02-05 DIAGNOSIS — G56.03 BILATERAL CARPAL TUNNEL SYNDROME: ICD-10-CM

## 2020-02-05 DIAGNOSIS — G56.00 CARPAL TUNNEL SYNDROME, UNSPECIFIED LATERALITY: ICD-10-CM

## 2020-02-05 DIAGNOSIS — M25.539 PAIN IN WRIST, UNSPECIFIED LATERALITY: ICD-10-CM

## 2020-02-05 PROCEDURE — 99999 PR PBB SHADOW E&M-EST. PATIENT-LVL IV: ICD-10-PCS | Mod: PBBFAC,,, | Performed by: ORTHOPAEDIC SURGERY

## 2020-02-05 PROCEDURE — 20526 PR INJECT CARPAL TUNNEL: ICD-10-PCS | Mod: 50,S$GLB,, | Performed by: ORTHOPAEDIC SURGERY

## 2020-02-05 PROCEDURE — 99999 PR PBB SHADOW E&M-EST. PATIENT-LVL IV: CPT | Mod: PBBFAC,,, | Performed by: ORTHOPAEDIC SURGERY

## 2020-02-05 PROCEDURE — 99203 OFFICE O/P NEW LOW 30 MIN: CPT | Mod: 25,S$GLB,, | Performed by: ORTHOPAEDIC SURGERY

## 2020-02-05 PROCEDURE — 73100 XR WRIST 2 VIEW BILATERAL: ICD-10-PCS | Mod: 26,,, | Performed by: RADIOLOGY

## 2020-02-05 PROCEDURE — 73100 X-RAY EXAM OF WRIST: CPT | Mod: 26,,, | Performed by: RADIOLOGY

## 2020-02-05 PROCEDURE — 3008F PR BODY MASS INDEX (BMI) DOCUMENTED: ICD-10-PCS | Mod: CPTII,S$GLB,, | Performed by: ORTHOPAEDIC SURGERY

## 2020-02-05 PROCEDURE — 3008F BODY MASS INDEX DOCD: CPT | Mod: CPTII,S$GLB,, | Performed by: ORTHOPAEDIC SURGERY

## 2020-02-05 PROCEDURE — 73100 X-RAY EXAM OF WRIST: CPT | Mod: TC,50,FY

## 2020-02-05 PROCEDURE — 99203 PR OFFICE/OUTPT VISIT, NEW, LEVL III, 30-44 MIN: ICD-10-PCS | Mod: 25,S$GLB,, | Performed by: ORTHOPAEDIC SURGERY

## 2020-02-05 PROCEDURE — 20526 THER INJECTION CARP TUNNEL: CPT | Mod: 50,S$GLB,, | Performed by: ORTHOPAEDIC SURGERY

## 2020-02-05 RX ORDER — TRIAMCINOLONE ACETONIDE 40 MG/ML
40 INJECTION, SUSPENSION INTRA-ARTICULAR; INTRAMUSCULAR
Status: COMPLETED | OUTPATIENT
Start: 2020-02-05 | End: 2020-02-05

## 2020-02-05 RX ORDER — NABUMETONE 500 MG/1
500 TABLET, FILM COATED ORAL 2 TIMES DAILY WITH MEALS
Qty: 60 TABLET | Refills: 1 | Status: SHIPPED | OUTPATIENT
Start: 2020-02-05 | End: 2021-06-09

## 2020-02-05 RX ADMIN — TRIAMCINOLONE ACETONIDE 40 MG: 40 INJECTION, SUSPENSION INTRA-ARTICULAR; INTRAMUSCULAR at 03:02

## 2020-02-05 NOTE — PROGRESS NOTES
Subjective:      Patient ID: Yanci Coats is a 64 y.o. female.    Chief Complaint: Pain of the Right Shoulder; Pain of the Left Shoulder; Pain of the Right Wrist; and Pain of the Left Wrist      HPI  Yanci Coats is a  64 y.o. female presenting today for bilateral hand and arm pain.  There was not a history of trauma.  Onset of symptoms began more than 6 months ago  She has been referred by Dr. Ping Man for evaluation of symptoms in both arms right worse than left  She does have shoulder pain and as well as neck problems  Recent nerve conduction study showed evidence of moderate right carpal tunnel, mild left carpal tunnel  With superimposed cervical radiculopathy especially on the right and possibly cubital tunnel at the elbow  I went over those findings with her today.      Review of patient's allergies indicates:   Allergen Reactions    Lipitor [atorvastatin] Other (See Comments)     Muscle pain    Penicillins Other (See Comments)     Unknown reaction         Current Outpatient Medications   Medication Sig Dispense Refill    amLODIPine (NORVASC) 10 MG tablet TAKE 1 TABLET BY MOUTH ONCE DAILY 90 tablet 3    aspirin 81 MG Chew Take 81 mg by mouth once daily.      blood sugar diagnostic Strp Test glucose 2x/day. Dispense strips and fastclix lancets for accuchek evelyn meter. 200 each 3    carvedilol (COREG) 12.5 MG tablet TAKE ONE TABLET BY MOUTH ONCE DAILY 90 tablet 3    dulaglutide (TRULICITY) 1.5 mg/0.5 mL PnIj INJECT 0.5ML (=1.5 MG)     SUBCUTANEOUSLY ONCE WEEKLY 6 mL 1    fluticasone (FLONASE) 50 mcg/actuation nasal spray 2 sprays (100 mcg total) by Each Nare route once daily. 1 Bottle 5    FREESTYLE ANDRES 14 DAY READER [facility] Use to test glucoses. 1 each 0    FREESTYLE ANDRES 14 DAY SENSOR Kit Wear one sensor for 14 days. 6 kit 3    gabapentin (NEURONTIN) 300 MG capsule Take 1 capsule (300 mg total) by mouth 3 (three) times daily as needed. 90 capsule 11    glipiZIDE (GLUCOTROL) 10 MG tablet  "TAKE 1 TABLET TWICE A  tablet 3    JANUMET 50-1,000 mg per tablet TAKE 1 TABLET TWICE DAILY  WITH MEALS 180 tablet 1    ketoconazole (NIZORAL) 2 % shampoo Lather scalp, let sit 5 min before rinsing. Use 2-3 times per week. 120 mL 5    omeprazole (PRILOSEC) 20 MG capsule Take 2 capsules (40 mg total) by mouth 2 (two) times daily. 180 capsule 1    pen needle, diabetic (BD ULTRA-FINE ANTONY PEN NEEDLES) 32 gauge x 5/32" Ndle 1 Device by Misc.(Non-Drug; Combo Route) route 4 (four) times daily with meals and nightly. 100 each 11    rosuvastatin (CRESTOR) 5 MG tablet TAKE 1 TABLET DAILY 90 tablet 3    telmisartan-hydrochlorothiazide (MICARDIS HCT) 80-25 mg per tablet Take 1 tablet by mouth once daily. 90 tablet 3    DULoxetine (CYMBALTA) 30 MG capsule Take 1 capsule (30 mg total) by mouth once daily. (Patient not taking: Reported on 1/23/2020) 30 capsule 0    furosemide (LASIX) 20 MG tablet Take 2 tablets (40 mg total) by mouth daily as needed. (Patient not taking: Reported on 2/5/2020) 180 tablet 3    HYDROcodone-acetaminophen (NORCO) 5-325 mg per tablet Take 1 tablet by mouth every 6 (six) hours as needed. (Patient not taking: Reported on 2/5/2020) 28 tablet 0    lancets Misc Please dispense accu chek antony lancets , patient test twice a day, ICD E11.9 ,.92 (Patient not taking: Reported on 1/23/2020) 180 each 3    traMADol (ULTRAM) 50 mg tablet Take 1 tablet (50 mg total) by mouth every 6 (six) hours as needed for Pain. (Patient not taking: Reported on 1/23/2020) 60 tablet 1     Current Facility-Administered Medications   Medication Dose Route Frequency Provider Last Rate Last Dose    [COMPLETED] triamcinolone acetonide injection 40 mg  40 mg Other 1 time in Clinic/HOD Naresh Martin Jr., MD   40 mg at 02/05/20 9553       Past Medical History:   Diagnosis Date    Allergy history unknown     Arthritis     DDD    Back pain     Cataract     OU    Colon polyps 01/31/2019    Diabetes mellitus " "type II     GERD (gastroesophageal reflux disease)     Hypertension     LPRD (laryngopharyngeal reflux disease)     Nuclear sclerosis - Both Eyes 7/9/2013       Past Surgical History:   Procedure Laterality Date    COLONOSCOPY N/A 1/31/2019    Procedure: COLONOSCOPY;  Surgeon: Ronak Palacios MD;  Location: [facility];  Service: Endoscopy;  Laterality: N/A;       Review of Systems:  ROS    OBJECTIVE:     PHYSICAL EXAM:  Height: 5' 8" (172.7 cm) Weight: 98.9 kg (218 lb)  Vitals:    02/05/20 1440   Weight: 98.9 kg (218 lb)   Height: 5' 8" (1.727 m)   PainSc:   5     Well developed, well nourished female in no acute distress  Alert and oriented x 3  HEENT- Normal exam  Lungs- Clear to auscultation  Heart- Regular rate and rhythm  Abdomen- Soft nontender  Extremity exam-   Examination both shoulders have full range of motion but she does have a positive impingement sign on the right with mild crepitation and pain  Neck is slightly tender  Right hand demonstrates a positive Tinel sign at the wrist negative Phalen's at wrist  Left hand demonstrates a positive Tinel sign and a negative Phalen's test range of motion fingers full both hands  Slight atrophy of the right thenar muscle with mild weakness on the right    RADIOGRAPHS:  Side  AP lateral x-rays bilateral hand and wrist are normal  Comments: I have personally reviewed the imaging and I agree with the above radiologist's report.    ASSESSMENT/PLAN:   .  2.  Superimposed cervical radiculopathy affecting right arm  3.  Probable rotator cuff pathology right shoulder  IMPRESSION:  1.  Bilateral carpal tunnel syndrome right worse than left  I explained the nature of these 3 problems to the patient. She does have several different issues probably contributing to the pain in her right arm  It is hard to figure out which of these are causing the majority of her symptoms  Certainly I think carpal tunnel surgery might be an option but she is not quite ready to proceed to " surgery  She has had injections in the past she might like to try that again today she thinks  She also has wrist splints for nighttime use    PLAN:  After pause for time-out identified each wrist injected bilaterally with combination Kenalog 20 mg 0.5 cc xylocaine sterile technique  She tolerated the procedure well without complication  I would like her to continue with nighttime splints as well as anti-inflammatory medication by mouth  Follow-up 3-4 weeks for recheck depending on how the injections have done we may consider surgery in the future for carpal tunnel release       - We talked at length about the anatomy and pathophysiology of   Encounter Diagnoses   Name Primary?    Carpal tunnel syndrome, unspecified laterality     Bilateral carpal tunnel syndrome            Disclaimer: This note has been generated using voice-recognition software. There may be typographical errors that have been missed during proof-reading.

## 2020-02-05 NOTE — LETTER
February 5, 2020      Brie England [address]   [address]  [place] [address]           [facility] [address] [facility] [address]  [address], [address]  [facility] [address]  Phone: [phone]          Patient: Yanci Coats   MR Number: [id number]   YOB: 1955   Date of Visit: 2/5/2020       Dear Dr. Brie England:    Thank you for referring Yanci Coats to me for evaluation. Attached you will find relevant portions of my assessment and plan of care.    If you have questions, please do not hesitate to call me. I look forward to following Yanci Coats along with you.    Sincerely,    Naresh Martin Jr., MD    Enclosure  CC:  No Recipients    If you would like to receive this communication electronically, please contact [email or web address] or [phone] to request more information on [employer] access.    For providers and/or their staff who would like to refer a patient to [facility], please contact us through our one-stop-shop provider referral line, [facility] , at [phone].    If you feel you have received this communication in error or would no longer like to receive these types of communications, please e-mail [email or web address]

## 2020-02-07 ENCOUNTER — TELEPHONE (OUTPATIENT)
Dept: SPINE | Facility: CLINIC | Age: 65
End: 2020-02-07

## 2020-02-07 ENCOUNTER — OFFICE VISIT (OUTPATIENT)
Dept: PHYSICAL MEDICINE AND REHAB | Facility: CLINIC | Age: 65
End: 2020-02-07
Payer: COMMERCIAL

## 2020-02-07 VITALS
HEART RATE: 78 BPM | WEIGHT: 218 LBS | SYSTOLIC BLOOD PRESSURE: 146 MMHG | DIASTOLIC BLOOD PRESSURE: 83 MMHG | HEIGHT: 68 IN | BODY MASS INDEX: 33.04 KG/M2

## 2020-02-07 DIAGNOSIS — M12.811 ROTATOR CUFF ARTHROPATHY OF BOTH SHOULDERS: Primary | ICD-10-CM

## 2020-02-07 DIAGNOSIS — M12.812 ROTATOR CUFF ARTHROPATHY OF BOTH SHOULDERS: Primary | ICD-10-CM

## 2020-02-07 LAB
FINAL PATHOLOGIC DIAGNOSIS: NORMAL
Lab: NORMAL

## 2020-02-07 PROCEDURE — 99214 PR OFFICE/OUTPT VISIT, EST, LEVL IV, 30-39 MIN: ICD-10-PCS | Mod: S$GLB,,, | Performed by: PHYSICAL MEDICINE & REHABILITATION

## 2020-02-07 PROCEDURE — 3079F DIAST BP 80-89 MM HG: CPT | Mod: CPTII,S$GLB,, | Performed by: PHYSICAL MEDICINE & REHABILITATION

## 2020-02-07 PROCEDURE — 3008F BODY MASS INDEX DOCD: CPT | Mod: CPTII,S$GLB,, | Performed by: PHYSICAL MEDICINE & REHABILITATION

## 2020-02-07 PROCEDURE — 3008F PR BODY MASS INDEX (BMI) DOCUMENTED: ICD-10-PCS | Mod: CPTII,S$GLB,, | Performed by: PHYSICAL MEDICINE & REHABILITATION

## 2020-02-07 PROCEDURE — 99999 PR PBB SHADOW E&M-EST. PATIENT-LVL IV: ICD-10-PCS | Mod: PBBFAC,,, | Performed by: PHYSICAL MEDICINE & REHABILITATION

## 2020-02-07 PROCEDURE — 3079F PR MOST RECENT DIASTOLIC BLOOD PRESSURE 80-89 MM HG: ICD-10-PCS | Mod: CPTII,S$GLB,, | Performed by: PHYSICAL MEDICINE & REHABILITATION

## 2020-02-07 PROCEDURE — 99214 OFFICE O/P EST MOD 30 MIN: CPT | Mod: S$GLB,,, | Performed by: PHYSICAL MEDICINE & REHABILITATION

## 2020-02-07 PROCEDURE — 99999 PR PBB SHADOW E&M-EST. PATIENT-LVL IV: CPT | Mod: PBBFAC,,, | Performed by: PHYSICAL MEDICINE & REHABILITATION

## 2020-02-07 PROCEDURE — 3077F SYST BP >= 140 MM HG: CPT | Mod: CPTII,S$GLB,, | Performed by: PHYSICAL MEDICINE & REHABILITATION

## 2020-02-07 PROCEDURE — 3077F PR MOST RECENT SYSTOLIC BLOOD PRESSURE >= 140 MM HG: ICD-10-PCS | Mod: CPTII,S$GLB,, | Performed by: PHYSICAL MEDICINE & REHABILITATION

## 2020-02-07 NOTE — PROGRESS NOTES
HPI:  Patient is a 64 y.o. year old female w. DM2. She is s/p cortisone injections *2 for carpal tunnel. She also is s/p right shoulder cortisone injection last week. She states her blood glucose did go up after injection. Today, she was scheduled for a cortisone injection to her right shoulder, however she just received cortisone and states she has not been monitoring her blood glucose since the last injection. She is having the same difficulty w. Her left shoulder as she did w. Her right. She is having difficulty doing overhead activities.    Imaging  none      Past Medical History:   Diagnosis Date    Allergy history unknown     Arthritis     DDD    Back pain     Cataract     OU    Colon polyps 01/31/2019    Diabetes mellitus type II     GERD (gastroesophageal reflux disease)     Hypertension     LPRD (laryngopharyngeal reflux disease)     Nuclear sclerosis - Both Eyes 7/9/2013     Past Surgical History:   Procedure Laterality Date    COLONOSCOPY N/A 1/31/2019    Procedure: COLONOSCOPY;  Surgeon: Ronak Palacios MD;  Location: [facility];  Service: Endoscopy;  Laterality: N/A;     Family History   Problem Relation Age of Onset    ANNA disease Father     Heart disease Father     Heart disease Mother     Diabetes Mother     Blindness Mother     Cataracts Mother     Hypertension Mother     Cholelithiasis Sister     ANNA disease Sister     Blindness Maternal Aunt     Diabetes Maternal Aunt     Blindness Paternal Aunt     Stroke Paternal Grandmother     Glaucoma Paternal Grandmother     Cervical cancer Unknown         aunt    Retinal detachment Brother     Celiac disease Neg Hx     Cirrhosis Neg Hx     Colon cancer Neg Hx     Colon polyps Neg Hx     Crohn's disease Neg Hx     Cystic fibrosis Neg Hx     Esophageal cancer Neg Hx     Hemochromatosis Neg Hx     Inflammatory bowel disease Neg Hx     Irritable bowel syndrome Neg Hx     Liver cancer Neg Hx     Liver disease Neg Hx      Rectal cancer Neg Hx     Stomach cancer Neg Hx     Ulcerative colitis Neg Hx     Clive's disease Neg Hx     Melanoma Neg Hx     Amblyopia Neg Hx     Cancer Neg Hx     Macular degeneration Neg Hx     Strabismus Neg Hx     Thyroid disease Neg Hx     Psoriasis Neg Hx     Lupus Neg Hx     Eczema Neg Hx     Breast cancer Neg Hx     Ovarian cancer Neg Hx      Social History     Socioeconomic History    Marital status: Significant Other     Spouse name: Not on file    Number of children: Not on file    Years of education: Not on file    Highest education level: Not on file   Occupational History     Employer: [employer]   Social Needs    Financial resource strain: Not on file    Food insecurity:     Worry: Not on file     Inability: Not on file    Transportation needs:     Medical: Not on file     Non-medical: Not on file   Tobacco Use    Smoking status: Never Smoker    Smokeless tobacco: Never Used   Substance and Sexual Activity    Alcohol use: Yes     Comment: occ    Drug use: No    Sexual activity: Not Currently     Partners: Male     Birth control/protection: Post-menopausal   Lifestyle    Physical activity:     Days per week: Not on file     Minutes per session: Not on file    Stress: Only a little   Relationships    Social connections:     Talks on phone: Not on file     Gets together: Not on file     Attends [employer] service: Not on file     Active member of club or organization: Not on file     Attends meetings of clubs or organizations: Not on file     Relationship status: Not on file   Other Topics Concern    Are you pregnant or think you may be? No    Breast-feeding No   Social History Narrative    Lives with significant other.    No kids.    3 dogs and cat.    No exercise.    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       Review of patient's allergies indicates:   Allergen Reactions    Lipitor [atorvastatin] Other (See Comments)     Muscle pain    Penicillins Other (See Comments)      Unknown reaction       Current Outpatient Medications:     amLODIPine (NORVASC) 10 MG tablet, TAKE 1 TABLET BY MOUTH ONCE DAILY, Disp: 90 tablet, Rfl: 3    aspirin 81 MG Chew, Take 81 mg by mouth once daily., Disp: , Rfl:     blood sugar diagnostic Strp, Test glucose 2x/day. Dispense strips and fastclix lancets for accuchek evelyn meter., Disp: 200 each, Rfl: 3    carvedilol (COREG) 12.5 MG tablet, TAKE ONE TABLET BY MOUTH ONCE DAILY, Disp: 90 tablet, Rfl: 3    dulaglutide (TRULICITY) 1.5 mg/0.5 mL PnIj, INJECT 0.5ML (=1.5 MG)     SUBCUTANEOUSLY ONCE WEEKLY, Disp: 6 mL, Rfl: 1    DULoxetine (CYMBALTA) 30 MG capsule, Take 1 capsule (30 mg total) by mouth once daily. (Patient not taking: Reported on 1/23/2020), Disp: 30 capsule, Rfl: 0    fluticasone (FLONASE) 50 mcg/actuation nasal spray, 2 sprays (100 mcg total) by Each Nare route once daily., Disp: 1 Bottle, Rfl: 5    FREESTYLE ANDRES 14 DAY READER Misc, Use to test glucoses., Disp: 1 each, Rfl: 0    FREESTYLE ANDRES 14 DAY SENSOR Kit, Wear one sensor for 14 days., Disp: 6 kit, Rfl: 3    furosemide (LASIX) 20 MG tablet, Take 2 tablets (40 mg total) by mouth daily as needed. (Patient not taking: Reported on 2/5/2020), Disp: 180 tablet, Rfl: 3    gabapentin (NEURONTIN) 300 MG capsule, Take 1 capsule (300 mg total) by mouth 3 (three) times daily as needed., Disp: 90 capsule, Rfl: 11    glipiZIDE (GLUCOTROL) 10 MG tablet, TAKE 1 TABLET TWICE A DAY, Disp: 180 tablet, Rfl: 3    HYDROcodone-acetaminophen (NORCO) 5-325 mg per tablet, Take 1 tablet by mouth every 6 (six) hours as needed. (Patient not taking: Reported on 2/5/2020), Disp: 28 tablet, Rfl: 0    JANUMET 50-1,000 mg per tablet, TAKE 1 TABLET TWICE DAILY  WITH MEALS, Disp: 180 tablet, Rfl: 1    ketoconazole (NIZORAL) 2 % shampoo, Lather scalp, let sit 5 min before rinsing. Use 2-3 times per week., Disp: 120 mL, Rfl: 5    lancets Misc, Please dispense accu chek evelyn lancets , patient test twice a  "day, ICD E11.9 ,.92 (Patient not taking: Reported on 1/23/2020), Disp: 180 each, Rfl: 3    nabumetone (RELAFEN) 500 MG tablet, Take 1 tablet (500 mg total) by mouth 2 (two) times daily with meals., Disp: 60 tablet, Rfl: 1    omeprazole (PRILOSEC) 20 MG capsule, Take 2 capsules (40 mg total) by mouth 2 (two) times daily., Disp: 180 capsule, Rfl: 1    pen needle, diabetic (BD ULTRA-FINE ANTONY PEN NEEDLES) 32 gauge x 5/32" Ndle, 1 Device by Misc.(Non-Drug; Combo Route) route 4 (four) times daily with meals and nightly., Disp: 100 each, Rfl: 11    rosuvastatin (CRESTOR) 5 MG tablet, TAKE 1 TABLET DAILY, Disp: 90 tablet, Rfl: 3    telmisartan-hydrochlorothiazide (MICARDIS HCT) 80-25 mg per tablet, Take 1 tablet by mouth once daily., Disp: 90 tablet, Rfl: 3    traMADol (ULTRAM) 50 mg tablet, Take 1 tablet (50 mg total) by mouth every 6 (six) hours as needed for Pain. (Patient not taking: Reported on 1/23/2020), Disp: 60 tablet, Rfl: 1      Review of Systems  No nausea, vomiting, fevers, Chills , contipation, diarrhea or sweats    Physical Exam:      Vitals reviewed    alert and oriented ×4 follows commands answers all questions appropriately,affect wnl  Manual muscle test 5 out of 5 except for left shoulder abduction and fwd flexion sensation to light touch grossly intact  Dec rom left shoulder all directions  +impingement 90deg in abduction and fwd flexion  +hawkin's +neers  +mild tenderness right greater troch and R QL  Nl gait  No C/C/E        Assessment:  B/l rtc impingement  B/l cts s/p cortisone injection  DM2    Plan:  Will hold of on cortisone to her right shoulder as she already has had too much cortisone this past week  May consider doing a suprascapular nerve block+hydrodissection instead to help w. Pain control- will have her return in 1-2 wks  I will prescribe a cmpd cream to be used as needed for now  I have also prescribed physical therapy for her shoulders, to focus on inc rom and strengthening " of scapular stabilizers

## 2020-02-07 NOTE — TELEPHONE ENCOUNTER
----- Message from Stacey M Lefort sent at 2/7/2020 11:42 AM CST -----  Pt came by to see if we would be able to print her the diagnosis for her carpal tunnel syndrome along with the EMG report.  She would like a call back at [phone].  Thank you.   -IV fluids were changed to 1/2 NSS with 20 meQ of KCl at 100 ml/hr  -follow the sodium level

## 2020-03-09 ENCOUNTER — PATIENT OUTREACH (OUTPATIENT)
Dept: ADMINISTRATIVE | Facility: OTHER | Age: 65
End: 2020-03-09

## 2020-03-09 NOTE — PROGRESS NOTES
Chart reviewed.   Immunizations: Triggered Imm Registry     Orders placed: n/a  Upcoming appts to satisfy ALEA topics: n/a

## 2020-03-10 DIAGNOSIS — I15.2 HYPERTENSION ASSOCIATED WITH DIABETES: ICD-10-CM

## 2020-03-10 DIAGNOSIS — E11.59 HYPERTENSION ASSOCIATED WITH DIABETES: ICD-10-CM

## 2020-03-11 RX ORDER — CARVEDILOL 12.5 MG/1
TABLET ORAL
Qty: 90 TABLET | Refills: 0 | Status: SHIPPED | OUTPATIENT
Start: 2020-03-11 | End: 2020-03-12 | Stop reason: SDUPTHER

## 2020-03-11 RX ORDER — TELMISARTAN AND HYDROCHLORTHIAZIDE 80; 25 MG/1; MG/1
1 TABLET ORAL DAILY
Qty: 90 TABLET | Refills: 0 | Status: SHIPPED | OUTPATIENT
Start: 2020-03-11 | End: 2020-03-12 | Stop reason: SDUPTHER

## 2020-03-12 ENCOUNTER — PATIENT OUTREACH (OUTPATIENT)
Dept: ADMINISTRATIVE | Facility: HOSPITAL | Age: 65
End: 2020-03-12

## 2020-03-12 ENCOUNTER — DOCUMENTATION ONLY (OUTPATIENT)
Dept: FAMILY MEDICINE | Facility: CLINIC | Age: 65
End: 2020-03-12

## 2020-03-12 DIAGNOSIS — I15.2 HYPERTENSION ASSOCIATED WITH DIABETES: ICD-10-CM

## 2020-03-12 DIAGNOSIS — E11.65 UNCONTROLLED TYPE 2 DIABETES MELLITUS WITH HYPERGLYCEMIA: ICD-10-CM

## 2020-03-12 DIAGNOSIS — K21.9 LPRD (LARYNGOPHARYNGEAL REFLUX DISEASE): ICD-10-CM

## 2020-03-12 DIAGNOSIS — E11.59 HYPERTENSION ASSOCIATED WITH DIABETES: ICD-10-CM

## 2020-03-12 RX ORDER — AMLODIPINE BESYLATE 10 MG/1
10 TABLET ORAL DAILY
Qty: 90 TABLET | Refills: 3 | Status: SHIPPED | OUTPATIENT
Start: 2020-03-12 | End: 2020-03-13 | Stop reason: SDUPTHER

## 2020-03-12 RX ORDER — OMEPRAZOLE 20 MG/1
CAPSULE, DELAYED RELEASE ORAL
Refills: 0 | OUTPATIENT
Start: 2020-03-12

## 2020-03-12 RX ORDER — CARVEDILOL 12.5 MG/1
12.5 TABLET ORAL DAILY
Qty: 90 TABLET | Refills: 0 | Status: SHIPPED | OUTPATIENT
Start: 2020-03-12 | End: 2020-03-13 | Stop reason: SDUPTHER

## 2020-03-12 RX ORDER — GLIPIZIDE 10 MG/1
10 TABLET ORAL 2 TIMES DAILY
Qty: 180 TABLET | Refills: 3 | Status: SHIPPED | OUTPATIENT
Start: 2020-03-12 | End: 2021-01-11

## 2020-03-12 RX ORDER — TELMISARTAN AND HYDROCHLORTHIAZIDE 80; 25 MG/1; MG/1
1 TABLET ORAL DAILY
Qty: 90 TABLET | Refills: 0 | Status: SHIPPED | OUTPATIENT
Start: 2020-03-12 | End: 2020-05-25

## 2020-03-12 NOTE — PROGRESS NOTES
Pre-Visit Chart Review  For Appointment Scheduled on (03/13/20)    Health Maintenance Due   Topic Date Due    Foot Exam  01/05/2019    Lipid Panel  01/05/2019    Hemoglobin A1c  09/01/2019    Colonoscopy  01/31/2020

## 2020-03-12 NOTE — TELEPHONE ENCOUNTER
----- Message from Sania Roldan sent at 3/12/2020  9:01 AM CDT -----  Contact: patient  Type: Needs Medical Advice    Who Called:  patient  Pharmacy name and phone #: [facility]  Best Call Back Number: [phone] (home)   Additional Information: The pt needs refills on Amlodopine 10mg; with a three month supply, Carvedilol 12.5 mg--and Telmisartan Hydrochlorothiazide 80 mg/25mg  Please submit three month supply on all Rx.these go [facility].    ([facility] for this Rx--glipiZIDE (GLUCOTROL) 10 MG tablet) also [facility] for this Rx also. Janument, Trulicity as well. Patient is asking for a renewal on all Rx and a call back to advise.

## 2020-03-12 NOTE — TELEPHONE ENCOUNTER
----- Message from Sania Roldan sent at 3/12/2020  9:01 AM CDT -----  Contact: patient  Type: Needs Medical Advice    Who Called:  patient  Pharmacy name and phone #: [facility]  Best Call Back Number: [phone] (home)   Additional Information: The pt needs refills on Amlodopine 10mg; with a three month supply, Carvedilol 12.5 mg--and Telmisartan Hydrochlorothiazide 80 mg/25mg  Please submit three month supply on all Rx.these go [facility].    ([facility] for this Rx--glipiZIDE (GLUCOTROL) 10 MG tablet) also [facility] for this Rx also. Janument, Trulicity as well. Patient is asking for a renewal on all Rx and a call back to advise.

## 2020-03-12 NOTE — PROGRESS NOTES
Chart review completed 03/12/2020.  Care Everywhere updates requested and reviewed.  Immunizations reconciled. Media reviewed.     Spoke with pt. Labs scheduled.    Health Maintenance Due   Topic Date Due    HIV Screening  09/16/1970    Shingles Vaccine (1 of 2) 09/16/2006    Foot Exam  01/05/2019    Lipid Panel  01/05/2019    Hemoglobin A1c  09/01/2019    Colonoscopy  01/31/2020

## 2020-03-13 ENCOUNTER — LAB VISIT (OUTPATIENT)
Dept: LAB | Facility: HOSPITAL | Age: 65
End: 2020-03-13
Attending: FAMILY MEDICINE
Payer: COMMERCIAL

## 2020-03-13 ENCOUNTER — OFFICE VISIT (OUTPATIENT)
Dept: FAMILY MEDICINE | Facility: CLINIC | Age: 65
End: 2020-03-13
Payer: COMMERCIAL

## 2020-03-13 VITALS
HEART RATE: 81 BPM | DIASTOLIC BLOOD PRESSURE: 74 MMHG | OXYGEN SATURATION: 95 % | SYSTOLIC BLOOD PRESSURE: 136 MMHG | BODY MASS INDEX: 32.58 KG/M2 | HEIGHT: 68 IN | TEMPERATURE: 99 F | WEIGHT: 214.94 LBS

## 2020-03-13 DIAGNOSIS — E11.65 TYPE 2 DIABETES MELLITUS WITH HYPERGLYCEMIA, WITHOUT LONG-TERM CURRENT USE OF INSULIN: ICD-10-CM

## 2020-03-13 DIAGNOSIS — L21.9 SEBORRHEIC DERMATITIS OF SCALP: ICD-10-CM

## 2020-03-13 DIAGNOSIS — E11.59 HYPERTENSION ASSOCIATED WITH DIABETES: ICD-10-CM

## 2020-03-13 DIAGNOSIS — J30.1 ALLERGIC RHINITIS DUE TO POLLEN, UNSPECIFIED SEASONALITY: Primary | ICD-10-CM

## 2020-03-13 DIAGNOSIS — I15.2 HYPERTENSION ASSOCIATED WITH DIABETES: ICD-10-CM

## 2020-03-13 DIAGNOSIS — R53.83 FATIGUE, UNSPECIFIED TYPE: ICD-10-CM

## 2020-03-13 DIAGNOSIS — K21.9 LPRD (LARYNGOPHARYNGEAL REFLUX DISEASE): ICD-10-CM

## 2020-03-13 LAB
25(OH)D3+25(OH)D2 SERPL-MCNC: 22 NG/ML (ref 30–96)
ALBUMIN SERPL BCP-MCNC: 3.8 G/DL (ref 3.5–5.2)
ALP SERPL-CCNC: 73 U/L (ref 55–135)
ALT SERPL W/O P-5'-P-CCNC: 20 U/L (ref 10–44)
ANION GAP SERPL CALC-SCNC: 10 MMOL/L (ref 8–16)
AST SERPL-CCNC: 21 U/L (ref 10–40)
BASOPHILS # BLD AUTO: 0.07 K/UL (ref 0–0.2)
BASOPHILS NFR BLD: 0.6 % (ref 0–1.9)
BILIRUB SERPL-MCNC: 0.5 MG/DL (ref 0.1–1)
BUN SERPL-MCNC: 19 MG/DL (ref 8–23)
CALCIUM SERPL-MCNC: 10.4 MG/DL (ref 8.7–10.5)
CHLORIDE SERPL-SCNC: 105 MMOL/L (ref 95–110)
CHOLEST SERPL-MCNC: 161 MG/DL (ref 120–199)
CHOLEST/HDLC SERPL: 2.6 {RATIO} (ref 2–5)
CO2 SERPL-SCNC: 28 MMOL/L (ref 23–29)
CREAT SERPL-MCNC: 1 MG/DL (ref 0.5–1.4)
DIFFERENTIAL METHOD: ABNORMAL
EOSINOPHIL # BLD AUTO: 0.2 K/UL (ref 0–0.5)
EOSINOPHIL NFR BLD: 2.2 % (ref 0–8)
ERYTHROCYTE [DISTWIDTH] IN BLOOD BY AUTOMATED COUNT: 14 % (ref 11.5–14.5)
EST. GFR  (AFRICAN AMERICAN): >60 ML/MIN/1.73 M^2
EST. GFR  (NON AFRICAN AMERICAN): >60 ML/MIN/1.73 M^2
ESTIMATED AVG GLUCOSE: 169 MG/DL (ref 68–131)
FERRITIN SERPL-MCNC: 147 NG/ML (ref 20–300)
FOLATE SERPL-MCNC: 14.4 NG/ML (ref 4–24)
GLUCOSE SERPL-MCNC: 114 MG/DL (ref 70–110)
HBA1C MFR BLD HPLC: 7.5 % (ref 4–5.6)
HCT VFR BLD AUTO: 39.4 % (ref 37–48.5)
HDLC SERPL-MCNC: 63 MG/DL (ref 40–75)
HDLC SERPL: 39.1 % (ref 20–50)
HGB BLD-MCNC: 11.8 G/DL (ref 12–16)
IMM GRANULOCYTES # BLD AUTO: 0.04 K/UL (ref 0–0.04)
IMM GRANULOCYTES NFR BLD AUTO: 0.4 % (ref 0–0.5)
LDLC SERPL CALC-MCNC: 81.6 MG/DL (ref 63–159)
LYMPHOCYTES # BLD AUTO: 3.7 K/UL (ref 1–4.8)
LYMPHOCYTES NFR BLD: 33.8 % (ref 18–48)
MCH RBC QN AUTO: 27.8 PG (ref 27–31)
MCHC RBC AUTO-ENTMCNC: 29.9 G/DL (ref 32–36)
MCV RBC AUTO: 93 FL (ref 82–98)
MONOCYTES # BLD AUTO: 0.6 K/UL (ref 0.3–1)
MONOCYTES NFR BLD: 5.4 % (ref 4–15)
NEUTROPHILS # BLD AUTO: 6.2 K/UL (ref 1.8–7.7)
NEUTROPHILS NFR BLD: 57.6 % (ref 38–73)
NONHDLC SERPL-MCNC: 98 MG/DL
NRBC BLD-RTO: 0 /100 WBC
PLATELET # BLD AUTO: 374 K/UL (ref 150–350)
PMV BLD AUTO: 10.1 FL (ref 9.2–12.9)
POTASSIUM SERPL-SCNC: 4.3 MMOL/L (ref 3.5–5.1)
PROT SERPL-MCNC: 8.8 G/DL (ref 6–8.4)
RBC # BLD AUTO: 4.25 M/UL (ref 4–5.4)
SODIUM SERPL-SCNC: 143 MMOL/L (ref 136–145)
TRIGL SERPL-MCNC: 82 MG/DL (ref 30–150)
TSH SERPL DL<=0.005 MIU/L-ACNC: 0.86 UIU/ML (ref 0.4–4)
VIT B12 SERPL-MCNC: 1565 PG/ML (ref 210–950)
WBC # BLD AUTO: 10.8 K/UL (ref 3.9–12.7)

## 2020-03-13 PROCEDURE — 80061 LIPID PANEL: CPT

## 2020-03-13 PROCEDURE — 36415 COLL VENOUS BLD VENIPUNCTURE: CPT | Mod: PO

## 2020-03-13 PROCEDURE — 85025 COMPLETE CBC W/AUTO DIFF WBC: CPT

## 2020-03-13 PROCEDURE — 99214 OFFICE O/P EST MOD 30 MIN: CPT | Mod: S$GLB,,, | Performed by: PHYSICIAN ASSISTANT

## 2020-03-13 PROCEDURE — 80053 COMPREHEN METABOLIC PANEL: CPT

## 2020-03-13 PROCEDURE — 99214 PR OFFICE/OUTPT VISIT, EST, LEVL IV, 30-39 MIN: ICD-10-PCS | Mod: S$GLB,,, | Performed by: PHYSICIAN ASSISTANT

## 2020-03-13 PROCEDURE — 84443 ASSAY THYROID STIM HORMONE: CPT

## 2020-03-13 PROCEDURE — 3008F BODY MASS INDEX DOCD: CPT | Mod: CPTII,S$GLB,, | Performed by: PHYSICIAN ASSISTANT

## 2020-03-13 PROCEDURE — 3075F PR MOST RECENT SYSTOLIC BLOOD PRESS GE 130-139MM HG: ICD-10-PCS | Mod: CPTII,S$GLB,, | Performed by: PHYSICIAN ASSISTANT

## 2020-03-13 PROCEDURE — 82746 ASSAY OF FOLIC ACID SERUM: CPT

## 2020-03-13 PROCEDURE — 99999 PR PBB SHADOW E&M-EST. PATIENT-LVL IV: CPT | Mod: PBBFAC,,, | Performed by: PHYSICIAN ASSISTANT

## 2020-03-13 PROCEDURE — 3078F PR MOST RECENT DIASTOLIC BLOOD PRESSURE < 80 MM HG: ICD-10-PCS | Mod: CPTII,S$GLB,, | Performed by: PHYSICIAN ASSISTANT

## 2020-03-13 PROCEDURE — 83036 HEMOGLOBIN GLYCOSYLATED A1C: CPT

## 2020-03-13 PROCEDURE — 82728 ASSAY OF FERRITIN: CPT

## 2020-03-13 PROCEDURE — 99999 PR PBB SHADOW E&M-EST. PATIENT-LVL IV: ICD-10-PCS | Mod: PBBFAC,,, | Performed by: PHYSICIAN ASSISTANT

## 2020-03-13 PROCEDURE — 82607 VITAMIN B-12: CPT

## 2020-03-13 PROCEDURE — 3078F DIAST BP <80 MM HG: CPT | Mod: CPTII,S$GLB,, | Performed by: PHYSICIAN ASSISTANT

## 2020-03-13 PROCEDURE — 3075F SYST BP GE 130 - 139MM HG: CPT | Mod: CPTII,S$GLB,, | Performed by: PHYSICIAN ASSISTANT

## 2020-03-13 PROCEDURE — 82306 VITAMIN D 25 HYDROXY: CPT

## 2020-03-13 PROCEDURE — 3008F PR BODY MASS INDEX (BMI) DOCUMENTED: ICD-10-PCS | Mod: CPTII,S$GLB,, | Performed by: PHYSICIAN ASSISTANT

## 2020-03-13 RX ORDER — MINERAL OIL
180 ENEMA (ML) RECTAL DAILY
Qty: 90 TABLET | Refills: 0 | Status: SHIPPED | OUTPATIENT
Start: 2020-03-13 | End: 2023-05-24

## 2020-03-13 RX ORDER — FLUTICASONE PROPIONATE 50 MCG
2 SPRAY, SUSPENSION (ML) NASAL DAILY
Qty: 16 G | Refills: 3 | Status: SHIPPED | OUTPATIENT
Start: 2020-03-13 | End: 2021-06-09 | Stop reason: SDUPTHER

## 2020-03-13 RX ORDER — CARVEDILOL 12.5 MG/1
12.5 TABLET ORAL DAILY
Qty: 30 TABLET | Refills: 0 | Status: SHIPPED | OUTPATIENT
Start: 2020-03-13 | End: 2020-07-13 | Stop reason: SDUPTHER

## 2020-03-13 RX ORDER — OMEPRAZOLE 20 MG/1
40 CAPSULE, DELAYED RELEASE ORAL 2 TIMES DAILY
Qty: 180 CAPSULE | Refills: 0 | Status: SHIPPED | OUTPATIENT
Start: 2020-03-13 | End: 2020-07-13 | Stop reason: SDUPTHER

## 2020-03-13 RX ORDER — KETOCONAZOLE 20 MG/ML
SHAMPOO, SUSPENSION TOPICAL
Qty: 360 ML | Refills: 5 | Status: SHIPPED | OUTPATIENT
Start: 2020-03-13 | End: 2020-07-13 | Stop reason: SDUPTHER

## 2020-03-13 RX ORDER — AMLODIPINE BESYLATE 10 MG/1
10 TABLET ORAL DAILY
Qty: 30 TABLET | Refills: 0 | Status: SHIPPED | OUTPATIENT
Start: 2020-03-13 | End: 2020-09-11 | Stop reason: SDUPTHER

## 2020-03-13 NOTE — PROGRESS NOTES
Subjective:       Patient ID: Yanci Coats is a 64 y.o. female.    Chief Complaint: Refills and Allergies    Patient with hypertension, type 2 diabetes, seborrheic dermatitis, and chronic nasal allergies presents for routine follow-up.  All of her chronic medical conditions are currently controlled.  She has no new complaints to discuss at today's visit.    Review of Systems   Constitutional: Negative for activity change, appetite change, chills, fatigue and fever.   HENT: Positive for congestion, postnasal drip and rhinorrhea. Negative for ear pain, sinus pressure, sinus pain, sneezing, sore throat and voice change.    Respiratory: Positive for cough. Negative for chest tightness, shortness of breath and wheezing.    Cardiovascular: Negative for chest pain, palpitations and leg swelling.   Gastrointestinal: Negative for nausea and vomiting.   Neurological: Negative for dizziness, light-headedness and headaches.       Objective:      Physical Exam   Constitutional: She appears well-developed and well-nourished. She is cooperative. No distress.   HENT:   Head: Normocephalic and atraumatic.   Cardiovascular: Normal rate, regular rhythm and normal heart sounds.   Pulmonary/Chest: Effort normal and breath sounds normal.   Musculoskeletal:        Right lower leg: She exhibits no edema.        Left lower leg: She exhibits no edema.   Neurological: She is alert.   Skin: Skin is warm and dry.       Assessment:       1. Allergic rhinitis due to pollen, unspecified seasonality    2. LPRD (laryngopharyngeal reflux disease)    3. Hypertension associated with diabetes    4. Seborrheic dermatitis of scalp        Plan:       Yanci was seen today for refills and allergies.    Diagnoses and all orders for this visit:    Allergic rhinitis due to pollen, unspecified seasonality  -   resume  fluticasone propionate (FLONASE) 50 mcg/actuation nasal spray; 2 sprays (100 mcg total) by Each Nostril route once daily.  -      fexofenadine (ALLEGRA) 180 MG tablet; Take 1 tablet (180 mg total) by mouth once daily.    LPRD (laryngopharyngeal reflux disease)  -     omeprazole (PRILOSEC) 20 MG capsule; Take 2 capsules (40 mg total) by mouth 2 (two) times daily.    Hypertension associated with diabetes  -     amLODIPine (NORVASC) 10 MG tablet; Take 1 tablet (10 mg total) by mouth once daily.  -     carvediloL (COREG) 12.5 MG tablet; Take 1 tablet (12.5 mg total) by mouth once daily.    Seborrheic dermatitis of scalp  -     ketoconazole (NIZORAL) 2 % shampoo; Lather scalp, let sit 5 min before rinsing. Use 2-3 times per week.           Follow up if symptoms worsen or fail to improve.   DISCLAIMER: This note was prepared with [employer] voice recognition transcription software. Garbled syntax, mangled pronouns, and other bizarre constructions may be attributed to that software system

## 2020-03-16 ENCOUNTER — TELEPHONE (OUTPATIENT)
Dept: FAMILY MEDICINE | Facility: CLINIC | Age: 65
End: 2020-03-16

## 2020-03-16 NOTE — LETTER
March 16, 2020    Yanci Coats  [address] [place] [address]             [place] [address] [facility] [address]  Phone: [phone]  Fax: [phone] To Whom it may concern,      Due to this patient's medical conditions. It is recommended that the patient works from home until further notice.      If you have any questions or concerns, please don't hesitate to call.    Sincerely,        Anurag Nye MD

## 2020-03-16 NOTE — TELEPHONE ENCOUNTER
----- Message from Ofelia Mckenzie sent at 3/16/2020  8:22 AM CDT -----  Type: Needs Medical Advice    Who Called:  Patient  Best Call Back Number: [phone]  Additional Information: States that she has diabetes and wants office to write a note for her employer stating she needs to work from home due to the Caronavirus. Please call to advise.

## 2020-04-28 ENCOUNTER — TELEPHONE (OUTPATIENT)
Dept: FAMILY MEDICINE | Facility: CLINIC | Age: 65
End: 2020-04-28

## 2020-04-28 NOTE — TELEPHONE ENCOUNTER
----- Message from Long Shin sent at 4/28/2020 10:17 AM CDT -----  Contact: pt  Type: Needs Medical Advice    Who Called:  pt    Best Call Back Number: [phone]  Additional Information: pt needs a PA for ketoconazole (NIZORAL) 2 % shampoo. Please call to advise.     [facility] Dept phone number: [phone]

## 2020-05-05 ENCOUNTER — PATIENT MESSAGE (OUTPATIENT)
Dept: ADMINISTRATIVE | Facility: HOSPITAL | Age: 65
End: 2020-05-05

## 2020-05-11 ENCOUNTER — TELEPHONE (OUTPATIENT)
Dept: FAMILY MEDICINE | Facility: CLINIC | Age: 65
End: 2020-05-11

## 2020-05-23 DIAGNOSIS — I15.2 HYPERTENSION ASSOCIATED WITH DIABETES: ICD-10-CM

## 2020-05-23 DIAGNOSIS — E11.59 HYPERTENSION ASSOCIATED WITH DIABETES: ICD-10-CM

## 2020-05-25 RX ORDER — TELMISARTAN AND HYDROCHLORTHIAZIDE 80; 25 MG/1; MG/1
TABLET ORAL
Qty: 90 TABLET | Refills: 0 | Status: SHIPPED | OUTPATIENT
Start: 2020-05-25 | End: 2020-08-06

## 2020-06-03 ENCOUNTER — PATIENT OUTREACH (OUTPATIENT)
Dept: ADMINISTRATIVE | Facility: OTHER | Age: 65
End: 2020-06-03

## 2020-06-05 ENCOUNTER — OFFICE VISIT (OUTPATIENT)
Dept: PHYSICAL MEDICINE AND REHAB | Facility: CLINIC | Age: 65
End: 2020-06-05
Payer: COMMERCIAL

## 2020-06-05 VITALS
HEART RATE: 83 BPM | DIASTOLIC BLOOD PRESSURE: 83 MMHG | HEIGHT: 68 IN | BODY MASS INDEX: 32.43 KG/M2 | SYSTOLIC BLOOD PRESSURE: 146 MMHG | WEIGHT: 214 LBS

## 2020-06-05 DIAGNOSIS — M25.552 PAIN OF LEFT HIP JOINT: Primary | ICD-10-CM

## 2020-06-05 DIAGNOSIS — M79.10 MYALGIA: ICD-10-CM

## 2020-06-05 DIAGNOSIS — M67.912 ROTATOR CUFF DISORDER, LEFT: ICD-10-CM

## 2020-06-05 DIAGNOSIS — M25.50 ARTHRALGIA, UNSPECIFIED JOINT: ICD-10-CM

## 2020-06-05 DIAGNOSIS — Z01.818 PRE-OP EVALUATION: ICD-10-CM

## 2020-06-05 PROCEDURE — 3077F SYST BP >= 140 MM HG: CPT | Mod: CPTII,S$GLB,, | Performed by: PHYSICAL MEDICINE & REHABILITATION

## 2020-06-05 PROCEDURE — 99214 PR OFFICE/OUTPT VISIT, EST, LEVL IV, 30-39 MIN: ICD-10-PCS | Mod: 25,S$GLB,, | Performed by: PHYSICAL MEDICINE & REHABILITATION

## 2020-06-05 PROCEDURE — 3077F PR MOST RECENT SYSTOLIC BLOOD PRESSURE >= 140 MM HG: ICD-10-PCS | Mod: CPTII,S$GLB,, | Performed by: PHYSICAL MEDICINE & REHABILITATION

## 2020-06-05 PROCEDURE — 3079F PR MOST RECENT DIASTOLIC BLOOD PRESSURE 80-89 MM HG: ICD-10-PCS | Mod: CPTII,S$GLB,, | Performed by: PHYSICAL MEDICINE & REHABILITATION

## 2020-06-05 PROCEDURE — 99999 PR PBB SHADOW E&M-EST. PATIENT-LVL V: CPT | Mod: PBBFAC,,, | Performed by: PHYSICAL MEDICINE & REHABILITATION

## 2020-06-05 PROCEDURE — 76882 PR  US,EXTREMITY,NONVASCULAR,REAL-TIME IMAGE,LIMITED: ICD-10-PCS | Mod: ,,, | Performed by: PHYSICAL MEDICINE & REHABILITATION

## 2020-06-05 PROCEDURE — 3079F DIAST BP 80-89 MM HG: CPT | Mod: CPTII,S$GLB,, | Performed by: PHYSICAL MEDICINE & REHABILITATION

## 2020-06-05 PROCEDURE — 3008F PR BODY MASS INDEX (BMI) DOCUMENTED: ICD-10-PCS | Mod: CPTII,S$GLB,, | Performed by: PHYSICAL MEDICINE & REHABILITATION

## 2020-06-05 PROCEDURE — 3008F BODY MASS INDEX DOCD: CPT | Mod: CPTII,S$GLB,, | Performed by: PHYSICAL MEDICINE & REHABILITATION

## 2020-06-05 PROCEDURE — 99214 OFFICE O/P EST MOD 30 MIN: CPT | Mod: 25,S$GLB,, | Performed by: PHYSICAL MEDICINE & REHABILITATION

## 2020-06-05 PROCEDURE — 76882 US LMTD JT/FCL EVL NVASC XTR: CPT | Mod: ,,, | Performed by: PHYSICAL MEDICINE & REHABILITATION

## 2020-06-05 PROCEDURE — 99999 PR PBB SHADOW E&M-EST. PATIENT-LVL V: ICD-10-PCS | Mod: PBBFAC,,, | Performed by: PHYSICAL MEDICINE & REHABILITATION

## 2020-06-05 RX ORDER — LIDOCAINE HYDROCHLORIDE 10 MG/ML
1 INJECTION, SOLUTION EPIDURAL; INFILTRATION; INTRACAUDAL; PERINEURAL ONCE
Status: CANCELLED | OUTPATIENT
Start: 2020-06-05 | End: 2020-06-05

## 2020-06-05 RX ORDER — ALPRAZOLAM 0.5 MG/1
0.5 TABLET, ORALLY DISINTEGRATING ORAL ONCE AS NEEDED
Status: CANCELLED | OUTPATIENT
Start: 2020-06-05 | End: 2031-11-02

## 2020-06-05 NOTE — PROGRESS NOTES
HPI:  Patient is a 64 y.o. year old female w. Left shoulder and left hip pain. She did physical therapy which helped but only temporarily. She is currently having left hip > shoulder pain. She is unable to sleep on her left hip for any period of time. She works sitting down for long periods and this agrevates her hip. The hip pain radiates to above her knee. I only occasionally goes into her foot.  Prior to pandemic I had evaluated her shoulder and sent her to therapy, which helped but it did not resolve her problem. She has pain raising her arm over her head and doing any over head activities.    Imaging  MRI L spine 2018  Lumbar spondylosis and facet disease    Labs  elev gluc 114  egfr cr lfts nl  Vit D low 22        Past Medical History:   Diagnosis Date    Allergy history unknown     Arthritis     DDD    Back pain     Cataract     OU    Colon polyps 01/31/2019    Diabetes mellitus type II     GERD (gastroesophageal reflux disease)     Hypertension     LPRD (laryngopharyngeal reflux disease)     Nuclear sclerosis - Both Eyes 7/9/2013     Past Surgical History:   Procedure Laterality Date    COLONOSCOPY N/A 1/31/2019    Procedure: COLONOSCOPY;  Surgeon: Ronak Palacios MD;  Location: [facility];  Service: Endoscopy;  Laterality: N/A;     Family History   Problem Relation Age of Onset    ANNA disease Father     Heart disease Father     Heart disease Mother     Diabetes Mother     Blindness Mother     Cataracts Mother     Hypertension Mother     Cholelithiasis Sister     ANNA disease Sister     Blindness Maternal Aunt     Diabetes Maternal Aunt     Blindness Paternal Aunt     Stroke Paternal Grandmother     Glaucoma Paternal Grandmother     Cervical cancer Unknown         aunt    Retinal detachment Brother     Celiac disease Neg Hx     Cirrhosis Neg Hx     Colon cancer Neg Hx     Colon polyps Neg Hx     Crohn's disease Neg Hx     Cystic fibrosis Neg Hx     Esophageal cancer Neg Hx      Hemochromatosis Neg Hx     Inflammatory bowel disease Neg Hx     Irritable bowel syndrome Neg Hx     Liver cancer Neg Hx     Liver disease Neg Hx     Rectal cancer Neg Hx     Stomach cancer Neg Hx     Ulcerative colitis Neg Hx     Clive's disease Neg Hx     Melanoma Neg Hx     Amblyopia Neg Hx     Cancer Neg Hx     Macular degeneration Neg Hx     Strabismus Neg Hx     Thyroid disease Neg Hx     Psoriasis Neg Hx     Lupus Neg Hx     Eczema Neg Hx     Breast cancer Neg Hx     Ovarian cancer Neg Hx      Social History     Socioeconomic History    Marital status: Significant Other     Spouse name: Not on file    Number of children: Not on file    Years of education: Not on file    Highest education level: Not on file   Occupational History     Employer: [employer]   Social Needs    Financial resource strain: Not on file    Food insecurity:     Worry: Not on file     Inability: Not on file    Transportation needs:     Medical: Not on file     Non-medical: Not on file   Tobacco Use    Smoking status: Never Smoker    Smokeless tobacco: Never Used   Substance and Sexual Activity    Alcohol use: Yes     Comment: occ    Drug use: No    Sexual activity: Not Currently     Partners: Male     Birth control/protection: Post-menopausal   Lifestyle    Physical activity:     Days per week: Not on file     Minutes per session: Not on file    Stress: Only a little   Relationships    Social connections:     Talks on phone: Not on file     Gets together: Not on file     Attends Congregation service: Not on file     Active member of club or organization: Not on file     Attends meetings of clubs or organizations: Not on file     Relationship status: Not on file   Other Topics Concern    Are you pregnant or think you may be? No    Breast-feeding No   Social History Narrative    Lives with significant other.    No kids.    3 dogs and cat.    No exercise.    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        Review of patient's allergies indicates:   Allergen Reactions    Lipitor [atorvastatin] Other (See Comments)     Muscle pain    Penicillins Other (See Comments)     Unknown reaction       Current Outpatient Medications:     amLODIPine (NORVASC) 10 MG tablet, Take 1 tablet (10 mg total) by mouth once daily., Disp: 30 tablet, Rfl: 0    aspirin 81 MG Chew, Take 81 mg by mouth once daily., Disp: , Rfl:     blood sugar diagnostic Strp, Test glucose 2x/day. Dispense strips and fastclix lancets for accuchek evelyn meter., Disp: 200 each, Rfl: 3    carvediloL (COREG) 12.5 MG tablet, Take 1 tablet (12.5 mg total) by mouth once daily., Disp: 30 tablet, Rfl: 0    diclofenac 0.15% lidocaine 2.25% prilocaine 2.25% topical cream, APPLY UP TO TWO GRAMS TO THE AFFECTED AREA 2-3 TIMES DAILY AS NEEDED, Disp: , Rfl:     dulaglutide (TRULICITY) 1.5 mg/0.5 mL PnIj, INJECT 0.5ML (=1.5 MG)     SUBCUTANEOUSLY ONCE WEEKLY, Disp: 6 mL, Rfl: 1    fexofenadine (ALLEGRA) 180 MG tablet, Take 1 tablet (180 mg total) by mouth once daily., Disp: 90 tablet, Rfl: 0    fluticasone propionate (FLONASE) 50 mcg/actuation nasal spray, 2 sprays (100 mcg total) by Each Nostril route once daily., Disp: 16 g, Rfl: 3    FREESTYLE ANDRES 14 DAY READER Misc, Use to test glucoses., Disp: 1 each, Rfl: 0    FREESTYLE ANDRES 14 DAY SENSOR Kit, Wear one sensor for 14 days., Disp: 6 kit, Rfl: 3    furosemide (LASIX) 20 MG tablet, Take 2 tablets (40 mg total) by mouth daily as needed., Disp: 180 tablet, Rfl: 3    gabapentin (NEURONTIN) 300 MG capsule, Take 1 capsule (300 mg total) by mouth 3 (three) times daily as needed., Disp: 90 capsule, Rfl: 11    glipiZIDE (GLUCOTROL) 10 MG tablet, Take 1 tablet (10 mg total) by mouth 2 (two) times daily., Disp: 180 tablet, Rfl: 3    HYDROcodone-acetaminophen (NORCO) 5-325 mg per tablet, Take 1 tablet by mouth every 6 (six) hours as needed., Disp: 28 tablet, Rfl: 0    ketoconazole (NIZORAL) 2 % shampoo, Lather  "scalp, let sit 5 min before rinsing. Use 2-3 times per week., Disp: 360 mL, Rfl: 5    lancets Misc, Please dispense accu chek evelyn lancets , patient test twice a day, ICD E11.9 ,.92, Disp: 180 each, Rfl: 3    nabumetone (RELAFEN) 500 MG tablet, Take 1 tablet (500 mg total) by mouth 2 (two) times daily with meals., Disp: 60 tablet, Rfl: 1    omeprazole (PRILOSEC) 20 MG capsule, Take 2 capsules (40 mg total) by mouth 2 (two) times daily., Disp: 180 capsule, Rfl: 0    pen needle, diabetic (BD ULTRA-FINE EVELYN PEN NEEDLES) 32 gauge x 5/32" Ndle, 1 Device by Misc.(Non-Drug; Combo Route) route 4 (four) times daily with meals and nightly., Disp: 100 each, Rfl: 11    rosuvastatin (CRESTOR) 5 MG tablet, TAKE 1 TABLET DAILY, Disp: 90 tablet, Rfl: 3    SITagliptan-metformin (JANUMET) 50-1,000 mg per tablet, Take 1 tablet by mouth 2 (two) times daily with meals., Disp: 180 tablet, Rfl: 1    telmisartan-hydrochlorothiazide (MICARDIS HCT) 80-25 mg per tablet, TAKE 1 TABLET ONCE DAILY, Disp: 90 tablet, Rfl: 0    traMADol (ULTRAM) 50 mg tablet, Take 1 tablet (50 mg total) by mouth every 6 (six) hours as needed for Pain., Disp: 60 tablet, Rfl: 1      Review of Systems  No nausea, vomiting, fevers, Chills , contipation, diarrhea or sweats    Physical Exam:      Vitals:    06/05/20 0812   BP: (!) 146/83   Pulse: 83     alert and oriented ×4 follows commands answers all questions appropriately,affect wnl  Manual muscle test 5 out of 5 sensation to light touch grossly intact  +excruciate tenderness left greater troch   -SLR  No focal muscular atrophy  Manual muscle test 5 out of 5 except for left shoulder abduction and fwd flexion sensation to light touch grossly intact  Dec rom left shoulder all directions  +impingement 90deg in abduction and fwd flexion  +hawkin's +neers  antalgic gait  No C/C/E  -spurling's  -JONATHAN  Full hip ROM  terriky down  No clonus  DTR's symmetric 2+  No C/C/E    Gluteus medius US " FINDINGS  Real-time [facility] ultrasound of lefT LATERAL HIP  indicated a hypoechoic lesion in  the gluteus medius tendon which may represent diseased tissue. She had excruciate tenderness  to sono palpation.There was bony irregularity noted at the point of attachment of the right gluteus medius tendon and the greater trochanter indicative of a chronic tendinopathy. A large calcification was also noted at the point of attachment. There was no troch bursa effusion. The gluteus minimus and melissa attachments appeared intact.  Color Doppler  did not show neovascularization or hyperemia within the tendon tissue.  IMAGES HAVE BEEN SAVED    Assessment:  Left gluteus medius tendinopathy  Left rtc tendinopathy  Arthralgias and myalgias  Low vit D  DM2-avoid cortisone    Plan:  Discussed cymbalta trial, but she was not able to tolerate it in the past d/t side effects- she declined  Serologic rheumatologic work up  Start vit D3 5,000 IU daily  Mri of left shoulder  Schedule percutaneous tenotomy of left gluteus medius

## 2020-06-10 ENCOUNTER — PATIENT OUTREACH (OUTPATIENT)
Dept: ADMINISTRATIVE | Facility: OTHER | Age: 65
End: 2020-06-10

## 2020-06-11 ENCOUNTER — DOCUMENTATION ONLY (OUTPATIENT)
Dept: FAMILY MEDICINE | Facility: CLINIC | Age: 65
End: 2020-06-11

## 2020-06-11 NOTE — PROGRESS NOTES
Pre-Visit Chart Review  For Appointment Scheduled on 6/12/2020    Health Maintenance Due   Topic Date Due    Foot Exam  01/05/2019    Colonoscopy  01/31/2020

## 2020-06-16 ENCOUNTER — TELEPHONE (OUTPATIENT)
Dept: PHYSICAL MEDICINE AND REHAB | Facility: CLINIC | Age: 65
End: 2020-06-16

## 2020-06-16 NOTE — TELEPHONE ENCOUNTER
----- Message from Rosario Castano sent at 6/16/2020 12:22 PM CDT -----  Patient is calling to let you know that she was not able to get the MRI performed so she needs to cancel her surgery for tomorrow.  Please call her to reschedule at [phone].  Thank you!

## 2020-07-08 ENCOUNTER — PATIENT OUTREACH (OUTPATIENT)
Dept: ADMINISTRATIVE | Facility: OTHER | Age: 65
End: 2020-07-08

## 2020-07-08 NOTE — PROGRESS NOTES
Chart was reviewed for overdue Proactive [facility] Encounters (ALEA)  topics  Updates were requested from care everywhere  Health Maintenance has been updated

## 2020-07-13 DIAGNOSIS — K21.9 LPRD (LARYNGOPHARYNGEAL REFLUX DISEASE): ICD-10-CM

## 2020-07-13 DIAGNOSIS — L21.9 SEBORRHEIC DERMATITIS OF SCALP: ICD-10-CM

## 2020-07-13 DIAGNOSIS — I15.2 HYPERTENSION ASSOCIATED WITH DIABETES: ICD-10-CM

## 2020-07-13 DIAGNOSIS — E11.59 HYPERTENSION ASSOCIATED WITH DIABETES: ICD-10-CM

## 2020-07-13 DIAGNOSIS — K59.00 CONSTIPATION, UNSPECIFIED CONSTIPATION TYPE: ICD-10-CM

## 2020-07-13 RX ORDER — KETOCONAZOLE 20 MG/ML
SHAMPOO, SUSPENSION TOPICAL
Qty: 360 ML | Refills: 5 | Status: SHIPPED | OUTPATIENT
Start: 2020-07-13 | End: 2020-07-13 | Stop reason: SDUPTHER

## 2020-07-13 RX ORDER — OMEPRAZOLE 20 MG/1
40 CAPSULE, DELAYED RELEASE ORAL 2 TIMES DAILY
Qty: 180 CAPSULE | Refills: 0 | Status: SHIPPED | OUTPATIENT
Start: 2020-07-13 | End: 2020-07-13 | Stop reason: SDUPTHER

## 2020-07-13 RX ORDER — CARVEDILOL 12.5 MG/1
12.5 TABLET ORAL DAILY
Qty: 30 TABLET | Refills: 0 | Status: SHIPPED | OUTPATIENT
Start: 2020-07-13 | End: 2020-07-13 | Stop reason: SDUPTHER

## 2020-07-13 NOTE — TELEPHONE ENCOUNTER
Patient would like everything to go to [facility] and not [facility]. I already called to cancel scripts at [facility]. Patient also needs the scripts to be for a 3 month supply so that she can save money. Patient is also wanting to add a prescription for linzess.

## 2020-07-13 NOTE — TELEPHONE ENCOUNTER
----- Message from Francesca Briggs sent at 7/13/2020  1:10 PM CDT -----  Type:  RX Refill Request    Who Called:  Patient  RX Name and Strength:  omeprazole (PRILOSEC) 20 MG capsule;carvediloL (COREG) 12.5 MG tablet; and ketoconazole (NIZORAL) 2 % shampoo/also requesting Linzess be ordered for constipation. (Requesting three month supply of everything)  Preferred Pharmacy with phone number:  [facility]  Best Call Back Number:  [phone]  Additional Information:  Stated pharmacy needs reason required included with shampoo and Linzess.

## 2020-07-14 RX ORDER — KETOCONAZOLE 20 MG/ML
SHAMPOO, SUSPENSION TOPICAL
Qty: 360 ML | Refills: 5 | Status: SHIPPED | OUTPATIENT
Start: 2020-07-14

## 2020-07-14 RX ORDER — CARVEDILOL 12.5 MG/1
12.5 TABLET ORAL DAILY
Qty: 30 TABLET | Refills: 0 | Status: SHIPPED | OUTPATIENT
Start: 2020-07-14 | End: 2020-09-11 | Stop reason: SDUPTHER

## 2020-07-14 RX ORDER — OMEPRAZOLE 20 MG/1
40 CAPSULE, DELAYED RELEASE ORAL 2 TIMES DAILY
Qty: 180 CAPSULE | Refills: 0 | Status: SHIPPED | OUTPATIENT
Start: 2020-07-14 | End: 2020-08-20

## 2020-07-23 ENCOUNTER — OFFICE VISIT (OUTPATIENT)
Dept: SPINE | Facility: CLINIC | Age: 65
End: 2020-07-23
Payer: COMMERCIAL

## 2020-07-23 VITALS
DIASTOLIC BLOOD PRESSURE: 82 MMHG | SYSTOLIC BLOOD PRESSURE: 154 MMHG | HEIGHT: 68 IN | HEART RATE: 87 BPM | WEIGHT: 214.06 LBS | BODY MASS INDEX: 32.44 KG/M2

## 2020-07-23 DIAGNOSIS — G89.29 CHRONIC BILATERAL LOW BACK PAIN WITHOUT SCIATICA: Primary | ICD-10-CM

## 2020-07-23 DIAGNOSIS — M54.50 CHRONIC BILATERAL LOW BACK PAIN WITHOUT SCIATICA: Primary | ICD-10-CM

## 2020-07-23 PROCEDURE — 3079F DIAST BP 80-89 MM HG: CPT | Mod: S$GLB,,, | Performed by: PHYSICAL MEDICINE & REHABILITATION

## 2020-07-23 PROCEDURE — 3077F SYST BP >= 140 MM HG: CPT | Mod: S$GLB,,, | Performed by: PHYSICAL MEDICINE & REHABILITATION

## 2020-07-23 PROCEDURE — 3008F BODY MASS INDEX DOCD: CPT | Mod: S$GLB,,, | Performed by: PHYSICAL MEDICINE & REHABILITATION

## 2020-07-23 PROCEDURE — 3008F PR BODY MASS INDEX (BMI) DOCUMENTED: ICD-10-PCS | Mod: S$GLB,,, | Performed by: PHYSICAL MEDICINE & REHABILITATION

## 2020-07-23 PROCEDURE — 3079F PR MOST RECENT DIASTOLIC BLOOD PRESSURE 80-89 MM HG: ICD-10-PCS | Mod: S$GLB,,, | Performed by: PHYSICAL MEDICINE & REHABILITATION

## 2020-07-23 PROCEDURE — 99213 PR OFFICE/OUTPT VISIT, EST, LEVL III, 20-29 MIN: ICD-10-PCS | Mod: S$GLB,,, | Performed by: PHYSICAL MEDICINE & REHABILITATION

## 2020-07-23 PROCEDURE — 99213 OFFICE O/P EST LOW 20 MIN: CPT | Mod: S$GLB,,, | Performed by: PHYSICAL MEDICINE & REHABILITATION

## 2020-07-23 PROCEDURE — 3077F PR MOST RECENT SYSTOLIC BLOOD PRESSURE >= 140 MM HG: ICD-10-PCS | Mod: S$GLB,,, | Performed by: PHYSICAL MEDICINE & REHABILITATION

## 2020-07-23 RX ORDER — METHYLPREDNISOLONE ACETATE 40 MG/ML
40 INJECTION, SUSPENSION INTRA-ARTICULAR; INTRALESIONAL; INTRAMUSCULAR; SOFT TISSUE
Status: DISCONTINUED | OUTPATIENT
Start: 2020-07-23 | End: 2021-06-09

## 2020-07-23 NOTE — PROGRESS NOTES
SUBJECTIVE:    Patient ID: Yanci Coats is a 64 y.o. female.    Chief Complaint: Back Pain    She presents today with a primary complaint of centralized low back pain at the lumbosacral junction.  This time without any leg discomfort.  Hurts worse to sit but if she stands too long it also hurts.  Pain level is 8/10.        Past Medical History:   Diagnosis Date    Allergy history unknown     Arthritis     DDD    Back pain     Cataract     OU    Colon polyps 01/31/2019    Diabetes mellitus type II     GERD (gastroesophageal reflux disease)     Hypertension     LPRD (laryngopharyngeal reflux disease)     Nuclear sclerosis - Both Eyes 7/9/2013     Social History     Socioeconomic History    Marital status: Significant Other     Spouse name: Not on file    Number of children: Not on file    Years of education: Not on file    Highest education level: Not on file   Occupational History     Employer: [email or web address]   Social Needs    Financial resource strain: Not on file    Food insecurity     Worry: Not on file     Inability: Not on file    Transportation needs     Medical: Not on file     Non-medical: Not on file   Tobacco Use    Smoking status: Never Smoker    Smokeless tobacco: Never Used   Substance and Sexual Activity    Alcohol use: Yes     Comment: occ    Drug use: No    Sexual activity: Not Currently     Partners: Male     Birth control/protection: Post-menopausal   Lifestyle    Physical activity     Days per week: Not on file     Minutes per session: Not on file    Stress: Only a little   Relationships    Social connections     Talks on phone: Not on file     Gets together: Not on file     Attends Congregation service: Not on file     Active member of club or organization: Not on file     Attends meetings of clubs or organizations: Not on file     Relationship status: Not on file   Other Topics Concern    Are you pregnant or think you may be? No    Breast-feeding No   Social  "History Narrative    Lives with significant other.    No kids.    3 dogs and cat.    No exercise.    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     Past Surgical History:   Procedure Laterality Date    COLONOSCOPY N/A 1/31/2019    Procedure: COLONOSCOPY;  Surgeon: Ronak Palacios MD;  Location: [facility];  Service: Endoscopy;  Laterality: N/A;     Family History   Problem Relation Age of Onset    ANNA disease Father     Heart disease Father     Heart disease Mother     Diabetes Mother     Blindness Mother     Cataracts Mother     Hypertension Mother     Cholelithiasis Sister     ANNA disease Sister     Blindness Maternal Aunt     Diabetes Maternal Aunt     Blindness Paternal Aunt     Stroke Paternal Grandmother     Glaucoma Paternal Grandmother     Cervical cancer Unknown         aunt    Retinal detachment Brother     Celiac disease Neg Hx     Cirrhosis Neg Hx     Colon cancer Neg Hx     Colon polyps Neg Hx     Crohn's disease Neg Hx     Cystic fibrosis Neg Hx     Esophageal cancer Neg Hx     Hemochromatosis Neg Hx     Inflammatory bowel disease Neg Hx     Irritable bowel syndrome Neg Hx     Liver cancer Neg Hx     Liver disease Neg Hx     Rectal cancer Neg Hx     Stomach cancer Neg Hx     Ulcerative colitis Neg Hx     Clive's disease Neg Hx     Melanoma Neg Hx     Amblyopia Neg Hx     Cancer Neg Hx     Macular degeneration Neg Hx     Strabismus Neg Hx     Thyroid disease Neg Hx     Psoriasis Neg Hx     Lupus Neg Hx     Eczema Neg Hx     Breast cancer Neg Hx     Ovarian cancer Neg Hx      Vitals:    07/23/20 1335   BP: (!) 154/82   Pulse: 87   Weight: 97.1 kg (214 lb 1.1 oz)   Height: 5' 8" (1.727 m)       Review of Systems   Constitutional: Negative for chills, diaphoresis, fatigue, fever and unexpected weight change.   HENT: Negative for trouble swallowing.    Eyes: Negative for visual disturbance.   Respiratory: Negative for shortness of breath.    Cardiovascular: Negative for chest " pain.   Gastrointestinal: Negative for abdominal pain, constipation, nausea and vomiting.   Genitourinary: Negative for difficulty urinating.   Musculoskeletal: Negative for arthralgias, back pain, gait problem, joint swelling, myalgias, neck pain and neck stiffness.   Neurological: Negative for dizziness, speech difficulty, weakness, light-headedness, numbness and headaches.          Objective:      Physical Exam  Neurological:      Mental Status: She is alert and oriented to person, place, and time.      Comments: She is awake and in no acute distress  Mild tenderness to palpation lumbar paraspinous musculature at the lumbosacral junction and over the sacroiliac joints bilaterally.  No palpable masses             Assessment:       1. Chronic bilateral low back pain without sciatica           Plan:     she has chronic low back pain on basis of lumbar degenerative disc disease plus or minus sacroiliac joint dysfunction.  I have offered her epidural steroid injections in the past which she declined due to cost.  At that time she was also having leg discomfort.  I think with primary low back pain she could consider medial branch blocks and radiofrequency ablation at L4-5 and L5-S1 versus facet joint injection.  Also reasonable to do diagnostic/therapeutic SI joint injections.  If she goes that route then I would defer to Dr. Barrientos.  She is going to give it some thought.  In the meantime she is requesting a shot of Depo-Medrol.  We will follow-up with her in about a week can see how she is doing      Chronic bilateral low back pain without sciatica    Other orders  -     methylPREDNISolone acetate injection 40 mg

## 2020-08-18 RX ORDER — SITAGLIPTIN AND METFORMIN HYDROCHLORIDE 1000; 50 MG/1; MG/1
TABLET, FILM COATED ORAL
Qty: 180 TABLET | Refills: 1 | OUTPATIENT
Start: 2020-08-18

## 2020-09-11 DIAGNOSIS — I15.2 HYPERTENSION ASSOCIATED WITH DIABETES: ICD-10-CM

## 2020-09-11 DIAGNOSIS — E11.59 HYPERTENSION ASSOCIATED WITH DIABETES: ICD-10-CM

## 2020-09-11 NOTE — TELEPHONE ENCOUNTER
----- Message from Marichuy Jones sent at 9/11/2020  4:11 PM CDT -----  Contact: pt  Type:  RX Refill Request    Who Called:  PT    Refill or New Rx:  refill  RX Name and Strength:    carvediloL (COREG) 12.5 MG tablet   amLODIPine (NORVASC) 10 MG tablet   How is the patient currently taking it? (ex. 1XDay):  As Directed  Is this a 30 day or 90 day RX:  as Directed  Preferred Pharmacy with phone number:    [facility] - [place], [place] - [address]  [address] [facility] [place] [address]  Phone: [phone] Fax: [phone]      Additional Information:  Please Advise ---Thank you

## 2020-09-14 RX ORDER — CARVEDILOL 12.5 MG/1
12.5 TABLET ORAL DAILY
Qty: 30 TABLET | Refills: 0 | Status: SHIPPED | OUTPATIENT
Start: 2020-09-14 | End: 2020-09-23 | Stop reason: SDUPTHER

## 2020-09-14 RX ORDER — AMLODIPINE BESYLATE 10 MG/1
10 TABLET ORAL DAILY
Qty: 30 TABLET | Refills: 0 | Status: SHIPPED | OUTPATIENT
Start: 2020-09-14 | End: 2020-09-23 | Stop reason: SDUPTHER

## 2020-09-23 ENCOUNTER — TELEPHONE (OUTPATIENT)
Dept: FAMILY MEDICINE | Facility: CLINIC | Age: 65
End: 2020-09-23

## 2020-09-23 DIAGNOSIS — K59.09 CHRONIC CONSTIPATION: Primary | ICD-10-CM

## 2020-09-23 DIAGNOSIS — E11.59 HYPERTENSION ASSOCIATED WITH DIABETES: ICD-10-CM

## 2020-09-23 DIAGNOSIS — K59.00 CONSTIPATION, UNSPECIFIED CONSTIPATION TYPE: ICD-10-CM

## 2020-09-23 DIAGNOSIS — I15.2 HYPERTENSION ASSOCIATED WITH DIABETES: ICD-10-CM

## 2020-09-23 RX ORDER — AMLODIPINE BESYLATE 10 MG/1
10 TABLET ORAL DAILY
Qty: 90 TABLET | Refills: 3 | Status: SHIPPED | OUTPATIENT
Start: 2020-09-23 | End: 2021-12-15

## 2020-09-23 RX ORDER — CARVEDILOL 12.5 MG/1
12.5 TABLET ORAL DAILY
Qty: 90 TABLET | Refills: 3 | Status: SHIPPED | OUTPATIENT
Start: 2020-09-23 | End: 2021-05-24 | Stop reason: SDUPTHER

## 2020-09-23 RX ORDER — LUBIPROSTONE 24 UG/1
24 CAPSULE ORAL
Qty: 90 CAPSULE | Refills: 3 | Status: SHIPPED | OUTPATIENT
Start: 2020-09-23 | End: 2021-06-09

## 2020-09-23 NOTE — TELEPHONE ENCOUNTER
Attempted PA for linzess, PA was denied.  Pharmacy stated they need a specific diagnosis code to be considered.  Can dx code be changed or can alternative be called in?

## 2020-09-23 NOTE — TELEPHONE ENCOUNTER
Please let patient know that the approval for her Linzess was denied.  We can try an alternative called Amitiza.  I will send this into the pharmacy.  They may in fact denies this medication as well.  If they do we will refer her to GI for further evaluation recommendations regarding her chronic constipation

## 2020-09-23 NOTE — TELEPHONE ENCOUNTER
----- Message from Mignon Umanzor sent at 9/23/2020 10:35 AM CDT -----  Regarding: refill  and call back  Contact: patient [phone]  Patient received a letter from [facility] stating that the doctor cancelled RX because medicine was inappropriate for patient .  Please call patient to discuss      Requesting an RX refill or new RX.  Is this a refill or new RX:  refill  RX name and strength: amLODIPine (NORVASC) 10 MG tablet  Directions (copy/paste from chart):  Take 1 tablet (10 mg total) by mouth once daily. - Oral  Is this a 30 day or 90 day RX:  90  Local pharmacy or mail order pharmacy:  [facility]  Pharmacy name and phone # ([facility] - [place], [place] - [address] AT Portal to [facility] [phone] (Phone)  [phone] (Fax)          Requesting an RX refill or new RX.  Is this a refill or new RX:  refill  RX name and strength: carvediloL (COREG) 12.5 MG tablet  Directions (copy/paste from chart):  Take 1 tablet (12.5 mg total) by mouth once daily. - Oral  Is this a 30 day or 90 day RX:  90  Local pharmacy or mail order pharmacy:  [facility]  Pharmacy name and phone #[facility] [place], [place] - [address] AT Portal to [facility] [phone] (Phone)  [phone] (Fax)

## 2020-09-23 NOTE — TELEPHONE ENCOUNTER
Spoke with patient in regards to constipation medications, voiced understanding and made aware of new Rx sent to pharmacy.

## 2020-09-25 DIAGNOSIS — E11.9 TYPE 2 DIABETES MELLITUS WITHOUT COMPLICATION: ICD-10-CM

## 2020-10-05 ENCOUNTER — PATIENT MESSAGE (OUTPATIENT)
Dept: ADMINISTRATIVE | Facility: HOSPITAL | Age: 65
End: 2020-10-05

## 2021-01-15 DIAGNOSIS — K21.9 LPRD (LARYNGOPHARYNGEAL REFLUX DISEASE): ICD-10-CM

## 2021-01-19 RX ORDER — OMEPRAZOLE 20 MG/1
CAPSULE, DELAYED RELEASE ORAL
Qty: 180 CAPSULE | Refills: 1 | Status: SHIPPED | OUTPATIENT
Start: 2021-01-19 | End: 2021-05-06 | Stop reason: SDUPTHER

## 2021-01-25 ENCOUNTER — OFFICE VISIT (OUTPATIENT)
Dept: FAMILY MEDICINE | Facility: CLINIC | Age: 66
End: 2021-01-25
Payer: COMMERCIAL

## 2021-01-25 VITALS
WEIGHT: 211.88 LBS | BODY MASS INDEX: 32.11 KG/M2 | OXYGEN SATURATION: 97 % | RESPIRATION RATE: 18 BRPM | TEMPERATURE: 98 F | HEART RATE: 84 BPM | SYSTOLIC BLOOD PRESSURE: 130 MMHG | HEIGHT: 68 IN | DIASTOLIC BLOOD PRESSURE: 78 MMHG

## 2021-01-25 DIAGNOSIS — E11.65 TYPE 2 DIABETES MELLITUS WITH HYPERGLYCEMIA, WITHOUT LONG-TERM CURRENT USE OF INSULIN: Primary | ICD-10-CM

## 2021-01-25 DIAGNOSIS — Z12.39 ENCOUNTER FOR SCREENING FOR MALIGNANT NEOPLASM OF BREAST, UNSPECIFIED SCREENING MODALITY: ICD-10-CM

## 2021-01-25 PROCEDURE — 3051F PR MOST RECENT HEMOGLOBIN A1C LEVEL 7.0 - < 8.0%: ICD-10-PCS | Mod: CPTII,S$GLB,, | Performed by: FAMILY MEDICINE

## 2021-01-25 PROCEDURE — 99999 PR PBB SHADOW E&M-EST. PATIENT-LVL V: ICD-10-PCS | Mod: PBBFAC,,, | Performed by: FAMILY MEDICINE

## 2021-01-25 PROCEDURE — 3078F DIAST BP <80 MM HG: CPT | Mod: CPTII,S$GLB,, | Performed by: FAMILY MEDICINE

## 2021-01-25 PROCEDURE — 3051F HG A1C>EQUAL 7.0%<8.0%: CPT | Mod: CPTII,S$GLB,, | Performed by: FAMILY MEDICINE

## 2021-01-25 PROCEDURE — 99999 PR PBB SHADOW E&M-EST. PATIENT-LVL V: CPT | Mod: PBBFAC,,, | Performed by: FAMILY MEDICINE

## 2021-01-25 PROCEDURE — 3078F PR MOST RECENT DIASTOLIC BLOOD PRESSURE < 80 MM HG: ICD-10-PCS | Mod: CPTII,S$GLB,, | Performed by: FAMILY MEDICINE

## 2021-01-25 PROCEDURE — 3008F PR BODY MASS INDEX (BMI) DOCUMENTED: ICD-10-PCS | Mod: CPTII,S$GLB,, | Performed by: FAMILY MEDICINE

## 2021-01-25 PROCEDURE — 3075F PR MOST RECENT SYSTOLIC BLOOD PRESS GE 130-139MM HG: ICD-10-PCS | Mod: CPTII,S$GLB,, | Performed by: FAMILY MEDICINE

## 2021-01-25 PROCEDURE — 3075F SYST BP GE 130 - 139MM HG: CPT | Mod: CPTII,S$GLB,, | Performed by: FAMILY MEDICINE

## 2021-01-25 PROCEDURE — 99396 PR PREVENTIVE VISIT,EST,40-64: ICD-10-PCS | Mod: S$GLB,,, | Performed by: FAMILY MEDICINE

## 2021-01-25 PROCEDURE — 99396 PREV VISIT EST AGE 40-64: CPT | Mod: S$GLB,,, | Performed by: FAMILY MEDICINE

## 2021-01-25 PROCEDURE — 3008F BODY MASS INDEX DOCD: CPT | Mod: CPTII,S$GLB,, | Performed by: FAMILY MEDICINE

## 2021-03-15 DIAGNOSIS — M54.50 CHRONIC BILATERAL LOW BACK PAIN WITHOUT SCIATICA: ICD-10-CM

## 2021-03-15 DIAGNOSIS — G89.29 CHRONIC BILATERAL LOW BACK PAIN WITHOUT SCIATICA: ICD-10-CM

## 2021-03-15 RX ORDER — GABAPENTIN 300 MG/1
300 CAPSULE ORAL 3 TIMES DAILY PRN
Qty: 90 CAPSULE | Refills: 11 | Status: SHIPPED | OUTPATIENT
Start: 2021-03-15 | End: 2022-01-13

## 2021-04-05 ENCOUNTER — PATIENT MESSAGE (OUTPATIENT)
Dept: ADMINISTRATIVE | Facility: HOSPITAL | Age: 66
End: 2021-04-05

## 2021-04-08 ENCOUNTER — PATIENT OUTREACH (OUTPATIENT)
Dept: ADMINISTRATIVE | Facility: OTHER | Age: 66
End: 2021-04-08

## 2021-04-09 ENCOUNTER — HOSPITAL ENCOUNTER (OUTPATIENT)
Dept: RADIOLOGY | Facility: CLINIC | Age: 66
Discharge: HOME | End: 2021-04-09
Attending: FAMILY MEDICINE
Payer: COMMERCIAL

## 2021-04-09 ENCOUNTER — OFFICE VISIT (OUTPATIENT)
Dept: OPTOMETRY | Facility: CLINIC | Age: 66
End: 2021-04-09
Payer: COMMERCIAL

## 2021-04-09 DIAGNOSIS — Z12.39 ENCOUNTER FOR SCREENING FOR MALIGNANT NEOPLASM OF BREAST, UNSPECIFIED SCREENING MODALITY: ICD-10-CM

## 2021-04-09 DIAGNOSIS — H52.7 REFRACTIVE ERROR: ICD-10-CM

## 2021-04-09 DIAGNOSIS — E11.3293 MILD NONPROLIFERATIVE DIABETIC RETINOPATHY OF BOTH EYES WITHOUT MACULAR EDEMA ASSOCIATED WITH TYPE 2 DIABETES MELLITUS: Primary | ICD-10-CM

## 2021-04-09 DIAGNOSIS — H26.9 CORTICAL CATARACT: ICD-10-CM

## 2021-04-09 DIAGNOSIS — H35.371 EPIRETINAL MEMBRANE (ERM) OF RIGHT EYE: ICD-10-CM

## 2021-04-09 DIAGNOSIS — Z12.11 SPECIAL SCREENING FOR MALIGNANT NEOPLASM OF COLON: Primary | ICD-10-CM

## 2021-04-09 DIAGNOSIS — H25.13 NUCLEAR SCLEROSIS, BILATERAL: ICD-10-CM

## 2021-04-09 DIAGNOSIS — E11.36 DIABETIC CATARACT: ICD-10-CM

## 2021-04-09 PROCEDURE — 99999 PR PBB SHADOW E&M-EST. PATIENT-LVL III: ICD-10-PCS | Mod: PBBFAC,,, | Performed by: OPTOMETRIST

## 2021-04-09 PROCEDURE — 1126F AMNT PAIN NOTED NONE PRSNT: CPT | Mod: S$GLB,,, | Performed by: OPTOMETRIST

## 2021-04-09 PROCEDURE — 77067 MAMMO DIGITAL SCREENING BILAT WITH TOMO: ICD-10-PCS | Mod: 26,,, | Performed by: RADIOLOGY

## 2021-04-09 PROCEDURE — 77063 MAMMO DIGITAL SCREENING BILAT WITH TOMO: ICD-10-PCS | Mod: 26,,, | Performed by: RADIOLOGY

## 2021-04-09 PROCEDURE — 92014 PR EYE EXAM, EST PATIENT,COMPREHESV: ICD-10-PCS | Mod: S$GLB,,, | Performed by: OPTOMETRIST

## 2021-04-09 PROCEDURE — 77067 SCR MAMMO BI INCL CAD: CPT | Mod: 26,,, | Performed by: RADIOLOGY

## 2021-04-09 PROCEDURE — 77067 SCR MAMMO BI INCL CAD: CPT | Mod: TC,PO

## 2021-04-09 PROCEDURE — 99999 PR PBB SHADOW E&M-EST. PATIENT-LVL III: CPT | Mod: PBBFAC,,, | Performed by: OPTOMETRIST

## 2021-04-09 PROCEDURE — 77063 BREAST TOMOSYNTHESIS BI: CPT | Mod: 26,,, | Performed by: RADIOLOGY

## 2021-04-09 PROCEDURE — 92014 COMPRE OPH EXAM EST PT 1/>: CPT | Mod: S$GLB,,, | Performed by: OPTOMETRIST

## 2021-04-09 PROCEDURE — 1126F PR PAIN SEVERITY QUANTIFIED, NO PAIN PRESENT: ICD-10-PCS | Mod: S$GLB,,, | Performed by: OPTOMETRIST

## 2021-05-06 ENCOUNTER — TELEPHONE (OUTPATIENT)
Dept: FAMILY MEDICINE | Facility: CLINIC | Age: 66
End: 2021-05-06

## 2021-05-06 DIAGNOSIS — K21.9 LPRD (LARYNGOPHARYNGEAL REFLUX DISEASE): ICD-10-CM

## 2021-05-06 RX ORDER — OMEPRAZOLE 20 MG/1
CAPSULE, DELAYED RELEASE ORAL
Qty: 180 CAPSULE | Refills: 1 | Status: SHIPPED | OUTPATIENT
Start: 2021-05-06 | End: 2022-05-25

## 2021-05-07 RX ORDER — LANCETS
EACH MISCELLANEOUS
Qty: 100 EACH | Refills: 11 | Status: SHIPPED | OUTPATIENT
Start: 2021-05-07

## 2021-05-07 RX ORDER — INSULIN PUMP SYRINGE, 3 ML
EACH MISCELLANEOUS
Qty: 1 EACH | Refills: 0 | Status: SHIPPED | OUTPATIENT
Start: 2021-05-07 | End: 2023-08-08 | Stop reason: ALTCHOICE

## 2021-05-22 DIAGNOSIS — E78.5 HYPERLIPIDEMIA ASSOCIATED WITH TYPE 2 DIABETES MELLITUS: ICD-10-CM

## 2021-05-22 DIAGNOSIS — E11.69 HYPERLIPIDEMIA ASSOCIATED WITH TYPE 2 DIABETES MELLITUS: ICD-10-CM

## 2021-05-24 DIAGNOSIS — E11.59 HYPERTENSION ASSOCIATED WITH DIABETES: ICD-10-CM

## 2021-05-24 DIAGNOSIS — I15.2 HYPERTENSION ASSOCIATED WITH DIABETES: ICD-10-CM

## 2021-05-24 RX ORDER — CARVEDILOL 12.5 MG/1
12.5 TABLET ORAL DAILY
Qty: 90 TABLET | Refills: 3 | Status: SHIPPED | OUTPATIENT
Start: 2021-05-24 | End: 2022-11-03 | Stop reason: SDUPTHER

## 2021-05-24 RX ORDER — CARVEDILOL 12.5 MG/1
12.5 TABLET ORAL 2 TIMES DAILY WITH MEALS
Qty: 14 TABLET | Refills: 0 | Status: SHIPPED | OUTPATIENT
Start: 2021-05-24 | End: 2022-05-24

## 2021-05-24 RX ORDER — ROSUVASTATIN CALCIUM 5 MG/1
TABLET, COATED ORAL
Qty: 90 TABLET | Refills: 0 | Status: SHIPPED | OUTPATIENT
Start: 2021-05-24 | End: 2021-08-05

## 2021-05-27 ENCOUNTER — LAB VISIT (OUTPATIENT)
Dept: LAB | Facility: HOSPITAL | Age: 66
End: 2021-05-27
Attending: FAMILY MEDICINE
Payer: COMMERCIAL

## 2021-05-27 DIAGNOSIS — E11.65 TYPE 2 DIABETES MELLITUS WITH HYPERGLYCEMIA, WITHOUT LONG-TERM CURRENT USE OF INSULIN: ICD-10-CM

## 2021-05-27 DIAGNOSIS — E11.9 TYPE 2 DIABETES MELLITUS WITHOUT COMPLICATION: ICD-10-CM

## 2021-05-27 LAB
ALBUMIN SERPL BCP-MCNC: 4 G/DL (ref 3.5–5.2)
ALP SERPL-CCNC: 58 U/L (ref 55–135)
ALT SERPL W/O P-5'-P-CCNC: 25 U/L (ref 10–44)
ANION GAP SERPL CALC-SCNC: 10 MMOL/L (ref 8–16)
AST SERPL-CCNC: 23 U/L (ref 10–40)
BILIRUB SERPL-MCNC: 0.7 MG/DL (ref 0.1–1)
BUN SERPL-MCNC: 24 MG/DL (ref 8–23)
CALCIUM SERPL-MCNC: 10.2 MG/DL (ref 8.7–10.5)
CHLORIDE SERPL-SCNC: 106 MMOL/L (ref 95–110)
CHOLEST SERPL-MCNC: 141 MG/DL (ref 120–199)
CHOLEST/HDLC SERPL: 2.5 {RATIO} (ref 2–5)
CO2 SERPL-SCNC: 26 MMOL/L (ref 23–29)
CREAT SERPL-MCNC: 1.1 MG/DL (ref 0.5–1.4)
EST. GFR  (AFRICAN AMERICAN): >60 ML/MIN/1.73 M^2
EST. GFR  (NON AFRICAN AMERICAN): 53.2 ML/MIN/1.73 M^2
ESTIMATED AVG GLUCOSE: 134 MG/DL (ref 68–131)
ESTIMATED AVG GLUCOSE: 134 MG/DL (ref 68–131)
GLUCOSE SERPL-MCNC: 91 MG/DL (ref 70–110)
HBA1C MFR BLD: 6.3 % (ref 4–5.6)
HBA1C MFR BLD: 6.3 % (ref 4–5.6)
HDLC SERPL-MCNC: 56 MG/DL (ref 40–75)
HDLC SERPL: 39.7 % (ref 20–50)
LDLC SERPL CALC-MCNC: 70.8 MG/DL (ref 63–159)
NONHDLC SERPL-MCNC: 85 MG/DL
POTASSIUM SERPL-SCNC: 4.4 MMOL/L (ref 3.5–5.1)
PROT SERPL-MCNC: 8.4 G/DL (ref 6–8.4)
SODIUM SERPL-SCNC: 142 MMOL/L (ref 136–145)
TRIGL SERPL-MCNC: 71 MG/DL (ref 30–150)

## 2021-05-27 PROCEDURE — 80061 LIPID PANEL: CPT | Performed by: FAMILY MEDICINE

## 2021-05-27 PROCEDURE — 83036 HEMOGLOBIN GLYCOSYLATED A1C: CPT | Performed by: FAMILY MEDICINE

## 2021-05-27 PROCEDURE — 36415 COLL VENOUS BLD VENIPUNCTURE: CPT | Mod: PO | Performed by: FAMILY MEDICINE

## 2021-05-27 PROCEDURE — 80053 COMPREHEN METABOLIC PANEL: CPT | Performed by: FAMILY MEDICINE

## 2021-06-03 DIAGNOSIS — E11.9 TYPE 2 DIABETES MELLITUS WITHOUT COMPLICATION: ICD-10-CM

## 2021-06-04 ENCOUNTER — TELEPHONE (OUTPATIENT)
Dept: FAMILY MEDICINE | Facility: CLINIC | Age: 66
End: 2021-06-04

## 2021-06-08 ENCOUNTER — PATIENT OUTREACH (OUTPATIENT)
Dept: ADMINISTRATIVE | Facility: OTHER | Age: 66
End: 2021-06-08

## 2021-06-09 ENCOUNTER — OFFICE VISIT (OUTPATIENT)
Dept: SPINE | Facility: CLINIC | Age: 66
End: 2021-06-09
Payer: COMMERCIAL

## 2021-06-09 ENCOUNTER — LAB VISIT (OUTPATIENT)
Dept: LAB | Facility: HOSPITAL | Age: 66
End: 2021-06-09
Attending: PHYSICIAN ASSISTANT
Payer: COMMERCIAL

## 2021-06-09 ENCOUNTER — OFFICE VISIT (OUTPATIENT)
Dept: FAMILY MEDICINE | Facility: CLINIC | Age: 66
End: 2021-06-09
Payer: COMMERCIAL

## 2021-06-09 VITALS
SYSTOLIC BLOOD PRESSURE: 134 MMHG | DIASTOLIC BLOOD PRESSURE: 82 MMHG | BODY MASS INDEX: 31.98 KG/M2 | WEIGHT: 211 LBS | TEMPERATURE: 98 F | OXYGEN SATURATION: 98 % | HEIGHT: 68 IN | HEART RATE: 88 BPM

## 2021-06-09 VITALS — HEIGHT: 68 IN | BODY MASS INDEX: 31.98 KG/M2 | WEIGHT: 211 LBS

## 2021-06-09 DIAGNOSIS — Z86.39 HISTORY OF VITAMIN D DEFICIENCY: ICD-10-CM

## 2021-06-09 DIAGNOSIS — J30.1 ALLERGIC RHINITIS DUE TO POLLEN, UNSPECIFIED SEASONALITY: ICD-10-CM

## 2021-06-09 DIAGNOSIS — G89.29 CHRONIC MIDLINE LOW BACK PAIN WITH RIGHT-SIDED SCIATICA: Primary | ICD-10-CM

## 2021-06-09 DIAGNOSIS — R53.83 FATIGUE, UNSPECIFIED TYPE: ICD-10-CM

## 2021-06-09 DIAGNOSIS — M54.41 CHRONIC MIDLINE LOW BACK PAIN WITH RIGHT-SIDED SCIATICA: Primary | ICD-10-CM

## 2021-06-09 DIAGNOSIS — E04.1 THYROID NODULE: ICD-10-CM

## 2021-06-09 DIAGNOSIS — R53.83 FATIGUE, UNSPECIFIED TYPE: Primary | ICD-10-CM

## 2021-06-09 LAB
25(OH)D3+25(OH)D2 SERPL-MCNC: 38 NG/ML (ref 30–96)
BASOPHILS # BLD AUTO: 0.08 K/UL (ref 0–0.2)
BASOPHILS NFR BLD: 0.9 % (ref 0–1.9)
DIFFERENTIAL METHOD: ABNORMAL
EOSINOPHIL # BLD AUTO: 0.3 K/UL (ref 0–0.5)
EOSINOPHIL NFR BLD: 3.8 % (ref 0–8)
ERYTHROCYTE [DISTWIDTH] IN BLOOD BY AUTOMATED COUNT: 14.1 % (ref 11.5–14.5)
HCT VFR BLD AUTO: 36.1 % (ref 37–48.5)
HGB BLD-MCNC: 11.2 G/DL (ref 12–16)
IMM GRANULOCYTES # BLD AUTO: 0.01 K/UL (ref 0–0.04)
IMM GRANULOCYTES NFR BLD AUTO: 0.1 % (ref 0–0.5)
LYMPHOCYTES # BLD AUTO: 4.1 K/UL (ref 1–4.8)
LYMPHOCYTES NFR BLD: 45.7 % (ref 18–48)
MCH RBC QN AUTO: 28.1 PG (ref 27–31)
MCHC RBC AUTO-ENTMCNC: 31 G/DL (ref 32–36)
MCV RBC AUTO: 91 FL (ref 82–98)
MONOCYTES # BLD AUTO: 0.5 K/UL (ref 0.3–1)
MONOCYTES NFR BLD: 5.8 % (ref 4–15)
NEUTROPHILS # BLD AUTO: 3.9 K/UL (ref 1.8–7.7)
NEUTROPHILS NFR BLD: 43.7 % (ref 38–73)
NRBC BLD-RTO: 0 /100 WBC
PLATELET # BLD AUTO: 326 K/UL (ref 150–450)
PMV BLD AUTO: 10.2 FL (ref 9.2–12.9)
RBC # BLD AUTO: 3.99 M/UL (ref 4–5.4)
WBC # BLD AUTO: 8.95 K/UL (ref 3.9–12.7)

## 2021-06-09 PROCEDURE — 86376 MICROSOMAL ANTIBODY EACH: CPT | Performed by: PHYSICIAN ASSISTANT

## 2021-06-09 PROCEDURE — 84443 ASSAY THYROID STIM HORMONE: CPT | Performed by: PHYSICIAN ASSISTANT

## 2021-06-09 PROCEDURE — 99999 PR PBB SHADOW E&M-EST. PATIENT-LVL V: CPT | Mod: PBBFAC,,, | Performed by: PHYSICIAN ASSISTANT

## 2021-06-09 PROCEDURE — 1125F PR PAIN SEVERITY QUANTIFIED, PAIN PRESENT: ICD-10-PCS | Mod: S$GLB,,, | Performed by: PHYSICAL MEDICINE & REHABILITATION

## 2021-06-09 PROCEDURE — 84480 ASSAY TRIIODOTHYRONINE (T3): CPT | Performed by: PHYSICIAN ASSISTANT

## 2021-06-09 PROCEDURE — 99999 PR PBB SHADOW E&M-EST. PATIENT-LVL V: ICD-10-PCS | Mod: PBBFAC,,, | Performed by: PHYSICIAN ASSISTANT

## 2021-06-09 PROCEDURE — 1125F AMNT PAIN NOTED PAIN PRSNT: CPT | Mod: S$GLB,,, | Performed by: PHYSICAL MEDICINE & REHABILITATION

## 2021-06-09 PROCEDURE — 85025 COMPLETE CBC W/AUTO DIFF WBC: CPT | Performed by: PHYSICIAN ASSISTANT

## 2021-06-09 PROCEDURE — 1125F PR PAIN SEVERITY QUANTIFIED, PAIN PRESENT: ICD-10-PCS | Mod: S$GLB,,, | Performed by: PHYSICIAN ASSISTANT

## 2021-06-09 PROCEDURE — 82728 ASSAY OF FERRITIN: CPT | Performed by: PHYSICIAN ASSISTANT

## 2021-06-09 PROCEDURE — 99213 OFFICE O/P EST LOW 20 MIN: CPT | Mod: S$GLB,,, | Performed by: PHYSICAL MEDICINE & REHABILITATION

## 2021-06-09 PROCEDURE — 99214 OFFICE O/P EST MOD 30 MIN: CPT | Mod: S$GLB,,, | Performed by: PHYSICIAN ASSISTANT

## 2021-06-09 PROCEDURE — 36415 COLL VENOUS BLD VENIPUNCTURE: CPT | Mod: PO | Performed by: PHYSICIAN ASSISTANT

## 2021-06-09 PROCEDURE — 83540 ASSAY OF IRON: CPT | Performed by: PHYSICIAN ASSISTANT

## 2021-06-09 PROCEDURE — 3008F BODY MASS INDEX DOCD: CPT | Mod: CPTII,S$GLB,, | Performed by: PHYSICAL MEDICINE & REHABILITATION

## 2021-06-09 PROCEDURE — 1125F AMNT PAIN NOTED PAIN PRSNT: CPT | Mod: S$GLB,,, | Performed by: PHYSICIAN ASSISTANT

## 2021-06-09 PROCEDURE — 82306 VITAMIN D 25 HYDROXY: CPT | Performed by: PHYSICIAN ASSISTANT

## 2021-06-09 PROCEDURE — 3008F PR BODY MASS INDEX (BMI) DOCUMENTED: ICD-10-PCS | Mod: CPTII,S$GLB,, | Performed by: PHYSICIAN ASSISTANT

## 2021-06-09 PROCEDURE — 84439 ASSAY OF FREE THYROXINE: CPT | Performed by: PHYSICIAN ASSISTANT

## 2021-06-09 PROCEDURE — 99213 PR OFFICE/OUTPT VISIT, EST, LEVL III, 20-29 MIN: ICD-10-PCS | Mod: S$GLB,,, | Performed by: PHYSICAL MEDICINE & REHABILITATION

## 2021-06-09 PROCEDURE — 3008F BODY MASS INDEX DOCD: CPT | Mod: CPTII,S$GLB,, | Performed by: PHYSICIAN ASSISTANT

## 2021-06-09 PROCEDURE — 3008F PR BODY MASS INDEX (BMI) DOCUMENTED: ICD-10-PCS | Mod: CPTII,S$GLB,, | Performed by: PHYSICAL MEDICINE & REHABILITATION

## 2021-06-09 PROCEDURE — 99214 PR OFFICE/OUTPT VISIT, EST, LEVL IV, 30-39 MIN: ICD-10-PCS | Mod: S$GLB,,, | Performed by: PHYSICIAN ASSISTANT

## 2021-06-09 RX ORDER — METHYLPREDNISOLONE ACETATE 40 MG/ML
40 INJECTION, SUSPENSION INTRA-ARTICULAR; INTRALESIONAL; INTRAMUSCULAR; SOFT TISSUE ONCE
Status: SHIPPED | OUTPATIENT
Start: 2021-06-09

## 2021-06-09 RX ORDER — FLUTICASONE PROPIONATE 50 MCG
2 SPRAY, SUSPENSION (ML) NASAL DAILY
Qty: 16 G | Refills: 3 | Status: SHIPPED | OUTPATIENT
Start: 2021-06-09 | End: 2021-12-29 | Stop reason: SDUPTHER

## 2021-06-10 LAB
FERRITIN SERPL-MCNC: 122 NG/ML (ref 20–300)
IRON SERPL-MCNC: 81 UG/DL (ref 30–160)
SATURATED IRON: 26 % (ref 20–50)
T3 SERPL-MCNC: 79 NG/DL (ref 60–180)
T4 FREE SERPL-MCNC: 1.06 NG/DL (ref 0.71–1.51)
THYROPEROXIDASE IGG SERPL-ACNC: 6.1 IU/ML
TOTAL IRON BINDING CAPACITY: 314 UG/DL (ref 250–450)
TRANSFERRIN SERPL-MCNC: 212 MG/DL (ref 200–375)
TSH SERPL DL<=0.005 MIU/L-ACNC: 0.76 UIU/ML (ref 0.4–4)

## 2021-06-11 ENCOUNTER — HOSPITAL ENCOUNTER (OUTPATIENT)
Dept: RADIOLOGY | Facility: HOSPITAL | Age: 66
Discharge: HOME | End: 2021-06-11
Attending: PHYSICIAN ASSISTANT
Payer: COMMERCIAL

## 2021-06-11 DIAGNOSIS — E04.1 THYROID NODULE: ICD-10-CM

## 2021-06-11 PROCEDURE — 76536 US EXAM OF HEAD AND NECK: CPT | Mod: 26,,, | Performed by: RADIOLOGY

## 2021-06-11 PROCEDURE — 76536 US EXAM OF HEAD AND NECK: CPT | Mod: TC

## 2021-06-11 PROCEDURE — 76536 US SOFT TISSUE HEAD NECK THYROID: ICD-10-PCS | Mod: 26,,, | Performed by: RADIOLOGY

## 2021-07-07 ENCOUNTER — PATIENT MESSAGE (OUTPATIENT)
Dept: ADMINISTRATIVE | Facility: HOSPITAL | Age: 66
End: 2021-07-07

## 2021-08-13 ENCOUNTER — OFFICE VISIT (OUTPATIENT)
Dept: OPTOMETRY | Facility: CLINIC | Age: 66
End: 2021-08-13
Payer: COMMERCIAL

## 2021-08-13 DIAGNOSIS — H52.7 REFRACTIVE ERROR: ICD-10-CM

## 2021-08-13 DIAGNOSIS — E11.3293 MILD NONPROLIFERATIVE DIABETIC RETINOPATHY OF BOTH EYES WITHOUT MACULAR EDEMA ASSOCIATED WITH TYPE 2 DIABETES MELLITUS: Primary | ICD-10-CM

## 2021-08-13 DIAGNOSIS — H25.13 NUCLEAR SCLEROSIS, BILATERAL: ICD-10-CM

## 2021-08-13 DIAGNOSIS — H35.371 EPIRETINAL MEMBRANE (ERM) OF RIGHT EYE: ICD-10-CM

## 2021-08-13 DIAGNOSIS — H26.9 CORTICAL CATARACT: ICD-10-CM

## 2021-08-13 DIAGNOSIS — E11.36 DIABETIC CATARACT: ICD-10-CM

## 2021-08-13 PROCEDURE — 92134 POSTERIOR SEGMENT OCT RETINA (OCULAR COHERENCE TOMOGRAPHY)-BOTH EYES: ICD-10-PCS | Mod: S$GLB,,, | Performed by: OPTOMETRIST

## 2021-08-13 PROCEDURE — 3044F PR MOST RECENT HEMOGLOBIN A1C LEVEL <7.0%: ICD-10-PCS | Mod: CPTII,S$GLB,, | Performed by: OPTOMETRIST

## 2021-08-13 PROCEDURE — 1126F AMNT PAIN NOTED NONE PRSNT: CPT | Mod: CPTII,S$GLB,, | Performed by: OPTOMETRIST

## 2021-08-13 PROCEDURE — 1159F PR MEDICATION LIST DOCUMENTED IN MEDICAL RECORD: ICD-10-PCS | Mod: CPTII,S$GLB,, | Performed by: OPTOMETRIST

## 2021-08-13 PROCEDURE — 3044F HG A1C LEVEL LT 7.0%: CPT | Mod: CPTII,S$GLB,, | Performed by: OPTOMETRIST

## 2021-08-13 PROCEDURE — 1126F PR PAIN SEVERITY QUANTIFIED, NO PAIN PRESENT: ICD-10-PCS | Mod: CPTII,S$GLB,, | Performed by: OPTOMETRIST

## 2021-08-13 PROCEDURE — 1160F RVW MEDS BY RX/DR IN RCRD: CPT | Mod: CPTII,S$GLB,, | Performed by: OPTOMETRIST

## 2021-08-13 PROCEDURE — 92134 CPTRZ OPH DX IMG PST SGM RTA: CPT | Mod: S$GLB,,, | Performed by: OPTOMETRIST

## 2021-08-13 PROCEDURE — 99999 PR PBB SHADOW E&M-EST. PATIENT-LVL III: CPT | Mod: PBBFAC,,, | Performed by: OPTOMETRIST

## 2021-08-13 PROCEDURE — 99999 PR PBB SHADOW E&M-EST. PATIENT-LVL III: ICD-10-PCS | Mod: PBBFAC,,, | Performed by: OPTOMETRIST

## 2021-08-13 PROCEDURE — 1159F MED LIST DOCD IN RCRD: CPT | Mod: CPTII,S$GLB,, | Performed by: OPTOMETRIST

## 2021-08-13 PROCEDURE — 1160F PR REVIEW ALL MEDS BY PRESCRIBER/CLIN PHARMACIST DOCUMENTED: ICD-10-PCS | Mod: CPTII,S$GLB,, | Performed by: OPTOMETRIST

## 2021-08-13 PROCEDURE — 92014 COMPRE OPH EXAM EST PT 1/>: CPT | Mod: S$GLB,,, | Performed by: OPTOMETRIST

## 2021-08-13 PROCEDURE — 92014 PR EYE EXAM, EST PATIENT,COMPREHESV: ICD-10-PCS | Mod: S$GLB,,, | Performed by: OPTOMETRIST

## 2021-08-17 RX ORDER — DULAGLUTIDE 1.5 MG/.5ML
1.5 INJECTION, SOLUTION SUBCUTANEOUS
Qty: 0.5 PEN | Refills: 2 | Status: SHIPPED | OUTPATIENT
Start: 2021-08-17 | End: 2021-08-23 | Stop reason: SDUPTHER

## 2021-08-19 ENCOUNTER — TELEPHONE (OUTPATIENT)
Dept: FAMILY MEDICINE | Facility: CLINIC | Age: 66
End: 2021-08-19

## 2021-08-23 RX ORDER — DULAGLUTIDE 1.5 MG/.5ML
1.5 INJECTION, SOLUTION SUBCUTANEOUS
Qty: 12 PEN | Refills: 3 | Status: SHIPPED | OUTPATIENT
Start: 2021-08-23 | End: 2022-07-18

## 2021-08-26 ENCOUNTER — TELEPHONE (OUTPATIENT)
Dept: FAMILY MEDICINE | Facility: CLINIC | Age: 66
End: 2021-08-26

## 2021-09-02 ENCOUNTER — TELEPHONE (OUTPATIENT)
Dept: FAMILY MEDICINE | Facility: CLINIC | Age: 66
End: 2021-09-02

## 2021-09-10 ENCOUNTER — TELEPHONE (OUTPATIENT)
Dept: FAMILY MEDICINE | Facility: CLINIC | Age: 66
End: 2021-09-10

## 2021-09-10 RX ORDER — DULAGLUTIDE 1.5 MG/.5ML
1.5 INJECTION, SOLUTION SUBCUTANEOUS
Qty: 12 PEN | Refills: 3 | Status: CANCELLED | OUTPATIENT
Start: 2021-09-10 | End: 2022-09-10

## 2021-09-23 ENCOUNTER — OFFICE VISIT (OUTPATIENT)
Dept: FAMILY MEDICINE | Facility: CLINIC | Age: 66
End: 2021-09-23
Payer: COMMERCIAL

## 2021-09-23 VITALS
BODY MASS INDEX: 31.87 KG/M2 | OXYGEN SATURATION: 98 % | DIASTOLIC BLOOD PRESSURE: 82 MMHG | SYSTOLIC BLOOD PRESSURE: 152 MMHG | HEIGHT: 68 IN | HEART RATE: 91 BPM | TEMPERATURE: 98 F | WEIGHT: 210.31 LBS

## 2021-09-23 DIAGNOSIS — E11.65 TYPE 2 DIABETES MELLITUS WITH HYPERGLYCEMIA, WITHOUT LONG-TERM CURRENT USE OF INSULIN: Primary | ICD-10-CM

## 2021-09-23 PROCEDURE — 3288F FALL RISK ASSESSMENT DOCD: CPT | Mod: CPTII,S$GLB,, | Performed by: FAMILY MEDICINE

## 2021-09-23 PROCEDURE — 90670 PCV13 VACCINE IM: CPT | Mod: S$GLB,,, | Performed by: FAMILY MEDICINE

## 2021-09-23 PROCEDURE — 90670 PNEUMOCOCCAL CONJUGATE VACCINE 13-VALENT LESS THAN 5YO & GREATER THAN: ICD-10-PCS | Mod: S$GLB,,, | Performed by: FAMILY MEDICINE

## 2021-09-23 PROCEDURE — 99214 OFFICE O/P EST MOD 30 MIN: CPT | Mod: 25,S$GLB,, | Performed by: FAMILY MEDICINE

## 2021-09-23 PROCEDURE — 3077F SYST BP >= 140 MM HG: CPT | Mod: CPTII,S$GLB,, | Performed by: FAMILY MEDICINE

## 2021-09-23 PROCEDURE — 3044F PR MOST RECENT HEMOGLOBIN A1C LEVEL <7.0%: ICD-10-PCS | Mod: CPTII,S$GLB,, | Performed by: FAMILY MEDICINE

## 2021-09-23 PROCEDURE — 90471 IMMUNIZATION ADMIN: CPT | Mod: S$GLB,,, | Performed by: FAMILY MEDICINE

## 2021-09-23 PROCEDURE — 99999 PR PBB SHADOW E&M-EST. PATIENT-LVL V: CPT | Mod: PBBFAC,,, | Performed by: FAMILY MEDICINE

## 2021-09-23 PROCEDURE — 99214 PR OFFICE/OUTPT VISIT, EST, LEVL IV, 30-39 MIN: ICD-10-PCS | Mod: 25,S$GLB,, | Performed by: FAMILY MEDICINE

## 2021-09-23 PROCEDURE — 3008F PR BODY MASS INDEX (BMI) DOCUMENTED: ICD-10-PCS | Mod: CPTII,S$GLB,, | Performed by: FAMILY MEDICINE

## 2021-09-23 PROCEDURE — 3288F PR FALLS RISK ASSESSMENT DOCUMENTED: ICD-10-PCS | Mod: CPTII,S$GLB,, | Performed by: FAMILY MEDICINE

## 2021-09-23 PROCEDURE — 3079F PR MOST RECENT DIASTOLIC BLOOD PRESSURE 80-89 MM HG: ICD-10-PCS | Mod: CPTII,S$GLB,, | Performed by: FAMILY MEDICINE

## 2021-09-23 PROCEDURE — 3079F DIAST BP 80-89 MM HG: CPT | Mod: CPTII,S$GLB,, | Performed by: FAMILY MEDICINE

## 2021-09-23 PROCEDURE — 99999 PR PBB SHADOW E&M-EST. PATIENT-LVL V: ICD-10-PCS | Mod: PBBFAC,,, | Performed by: FAMILY MEDICINE

## 2021-09-23 PROCEDURE — 1101F PT FALLS ASSESS-DOCD LE1/YR: CPT | Mod: CPTII,S$GLB,, | Performed by: FAMILY MEDICINE

## 2021-09-23 PROCEDURE — 1101F PR PT FALLS ASSESS DOC 0-1 FALLS W/OUT INJ PAST YR: ICD-10-PCS | Mod: CPTII,S$GLB,, | Performed by: FAMILY MEDICINE

## 2021-09-23 PROCEDURE — 3077F PR MOST RECENT SYSTOLIC BLOOD PRESSURE >= 140 MM HG: ICD-10-PCS | Mod: CPTII,S$GLB,, | Performed by: FAMILY MEDICINE

## 2021-09-23 PROCEDURE — 3044F HG A1C LEVEL LT 7.0%: CPT | Mod: CPTII,S$GLB,, | Performed by: FAMILY MEDICINE

## 2021-09-23 PROCEDURE — 1159F PR MEDICATION LIST DOCUMENTED IN MEDICAL RECORD: ICD-10-PCS | Mod: CPTII,S$GLB,, | Performed by: FAMILY MEDICINE

## 2021-09-23 PROCEDURE — 90471 PNEUMOCOCCAL CONJUGATE VACCINE 13-VALENT LESS THAN 5YO & GREATER THAN: ICD-10-PCS | Mod: S$GLB,,, | Performed by: FAMILY MEDICINE

## 2021-09-23 PROCEDURE — 3008F BODY MASS INDEX DOCD: CPT | Mod: CPTII,S$GLB,, | Performed by: FAMILY MEDICINE

## 2021-09-23 PROCEDURE — 1159F MED LIST DOCD IN RCRD: CPT | Mod: CPTII,S$GLB,, | Performed by: FAMILY MEDICINE

## 2021-10-07 ENCOUNTER — PATIENT MESSAGE (OUTPATIENT)
Dept: ADMINISTRATIVE | Facility: HOSPITAL | Age: 66
End: 2021-10-07

## 2021-11-03 ENCOUNTER — IMMUNIZATION (OUTPATIENT)
Dept: PRIMARY CARE CLINIC | Facility: CLINIC | Age: 66
End: 2021-11-03
Payer: COMMERCIAL

## 2021-11-03 DIAGNOSIS — Z23 NEED FOR VACCINATION: Primary | ICD-10-CM

## 2021-11-03 PROCEDURE — 0064A PR IMMUNIZ ADMIN, SARS-COV-2 COVID-19 VACC, 50MCG/0.25ML, BOOSTER DOSE: CPT | Mod: S$GLB,,, | Performed by: FAMILY MEDICINE

## 2021-11-03 PROCEDURE — 91306 COVID-19, MRNA, LNP-S, PF, 100 MCG/0.5 ML DOSE VACCINE: ICD-10-PCS | Mod: S$GLB,,, | Performed by: FAMILY MEDICINE

## 2021-11-03 PROCEDURE — 91306 COVID-19, MRNA, LNP-S, PF, 100 MCG/0.5 ML DOSE VACCINE: CPT | Mod: S$GLB,,, | Performed by: FAMILY MEDICINE

## 2021-11-03 PROCEDURE — 0064A PR IMMUNIZ ADMIN, SARS-COV-2 COVID-19 VACC, 50MCG/0.25ML, BOOSTER DOSE: ICD-10-PCS | Mod: S$GLB,,, | Performed by: FAMILY MEDICINE

## 2021-11-11 ENCOUNTER — TELEPHONE (OUTPATIENT)
Dept: FAMILY MEDICINE | Facility: CLINIC | Age: 66
End: 2021-11-11

## 2021-11-11 ENCOUNTER — PATIENT MESSAGE (OUTPATIENT)
Dept: FAMILY MEDICINE | Facility: CLINIC | Age: 66
End: 2021-11-11

## 2021-11-11 ENCOUNTER — IMMUNIZATION (OUTPATIENT)
Dept: FAMILY MEDICINE | Facility: CLINIC | Age: 66
End: 2021-11-11
Payer: COMMERCIAL

## 2021-11-11 VITALS — HEART RATE: 70 BPM | DIASTOLIC BLOOD PRESSURE: 78 MMHG | SYSTOLIC BLOOD PRESSURE: 136 MMHG

## 2021-11-11 PROCEDURE — 90471 FLU VACCINE - QUADRIVALENT - ADJUVANTED: ICD-10-PCS | Mod: S$GLB,,, | Performed by: INTERNAL MEDICINE

## 2021-11-11 PROCEDURE — 90694 FLU VACCINE - QUADRIVALENT - ADJUVANTED: ICD-10-PCS | Mod: S$GLB,,, | Performed by: INTERNAL MEDICINE

## 2021-11-11 PROCEDURE — 90694 VACC AIIV4 NO PRSRV 0.5ML IM: CPT | Mod: S$GLB,,, | Performed by: INTERNAL MEDICINE

## 2021-11-11 PROCEDURE — 90471 IMMUNIZATION ADMIN: CPT | Mod: S$GLB,,, | Performed by: INTERNAL MEDICINE

## 2021-11-11 NOTE — LETTER
January 29, 2019      Chaitanya Mc MD  [address] [place] [address] Dr  [address]  [place] [address]           [facility]  [address] [place] [address] [address]  [place] [address]  Phone: [phone]  Fax: [phone]          Patient: Yanci Coats   MR Number: [id number]   YOB: 1955   Date of Visit: 1/24/2019       Dear Dr. Chaitanya Mc:    Thank you for referring Yanci Coats to me for evaluation. Attached you will find relevant portions of my assessment and plan of care.    If you have questions, please do not hesitate to call me. I look forward to following Yanci Coats along with you.    Sincerely,    Clifford Hayes  CC:  No Recipients    If you would like to receive this communication electronically, please contact [email or web address] or [phone] to request more information on [employer] Link access.    For providers and/or their staff who would like to refer a patient to [facility], please contact us through our one-stop-shop provider referral line, [facility], at [phone].    If you feel you have received this communication in error or would no longer like to receive these types of communications, please e-mail [email or web address]         
no

## 2021-12-12 DIAGNOSIS — I15.2 HYPERTENSION ASSOCIATED WITH DIABETES: ICD-10-CM

## 2021-12-12 DIAGNOSIS — E11.59 HYPERTENSION ASSOCIATED WITH DIABETES: ICD-10-CM

## 2021-12-15 RX ORDER — AMLODIPINE BESYLATE 10 MG/1
TABLET ORAL
Qty: 90 TABLET | Refills: 3 | Status: SHIPPED | OUTPATIENT
Start: 2021-12-15 | End: 2022-10-03

## 2021-12-28 ENCOUNTER — TELEPHONE (OUTPATIENT)
Dept: FAMILY MEDICINE | Facility: CLINIC | Age: 66
End: 2021-12-28
Payer: COMMERCIAL

## 2021-12-29 ENCOUNTER — OFFICE VISIT (OUTPATIENT)
Dept: FAMILY MEDICINE | Facility: CLINIC | Age: 66
End: 2021-12-29
Payer: COMMERCIAL

## 2021-12-29 VITALS
OXYGEN SATURATION: 97 % | RESPIRATION RATE: 14 BRPM | HEIGHT: 68 IN | HEART RATE: 87 BPM | WEIGHT: 208.75 LBS | DIASTOLIC BLOOD PRESSURE: 70 MMHG | TEMPERATURE: 99 F | SYSTOLIC BLOOD PRESSURE: 138 MMHG | BODY MASS INDEX: 31.64 KG/M2

## 2021-12-29 DIAGNOSIS — I15.2 HYPERTENSION ASSOCIATED WITH DIABETES: ICD-10-CM

## 2021-12-29 DIAGNOSIS — E11.65 TYPE 2 DIABETES MELLITUS WITH HYPERGLYCEMIA, WITHOUT LONG-TERM CURRENT USE OF INSULIN: ICD-10-CM

## 2021-12-29 DIAGNOSIS — B96.89 ACUTE BACTERIAL RHINOSINUSITIS: Primary | ICD-10-CM

## 2021-12-29 DIAGNOSIS — R09.81 SINUS CONGESTION: ICD-10-CM

## 2021-12-29 DIAGNOSIS — E11.59 HYPERTENSION ASSOCIATED WITH DIABETES: ICD-10-CM

## 2021-12-29 DIAGNOSIS — J01.90 ACUTE BACTERIAL RHINOSINUSITIS: Primary | ICD-10-CM

## 2021-12-29 DIAGNOSIS — R05.9 COUGH: ICD-10-CM

## 2021-12-29 PROCEDURE — 1159F PR MEDICATION LIST DOCUMENTED IN MEDICAL RECORD: ICD-10-PCS | Mod: CPTII,S$GLB,,

## 2021-12-29 PROCEDURE — 3288F FALL RISK ASSESSMENT DOCD: CPT | Mod: CPTII,S$GLB,,

## 2021-12-29 PROCEDURE — 99214 PR OFFICE/OUTPT VISIT, EST, LEVL IV, 30-39 MIN: ICD-10-PCS | Mod: S$GLB,,,

## 2021-12-29 PROCEDURE — 3044F PR MOST RECENT HEMOGLOBIN A1C LEVEL <7.0%: ICD-10-PCS | Mod: CPTII,S$GLB,,

## 2021-12-29 PROCEDURE — 1160F PR REVIEW ALL MEDS BY PRESCRIBER/CLIN PHARMACIST DOCUMENTED: ICD-10-PCS | Mod: CPTII,S$GLB,,

## 2021-12-29 PROCEDURE — U0003 INFECTIOUS AGENT DETECTION BY NUCLEIC ACID (DNA OR RNA); SEVERE ACUTE RESPIRATORY SYNDROME CORONAVIRUS 2 (SARS-COV-2) (CORONAVIRUS DISEASE [COVID-19]), AMPLIFIED PROBE TECHNIQUE, MAKING USE OF HIGH THROUGHPUT TECHNOLOGIES AS DESCRIBED BY CMS-2020-01-R: HCPCS

## 2021-12-29 PROCEDURE — 3078F DIAST BP <80 MM HG: CPT | Mod: CPTII,S$GLB,,

## 2021-12-29 PROCEDURE — 3008F BODY MASS INDEX DOCD: CPT | Mod: CPTII,S$GLB,,

## 2021-12-29 PROCEDURE — 3075F SYST BP GE 130 - 139MM HG: CPT | Mod: CPTII,S$GLB,,

## 2021-12-29 PROCEDURE — 99214 OFFICE O/P EST MOD 30 MIN: CPT | Mod: S$GLB,,,

## 2021-12-29 PROCEDURE — 3044F HG A1C LEVEL LT 7.0%: CPT | Mod: CPTII,S$GLB,,

## 2021-12-29 PROCEDURE — 1101F PR PT FALLS ASSESS DOC 0-1 FALLS W/OUT INJ PAST YR: ICD-10-PCS | Mod: CPTII,S$GLB,,

## 2021-12-29 PROCEDURE — 3288F PR FALLS RISK ASSESSMENT DOCUMENTED: ICD-10-PCS | Mod: CPTII,S$GLB,,

## 2021-12-29 PROCEDURE — 1160F RVW MEDS BY RX/DR IN RCRD: CPT | Mod: CPTII,S$GLB,,

## 2021-12-29 PROCEDURE — 99999 PR PBB SHADOW E&M-EST. PATIENT-LVL III: ICD-10-PCS | Mod: PBBFAC,,,

## 2021-12-29 PROCEDURE — U0005 INFEC AGEN DETEC AMPLI PROBE: HCPCS

## 2021-12-29 PROCEDURE — 1159F MED LIST DOCD IN RCRD: CPT | Mod: CPTII,S$GLB,,

## 2021-12-29 PROCEDURE — 3075F PR MOST RECENT SYSTOLIC BLOOD PRESS GE 130-139MM HG: ICD-10-PCS | Mod: CPTII,S$GLB,,

## 2021-12-29 PROCEDURE — 3078F PR MOST RECENT DIASTOLIC BLOOD PRESSURE < 80 MM HG: ICD-10-PCS | Mod: CPTII,S$GLB,,

## 2021-12-29 PROCEDURE — 3008F PR BODY MASS INDEX (BMI) DOCUMENTED: ICD-10-PCS | Mod: CPTII,S$GLB,,

## 2021-12-29 PROCEDURE — 1101F PT FALLS ASSESS-DOCD LE1/YR: CPT | Mod: CPTII,S$GLB,,

## 2021-12-29 PROCEDURE — 99999 PR PBB SHADOW E&M-EST. PATIENT-LVL III: CPT | Mod: PBBFAC,,,

## 2021-12-29 RX ORDER — GUAIFENESIN 600 MG/1
1200 TABLET, EXTENDED RELEASE ORAL 2 TIMES DAILY
COMMUNITY
End: 2023-08-08 | Stop reason: ALTCHOICE

## 2021-12-29 RX ORDER — BENZONATATE 100 MG/1
100 CAPSULE ORAL 3 TIMES DAILY PRN
Qty: 30 CAPSULE | Refills: 0 | Status: SHIPPED | OUTPATIENT
Start: 2021-12-29 | End: 2022-01-08

## 2021-12-29 RX ORDER — AZELASTINE 1 MG/ML
1 SPRAY, METERED NASAL 2 TIMES DAILY
Qty: 30 ML | Refills: 0 | Status: SHIPPED | OUTPATIENT
Start: 2021-12-29 | End: 2023-08-08 | Stop reason: ALTCHOICE

## 2021-12-29 RX ORDER — FLUTICASONE PROPIONATE 50 MCG
2 SPRAY, SUSPENSION (ML) NASAL DAILY
Qty: 16 G | Refills: 3 | Status: SHIPPED | OUTPATIENT
Start: 2021-12-29 | End: 2023-08-08 | Stop reason: ALTCHOICE

## 2021-12-29 RX ORDER — PROMETHAZINE HYDROCHLORIDE AND DEXTROMETHORPHAN HYDROBROMIDE 6.25; 15 MG/5ML; MG/5ML
5 SYRUP ORAL NIGHTLY
Qty: 118 ML | Refills: 0 | Status: CANCELLED | OUTPATIENT
Start: 2021-12-29 | End: 2022-01-08

## 2021-12-29 RX ORDER — DOXYCYCLINE HYCLATE 100 MG
100 TABLET ORAL 2 TIMES DAILY
Qty: 14 TABLET | Refills: 0 | Status: SHIPPED | OUTPATIENT
Start: 2021-12-29 | End: 2022-01-05

## 2021-12-31 LAB
SARS-COV-2 RNA RESP QL NAA+PROBE: NOT DETECTED
SARS-COV-2- CYCLE NUMBER: NORMAL

## 2022-01-13 DIAGNOSIS — G89.29 CHRONIC BILATERAL LOW BACK PAIN WITHOUT SCIATICA: ICD-10-CM

## 2022-01-13 DIAGNOSIS — M54.50 CHRONIC BILATERAL LOW BACK PAIN WITHOUT SCIATICA: ICD-10-CM

## 2022-01-13 RX ORDER — GABAPENTIN 300 MG/1
CAPSULE ORAL
Qty: 90 CAPSULE | Refills: 11 | Status: SHIPPED | OUTPATIENT
Start: 2022-01-13

## 2022-01-13 NOTE — TELEPHONE ENCOUNTER
No new care gaps identified.  Powered by [employer] by [employer]. Reference number: [id number].   1/13/2022 3:17:43 PM CST

## 2022-03-04 NOTE — TELEPHONE ENCOUNTER
Care Due:                  Date            Visit Type   Department     Provider  --------------------------------------------------------------------------------                                EP -                              PRIMARY      [facility]  Last Visit: 09-      CARE ([facility])   NIKI Nye                              EP -                              PRIMARY      [facility]  Next Visit: 03-      CARE ([facility])   NIKI Nye                                                            Last  Test          Frequency    Reason                     Performed    Due Date  --------------------------------------------------------------------------------    Cr..........  12 months..  glipiZIDE................  05- 05-    HBA1C.......  6 months...  dulaglutide, glipiZIDE...  05- 11-    Powered by [employer] by [employer]. Reference number: [id number].   3/04/2022 5:50:13 PM CST

## 2022-03-04 NOTE — TELEPHONE ENCOUNTER
----- Message from Nam Rust sent at 3/4/2022  3:46 PM CST -----  Who Called: PT  Regarding: The pt states she is almost out of her JANUMET 50-1,000 mg per tablet and needs to have a prescription called into the [facility] pharmacy. She also states that she needs an emergency supply sent to [facility] in [place], [place] at [phone]. Please contact the pt.   Would the patient rather a call back or a response via [facility]? Call back  Best Call Back Number: [phone]  Additional Information:

## 2022-03-05 RX ORDER — SITAGLIPTIN AND METFORMIN HYDROCHLORIDE 1000; 50 MG/1; MG/1
TABLET, FILM COATED ORAL
Qty: 180 TABLET | Refills: 1 | OUTPATIENT
Start: 2022-03-05

## 2022-03-06 NOTE — TELEPHONE ENCOUNTER
Quick DC. Inappropriate Request    Refill Authorization Note   Yanci Coats  is requesting a refill authorization.  Brief Assessment and Rationale for Refill:  Quick Discontinue  Medication Therapy Plan:       Medication Reconciliation Completed:  No      Comments:   Pended Medication(s)       Requested Prescriptions     Refused Prescriptions Disp Refills    JANUMET 50-1,000 mg per tablet [Pharmacy Med Name: JANUMET TAB ] 180 tablet 1     Sig: TAKE 1 TABLET TWICE DAILY  WITH MEALS     Refused By: MALINA DOLL     Reason for Refusal: Refill not appropriate        Duplicate Pended Encounter(s)/ Last Prescribed Details: (includes pharmacy & prescriber details)   JANUMET 50-1,000 mg per tablet [[id number]]  DISCONTINUED    Order Details  Dose, Route, Frequency: As Directed   Dispense Quantity: 180 tablet Refills: 1          Sig: TAKE 1 TABLET TWICE DAILY  WITH MEALS   Patient not taking: Reported on 6/9/2021         Start Date: 04/14/21 End Date: 06/09/21   Discontinued by: KIMBERLEY Davis on 6/9/2021 10:09   Reason: Cost of medication         Written Date: 04/14/21 Expiration Date: 04/14/22   Original Order:  JANUMET 50-1,000 mg per tablet [[id number]]                Note composed:6:41 PM 03/05/2022

## 2022-03-08 NOTE — TELEPHONE ENCOUNTER
----- Message from Fred Piña sent at 3/8/2022 12:01 PM CST -----  Type: Needs Medical Advice  Who Called:  Pt    Best Call Back Number: [phone] (home)     Additional Information: Pt sts she needs a call back asap about her RX Janumet she has yet to hear anything.  Please advise -- Thank you

## 2022-03-08 NOTE — TELEPHONE ENCOUNTER
Spoke to pt states she needs her Janumet sent to [facility]. Pt called last Friday  Because someone in endo cancelled her prescription due to cost. Pt states she has always taken this medication and has had no problem with the cost. Pt also states this medication has always been prescribed by Dr. Nye. Pt requesting a 3 month supply go to [facility] and a short supply to go to the [facility] pharmacy in [place] on [address] on file for urgent refill.

## 2022-03-08 NOTE — TELEPHONE ENCOUNTER
No new care gaps identified.  Powered by [employer] by [facility]. Reference number: [id number].   3/08/2022 2:56:59 PM CST

## 2022-03-24 ENCOUNTER — PATIENT MESSAGE (OUTPATIENT)
Dept: ADMINISTRATIVE | Facility: HOSPITAL | Age: 67
End: 2022-03-24
Payer: COMMERCIAL

## 2022-04-13 ENCOUNTER — IMMUNIZATION (OUTPATIENT)
Dept: PRIMARY CARE CLINIC | Facility: CLINIC | Age: 67
End: 2022-04-13
Payer: COMMERCIAL

## 2022-04-13 DIAGNOSIS — Z23 NEED FOR VACCINATION: Primary | ICD-10-CM

## 2022-04-13 PROCEDURE — 0064A COVID-19, MRNA, LNP-S, PF, 100 MCG/0.25 ML DOSE VACCINE (MODERNA BOOSTER): ICD-10-PCS | Mod: S$GLB,,, | Performed by: FAMILY MEDICINE

## 2022-04-13 PROCEDURE — 0064A COVID-19, MRNA, LNP-S, PF, 100 MCG/0.25 ML DOSE VACCINE (MODERNA BOOSTER): CPT | Mod: S$GLB,,, | Performed by: FAMILY MEDICINE

## 2022-04-13 PROCEDURE — 91306 COVID-19, MRNA, LNP-S, PF, 100 MCG/0.25 ML DOSE VACCINE (MODERNA BOOSTER): CPT | Mod: S$GLB,,, | Performed by: FAMILY MEDICINE

## 2022-04-13 PROCEDURE — 91306 COVID-19, MRNA, LNP-S, PF, 100 MCG/0.25 ML DOSE VACCINE (MODERNA BOOSTER): ICD-10-PCS | Mod: S$GLB,,, | Performed by: FAMILY MEDICINE

## 2022-04-19 NOTE — LETTER
December 13, 2019      Anurag Nye MD  [address]  [place] [address]           [place] [facility]  [address] [place] [address]  [address]  [place] [address]  Phone: [phone]  Fax: [phone]          Patient: Yanci Coats   MR Number: [id number]   YOB: 1955   Date of Visit: 12/13/2019       Dear Dr. Anurag Nye:    Thank you for referring Yanci Coats to me for evaluation. Attached you will find relevant portions of my assessment and plan of care.    If you have questions, please do not hesitate to call me. I look forward to following Yanci Coats along with you.    Sincerely,    Brie England,     Enclosure  CC:  No Recipients    If you would like to receive this communication electronically, please contact [email or web address] or [phone] to request more information on [employer] Link access.    For providers and/or their staff who would like to refer a patient to [facility], please contact us through our one-stop-shop provider referral line, [facility], at [phone].    If you feel you have received this communication in error or would no longer like to receive these types of communications, please e-mail [email or web address]          Doxycycline Counseling:  Patient counseled regarding possible photosensitivity and increased risk for sunburn.  Patient instructed to avoid sunlight, if possible.  When exposed to sunlight, patients should wear protective clothing, sunglasses, and sunscreen.  The patient was instructed to call the office immediately if the following severe adverse effects occur:  hearing changes, easy bruising/bleeding, severe headache, or vision changes.  The patient verbalized understanding of the proper use and possible adverse effects of doxycycline.  All of the patient's questions and concerns were addressed.

## 2022-04-25 ENCOUNTER — TELEPHONE (OUTPATIENT)
Dept: FAMILY MEDICINE | Facility: CLINIC | Age: 67
End: 2022-04-25
Payer: COMMERCIAL

## 2022-04-25 NOTE — TELEPHONE ENCOUNTER
----- Message from Laurel Han sent at 4/25/2022  2:40 PM CDT -----  Contact: pt  Pt states to disregard last message. She did receive the medication it was sent different.

## 2022-04-25 NOTE — TELEPHONE ENCOUNTER
----- Message from Millicent Brownlee sent at 4/25/2022  2:39 PM CDT -----  Type:  RX Refill Request    Who Called:  patient   Refill or New Rx:  refill   RX Name and Strength:  SITagliptan-metformin (JANUMET) 50-1,000 mg per tablet  How is the patient currently taking it? (ex. 1XDay):  as directed   Is this a 30 day or 90 day RX:  90  Preferred Pharmacy with phone number:   [facility] - [place], [place] - [address] AT Portal to [facility]  [address]  [facility] [address]  Phone: [phone] Fax: [phone]  Local or Mail Order:  Mail  Ordering Provider:  paulo Atkinson Call Back Number:  [phone]   Additional Information:  per patient requesting a call back regarding this prescription, states she was given a small portion locally but [facility]-please advise-thank you

## 2022-05-31 ENCOUNTER — PATIENT MESSAGE (OUTPATIENT)
Dept: ADMINISTRATIVE | Facility: HOSPITAL | Age: 67
End: 2022-05-31
Payer: COMMERCIAL

## 2022-05-31 DIAGNOSIS — K21.9 LPRD (LARYNGOPHARYNGEAL REFLUX DISEASE): ICD-10-CM

## 2022-05-31 RX ORDER — OMEPRAZOLE 20 MG/1
40 CAPSULE, DELAYED RELEASE ORAL 2 TIMES DAILY
Qty: 360 CAPSULE | Refills: 3 | Status: SHIPPED | OUTPATIENT
Start: 2022-05-31 | End: 2023-05-03

## 2022-05-31 NOTE — TELEPHONE ENCOUNTER
Qty on Janumet and omeprazole was incorrect for 90 day supplies. Please resend prescriptions with correct qty. Qty updated  Last ov 12-29-21 with Stella CHU

## 2022-05-31 NOTE — TELEPHONE ENCOUNTER
----- Message from Cheyenne Dutton sent at 5/31/2022  9:18 AM CDT -----    Patient Call Back    Who Called: PT     What is the request in detail: pt calling to speak with someone regarding her SITagliptan-metformin (JANUMET) 50-1,000 mg per tablet. The pt stated that she spoke with her pharmacy in regards to her medication. Pls advise.    Can the clinic reply by [facility]?    Best Call Back Number: [phone]      [facility] - [place], [place] - [address] AT [address] to [facility]   Phone: [phone]  Fax: [phone]

## 2022-05-31 NOTE — TELEPHONE ENCOUNTER
No new care gaps identified.  [facility] Embedded Care Gaps. Reference number: [id number]. 5/31/2022   10:09:33 AM TAVARES

## 2022-06-02 DIAGNOSIS — E11.9 TYPE 2 DIABETES MELLITUS WITHOUT COMPLICATION: ICD-10-CM

## 2022-06-23 ENCOUNTER — PATIENT MESSAGE (OUTPATIENT)
Dept: ADMINISTRATIVE | Facility: HOSPITAL | Age: 67
End: 2022-06-23
Payer: COMMERCIAL

## 2022-08-22 DIAGNOSIS — E78.5 HYPERLIPIDEMIA ASSOCIATED WITH TYPE 2 DIABETES MELLITUS: ICD-10-CM

## 2022-08-22 DIAGNOSIS — E11.69 HYPERLIPIDEMIA ASSOCIATED WITH TYPE 2 DIABETES MELLITUS: ICD-10-CM

## 2022-08-24 ENCOUNTER — PATIENT MESSAGE (OUTPATIENT)
Dept: ADMINISTRATIVE | Facility: HOSPITAL | Age: 67
End: 2022-08-24
Payer: COMMERCIAL

## 2022-08-24 RX ORDER — ROSUVASTATIN CALCIUM 5 MG/1
TABLET, COATED ORAL
Qty: 90 TABLET | Refills: 0 | Status: SHIPPED | OUTPATIENT
Start: 2022-08-24 | End: 2022-11-07

## 2022-08-29 ENCOUNTER — PATIENT MESSAGE (OUTPATIENT)
Dept: FAMILY MEDICINE | Facility: CLINIC | Age: 67
End: 2022-08-29
Payer: COMMERCIAL

## 2022-08-31 DIAGNOSIS — Z78.0 MENOPAUSE: ICD-10-CM

## 2022-09-12 DIAGNOSIS — E11.65 UNCONTROLLED TYPE 2 DIABETES MELLITUS WITH HYPERGLYCEMIA: ICD-10-CM

## 2022-09-13 RX ORDER — GLIPIZIDE 10 MG/1
TABLET ORAL
Qty: 180 TABLET | Refills: 0 | Status: SHIPPED | OUTPATIENT
Start: 2022-09-13 | End: 2023-11-17 | Stop reason: SDUPTHER

## 2022-09-13 NOTE — TELEPHONE ENCOUNTER
----- Message from Max Glass sent at 9/13/2022  4:02 PM CDT -----  Regarding: pt called  Name of Who is Calling: HARDY BUTLER [[id number]]      What is the request in detail: pt called requesting to get her new booster shot on the same day as her appt.Pt also wants to know if  can get a booster shot too.Pleasea advise      Can the clinic reply by [facility]: No      What Number to Call Back if not in [email or web address]:[phone] (home)

## 2022-09-13 NOTE — TELEPHONE ENCOUNTER
Refill Routing Note   Medication(s) are not appropriate for processing by [facility] for the following reason(s):      - Required laboratory values are outdated    [facility] action(s):  Defer          Medication reconciliation completed: No     Appointments  past 12m or future 3m with PCP    Date Provider   Last Visit   9/23/2021 Anurag Nye MD   Next Visit   Visit date not found Anurag Nye MD   ED visits in past 90 days: 0        Note composed:3:20 AM 09/13/2022

## 2022-09-14 ENCOUNTER — TELEPHONE (OUTPATIENT)
Dept: FAMILY MEDICINE | Facility: CLINIC | Age: 67
End: 2022-09-14
Payer: COMMERCIAL

## 2022-09-19 ENCOUNTER — PATIENT OUTREACH (OUTPATIENT)
Dept: ADMINISTRATIVE | Facility: HOSPITAL | Age: 67
End: 2022-09-19
Payer: COMMERCIAL

## 2022-09-19 NOTE — PROGRESS NOTES
Diabetic eye exam GAP report-Left VM and letter mailed out regarding pt's overdue Diabetic eye exam.

## 2022-09-19 NOTE — LETTER
September 19, 2022    Yanci Coats  [address] [place] [address]             [facility]  [address]  [facility] [address]  Phone: [phone] Dear Ms. Coats,    Good morning!!    I am reaching out to you because you are over due for a Diabetic eye exam. Have you had an eye exam in the last year,  if so where did you have it? If not,  would you like me to schedule you an eye exam with one of our Physician's at [facility]?    Thanks so much and have a great day!    Millicent HENNING LPN [facility]  [facility]  [address] [place] [place] [address]  P- [phone]  F- [phone]

## 2022-09-26 ENCOUNTER — IMMUNIZATION (OUTPATIENT)
Dept: PRIMARY CARE CLINIC | Facility: CLINIC | Age: 67
End: 2022-09-26
Payer: COMMERCIAL

## 2022-09-26 DIAGNOSIS — Z23 NEED FOR VACCINATION: Primary | ICD-10-CM

## 2022-09-26 PROCEDURE — 91313 COVID-19, MRNA, LNP-S, BIVALENT BOOSTER, PF, 50 MCG/0.5 ML: CPT | Mod: S$GLB,,, | Performed by: FAMILY MEDICINE

## 2022-09-26 PROCEDURE — 91313 COVID-19, MRNA, LNP-S, BIVALENT BOOSTER, PF, 50 MCG/0.5 ML: ICD-10-PCS | Mod: S$GLB,,, | Performed by: FAMILY MEDICINE

## 2022-09-26 PROCEDURE — 0134A COVID-19, MRNA, LNP-S, BIVALENT BOOSTER, PF, 50 MCG/0.5 ML: CPT | Mod: S$GLB,,, | Performed by: FAMILY MEDICINE

## 2022-09-26 PROCEDURE — 0134A COVID-19, MRNA, LNP-S, BIVALENT BOOSTER, PF, 50 MCG/0.5 ML: ICD-10-PCS | Mod: S$GLB,,, | Performed by: FAMILY MEDICINE

## 2022-09-30 ENCOUNTER — PATIENT OUTREACH (OUTPATIENT)
Dept: ADMINISTRATIVE | Facility: HOSPITAL | Age: 67
End: 2022-09-30
Payer: COMMERCIAL

## 2022-09-30 ENCOUNTER — PATIENT MESSAGE (OUTPATIENT)
Dept: ADMINISTRATIVE | Facility: HOSPITAL | Age: 67
End: 2022-09-30
Payer: COMMERCIAL

## 2022-09-30 NOTE — PROGRESS NOTES
[facility] and [facility] reviewed. No new HM items found.    Called patient. Phone not working.    Patient portal message sent regarding overdue HM A1C.

## 2022-10-07 ENCOUNTER — PATIENT OUTREACH (OUTPATIENT)
Dept: ADMINISTRATIVE | Facility: HOSPITAL | Age: 67
End: 2022-10-07
Payer: COMMERCIAL

## 2022-10-07 ENCOUNTER — TELEPHONE (OUTPATIENT)
Dept: OPHTHALMOLOGY | Facility: CLINIC | Age: 67
End: 2022-10-07
Payer: COMMERCIAL

## 2022-10-07 ENCOUNTER — TELEPHONE (OUTPATIENT)
Dept: FAMILY MEDICINE | Facility: CLINIC | Age: 67
End: 2022-10-07
Payer: COMMERCIAL

## 2022-10-07 DIAGNOSIS — E11.65 TYPE 2 DIABETES MELLITUS WITH HYPERGLYCEMIA, WITHOUT LONG-TERM CURRENT USE OF INSULIN: Primary | ICD-10-CM

## 2022-10-07 RX ORDER — DULAGLUTIDE 1.5 MG/.5ML
1.5 INJECTION, SOLUTION SUBCUTANEOUS
Qty: 4 PEN | Refills: 0 | Status: SHIPPED | OUTPATIENT
Start: 2022-10-07 | End: 2022-11-03 | Stop reason: SDUPTHER

## 2022-10-07 NOTE — TELEPHONE ENCOUNTER
Spoke to pt and made her aware no sooner availability     -TD     ----- Message from Lupe Freitas sent at 10/7/2022  7:40 AM CDT -----  Contact: self  Type:  Sooner Appointment Request    Caller is requesting a sooner appointment.  Caller declined first available appointment listed below.  Caller will not accept being placed on the waitlist and is requesting a message be sent to doctor.    Name of Caller:  patient  When is the first available appointment?  12/2/22  Symptoms:  reg eye exam  Best Call Back Number:  [phone]  Additional Information:  Thanks

## 2022-10-07 NOTE — PROGRESS NOTES
"Attempted to outreach patient regarding overdue/ due HM via "[employer]". No response after a week. Now sending outreach via mail out letter.      "

## 2022-10-07 NOTE — TELEPHONE ENCOUNTER
----- Message from Lupe Freitas sent at 10/7/2022  7:24 AM CDT -----  Contact: self  Type:  RX Refill Request    Who Called:  patient  Refill or New Rx:  refill  RX Name and Strength:  TRULICITY 1.5 mg/0.5 mL pen injector  How is the patient currently taking it? (ex. 1XDay):  as directed  Is this a 30 day or 90 day RX:  30  Preferred Pharmacy with phone number:    [facility] [place] [facility] [address]  [address]  [place] [address]  Phone: [phone] Fax: [phone]  Local or Mail Order:  local  Ordering Provider:  Dr Nye  Best Call Back Number:  [phone]  Additional Information:  Patient is out of her medicine and has an appt on 11/3/22 just needs enough until she can get in to see you. Please call patient back to let her know when it was sent at [phone] and thanks

## 2022-10-07 NOTE — LETTER
October 7, 2022    Yanci Coats  [address] [place] [address]             [facility]  [address]  [facility] [address]  Phone: [phone] Yanci Coats  [address] [place] [address]     Dear Bleanda M Simpson, [facility] is committed to your overall health.  To help you get the most out of each of your visits, we will review your information to make sure you are up to date on all of your recommended tests and/or procedures.       Dr. Anurag Nye MD  has found that your chart shows you may be due for      Hgb A1C - lab test     If you have had this done at another facility, please bring the records or information with you so that your record at [facility] will be complete.  If you would like to schedule your lab test please contact the clinic at [phone].     Thank You,     Your [facility] Team,  MD Jacinto Echeverria LPN,[facility]  [facility] [address] [place] [address]  Phone [phone]  Fax [phone]

## 2022-10-14 ENCOUNTER — PATIENT OUTREACH (OUTPATIENT)
Dept: ADMINISTRATIVE | Facility: HOSPITAL | Age: 67
End: 2022-10-14
Payer: COMMERCIAL

## 2022-11-01 ENCOUNTER — LAB VISIT (OUTPATIENT)
Dept: LAB | Facility: HOSPITAL | Age: 67
End: 2022-11-01
Attending: FAMILY MEDICINE
Payer: COMMERCIAL

## 2022-11-01 DIAGNOSIS — E11.65 TYPE 2 DIABETES MELLITUS WITH HYPERGLYCEMIA, WITHOUT LONG-TERM CURRENT USE OF INSULIN: ICD-10-CM

## 2022-11-01 DIAGNOSIS — E11.9 TYPE 2 DIABETES MELLITUS WITHOUT COMPLICATION: ICD-10-CM

## 2022-11-01 LAB
ALBUMIN SERPL BCP-MCNC: 4 G/DL (ref 3.5–5.2)
ALP SERPL-CCNC: 57 U/L (ref 55–135)
ALT SERPL W/O P-5'-P-CCNC: 25 U/L (ref 10–44)
ANION GAP SERPL CALC-SCNC: 14 MMOL/L (ref 8–16)
AST SERPL-CCNC: 25 U/L (ref 10–40)
BILIRUB SERPL-MCNC: 0.7 MG/DL (ref 0.1–1)
BUN SERPL-MCNC: 17 MG/DL (ref 8–23)
CALCIUM SERPL-MCNC: 10.1 MG/DL (ref 8.7–10.5)
CHLORIDE SERPL-SCNC: 103 MMOL/L (ref 95–110)
CHOLEST SERPL-MCNC: 197 MG/DL (ref 120–199)
CHOLEST/HDLC SERPL: 3.5 {RATIO} (ref 2–5)
CO2 SERPL-SCNC: 24 MMOL/L (ref 23–29)
CREAT SERPL-MCNC: 1.1 MG/DL (ref 0.5–1.4)
EST. GFR  (NO RACE VARIABLE): 55.4 ML/MIN/1.73 M^2
ESTIMATED AVG GLUCOSE: 128 MG/DL (ref 68–131)
GLUCOSE SERPL-MCNC: 114 MG/DL (ref 70–110)
HBA1C MFR BLD: 6.1 % (ref 4–5.6)
HDLC SERPL-MCNC: 57 MG/DL (ref 40–75)
HDLC SERPL: 28.9 % (ref 20–50)
LDLC SERPL CALC-MCNC: 114.2 MG/DL (ref 63–159)
NONHDLC SERPL-MCNC: 140 MG/DL
POTASSIUM SERPL-SCNC: 3.9 MMOL/L (ref 3.5–5.1)
PROT SERPL-MCNC: 8.8 G/DL (ref 6–8.4)
SODIUM SERPL-SCNC: 141 MMOL/L (ref 136–145)
TRIGL SERPL-MCNC: 129 MG/DL (ref 30–150)

## 2022-11-01 PROCEDURE — 36415 COLL VENOUS BLD VENIPUNCTURE: CPT | Performed by: FAMILY MEDICINE

## 2022-11-01 PROCEDURE — 83036 HEMOGLOBIN GLYCOSYLATED A1C: CPT | Performed by: FAMILY MEDICINE

## 2022-11-01 PROCEDURE — 80053 COMPREHEN METABOLIC PANEL: CPT | Performed by: FAMILY MEDICINE

## 2022-11-01 PROCEDURE — 80061 LIPID PANEL: CPT | Performed by: FAMILY MEDICINE

## 2022-11-03 ENCOUNTER — OFFICE VISIT (OUTPATIENT)
Dept: FAMILY MEDICINE | Facility: CLINIC | Age: 67
End: 2022-11-03
Payer: COMMERCIAL

## 2022-11-03 VITALS
TEMPERATURE: 98 F | SYSTOLIC BLOOD PRESSURE: 140 MMHG | OXYGEN SATURATION: 98 % | HEART RATE: 83 BPM | BODY MASS INDEX: 31.11 KG/M2 | DIASTOLIC BLOOD PRESSURE: 64 MMHG | RESPIRATION RATE: 18 BRPM | HEIGHT: 68 IN | WEIGHT: 205.25 LBS

## 2022-11-03 DIAGNOSIS — Z12.39 ENCOUNTER FOR SCREENING FOR MALIGNANT NEOPLASM OF BREAST, UNSPECIFIED SCREENING MODALITY: ICD-10-CM

## 2022-11-03 DIAGNOSIS — G47.00 INSOMNIA, UNSPECIFIED TYPE: ICD-10-CM

## 2022-11-03 DIAGNOSIS — Z23 NEED FOR IMMUNIZATION AGAINST INFLUENZA: ICD-10-CM

## 2022-11-03 DIAGNOSIS — E11.59 HYPERTENSION ASSOCIATED WITH DIABETES: ICD-10-CM

## 2022-11-03 DIAGNOSIS — E11.65 TYPE 2 DIABETES MELLITUS WITH HYPERGLYCEMIA, WITHOUT LONG-TERM CURRENT USE OF INSULIN: Primary | ICD-10-CM

## 2022-11-03 DIAGNOSIS — I15.2 HYPERTENSION ASSOCIATED WITH DIABETES: ICD-10-CM

## 2022-11-03 DIAGNOSIS — R29.898 WEAKNESS OF BOTH LOWER EXTREMITIES: ICD-10-CM

## 2022-11-03 DIAGNOSIS — Z12.11 SCREENING FOR COLON CANCER: ICD-10-CM

## 2022-11-03 PROCEDURE — 99397 PR PREVENTIVE VISIT,EST,65 & OVER: ICD-10-PCS | Mod: 25,S$GLB,, | Performed by: FAMILY MEDICINE

## 2022-11-03 PROCEDURE — 3066F NEPHROPATHY DOC TX: CPT | Mod: CPTII,S$GLB,, | Performed by: FAMILY MEDICINE

## 2022-11-03 PROCEDURE — 90677 PNEUMOCOCCAL CONJUGATE VACCINE 20-VALENT: ICD-10-PCS | Mod: S$GLB,,, | Performed by: FAMILY MEDICINE

## 2022-11-03 PROCEDURE — 1101F PR PT FALLS ASSESS DOC 0-1 FALLS W/OUT INJ PAST YR: ICD-10-PCS | Mod: CPTII,S$GLB,, | Performed by: FAMILY MEDICINE

## 2022-11-03 PROCEDURE — 99397 PER PM REEVAL EST PAT 65+ YR: CPT | Mod: 25,S$GLB,, | Performed by: FAMILY MEDICINE

## 2022-11-03 PROCEDURE — 3062F POS MACROALBUMINURIA REV: CPT | Mod: CPTII,S$GLB,, | Performed by: FAMILY MEDICINE

## 2022-11-03 PROCEDURE — 3078F PR MOST RECENT DIASTOLIC BLOOD PRESSURE < 80 MM HG: ICD-10-PCS | Mod: CPTII,S$GLB,, | Performed by: FAMILY MEDICINE

## 2022-11-03 PROCEDURE — 90677 PCV20 VACCINE IM: CPT | Mod: S$GLB,,, | Performed by: FAMILY MEDICINE

## 2022-11-03 PROCEDURE — 90471 FLU VACCINE - QUADRIVALENT - ADJUVANTED: ICD-10-PCS | Mod: S$GLB,,, | Performed by: FAMILY MEDICINE

## 2022-11-03 PROCEDURE — 3078F DIAST BP <80 MM HG: CPT | Mod: CPTII,S$GLB,, | Performed by: FAMILY MEDICINE

## 2022-11-03 PROCEDURE — 3077F PR MOST RECENT SYSTOLIC BLOOD PRESSURE >= 140 MM HG: ICD-10-PCS | Mod: CPTII,S$GLB,, | Performed by: FAMILY MEDICINE

## 2022-11-03 PROCEDURE — 3288F FALL RISK ASSESSMENT DOCD: CPT | Mod: CPTII,S$GLB,, | Performed by: FAMILY MEDICINE

## 2022-11-03 PROCEDURE — 90472 IMMUNIZATION ADMIN EACH ADD: CPT | Mod: S$GLB,,, | Performed by: FAMILY MEDICINE

## 2022-11-03 PROCEDURE — 3066F PR DOCUMENTATION OF TREATMENT FOR NEPHROPATHY: ICD-10-PCS | Mod: CPTII,S$GLB,, | Performed by: FAMILY MEDICINE

## 2022-11-03 PROCEDURE — 3044F PR MOST RECENT HEMOGLOBIN A1C LEVEL <7.0%: ICD-10-PCS | Mod: CPTII,S$GLB,, | Performed by: FAMILY MEDICINE

## 2022-11-03 PROCEDURE — 1101F PT FALLS ASSESS-DOCD LE1/YR: CPT | Mod: CPTII,S$GLB,, | Performed by: FAMILY MEDICINE

## 2022-11-03 PROCEDURE — 99999 PR PBB SHADOW E&M-EST. PATIENT-LVL V: CPT | Mod: PBBFAC,,, | Performed by: FAMILY MEDICINE

## 2022-11-03 PROCEDURE — 90694 VACC AIIV4 NO PRSRV 0.5ML IM: CPT | Mod: S$GLB,,, | Performed by: FAMILY MEDICINE

## 2022-11-03 PROCEDURE — 3008F BODY MASS INDEX DOCD: CPT | Mod: CPTII,S$GLB,, | Performed by: FAMILY MEDICINE

## 2022-11-03 PROCEDURE — 1126F AMNT PAIN NOTED NONE PRSNT: CPT | Mod: CPTII,S$GLB,, | Performed by: FAMILY MEDICINE

## 2022-11-03 PROCEDURE — 3288F PR FALLS RISK ASSESSMENT DOCUMENTED: ICD-10-PCS | Mod: CPTII,S$GLB,, | Performed by: FAMILY MEDICINE

## 2022-11-03 PROCEDURE — 3008F PR BODY MASS INDEX (BMI) DOCUMENTED: ICD-10-PCS | Mod: CPTII,S$GLB,, | Performed by: FAMILY MEDICINE

## 2022-11-03 PROCEDURE — 99999 PR PBB SHADOW E&M-EST. PATIENT-LVL V: ICD-10-PCS | Mod: PBBFAC,,, | Performed by: FAMILY MEDICINE

## 2022-11-03 PROCEDURE — 90471 IMMUNIZATION ADMIN: CPT | Mod: S$GLB,,, | Performed by: FAMILY MEDICINE

## 2022-11-03 PROCEDURE — 90694 FLU VACCINE - QUADRIVALENT - ADJUVANTED: ICD-10-PCS | Mod: S$GLB,,, | Performed by: FAMILY MEDICINE

## 2022-11-03 PROCEDURE — 3062F PR POS MACROALBUMINURIA RESULT DOCUMENTED/REVIEW: ICD-10-PCS | Mod: CPTII,S$GLB,, | Performed by: FAMILY MEDICINE

## 2022-11-03 PROCEDURE — 3044F HG A1C LEVEL LT 7.0%: CPT | Mod: CPTII,S$GLB,, | Performed by: FAMILY MEDICINE

## 2022-11-03 PROCEDURE — 90472 PNEUMOCOCCAL CONJUGATE VACCINE 20-VALENT: ICD-10-PCS | Mod: S$GLB,,, | Performed by: FAMILY MEDICINE

## 2022-11-03 PROCEDURE — 3077F SYST BP >= 140 MM HG: CPT | Mod: CPTII,S$GLB,, | Performed by: FAMILY MEDICINE

## 2022-11-03 PROCEDURE — 1126F PR PAIN SEVERITY QUANTIFIED, NO PAIN PRESENT: ICD-10-PCS | Mod: CPTII,S$GLB,, | Performed by: FAMILY MEDICINE

## 2022-11-03 PROCEDURE — 1159F PR MEDICATION LIST DOCUMENTED IN MEDICAL RECORD: ICD-10-PCS | Mod: CPTII,S$GLB,, | Performed by: FAMILY MEDICINE

## 2022-11-03 PROCEDURE — 1159F MED LIST DOCD IN RCRD: CPT | Mod: CPTII,S$GLB,, | Performed by: FAMILY MEDICINE

## 2022-11-03 RX ORDER — CARVEDILOL 12.5 MG/1
12.5 TABLET ORAL DAILY
Qty: 90 TABLET | Refills: 3 | Status: SHIPPED | OUTPATIENT
Start: 2022-11-03 | End: 2023-09-08 | Stop reason: SDUPTHER

## 2022-11-03 RX ORDER — TRAZODONE HYDROCHLORIDE 50 MG/1
50 TABLET ORAL NIGHTLY PRN
Qty: 90 TABLET | Refills: 3 | Status: SHIPPED | OUTPATIENT
Start: 2022-11-03 | End: 2023-08-08 | Stop reason: ALTCHOICE

## 2022-11-03 RX ORDER — DULAGLUTIDE 1.5 MG/.5ML
1.5 INJECTION, SOLUTION SUBCUTANEOUS
Qty: 12 PEN | Refills: 3 | Status: SHIPPED | OUTPATIENT
Start: 2022-11-03 | End: 2023-09-08 | Stop reason: SDUPTHER

## 2022-11-03 RX ORDER — PHENTERMINE HYDROCHLORIDE 37.5 MG/1
37.5 TABLET ORAL
Qty: 30 TABLET | Refills: 2 | Status: SHIPPED | OUTPATIENT
Start: 2022-11-03 | End: 2022-12-03

## 2022-11-03 NOTE — PATIENT INSTRUCTIONS
Andi Pete,     If you are due for any health screening(s) below please notify me so we can arrange them to be ordered and scheduled to maintain your health. Most healthy patients complete it. Don't lose out on improving your health.     Tests to Keep You Healthy    Mammogram: ORDERED BUT NOT SCHEDULED  Eye Exam: SCHEDULED  Colon Cancer Screening: DUE  Last Blood Pressure <= 139/89 (11/3/2022): Yes  Last HbA1c < 8 (11/01/2022): Yes      Breast Cancer Screening    Breast cancer is the second most common cancer in women after skin cancer, and the second leading cause of death from cancer after lung cancer. Mammograms can detect breast cancer early, which significantly increases the chances of curing the cancer.      A screening mammogram is an x-ray image of the breasts used for early breast cancer detection. It can help reduce the number of deaths from breast cancer among women. To get a clear image, the breast is placed between two plastic plates to make it flat. How often a mammogram is needed depends on your age and your breast cancer risk.    Although you are still overdue for this important screening, due to the COVID-19 pandemic, we recommend scheduling it in the near future.  Colon Cancer Screening    Of cancers affecting both men and women, colorectal cancer is the third leading cancer killer in the [place]. But it doesnt have to be. Screening can prevent colorectal cancer or find it at an early stage when treatment often leads to a cure.    A colonoscopy is the preferred test for detecting colon cancer. It is needed only once every 10 years if results are negative. While sedated, a flexible, lighted tube with a tiny camera is inserted into the rectum and advanced through the colon to look for cancers. An alternative screening test that is used at home and returned to the lab may also be used. It detects hidden blood in bowel movements which could indicate cancer in the colon. If results are positive,  "you will need a colonoscopy to determine if the blood is a sign of cancer. This type of follow up (diagnostic) colonoscopy usually requires additional copays as required by your insurance provider. Please contact your PCP if you have any questions.    Although you are still overdue for this important screening, due to the COVID-19 pandemic, we recommend scheduling it in the near future.       Diabetic Retinal Eye Exam    Diabetes is the #1 cause of blindness in the [place] - early detection before signs or symptoms develop can prevent debilitating blindness.    Once-a-year screening is recommended for all diabetic patients. This exam can prevent and treat diabetes complications in the eye before developing symptoms. This can be done with a special camera is used to take photographs of the back of your eye without having to dilate them, or you can see an eye doctor for a full dilated exam.    Although you are still overdue for this important screening, due to the COVID-19 pandemic, we recommend scheduling it in the near future.        Patient Education       Checking Your Blood Pressure at Home   The Basics   Written by the doctors and editors at [facility]   How is blood pressure measured? -- Blood pressure is usually measured with a device that goes around your upper arm. This is often done in a doctor's office. But some people also check their blood pressure themselves, at home or at work.  Blood pressure is explained with 2 numbers. For instance, your blood pressure might be "140 over 90." The first (top) number is the pressure inside your arteries when your heart is hua. The second (bottom) number is the pressure inside your arteries when your heart is relaxed. The table shows how doctors and nurses define high and normal blood pressure (table 1).  If your blood pressure gets too high, it puts you at risk for heart attack, stroke, and kidney disease. High blood pressure does not usually cause symptoms. But it " "can be serious.  What is a home blood pressure meter? -- A home blood pressure meter (or "monitor") is a device you can use to check your blood pressure yourself. It has a cuff that goes around your upper arm (figure 1). Some devices have a cuff that goes around your wrist instead. But doctors aren't sure if these work as well. The meter also has a small screen, or dial, that shows your blood pressure numbers.  There are also special meters you can wear for a day or 2. These are different because they automatically check your blood pressure throughout the day and night, even while you are sleeping. If your doctor thinks you should use one of these devices, they will talk to you about how to wear it.  Why do I need to check my blood pressure at home? -- If your doctor knows or suspects that you have high blood pressure, they might want you to check it at home. There are a few reasons for this. Your doctor might want to look at:  Whether your blood pressure measures the same at home as it did in the doctor's office  How well your blood pressure medicines are working  Changes in your blood pressure, for example, if it goes up and down  People who check their own blood pressure at home usually do better at keeping it low.  How do I choose a home blood pressure meter? -- When choosing a home blood pressure meter, you will probably want to think about:  Cost - Some devices cost more than others. You should also check to see if your insurance will help pay for your device.  Size - It's important to make sure the cuff fits your arm comfortably. Your doctor or nurse can help you with this.  How easy it is to use - You should make sure you understand how to use the device. You also need to be able to read the numbers on the screen.  You do not need a prescription to buy a home blood pressure meter. You can buy them at most pharmacies or over the internet. Your doctor or nurse can help you choose the right device for you.  How " do I check my blood pressure at home? -- Once you have a home blood pressure meter, your doctor or nurse should check it to make sure it fits you and works correctly.  When it's time to check your blood pressure:  Go to the bathroom and empty your bladder first. Having a full bladder can temporarily increase your blood pressure, making the results inaccurate.  Sit in a chair with your feet flat on the ground.  Try to breathe normally and stay calm.  Attach the cuff to your arm. Place the cuff directly on your skin, not over your clothing. The cuff should be tight enough to not slip down, but not uncomfortably tight.  Sit and relax for about 3 to 5 minutes with the cuff on.  Follow the directions that came with your device to start measuring your blood pressure. This might involve squeezing the bulb at the end of the tube to inflate the cuff (fill it with air). With some monitors, you just need to press a button to inflate the cuff. When the cuff fills with air, it feels like someone is squeezing your arm, but it should not hurt. Then you will slowly deflate the cuff (let the air out of it), or it will deflate by itself. The screen or dial will show your blood pressure numbers.  Stay seated and relax for 1 minute, then measure your blood pressure again.  How often should I check my blood pressure? -- It depends. Different people need to follow different schedules. Your doctor or nurse will tell you how often to check your blood pressure, and when. Some people need to check their blood pressure twice a day, in the morning and evening.  Your doctor or nurse will probably tell you to keep track of your blood pressure for at least a few days (table 2). Then they will look at the numbers. The reason for this is that it's normal for your blood pressure to change a bit from day to day. For example, the numbers might change depending on whether you recently had caffeine, just exercised, or feel stressed. Checking your blood  "pressure over several days - or longer - will give your doctor or nurse a better idea of what is average for you.  How should I keep track of my blood pressure? -- Some blood pressure meters will record your numbers for you, or send them to your computer or smartphone. If yours does not do this, you will need to write them down. Your doctor or nurse can help you figure out the best way to keep track of the numbers.  What if my blood pressure is high? -- Your doctor or nurse will tell you what to do if your blood pressure is high when you check it at home. If you get a number that is higher than normal, measure it again to see if it is still high. If it is very high (above a certain number, which your doctor or nurse will tell you to watch out for), you should call your doctor right away.  If your blood pressure is only a little high, your doctor or nurse might tell you to keep checking it for a few more days or weeks, and then call if it does not go back down. Then they can help you decide what to do next.  All topics are updated as new evidence becomes available and our peer review process is complete.  This topic retrieved from [employer] on: Sep 21, 2021.  Topic 235514 Version 4.0  Release: 29.4.2 - C29.263  © 2021 UpToDate, Inc. and/or its affiliates. All rights reserved.  table 1: Definition of normal and high blood pressure  Level  Top number  Bottom number    High 130 or above 80 or above   Elevated 120 to 129 79 or below   Normal 119 or below 79 or below   These definitions are from the American College of Cardiology/American Heart Association. Other expert groups might use slightly different definitions.  "Elevated blood pressure" is a term doctor or nurses use as a warning. It means you do not yet have high blood pressure, but your blood pressure is not as low as it should be for good health.  Graphic 98204 Version 6.0  figure 1: Using a home blood pressure meter     This is an example of a person using a " home blood pressure meter.  [facility] 696033 Version 1.0    table 2: 7-day diary for checking blood pressure at home  Day 1  Day 2  Day 3  Day 4  Day 5  Day 6  Day 7    Morning  1st read Morning  1st read Morning  1st read Morning  1st read Morning  1st read Morning  1st read Morning  1st read   Systolic: __________ Systolic: __________ Systolic: __________ Systolic: __________ Systolic: __________ Systolic: __________ Systolic: __________   Diastolic: __________ Diastolic: __________ Diastolic: __________ Diastolic: __________ Diastolic: __________ Diastolic: __________ Diastolic: __________   Pulse: __________ Pulse: __________ Pulse: __________ Pulse: __________ Pulse: __________ Pulse: __________ Pulse: __________   Morning  2nd read Morning  2nd read Morning  2nd read Morning  2nd read Morning  2nd read Morning  2nd read Morning  2nd read   Systolic: __________ Systolic: __________ Systolic: __________ Systolic: __________ Systolic: __________ Systolic: __________ Systolic: __________   Diastolic: __________ Diastolic: __________ Diastolic: __________ Diastolic: __________ Diastolic: __________ Diastolic: __________ Diastolic: __________   Pulse: __________ Pulse: __________ Pulse: __________ Pulse: __________ Pulse: __________ Pulse: __________ Pulse: __________   Evening  1st read Evening  1st read Evening  1st read Evening  1st read Evening  1st read Evening  1st read Evening  1st read   Systolic: __________ Systolic: __________ Systolic: __________ Systolic: __________ Systolic: __________ Systolic: __________ Systolic: __________   Diastolic: __________ Diastolic: __________ Diastolic: __________ Diastolic: __________ Diastolic: __________ Diastolic: __________ Diastolic: __________   Pulse: __________ Pulse: __________ Pulse: __________ Pulse: __________ Pulse: __________ Pulse: __________ Pulse: __________   Evening  2nd read Evening  2nd read Evening  2nd read Evening  2nd read Evening  2nd read Evening   2nd read Evening  2nd read   Systolic: __________ Systolic: __________ Systolic: __________ Systolic: __________ Systolic: __________ Systolic: __________ Systolic: __________   Diastolic: __________ Diastolic: __________ Diastolic: __________ Diastolic: __________ Diastolic: __________ Diastolic: __________ Diastolic: __________   Pulse: __________ Pulse: __________ Pulse: __________ Pulse: __________ Pulse: __________ Pulse: __________ Pulse: __________   Notes    Notes    Notes    Notes    Notes    Notes    Notes      ____________________ ____________________ ____________________ ____________________ ____________________ ____________________ ____________________   ____________________ ____________________ ____________________ ____________________ ____________________ ____________________ ____________________   ____________________ ____________________ ____________________ ____________________ ____________________ ____________________ ____________________   Patient name: ______________________________     Patient ID: ________________________________    Primary care provider: _______________________    Average BP: _______________________________    Graphic 519995 Version 1.0  Consumer Information Use and Disclaimer   This information is not specific medical advice and does not replace information you receive from your health care provider. This is only a brief summary of general information. It does NOT include all information about conditions, illnesses, injuries, tests, procedures, treatments, therapies, discharge instructions or life-style choices that may apply to you. You must talk with your health care provider for complete information about your health and treatment options. This information should not be used to decide whether or not to accept your health care provider's advice, instructions or recommendations. Only your health care provider has the knowledge and training to provide advice that is right for  you. The use of this information is governed by the [employer] End User License Agreement, available at https://www.[employer]/en/solutions/[employer]/about/shanna.The use of [employer] content is governed by the [employer] Terms of Use. ©2021 UpToDate, Inc. All rights reserved.  Copyright   © 2021 UpToDate, Inc. and/or its affiliates. All rights reserved.

## 2022-11-03 NOTE — PROGRESS NOTES
Subjective:   Patient ID: Yanci Coats is a 67 y.o. female     Chief Complaint:Annual Exam and Insomnia      Notes intermittent issues sleep at night. Wakes up and unable to return to sleep. Limited improvement with melatonin. Would like try prescription medication options. Also would like to start weight loss medication    Review of Systems   Constitutional:  Positive for fatigue and unexpected weight change.   Respiratory:  Negative for shortness of breath.    Cardiovascular:  Negative for chest pain.   Gastrointestinal:  Negative for abdominal pain.   Genitourinary:  Negative for dysuria.   Psychiatric/Behavioral:  Positive for sleep disturbance.    Past Medical History:   Diagnosis Date    Allergy history unknown     Arthritis     DDD    Back pain     Cataract     OU    Colon polyps 01/31/2019    Diabetes mellitus type II     GERD (gastroesophageal reflux disease)     Hypertension     LPRD (laryngopharyngeal reflux disease)     Nuclear sclerosis - Both Eyes 7/9/2013     Past Surgical History:   Procedure Laterality Date    COLONOSCOPY N/A 1/31/2019    Procedure: COLONOSCOPY;  Surgeon: Ronak Palacios MD;  Location: [facility];  Service: Endoscopy;  Laterality: N/A;     Objective:     Vitals:    11/03/22 0815   BP: (!) 140/64   Pulse: 83   Resp: 18   Temp: 98.2 °F (36.8 °C)     Body mass index is 31.21 kg/m².  Physical Exam  Vitals and nursing note reviewed.   Constitutional:       Appearance: She is well-developed.   HENT:      Head: Normocephalic and atraumatic.   Eyes:      General: No scleral icterus.     Conjunctiva/sclera: Conjunctivae normal.   Cardiovascular:      Heart sounds: No murmur heard.  Pulmonary:      Effort: Pulmonary effort is normal. No respiratory distress.   Musculoskeletal:         General: No deformity. Normal range of motion.      Cervical back: Normal range of motion and neck supple.   Skin:     Coloration: Skin is not pale.      Findings: No rash.   Neurological:      Mental  Status: She is alert and oriented to person, place, and time.   Psychiatric:         Behavior: Behavior normal.         Thought Content: Thought content normal.         Judgment: Judgment normal.     Assessment:     1. Encounter for screening for malignant neoplasm of breast, unspecified screening modality    2. Hypertension associated with diabetes    3. Type 2 diabetes mellitus with hyperglycemia, without long-term current use of insulin    4. Screening for colon cancer    5. Weakness of both lower extremities    6. BMI 31.0-31.9,adult    7. Need for immunization against influenza      Plan:   Type 2 diabetes mellitus with hyperglycemia, without long-term current use of insulin  -     dulaglutide (TRULICITY) 1.5 mg/0.5 mL pen injector; Inject 1.5 mg into the skin every 7 days.  Dispense: 12 pen; Refill: 3  -     Comprehensive Metabolic Panel; Future; Expected date: 05/03/2023  -     Hemoglobin A1C; Future; Expected date: 05/03/2023  -     Hemoglobin; Future; Expected date: 05/03/2023  -     Platelet Count; Future; Expected date: 05/03/2023  -     WBC; Future; Expected date: 05/03/2023    Hypertension associated with diabetes  -     carvediloL (COREG) 12.5 MG tablet; Take 1 tablet (12.5 mg total) by mouth once daily.  Dispense: 90 tablet; Refill: 3    Encounter for screening for malignant neoplasm of breast, unspecified screening modality  -     Mammo Digital Screening Bilat; Future; Expected date: 11/03/2022    Screening for colon cancer  -     Ambulatory referral/consult to Gastroenterology; Future; Expected date: 11/10/2022    Weakness of both lower extremities  -     Ambulatory referral/consult to Physical/Occupational Therapy; Future; Expected date: 11/10/2022    BMI 31.0-31.9,adult  Starting on 3 month course of adipex. Counseled at length the risks particularlyl with her history. If able to assist with weight loss at this point a short course as FDA recommended would beneficially outweigh risks. Advised  close f/u with any issues.  Need for immunization against influenza  -     Influenza (FLUAD) - Quadrivalent (Adjuvanted) *Preferred* (65+) (PF)    Insomnia, unspecified type  -     traZODone (DESYREL) 50 MG tablet; Take 1 tablet (50 mg total) by mouth nightly as needed for Insomnia.  Dispense: 90 tablet; Refill: 3  Counseled at length risks. Understands and accepts risks. Advised sleep hygiene and provided information.  Other orders  -     (In Office Administered) Pneumococcal Conjugate Vaccine (20 Valent) (IM)          Total time spent of Greater than 30 minutes minutes on the day of the visit.This includes face to face time and preparing to see the patient, obtaining and reviewing separately obtained history, documenting clinical information in the electronic or other health record, independently interpreting results and communicating results to the patient/family/caregiver, or care coordinator.    Established patient with me has been instructed that must see me at least 1 time yearly (every 365 days) for refills of medications. Seeing other providers in this clinic is fine but expectation is to see me yearly.    Anurag Nye MD  11/03/2022    Portions of this note have been dictated with ADELAIDA Guzman

## 2022-11-08 ENCOUNTER — TELEPHONE (OUTPATIENT)
Dept: FAMILY MEDICINE | Facility: CLINIC | Age: 67
End: 2022-11-08
Payer: COMMERCIAL

## 2022-11-08 NOTE — TELEPHONE ENCOUNTER
Spoke to pt states she received a call to schedule a regular GI exam appt instead of a colonoscopy. Pt wanting to know why she needs to see the GI  Prior for an exam? Pt states she was not informed of needing to see GI first. Please advise if pt is ok to complete colonoscopy or if you intended for her to be seen by GI as well.

## 2022-11-08 NOTE — TELEPHONE ENCOUNTER
----- Message from Monika Mooney sent at 11/8/2022  1:40 PM CST -----  Regarding: pt call back  Name of Who is Calling: HARDY BUTLER [[id number]]      What is the request in detail: Pt would like a call back from the dr or nurse to explain why she had to be scheduled with a GI doctor for her colonoscopy. Please advise.      Can the clinic reply by [facility]: no       What Number to [facility] Back [facility]: [phone]

## 2022-11-09 ENCOUNTER — PATIENT MESSAGE (OUTPATIENT)
Dept: GASTROENTEROLOGY | Facility: CLINIC | Age: 67
End: 2022-11-09
Payer: COMMERCIAL

## 2022-11-09 ENCOUNTER — TELEPHONE (OUTPATIENT)
Dept: GASTROENTEROLOGY | Facility: CLINIC | Age: 67
End: 2022-11-09
Payer: COMMERCIAL

## 2022-11-21 ENCOUNTER — PATIENT MESSAGE (OUTPATIENT)
Dept: ADMINISTRATIVE | Facility: HOSPITAL | Age: 67
End: 2022-11-21
Payer: COMMERCIAL

## 2022-11-29 ENCOUNTER — PATIENT MESSAGE (OUTPATIENT)
Dept: ADMINISTRATIVE | Facility: HOSPITAL | Age: 67
End: 2022-11-29
Payer: COMMERCIAL

## 2022-12-02 ENCOUNTER — OFFICE VISIT (OUTPATIENT)
Dept: OPTOMETRY | Facility: CLINIC | Age: 67
End: 2022-12-02
Payer: COMMERCIAL

## 2022-12-02 DIAGNOSIS — H35.371 EPIRETINAL MEMBRANE (ERM) OF RIGHT EYE: ICD-10-CM

## 2022-12-02 DIAGNOSIS — E11.3293 MILD NONPROLIFERATIVE DIABETIC RETINOPATHY OF BOTH EYES WITHOUT MACULAR EDEMA ASSOCIATED WITH TYPE 2 DIABETES MELLITUS: Primary | ICD-10-CM

## 2022-12-02 DIAGNOSIS — H04.123 DRY EYE SYNDROME, BILATERAL: ICD-10-CM

## 2022-12-02 DIAGNOSIS — H52.7 REFRACTIVE ERROR: ICD-10-CM

## 2022-12-02 DIAGNOSIS — E11.36 DIABETIC CATARACT: ICD-10-CM

## 2022-12-02 DIAGNOSIS — H26.9 CORTICAL CATARACT: ICD-10-CM

## 2022-12-02 DIAGNOSIS — H25.13 NUCLEAR SCLEROSIS, BILATERAL: ICD-10-CM

## 2022-12-02 PROCEDURE — 1126F AMNT PAIN NOTED NONE PRSNT: CPT | Mod: CPTII,S$GLB,, | Performed by: OPTOMETRIST

## 2022-12-02 PROCEDURE — 3288F PR FALLS RISK ASSESSMENT DOCUMENTED: ICD-10-PCS | Mod: CPTII,S$GLB,, | Performed by: OPTOMETRIST

## 2022-12-02 PROCEDURE — 1160F PR REVIEW ALL MEDS BY PRESCRIBER/CLIN PHARMACIST DOCUMENTED: ICD-10-PCS | Mod: CPTII,S$GLB,, | Performed by: OPTOMETRIST

## 2022-12-02 PROCEDURE — 3066F PR DOCUMENTATION OF TREATMENT FOR NEPHROPATHY: ICD-10-PCS | Mod: CPTII,S$GLB,, | Performed by: OPTOMETRIST

## 2022-12-02 PROCEDURE — 1159F MED LIST DOCD IN RCRD: CPT | Mod: CPTII,S$GLB,, | Performed by: OPTOMETRIST

## 2022-12-02 PROCEDURE — 92014 PR EYE EXAM, EST PATIENT,COMPREHESV: ICD-10-PCS | Mod: S$GLB,,, | Performed by: OPTOMETRIST

## 2022-12-02 PROCEDURE — 3044F HG A1C LEVEL LT 7.0%: CPT | Mod: CPTII,S$GLB,, | Performed by: OPTOMETRIST

## 2022-12-02 PROCEDURE — 3062F POS MACROALBUMINURIA REV: CPT | Mod: CPTII,S$GLB,, | Performed by: OPTOMETRIST

## 2022-12-02 PROCEDURE — 3066F NEPHROPATHY DOC TX: CPT | Mod: CPTII,S$GLB,, | Performed by: OPTOMETRIST

## 2022-12-02 PROCEDURE — 1160F RVW MEDS BY RX/DR IN RCRD: CPT | Mod: CPTII,S$GLB,, | Performed by: OPTOMETRIST

## 2022-12-02 PROCEDURE — 1126F PR PAIN SEVERITY QUANTIFIED, NO PAIN PRESENT: ICD-10-PCS | Mod: CPTII,S$GLB,, | Performed by: OPTOMETRIST

## 2022-12-02 PROCEDURE — 3288F FALL RISK ASSESSMENT DOCD: CPT | Mod: CPTII,S$GLB,, | Performed by: OPTOMETRIST

## 2022-12-02 PROCEDURE — 3044F PR MOST RECENT HEMOGLOBIN A1C LEVEL <7.0%: ICD-10-PCS | Mod: CPTII,S$GLB,, | Performed by: OPTOMETRIST

## 2022-12-02 PROCEDURE — 3062F PR POS MACROALBUMINURIA RESULT DOCUMENTED/REVIEW: ICD-10-PCS | Mod: CPTII,S$GLB,, | Performed by: OPTOMETRIST

## 2022-12-02 PROCEDURE — 99999 PR PBB SHADOW E&M-EST. PATIENT-LVL II: ICD-10-PCS | Mod: PBBFAC,,, | Performed by: OPTOMETRIST

## 2022-12-02 PROCEDURE — 1159F PR MEDICATION LIST DOCUMENTED IN MEDICAL RECORD: ICD-10-PCS | Mod: CPTII,S$GLB,, | Performed by: OPTOMETRIST

## 2022-12-02 PROCEDURE — 92014 COMPRE OPH EXAM EST PT 1/>: CPT | Mod: S$GLB,,, | Performed by: OPTOMETRIST

## 2022-12-02 PROCEDURE — 92015 DETERMINE REFRACTIVE STATE: CPT | Mod: S$GLB,,, | Performed by: OPTOMETRIST

## 2022-12-02 PROCEDURE — 1101F PT FALLS ASSESS-DOCD LE1/YR: CPT | Mod: CPTII,S$GLB,, | Performed by: OPTOMETRIST

## 2022-12-02 PROCEDURE — 92015 PR REFRACTION: ICD-10-PCS | Mod: S$GLB,,, | Performed by: OPTOMETRIST

## 2022-12-02 PROCEDURE — 99999 PR PBB SHADOW E&M-EST. PATIENT-LVL II: CPT | Mod: PBBFAC,,, | Performed by: OPTOMETRIST

## 2022-12-02 PROCEDURE — 1101F PR PT FALLS ASSESS DOC 0-1 FALLS W/OUT INJ PAST YR: ICD-10-PCS | Mod: CPTII,S$GLB,, | Performed by: OPTOMETRIST

## 2022-12-02 NOTE — PROGRESS NOTES
HPI     Diabetic Eye Exam     Additional comments: LDE: 08/13/2021           Comments    66 YO female presents today for an annual diabetic eye exam. Patient   states that she notices pain OU like tingling and makes eyes water   occasionally, unsure if it is related to sinuses. Current glasses a few   years old, working well takes off to read.     Hemoglobin A1C       Date                     Value               Ref Range             Status                11/01/2022               6.1 (H)             4.0 - 5.6 %           Final                  05/27/2021               6.3 (H)             4.0 - 5.6 %           Final                   05/27/2021               6.3 (H)             4.0 - 5.6 %           Final                      Last edited by Iliana Rivera on 12/2/2022  7:43 AM.            Assessment /Plan     For exam results, see Encounter Report.    Mild nonproliferative diabetic retinopathy of both eyes without macular edema associated with type 2 diabetes mellitus    Diabetic cataract    Nuclear sclerosis, bilateral    Cortical cataract    Refractive error    Epiretinal membrane (ERM) of right eye    Dry eye syndrome, bilateral      1. 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 Stable A1C. Discussed possible ocular affects of uncontrolled blood sugar with patient. Recommended continued strong blood sugar control and continued care with PCP. Recheck in 1 year, sooner if any worsening of A1C.     2. Diabetic cataract  3. Nuclear sclerosis, bilateral  4. Cortical cataract  Mild, not yet significant. Discussed possible ocular affects of cataracts. Acceptable BCVA OU. Discussed treatment options. Surgery not recommended at this time. Monitor yearly.     5. Refractive error  Discussed refraction fee  Dispensed updated spectacle Rx. Discussed various spectacle lens options. Discussed adaptation  period to new specs.   Demonstrated new spec Rx vs current specs in phoropter with patient satisfaction    6. Epiretinal membrane (ERM) of right eye  ERM OD, discussed reduced vision secondary to ERM. Stable from previous. Discussed options. Will continue to monitor yearly.         RTC in 6 months for DM DFE, or sooner prn.

## 2022-12-20 ENCOUNTER — PATIENT MESSAGE (OUTPATIENT)
Dept: ADMINISTRATIVE | Facility: HOSPITAL | Age: 67
End: 2022-12-20
Payer: COMMERCIAL

## 2023-01-17 ENCOUNTER — PATIENT MESSAGE (OUTPATIENT)
Dept: ADMINISTRATIVE | Facility: HOSPITAL | Age: 68
End: 2023-01-17
Payer: COMMERCIAL

## 2023-01-18 ENCOUNTER — PATIENT MESSAGE (OUTPATIENT)
Dept: ADMINISTRATIVE | Facility: HOSPITAL | Age: 68
End: 2023-01-18
Payer: COMMERCIAL

## 2023-02-02 ENCOUNTER — PATIENT MESSAGE (OUTPATIENT)
Dept: ADMINISTRATIVE | Facility: HOSPITAL | Age: 68
End: 2023-02-02
Payer: COMMERCIAL

## 2023-02-27 ENCOUNTER — PATIENT MESSAGE (OUTPATIENT)
Dept: ADMINISTRATIVE | Facility: HOSPITAL | Age: 68
End: 2023-02-27
Payer: COMMERCIAL

## 2023-03-08 ENCOUNTER — PATIENT MESSAGE (OUTPATIENT)
Dept: ADMINISTRATIVE | Facility: HOSPITAL | Age: 68
End: 2023-03-08
Payer: COMMERCIAL

## 2023-03-27 ENCOUNTER — PATIENT MESSAGE (OUTPATIENT)
Dept: ADMINISTRATIVE | Facility: HOSPITAL | Age: 68
End: 2023-03-27
Payer: COMMERCIAL

## 2023-04-06 ENCOUNTER — PATIENT MESSAGE (OUTPATIENT)
Dept: ADMINISTRATIVE | Facility: HOSPITAL | Age: 68
End: 2023-04-06
Payer: COMMERCIAL

## 2023-04-11 ENCOUNTER — TELEPHONE (OUTPATIENT)
Dept: OPHTHALMOLOGY | Facility: CLINIC | Age: 68
End: 2023-04-11
Payer: COMMERCIAL

## 2023-04-11 NOTE — TELEPHONE ENCOUNTER
Spoke to pt and made aware would send rx in the mail  ----- Message from Mari Carcamo sent at 4/11/2023  1:37 PM CDT -----  Type: Needs Medical Advice  Who Called:  pt  Best Call Back Number: [phone]    Additional Information requesting another rx for the glasses pt sts she lost the first one , requesting a call back please adivse thank you

## 2023-04-27 ENCOUNTER — PATIENT MESSAGE (OUTPATIENT)
Dept: ADMINISTRATIVE | Facility: HOSPITAL | Age: 68
End: 2023-04-27
Payer: COMMERCIAL

## 2023-04-29 NOTE — TELEPHONE ENCOUNTER
Care Due:                  Date            Visit Type   Department     Provider  --------------------------------------------------------------------------------                                EP -                              PRIMARY      [facility]  Last Visit: 11-      CARE ([facility])   NIKI Nye                              EP -                              PRIMARY      [facility]  Next Visit: 05-      CARE ([facility])   NIKI Nye                                                            Last  Test          Frequency    Reason                     Performed    Due Date  --------------------------------------------------------------------------------    HBA1C.......  6 months...  SITagliptan-metformin,     11- 04-                             dulaglutide, glipiZIDE...    [employer] Embedded Care Due Messages. Reference number: [id number].   4/29/2023 3:04:23 PM CDT

## 2023-04-30 RX ORDER — SITAGLIPTIN AND METFORMIN HYDROCHLORIDE 1000; 50 MG/1; MG/1
TABLET, FILM COATED ORAL
Qty: 180 TABLET | Refills: 0 | Status: SHIPPED | OUTPATIENT
Start: 2023-04-30 | End: 2023-09-08 | Stop reason: SDUPTHER

## 2023-04-30 NOTE — TELEPHONE ENCOUNTER
Refill Decision Note   Yanci Coats  is requesting a refill authorization.  Brief Assessment and Rationale for Refill:  Approve     Medication Therapy Plan:       Medication Reconciliation Completed: No   Comments:     Provider Staff:     Action is required for this patient.   Please see care gap opportunities below in Care Due Message.     Thanks!  [facility]     Appointments      Date Provider   Last Visit   11/3/2022 Anurag Nye MD   Next Visit   5/24/2023 Anurag Nye MD     Note composed:4:12 PM 04/30/2023           Note composed:4:12 PM 04/30/2023

## 2023-05-01 DIAGNOSIS — K21.9 LPRD (LARYNGOPHARYNGEAL REFLUX DISEASE): ICD-10-CM

## 2023-05-02 NOTE — TELEPHONE ENCOUNTER
No care due was identified.  [facility] Embedded Care Due Messages. Reference number: [id number].   5/01/2023 9:22:16 PM CDT

## 2023-05-02 NOTE — TELEPHONE ENCOUNTER
Refill Routing Note   Medication(s) are not appropriate for processing by [facility] for the following reason(s):      Prilosec total daily dose greater than 40 mg daily; ROUTE TO PCP FOR APPROVAL    [facility] action(s):  Route            Appointments  past 12m or future 3m with PCP    Date Provider   Last Visit   11/3/2022 Anurag Nye MD   Next Visit   5/24/2023 Anurag Nye MD   ED visits in past 90 days: 0        Note composed:6:20 AM 05/02/2023

## 2023-05-03 RX ORDER — OMEPRAZOLE 20 MG/1
CAPSULE, DELAYED RELEASE ORAL
Qty: 360 CAPSULE | Refills: 1 | Status: SHIPPED | OUTPATIENT
Start: 2023-05-03 | End: 2023-09-27 | Stop reason: SDUPTHER

## 2023-05-16 ENCOUNTER — PATIENT MESSAGE (OUTPATIENT)
Dept: ADMINISTRATIVE | Facility: HOSPITAL | Age: 68
End: 2023-05-16
Payer: COMMERCIAL

## 2023-05-24 ENCOUNTER — OFFICE VISIT (OUTPATIENT)
Dept: FAMILY MEDICINE | Facility: CLINIC | Age: 68
End: 2023-05-24
Payer: COMMERCIAL

## 2023-05-24 VITALS
HEART RATE: 65 BPM | SYSTOLIC BLOOD PRESSURE: 132 MMHG | WEIGHT: 202.63 LBS | OXYGEN SATURATION: 99 % | BODY MASS INDEX: 30.71 KG/M2 | HEIGHT: 68 IN | TEMPERATURE: 98 F | RESPIRATION RATE: 18 BRPM | DIASTOLIC BLOOD PRESSURE: 70 MMHG

## 2023-05-24 DIAGNOSIS — E11.59 HYPERTENSION ASSOCIATED WITH DIABETES: ICD-10-CM

## 2023-05-24 DIAGNOSIS — E11.69 HYPERLIPIDEMIA ASSOCIATED WITH TYPE 2 DIABETES MELLITUS: ICD-10-CM

## 2023-05-24 DIAGNOSIS — E78.5 HYPERLIPIDEMIA ASSOCIATED WITH TYPE 2 DIABETES MELLITUS: ICD-10-CM

## 2023-05-24 DIAGNOSIS — E11.65 TYPE 2 DIABETES MELLITUS WITH HYPERGLYCEMIA, WITHOUT LONG-TERM CURRENT USE OF INSULIN: Primary | ICD-10-CM

## 2023-05-24 DIAGNOSIS — I15.2 HYPERTENSION ASSOCIATED WITH DIABETES: ICD-10-CM

## 2023-05-24 PROCEDURE — 3078F DIAST BP <80 MM HG: CPT | Mod: CPTII,S$GLB,, | Performed by: FAMILY MEDICINE

## 2023-05-24 PROCEDURE — 1159F PR MEDICATION LIST DOCUMENTED IN MEDICAL RECORD: ICD-10-PCS | Mod: CPTII,S$GLB,, | Performed by: FAMILY MEDICINE

## 2023-05-24 PROCEDURE — 99999 PR PBB SHADOW E&M-EST. PATIENT-LVL V: CPT | Mod: PBBFAC,,, | Performed by: FAMILY MEDICINE

## 2023-05-24 PROCEDURE — 3288F PR FALLS RISK ASSESSMENT DOCUMENTED: ICD-10-PCS | Mod: CPTII,S$GLB,, | Performed by: FAMILY MEDICINE

## 2023-05-24 PROCEDURE — 1125F PR PAIN SEVERITY QUANTIFIED, PAIN PRESENT: ICD-10-PCS | Mod: CPTII,S$GLB,, | Performed by: FAMILY MEDICINE

## 2023-05-24 PROCEDURE — 1101F PT FALLS ASSESS-DOCD LE1/YR: CPT | Mod: CPTII,S$GLB,, | Performed by: FAMILY MEDICINE

## 2023-05-24 PROCEDURE — 3075F SYST BP GE 130 - 139MM HG: CPT | Mod: CPTII,S$GLB,, | Performed by: FAMILY MEDICINE

## 2023-05-24 PROCEDURE — 3078F PR MOST RECENT DIASTOLIC BLOOD PRESSURE < 80 MM HG: ICD-10-PCS | Mod: CPTII,S$GLB,, | Performed by: FAMILY MEDICINE

## 2023-05-24 PROCEDURE — 3075F PR MOST RECENT SYSTOLIC BLOOD PRESS GE 130-139MM HG: ICD-10-PCS | Mod: CPTII,S$GLB,, | Performed by: FAMILY MEDICINE

## 2023-05-24 PROCEDURE — 1101F PR PT FALLS ASSESS DOC 0-1 FALLS W/OUT INJ PAST YR: ICD-10-PCS | Mod: CPTII,S$GLB,, | Performed by: FAMILY MEDICINE

## 2023-05-24 PROCEDURE — 99214 OFFICE O/P EST MOD 30 MIN: CPT | Mod: S$GLB,,, | Performed by: FAMILY MEDICINE

## 2023-05-24 PROCEDURE — 3288F FALL RISK ASSESSMENT DOCD: CPT | Mod: CPTII,S$GLB,, | Performed by: FAMILY MEDICINE

## 2023-05-24 PROCEDURE — 3008F BODY MASS INDEX DOCD: CPT | Mod: CPTII,S$GLB,, | Performed by: FAMILY MEDICINE

## 2023-05-24 PROCEDURE — 3008F PR BODY MASS INDEX (BMI) DOCUMENTED: ICD-10-PCS | Mod: CPTII,S$GLB,, | Performed by: FAMILY MEDICINE

## 2023-05-24 PROCEDURE — 1159F MED LIST DOCD IN RCRD: CPT | Mod: CPTII,S$GLB,, | Performed by: FAMILY MEDICINE

## 2023-05-24 PROCEDURE — 99999 PR PBB SHADOW E&M-EST. PATIENT-LVL V: ICD-10-PCS | Mod: PBBFAC,,, | Performed by: FAMILY MEDICINE

## 2023-05-24 PROCEDURE — 99214 PR OFFICE/OUTPT VISIT, EST, LEVL IV, 30-39 MIN: ICD-10-PCS | Mod: S$GLB,,, | Performed by: FAMILY MEDICINE

## 2023-05-24 PROCEDURE — 1125F AMNT PAIN NOTED PAIN PRSNT: CPT | Mod: CPTII,S$GLB,, | Performed by: FAMILY MEDICINE

## 2023-05-24 NOTE — PATIENT INSTRUCTIONS
Andi Pete,     If you are due for any health screening(s) below please notify me so we can arrange them to be ordered and scheduled to maintain your health. Most healthy patients complete it. Don't lose out on improving your health.     Tests to Keep You Healthy    Mammogram: ORDERED BUT NOT SCHEDULED  Eye Exam: Met on 12/2/2022  Colon Cancer Screening: DUE  Last Blood Pressure <= 139/89 (5/24/2023): NO  Last HbA1c < 8 (11/01/2022): Yes      Breast Cancer Screening    Breast cancer is the second most common cancer in women after skin cancer, and the second leading cause of death from cancer after lung cancer. Mammograms can detect breast cancer early, which significantly increases the chances of curing the cancer.      A screening mammogram is an x-ray image of the breasts used for early breast cancer detection. It can help reduce the number of deaths from breast cancer among women. To get a clear image, the breast is placed between two plastic plates to make it flat. How often a mammogram is needed depends on your age and your breast cancer risk.    Colon Cancer Screening    Of cancers affecting both men and women, colorectal cancer is the third leading cancer killer in the [place]. But it doesnt have to be. Screening can prevent colorectal cancer or find it at an early stage when treatment often leads to a cure.    A colonoscopy is the preferred test for detecting colon cancer. It is needed only once every 10 years if results are negative. While sedated, a flexible, lighted tube with a tiny camera is inserted into the rectum and advanced through the colon to look for cancers. An alternative screening test that is used at home and returned to the lab may also be used. It detects hidden blood in bowel movements which could indicate cancer in the colon. If results are positive, you will need a colonoscopy to determine if the blood is a sign of cancer. This type of follow up (diagnostic) colonoscopy usually  requires additional copays as required by your insurance provider. Please contact your PCP if you have any questions.            Diabetic A1c Testing    Your chart identifies you as having diabetes. It is recommended that all diabetes patients have an A1c test done at least once a year, but [facility] recommends twice a year for most patients. This helps your doctor to better help you manage your diabetes. This is a non-fasting lab test which can be completed at any time. Your result will be sent to your Primary Care Provider for review and that office will contact you with the results.

## 2023-05-30 NOTE — PROGRESS NOTES
Subjective:   Patient ID: Yanci Coats is a 67 y.o. female     Chief Complaint:Follow-up (6 month)      Here for checkup    Follow-up  Pertinent negatives include no abdominal pain, arthralgias, chest pain, chills, coughing, fever, headaches, sore throat or weakness.   Review of Systems   Constitutional:  Negative for chills and fever.   HENT:  Negative for sore throat and trouble swallowing.    Respiratory:  Negative for cough and shortness of breath.    Cardiovascular:  Negative for chest pain and leg swelling.   Gastrointestinal:  Negative for abdominal distention and abdominal pain.   Genitourinary:  Negative for dysuria and flank pain.   Musculoskeletal:  Negative for arthralgias and back pain.   Skin:  Negative for color change and pallor.   Neurological:  Negative for weakness and headaches.   Psychiatric/Behavioral:  Negative for agitation and confusion.    Past Medical History:   Diagnosis Date    Allergy history unknown     Arthritis     DDD    Back pain     Cataract     OU    Colon polyps 01/31/2019    Diabetes mellitus type II     GERD (gastroesophageal reflux disease)     Hypertension     LPRD (laryngopharyngeal reflux disease)     Nuclear sclerosis - Both Eyes 7/9/2013     Past Surgical History:   Procedure Laterality Date    COLONOSCOPY N/A 1/31/2019    Procedure: COLONOSCOPY;  Surgeon: Ronak Palacios MD;  Location: [facility];  Service: Endoscopy;  Laterality: N/A;     Objective:     Vitals:    05/24/23 1151   BP: 132/70   Pulse: 65   Resp: 18   Temp: 98.1 °F (36.7 °C)     Body mass index is 30.81 kg/m².  Physical Exam  Vitals and nursing note reviewed.   Constitutional:       Appearance: She is well-developed.   HENT:      Head: Normocephalic and atraumatic.   Eyes:      General: No scleral icterus.     Conjunctiva/sclera: Conjunctivae normal.   Cardiovascular:      Heart sounds: No murmur heard.  Pulmonary:      Effort: Pulmonary effort is normal. No respiratory distress.    Musculoskeletal:         General: No deformity. Normal range of motion.      Cervical back: Normal range of motion and neck supple.   Skin:     Coloration: Skin is not pale.      Findings: No rash.   Neurological:      Mental Status: She is alert and oriented to person, place, and time.   Psychiatric:         Behavior: Behavior normal.         Thought Content: Thought content normal.         Judgment: Judgment normal.     Assessment:     1. Type 2 diabetes mellitus with hyperglycemia, without long-term current use of insulin    2. Hyperlipidemia associated with type 2 diabetes mellitus    3. Hypertension associated with diabetes      Plan:   Type 2 diabetes mellitus with hyperglycemia, without long-term current use of insulin  -     Comprehensive Metabolic Panel; Future; Expected date: 05/24/2023  -     Hemoglobin A1C; Future; Expected date: 05/24/2023  -     Lipid Panel; Future; Expected date: 05/24/2023  -     Hemoglobin; Future; Expected date: 05/24/2023  -     WBC; Future; Expected date: 05/24/2023  -     Platelet Count; Future; Expected date: 05/24/2023  -     Calcitriol; Future; Expected date: 05/24/2023    Hyperlipidemia associated with type 2 diabetes mellitus  -     Comprehensive Metabolic Panel; Future; Expected date: 05/24/2023  -     Hemoglobin A1C; Future; Expected date: 05/24/2023  -     Lipid Panel; Future; Expected date: 05/24/2023  -     Hemoglobin; Future; Expected date: 05/24/2023  -     WBC; Future; Expected date: 05/24/2023  -     Platelet Count; Future; Expected date: 05/24/2023  -     Calcitriol; Future; Expected date: 05/24/2023    Hypertension associated with diabetes  -     Comprehensive Metabolic Panel; Future; Expected date: 05/24/2023  -     Hemoglobin A1C; Future; Expected date: 05/24/2023  -     Lipid Panel; Future; Expected date: 05/24/2023  -     Hemoglobin; Future; Expected date: 05/24/2023  -     WBC; Future; Expected date: 05/24/2023  -     Platelet Count; Future; Expected date:  05/24/2023  -     Calcitriol; Future; Expected date: 05/24/2023            Total time spent of Greater than 30 minutes minutes on the day of the visit.This includes face to face time and preparing to see the patient, obtaining and reviewing separately obtained history, documenting clinical information in the electronic or other health record, independently interpreting results and communicating results to the patient/family/caregiver, or care coordinator.    Established patient with me has been instructed that must see me at least 1 time yearly (every 365 days) for refills of medications. Seeing other providers in this clinic is fine but expectation is to see me yearly.    Anurag Nye MD  05/30/2023    Portions of this note have been dictated with ADELAIDA Guzman

## 2023-06-21 ENCOUNTER — PATIENT MESSAGE (OUTPATIENT)
Dept: ADMINISTRATIVE | Facility: HOSPITAL | Age: 68
End: 2023-06-21
Payer: COMMERCIAL

## 2023-07-30 ENCOUNTER — PATIENT MESSAGE (OUTPATIENT)
Dept: ADMINISTRATIVE | Facility: HOSPITAL | Age: 68
End: 2023-07-30
Payer: COMMERCIAL

## 2023-08-04 ENCOUNTER — TELEPHONE (OUTPATIENT)
Dept: FAMILY MEDICINE | Facility: CLINIC | Age: 68
End: 2023-08-04
Payer: COMMERCIAL

## 2023-08-04 NOTE — TELEPHONE ENCOUNTER
Spoke to pt states she is having discoloration in bilateral legs, no fever, no pain, no discomfort and mild swelling. Appt scheduled

## 2023-08-04 NOTE — TELEPHONE ENCOUNTER
----- Message from Asher Sullivan sent at 8/4/2023 12:47 PM CDT -----  Contact: pt  Type: Needs Medical Advice  Who Called: pt  Best Call Back Number: [phone]    Additional Information: Pt is calling the office needing an appt having leg issues.Please call back and advise.

## 2023-08-08 ENCOUNTER — OFFICE VISIT (OUTPATIENT)
Dept: FAMILY MEDICINE | Facility: CLINIC | Age: 68
End: 2023-08-08
Payer: COMMERCIAL

## 2023-08-08 ENCOUNTER — LAB VISIT (OUTPATIENT)
Dept: LAB | Facility: HOSPITAL | Age: 68
End: 2023-08-08
Attending: FAMILY MEDICINE
Payer: COMMERCIAL

## 2023-08-08 VITALS
HEIGHT: 68 IN | DIASTOLIC BLOOD PRESSURE: 66 MMHG | TEMPERATURE: 98 F | BODY MASS INDEX: 31.21 KG/M2 | OXYGEN SATURATION: 97 % | SYSTOLIC BLOOD PRESSURE: 136 MMHG | HEART RATE: 83 BPM | WEIGHT: 205.94 LBS

## 2023-08-08 DIAGNOSIS — G89.29 CHRONIC RIGHT SHOULDER PAIN: Primary | ICD-10-CM

## 2023-08-08 DIAGNOSIS — E78.5 HYPERLIPIDEMIA ASSOCIATED WITH TYPE 2 DIABETES MELLITUS: ICD-10-CM

## 2023-08-08 DIAGNOSIS — E11.69 HYPERLIPIDEMIA ASSOCIATED WITH TYPE 2 DIABETES MELLITUS: ICD-10-CM

## 2023-08-08 DIAGNOSIS — E11.59 HYPERTENSION ASSOCIATED WITH DIABETES: ICD-10-CM

## 2023-08-08 DIAGNOSIS — M25.511 CHRONIC RIGHT SHOULDER PAIN: Primary | ICD-10-CM

## 2023-08-08 DIAGNOSIS — E11.65 TYPE 2 DIABETES MELLITUS WITH HYPERGLYCEMIA, WITHOUT LONG-TERM CURRENT USE OF INSULIN: ICD-10-CM

## 2023-08-08 DIAGNOSIS — I15.2 HYPERTENSION ASSOCIATED WITH DIABETES: ICD-10-CM

## 2023-08-08 DIAGNOSIS — R09.81 SINUS CONGESTION: ICD-10-CM

## 2023-08-08 LAB
ALBUMIN SERPL BCP-MCNC: 3.8 G/DL (ref 3.5–5.2)
ALBUMIN SERPL BCP-MCNC: 3.8 G/DL (ref 3.5–5.2)
ALP SERPL-CCNC: 66 U/L (ref 55–135)
ALP SERPL-CCNC: 66 U/L (ref 55–135)
ALT SERPL W/O P-5'-P-CCNC: 20 U/L (ref 10–44)
ALT SERPL W/O P-5'-P-CCNC: 20 U/L (ref 10–44)
ANION GAP SERPL CALC-SCNC: 11 MMOL/L (ref 8–16)
ANION GAP SERPL CALC-SCNC: 11 MMOL/L (ref 8–16)
AST SERPL-CCNC: 22 U/L (ref 10–40)
AST SERPL-CCNC: 22 U/L (ref 10–40)
BILIRUB SERPL-MCNC: 0.8 MG/DL (ref 0.1–1)
BILIRUB SERPL-MCNC: 0.8 MG/DL (ref 0.1–1)
BUN SERPL-MCNC: 14 MG/DL (ref 8–23)
BUN SERPL-MCNC: 14 MG/DL (ref 8–23)
CALCIUM SERPL-MCNC: 10 MG/DL (ref 8.7–10.5)
CALCIUM SERPL-MCNC: 10 MG/DL (ref 8.7–10.5)
CHLORIDE SERPL-SCNC: 102 MMOL/L (ref 95–110)
CHLORIDE SERPL-SCNC: 102 MMOL/L (ref 95–110)
CHOLEST SERPL-MCNC: 192 MG/DL (ref 120–199)
CHOLEST/HDLC SERPL: 3.3 {RATIO} (ref 2–5)
CO2 SERPL-SCNC: 26 MMOL/L (ref 23–29)
CO2 SERPL-SCNC: 26 MMOL/L (ref 23–29)
CREAT SERPL-MCNC: 0.9 MG/DL (ref 0.5–1.4)
CREAT SERPL-MCNC: 0.9 MG/DL (ref 0.5–1.4)
EST. GFR  (NO RACE VARIABLE): >60 ML/MIN/1.73 M^2
EST. GFR  (NO RACE VARIABLE): >60 ML/MIN/1.73 M^2
ESTIMATED AVG GLUCOSE: 131 MG/DL (ref 68–131)
ESTIMATED AVG GLUCOSE: 131 MG/DL (ref 68–131)
GLUCOSE SERPL-MCNC: 86 MG/DL (ref 70–110)
GLUCOSE SERPL-MCNC: 86 MG/DL (ref 70–110)
HBA1C MFR BLD: 6.2 % (ref 4–5.6)
HBA1C MFR BLD: 6.2 % (ref 4–5.6)
HDLC SERPL-MCNC: 59 MG/DL (ref 40–75)
HDLC SERPL: 30.7 % (ref 20–50)
HGB BLD-MCNC: 11.2 G/DL (ref 12–16)
HGB BLD-MCNC: 11.2 G/DL (ref 12–16)
LDLC SERPL CALC-MCNC: 105.4 MG/DL (ref 63–159)
NONHDLC SERPL-MCNC: 133 MG/DL
PLATELET # BLD AUTO: 325 K/UL (ref 150–450)
PLATELET # BLD AUTO: 325 K/UL (ref 150–450)
PMV BLD AUTO: 9.6 FL (ref 9.2–12.9)
PMV BLD AUTO: 9.6 FL (ref 9.2–12.9)
POTASSIUM SERPL-SCNC: 4.2 MMOL/L (ref 3.5–5.1)
POTASSIUM SERPL-SCNC: 4.2 MMOL/L (ref 3.5–5.1)
PROT SERPL-MCNC: 8.4 G/DL (ref 6–8.4)
PROT SERPL-MCNC: 8.4 G/DL (ref 6–8.4)
SODIUM SERPL-SCNC: 139 MMOL/L (ref 136–145)
SODIUM SERPL-SCNC: 139 MMOL/L (ref 136–145)
TRIGL SERPL-MCNC: 138 MG/DL (ref 30–150)
WBC # BLD AUTO: 9.55 K/UL (ref 3.9–12.7)
WBC # BLD AUTO: 9.55 K/UL (ref 3.9–12.7)

## 2023-08-08 PROCEDURE — 3075F SYST BP GE 130 - 139MM HG: CPT | Mod: CPTII,S$GLB,, | Performed by: NURSE PRACTITIONER

## 2023-08-08 PROCEDURE — 1125F AMNT PAIN NOTED PAIN PRSNT: CPT | Mod: CPTII,S$GLB,, | Performed by: NURSE PRACTITIONER

## 2023-08-08 PROCEDURE — 99999 PR PBB SHADOW E&M-EST. PATIENT-LVL IV: CPT | Mod: PBBFAC,,, | Performed by: NURSE PRACTITIONER

## 2023-08-08 PROCEDURE — 3008F PR BODY MASS INDEX (BMI) DOCUMENTED: ICD-10-PCS | Mod: CPTII,S$GLB,, | Performed by: NURSE PRACTITIONER

## 2023-08-08 PROCEDURE — 1101F PR PT FALLS ASSESS DOC 0-1 FALLS W/OUT INJ PAST YR: ICD-10-PCS | Mod: CPTII,S$GLB,, | Performed by: NURSE PRACTITIONER

## 2023-08-08 PROCEDURE — 85049 AUTOMATED PLATELET COUNT: CPT | Performed by: FAMILY MEDICINE

## 2023-08-08 PROCEDURE — 99214 PR OFFICE/OUTPT VISIT, EST, LEVL IV, 30-39 MIN: ICD-10-PCS | Mod: S$GLB,,, | Performed by: NURSE PRACTITIONER

## 2023-08-08 PROCEDURE — 99999 PR PBB SHADOW E&M-EST. PATIENT-LVL IV: ICD-10-PCS | Mod: PBBFAC,,, | Performed by: NURSE PRACTITIONER

## 2023-08-08 PROCEDURE — 3075F PR MOST RECENT SYSTOLIC BLOOD PRESS GE 130-139MM HG: ICD-10-PCS | Mod: CPTII,S$GLB,, | Performed by: NURSE PRACTITIONER

## 2023-08-08 PROCEDURE — 3078F DIAST BP <80 MM HG: CPT | Mod: CPTII,S$GLB,, | Performed by: NURSE PRACTITIONER

## 2023-08-08 PROCEDURE — 85048 AUTOMATED LEUKOCYTE COUNT: CPT | Performed by: FAMILY MEDICINE

## 2023-08-08 PROCEDURE — 1159F PR MEDICATION LIST DOCUMENTED IN MEDICAL RECORD: ICD-10-PCS | Mod: CPTII,S$GLB,, | Performed by: NURSE PRACTITIONER

## 2023-08-08 PROCEDURE — 83036 HEMOGLOBIN GLYCOSYLATED A1C: CPT | Performed by: FAMILY MEDICINE

## 2023-08-08 PROCEDURE — 36415 COLL VENOUS BLD VENIPUNCTURE: CPT | Mod: PO | Performed by: FAMILY MEDICINE

## 2023-08-08 PROCEDURE — 1125F PR PAIN SEVERITY QUANTIFIED, PAIN PRESENT: ICD-10-PCS | Mod: CPTII,S$GLB,, | Performed by: NURSE PRACTITIONER

## 2023-08-08 PROCEDURE — 3288F FALL RISK ASSESSMENT DOCD: CPT | Mod: CPTII,S$GLB,, | Performed by: NURSE PRACTITIONER

## 2023-08-08 PROCEDURE — 99214 OFFICE O/P EST MOD 30 MIN: CPT | Mod: S$GLB,,, | Performed by: NURSE PRACTITIONER

## 2023-08-08 PROCEDURE — 85018 HEMOGLOBIN: CPT | Performed by: FAMILY MEDICINE

## 2023-08-08 PROCEDURE — 3008F BODY MASS INDEX DOCD: CPT | Mod: CPTII,S$GLB,, | Performed by: NURSE PRACTITIONER

## 2023-08-08 PROCEDURE — 82652 VIT D 1 25-DIHYDROXY: CPT | Performed by: FAMILY MEDICINE

## 2023-08-08 PROCEDURE — 1101F PT FALLS ASSESS-DOCD LE1/YR: CPT | Mod: CPTII,S$GLB,, | Performed by: NURSE PRACTITIONER

## 2023-08-08 PROCEDURE — 3078F PR MOST RECENT DIASTOLIC BLOOD PRESSURE < 80 MM HG: ICD-10-PCS | Mod: CPTII,S$GLB,, | Performed by: NURSE PRACTITIONER

## 2023-08-08 PROCEDURE — 1159F MED LIST DOCD IN RCRD: CPT | Mod: CPTII,S$GLB,, | Performed by: NURSE PRACTITIONER

## 2023-08-08 PROCEDURE — 3288F PR FALLS RISK ASSESSMENT DOCUMENTED: ICD-10-PCS | Mod: CPTII,S$GLB,, | Performed by: NURSE PRACTITIONER

## 2023-08-08 PROCEDURE — 80061 LIPID PANEL: CPT | Performed by: FAMILY MEDICINE

## 2023-08-08 PROCEDURE — 80053 COMPREHEN METABOLIC PANEL: CPT | Performed by: FAMILY MEDICINE

## 2023-08-08 RX ORDER — FLUTICASONE PROPIONATE 50 MCG
2 SPRAY, SUSPENSION (ML) NASAL DAILY
Qty: 16 G | Refills: 1 | Status: SHIPPED | OUTPATIENT
Start: 2023-08-08 | End: 2023-09-07

## 2023-08-08 RX ORDER — AZELASTINE 1 MG/ML
1 SPRAY, METERED NASAL 2 TIMES DAILY
Qty: 30 ML | Refills: 2 | Status: SHIPPED | OUTPATIENT
Start: 2023-08-08 | End: 2024-08-07

## 2023-08-08 NOTE — PROGRESS NOTES
This dictation has been generated using [employer] Fluency Dictation some phonetic errors may occur. Please contact author for clarification if needed.     Problem List Items Addressed This Visit    None  Visit Diagnoses       Chronic right shoulder pain    -  Primary    may 2023    Relevant Orders    Ambulatory referral/consult to Physical/Occupational Therapy    Sinus congestion                Orders Placed This Encounter    Ambulatory referral/consult to Physical/Occupational Therapy    azelastine (ASTELIN) 137 mcg (0.1 %) nasal spray    fluticasone propionate (FLONASE) 50 mcg/actuation nasal spray     Chronic right shoulder pain.  Suspect some rotator cuff involvement and tendinitis without tear of either.  Refer to PT.    Sinus congestion Astelin and Flonase refill.    No follow-ups on file.    ________________________________________________________________  ________________________________________________________________      Chief Complaint   Patient presents with    Arm Pain     History of present illness  This 67 y.o. presents today for complaint of right shoulder pain and sinus complaint.  Patient notes right shoulder pain for months.  Symptoms started back in May.  Denies history of injury.  Notes weakness in shoulder when lifting objects above shoulder level.  Denies any numbness.  No loss in range of motion with movement.  Patient indicates some supraspinatus pain and shoulder pain.  Does have a past medical history positive for arthritis and degenerative disc disease.      Past Medical History:   Diagnosis Date    Allergy history unknown     Arthritis     DDD    Back pain     Cataract     OU    Colon polyps 01/31/2019    Diabetes mellitus type II     GERD (gastroesophageal reflux disease)     Hypertension     LPRD (laryngopharyngeal reflux disease)     Nuclear sclerosis - Both Eyes 7/9/2013       Past Surgical History:   Procedure Laterality Date    COLONOSCOPY N/A 1/31/2019    Procedure: COLONOSCOPY;   Surgeon: Ronak Palacios MD;  Location: [facility];  Service: Endoscopy;  Laterality: N/A;       Family History   Problem Relation Age of Onset    ANNA disease Father     Heart disease Father     Heart disease Mother     Diabetes Mother     Blindness Mother     Cataracts Mother     Hypertension Mother     Cholelithiasis Sister     ANNA disease Sister     Blindness Maternal Aunt     Diabetes Maternal Aunt     Blindness Paternal Aunt     Stroke Paternal Grandmother     Glaucoma Paternal Grandmother     Cervical cancer Unknown         aunt    Retinal detachment Brother     Celiac disease Neg Hx     Cirrhosis Neg Hx     Colon cancer Neg Hx     Colon polyps Neg Hx     Crohn's disease Neg Hx     Cystic fibrosis Neg Hx     Esophageal cancer Neg Hx     Hemochromatosis Neg Hx     Inflammatory bowel disease Neg Hx     Irritable bowel syndrome Neg Hx     Liver cancer Neg Hx     Liver disease Neg Hx     Rectal cancer Neg Hx     Stomach cancer Neg Hx     Ulcerative colitis Neg Hx     Clive's disease Neg Hx     Melanoma Neg Hx     Amblyopia Neg Hx     Cancer Neg Hx     Macular degeneration Neg Hx     Strabismus Neg Hx     Thyroid disease Neg Hx     Psoriasis Neg Hx     Lupus Neg Hx     Eczema Neg Hx     Breast cancer Neg Hx     Ovarian cancer Neg Hx        Social History     Socioeconomic History    Marital status: Significant Other   Occupational History     Employer: [employer]   Tobacco Use    Smoking status: Never    Smokeless tobacco: Never   Substance and Sexual Activity    Alcohol use: Yes     Comment: occ    Drug use: No    Sexual activity: Not Currently     Partners: Male     Birth control/protection: Post-menopausal   Other Topics Concern    Are you pregnant or think you may be? No    Breast-feeding No   Social History Narrative    Lives with significant other.    No kids.    3 dogs and cat.    No exercise.    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     Social Determinants of Health     Stress: No Stress Concern Present  "(11/22/2019)    [employer] [place] [employer] - Occupational Stress Questionnaire     Feeling of Stress : Only a little       Current Outpatient Medications   Medication Sig Dispense Refill    amLODIPine (NORVASC) 10 MG tablet TAKE 1 TABLET ONCE DAILY 90 tablet 3    aspirin 81 MG Chew Take 81 mg by mouth once daily.      carvediloL (COREG) 12.5 MG tablet Take 1 tablet (12.5 mg total) by mouth once daily. 90 tablet 3    dulaglutide (TRULICITY) 1.5 mg/0.5 mL pen injector Inject 1.5 mg into the skin every 7 days. 12 pen 3    gabapentin (NEURONTIN) 300 MG capsule TAKE 1 CAPSULE 3 TIMES     DAILY AS NEEDED 90 capsule 11    glipiZIDE (GLUCOTROL) 10 MG tablet TAKE 1 TABLET TWICE A  tablet 0    JANUMET 50-1,000 mg per tablet TAKE 1 TABLET TWICE DAILY  WITH MEALS 180 tablet 0    ketoconazole (NIZORAL) 2 % shampoo Lather scalp, let sit 5 min before rinsing. Use 2-3 times per week. 360 mL 5    lancets Misc To check BG 1 times daily, to use with insurance preferred meter 100 each 11    multivitamin capsule Take 1 capsule by mouth once daily.      omeprazole (PRILOSEC) 20 MG capsule TAKE 2 CAPSULES TWICE DAILY 360 capsule 1    pen needle, diabetic (BD ULTRA-FINE ANTONY PEN NEEDLES) 32 gauge x 5/32" Ndle 1 Device by Misc.(Non-Drug; Combo Route) route 4 (four) times daily with meals and nightly. 100 each 11    rosuvastatin (CRESTOR) 5 MG tablet TAKE 1 TABLET DAILY 90 tablet 3    telmisartan-hydrochlorothiazide (MICARDIS HCT) 80-25 mg per tablet TAKE 1 TABLET ONCE DAILY 90 tablet 3    azelastine (ASTELIN) 137 mcg (0.1 %) nasal spray 1 spray (137 mcg total) by Nasal route 2 (two) times daily. 30 mL 2    fluticasone propionate (FLONASE) 50 mcg/actuation nasal spray 2 sprays (100 mcg total) by Each Nostril route once daily. 16 g 1     Current Facility-Administered Medications   Medication Dose Route Frequency Provider Last Rate Last Admin    methylPREDNISolone acetate injection 40 mg  40 mg Intramuscular Once Alexandro, " Chaitanya MURPHY MD           Review of patient's allergies indicates:   Allergen Reactions    Lipitor [atorvastatin] Other (See Comments)     Muscle pain    Penicillins Other (See Comments)     Unknown reaction       Physical examination  Vitals Reviewed\  Vitals:    08/08/23 0959   BP: 136/66   Pulse: 83   Temp: 97.7 °F (36.5 °C)     Body mass index is 31.31 kg/m².   Gen. Well-dressed well-nourished   Skin warm dry and intact.  No rashes noted.  Chest.  Respirations are even unlabored.    Neuro. Awake alert oriented x4.  Normal judgment and cognition noted.  Extremities no clubbing cyanosis or edema noted.  Tenderness with palpation of the supraspinatus muscle without evidence of tear.  Tenderness in the infraspinatus and teres minor muscles.  No biceps groove tenderness.  Patient able to adduct abduct and rotate internal and external against resistance.  Cross shoulder reach negative.  Horn blower is negative.  Able to place the back of the hand at the small of the back and strength noted with lift-off.    Call or return to clinic prn if these symptoms worsen or fail to improve as anticipated.

## 2023-08-11 LAB — 1,25(OH)2D3 SERPL-MCNC: 23 PG/ML (ref 20–79)

## 2023-08-18 ENCOUNTER — CLINICAL SUPPORT (OUTPATIENT)
Dept: REHABILITATION | Facility: HOSPITAL | Age: 68
End: 2023-08-18
Payer: COMMERCIAL

## 2023-08-18 DIAGNOSIS — M25.511 CHRONIC RIGHT SHOULDER PAIN: ICD-10-CM

## 2023-08-18 DIAGNOSIS — R68.89 ACTIVITY INTOLERANCE: Primary | ICD-10-CM

## 2023-08-18 DIAGNOSIS — M25.611 DECREASED ROM OF RIGHT SHOULDER: ICD-10-CM

## 2023-08-18 DIAGNOSIS — R29.898 WEAKNESS OF SHOULDER: ICD-10-CM

## 2023-08-18 DIAGNOSIS — G89.29 CHRONIC RIGHT SHOULDER PAIN: ICD-10-CM

## 2023-08-18 PROCEDURE — 97162 PT EVAL MOD COMPLEX 30 MIN: CPT | Mod: PN

## 2023-08-18 PROCEDURE — 97140 MANUAL THERAPY 1/> REGIONS: CPT | Mod: PN

## 2023-08-18 NOTE — PLAN OF CARE
"[facility]   Physical Therapy Initial Evaluation      Name: Yanci Coats  Clinic Number: [id number]    Therapy Diagnosis:   Encounter Diagnoses   Name Primary?    Activity intolerance Yes    Decreased ROM of right shoulder     Weakness of shoulder     Chronic right shoulder pain         Physician: Enrique Bear, NP    Physician Orders: PT Eval and Treat shoulder  Medical Diagnosis from Referral: Chronic right shoulder pain   Evaluation Date: 8/18/2023  Authorization Period Expiration: 08/07/2024   Plan of Care Expiration: 09/29/2023  Progress Note Due: 09/20/2023  Visit # / Visits authorized: 1/ 1   FOTO: 1/ 3   Next survey due week of 8/28/2023    Precautions: Diabetes     Time In: 0913  Time Out: 1002  Total Billable Time: 45 minutes    Subjective     Date of onset: 5/2023    History of current condition - Yanci reports: she was in her usual state of health until 5/2023 when she developed insidious onset R shoulder pain.  She relates onset to excessive use (on vacation, lifting luggage; working around the house-gardening). Symptoms have improved minimally since onset but could be due to restricted activity level. Went to MD recently with these complaints.  No imaging performed.  Referred to PT.      Falls: No    Imaging: none:     Prior Therapy: Yes for her back  Social History:  lives with their family in a 1.5 story home with steps to access upper level.  Does not have to frequently access upper level.    Occupation: Retired.   Prior Level of Function: Was limited from lifting "heavy things". Otherwise no restrictions   Current Level of Function: Difficulty "putting things overhead", donning blouse (pull over worse than button up), intermittent increase in pain with driving, sleep is interrupted as she is not able to sleep on her R side (preferred), Patient is right handed.      Pain:  Current 1/10, worst 6/10, best 1/10   Location: right posterior shoulder, lesser so in " anterior R shoulder and extends into lateral deltoid   Description: intermittent stabbing  Aggravating Factors: reaching overhead, reaching into back seat of car, lifting.   Easing Factors: activity modification.    Patients goals: eliminating the pain.      Medical History:   Past Medical History:   Diagnosis Date    Allergy history unknown     Arthritis     DDD    Back pain     Cataract     OU    Colon polyps 01/31/2019    Diabetes mellitus type II     GERD (gastroesophageal reflux disease)     Hypertension     LPRD (laryngopharyngeal reflux disease)     Nuclear sclerosis - Both Eyes 7/9/2013       Surgical History:   Yanci Coats  has a past surgical history that includes Colonoscopy (N/A, 1/31/2019).    Medications:   Yanci has a current medication list which includes the following prescription(s): amlodipine, aspirin, azelastine, carvedilol, trulicity, fluticasone propionate, gabapentin, glipizide, janumet, ketoconazole, lancets, multivitamin, omeprazole, pen needle, diabetic, rosuvastatin, and telmisartan-hydrochlorothiazide, and the following Facility-Administered Medications: methylprednisolone acetate.    Allergies:   Review of patient's allergies indicates:   Allergen Reactions    Lipitor [atorvastatin] Other (See Comments)     Muscle pain    Penicillins Other (See Comments)     Unknown reaction        Objective      Posture: Standing: pelvis and shoulders level, minimal flattening of thoracic kyphosis, increased Dowenger's hump noted. Sitting: decreased lumbar lordosis, increased thoracic kyphosis, minimal to moderate rounded shoulder posture, moderate forward head posture.   Palpation: Tenderness present R suboccipitals, B upper trapezii, R infraspinatus and teres major, R lateral deltoid and posterior deltoid. Tenderness is present in similar areas on L but is mild vs moderate on R.  Increased muscle tension noted throughout B shoulder girdles with most notable being B upper trapezii.     Sensation: No deficits reported this date.   DTRs: n/a  Range of Motion/Strength:    Shoulder  Right   Left  Pain/Dysfunction with Movement    AROM PROM MMT AROM PROM MMT    flexion  140*  155*  4/5  165*  170*  4+/5  R slow to return to neutral   extension  75*  80  4+/5  80*  85*  4+/5    abduction  140*  170*  4-/5 P  140*  170*  4+/5    adduction  35*  42*  4+/5  35*  47*  4+/5    Internal rotation  T9   N/T  4+/5  T7  N/T  4+/5    ER at 90° abd  80*  90*  4-/5  85*  95*  4+/5  Guarding at end range on R   ER at 0° abd  50*  56*  4-/5 P  75*  82*  4+/5  R painful @ end range   Otherwise B UE grossly WNL and strength is 4+/5    Flexibility: Moderate tightness B upper trapezii, B levator scapulae, B pectoralis muscles.    Gait: Without AD  Analysis: No deficits noted this date.   Bed Mobility:Independent but with guarding of L UE  Transfers: Independent  Special Tests: Empty can test (+) for mild pain and mild weakness on R; drop arm (-), Apprehension (-)  Other: N/A     Intake Outcome Measure for FOTO Shoulder Survey    Therapist reviewed FOTO scores for Yanci Coats on 8/18/2023.   FOTO report - see Media section or FOTO account episode details.    Intake Score: 55%    Primary Measure    Score Range     Intake Score       Score Interpretation    DASH                   100 - 0               32.5                    Higher Score = Greater Disability         Treatment     Total Treatment time (time-based codes) separate from Evaluation: 10 minutes     Yanci received the treatments listed below:      manual therapy techniques: Joint mobilizations and Soft tissue Mobilization were applied to the: R shoulder for 10 minutes, including:   Scapular scouring in prone and supine   Grade II superior to inferior and anterior to posterior glide   Grade II rotation to R AC joint    Patient Education and Home Exercises     Education provided:   - Discussed role of PT and proposed POC.      Written Home Exercises  Provided:  To be issued during subsequent visits . Exercises were reviewed and Yanci was able to demonstrate them prior to the end of the session.  Yanci demonstrated good  understanding of the education provided. See EMR under Patient Instructions for exercises provided during therapy sessions.    Assessment     Yanci is a 67 y.o. female referred to outpatient Physical Therapy with a medical diagnosis of Chronic R shoulder pain and therapeutic diagnoses of limited shoulder ROM, shoulder weakness and activity intolerance. Patient presents with intermittent R shoulder pain, impaired posture, limited shoulder ROM, impaired upper quadrant flexibility, impaired posture, and increased tenderness/muscle tension.  These problems contribute to impaired functional use of R UE for self care/ADLs, reaching and lifting as well as interrupts sleep.  She will benefit from skilled PT services to address these problems in order to maximize functional use of R UE and to improve activity tolerance.      Patient prognosis is Good.   Patient will benefit from skilled outpatient Physical Therapy to address the deficits stated above and in the chart below, provide patient /family education, and to maximize patientt's level of independence.     Plan of care discussed with patient: Yes  Patient's spiritual, cultural and educational needs considered and patient is agreeable to the plan of care and goals as stated below:     Anticipated Barriers for therapy: None    Medical Necessity is demonstrated by the following  History  Co-morbidities and personal factors that may impact the plan of care [] LOW: no personal factors / co-morbidities  [x] MODERATE: 1-2 personal factors / co-morbidities  [] HIGH: 3+ personal factors / co-morbidities    Moderate / High Support Documentation:   Co-morbidities affecting plan of care: Arthritis, HTN    Personal Factors:   no deficits     Examination  Body Structures and Functions, activity limitations  and participation restrictions that may impact the plan of care [] LOW: addressing 1-2 elements  [x] MODERATE: 3+ elements  [] HIGH: 4+ elements (please support below)    Moderate / High Support Documentation: ROM/strength, self care/ADLs, driving     Clinical Presentation [] LOW: stable  [x] MODERATE: Evolving  [] HIGH: Unstable     Decision Making/ Complexity Score: moderate       Goals:  Short Term Goals: 3 weeks   1) Facilitate normalized joint play of R GH joint  2) Facilitate improved upper quadrant flexibility  3) Patient will lalo pull-over shirt with no more than minimal discomfort 8 of 10 trials  4) Patient will consistently drive without increased shoulder discomfort    Long Term Goals: 6 weeks   1) Patient will resume sleeping in R side lying without increased shoulder pain  2) Patient will consistently reach into overhead cabinet to retrieve/put away dishes using R UE without assisting with L UE or increased pain  3) Patient will lift at least 20# using B UE without R shoulder pain at least 7 of 10 trials  4) Patient will consistently reach into back seat of car without increased pain  5) Improve functional score to > 75.  6) Patient will be independent in HEP    Plan     Plan of care Certification: 8/18/2023 to 9/29/2023.    Outpatient Physical Therapy 2 times weekly for 6 weeks to include the following interventions: Electrical Stimulation IFC/TENS, Manual Therapy, Moist Heat/ Ice, Patient Education, Therapeutic Exercise, and Functional Dry Needling .     Nery Guerra PT        Physician's Signature: _________________________________________ Date: ________________

## 2023-08-22 ENCOUNTER — CLINICAL SUPPORT (OUTPATIENT)
Dept: REHABILITATION | Facility: HOSPITAL | Age: 68
End: 2023-08-22
Payer: COMMERCIAL

## 2023-08-22 DIAGNOSIS — G89.29 CHRONIC RIGHT SHOULDER PAIN: ICD-10-CM

## 2023-08-22 DIAGNOSIS — R29.898 WEAKNESS OF SHOULDER: ICD-10-CM

## 2023-08-22 DIAGNOSIS — M25.611 DECREASED ROM OF RIGHT SHOULDER: ICD-10-CM

## 2023-08-22 DIAGNOSIS — M25.511 CHRONIC RIGHT SHOULDER PAIN: ICD-10-CM

## 2023-08-22 DIAGNOSIS — R68.89 ACTIVITY INTOLERANCE: Primary | ICD-10-CM

## 2023-08-22 PROCEDURE — 97110 THERAPEUTIC EXERCISES: CPT | Mod: PN,CQ

## 2023-08-22 PROCEDURE — 97140 MANUAL THERAPY 1/> REGIONS: CPT | Mod: PN,CQ

## 2023-08-22 NOTE — PROGRESS NOTES
"[facility]   Physical Therapy Treatment Note     Name: Yanci Coats  Clinic Number: [id number]    Therapy Diagnosis:   Encounter Diagnoses   Name Primary?    Activity intolerance Yes    Decreased ROM of right shoulder     Weakness of shoulder     Chronic right shoulder pain      Physician: Enrique Bear NP    Visit Date: 8/22/2023    Physician Orders: PT Eval and Treat shoulder  Medical Diagnosis from Referral: Chronic right shoulder pain   Evaluation Date: 8/18/2023  Authorization Period Expiration: 08/07/2024   Plan of Care Expiration: 09/29/2023  Progress Note Due: 09/20/2023  Visit # / Visits authorized: 1/ 20 (2 total)   FOTO: 1/ 3              Next survey due week of 8/28/2023     Precautions: Diabetes     PTA Visit #: 1/5     Time In: 1240  Time Out: 1320 (pt requested limited Rx)  Total Billable Time: 40 minutes    SUBJECTIVE     Pt reports: "I feel it, but it is not bad."  She was not compliant with home exercise program because one has not been provided at this time.  Response to previous treatment: "I felt better afterwards, but then it started hurting more. I don't know if that is because it felt better that I was using it more or what."  Functional change: N/A at this time    Pain: 2/10  Location: right shoulder "into the hand"    OBJECTIVE     Objective Measures updated at progress report unless specified.     Treatment     Yanci received the treatments listed below:      therapeutic exercises to develop strength, endurance, ROM, flexibility, and posture for 32 minutes including:  Seated upper trapezius stretches 3x30s ea   Levator scapulae stretches 3x30s ea  Standing pectoral stretch 3x30s   Sleeper stretch 10x5s   Red theraband exercises x10 ea    Rows with scapular retractions    B shoulder extension with scapular retractions    R shoulder adduction    R shoulder internal rotation    R shoulder external rotation    To be added:   Resisted right shoulder " abduction; Therabar bilateral shoulder flexion &  press    manual therapy techniques: Joint mobilizations and Soft tissue Mobilization were applied to the: Right shoulder for 8 minutes, including:  STM: Strumming to deltoids    Scapular scouring in prone and supine              Grade II superior to inferior and anterior to posterior glide   Grade III GH jt distraction              Grade II rotation to R AC joint    Patient Education and Home Exercises     Home Exercises Provided and Patient Education Provided     Education provided:   - Postural importance was reviewed with the patient. Rehabilitation process reviewed with the patient in regards to current diagnosis. Patient was instructed in initial therapeutic treatment program listed above.    Written Home Exercises Provided:  The patient is to be provided formal written home exercise program in subsequent session(s) . Exercises were reviewed and Yanci was able to demonstrate them prior to the end of the session.  Yanci demonstrated fair  understanding of the education provided. See EMR under Patient Instructions for exercises provided during therapy sessions    ASSESSMENT     The patient reported to physical therapy stating minimal pain in affected upper extremity. Yanci Coats was instructed initial therapeutic treatment program, focusing on flexibility, shoulder range of motion, strength, endurance, and glenohumeral joint space to reduce impingement symptoms in order to promote increased function. Patient requests shortened treatment on today's date. Jorge L reports she would utilize heating pad or ice at home as needed post.    Yanci Is slowly progressing towards her goals.   Pt prognosis is Good.     Pt will continue to benefit from skilled outpatient physical therapy to address the deficits listed in the problem list box on initial evaluation, provide pt/family education and to maximize pt's level of independence in the home and community  environment.     Pt's spiritual, cultural and educational needs considered and pt agreeable to plan of care and goals.     Anticipated barriers to physical therapy: None    Goals:  Short Term Goals: 3 weeks   1) Facilitate normalized joint play of R GH joint Initiated  2) Facilitate improved upper quadrant flexibility Initiated  3) Patient will lalo pull-over shirt with no more than minimal discomfort 8 of 10 trials Ongoing  4) Patient will consistently drive without increased shoulder discomfort Ongoing     Long Term Goals: 6 weeks   1) Patient will resume sleeping in R side lying without increased shoulder pain Ongoing  2) Patient will consistently reach into overhead cabinet to retrieve/put away dishes using R UE without assisting with L UE or increased pain Ongoing  3) Patient will lift at least 20# using B UE without R shoulder pain at least 7 of 10 trials Ongoing  4) Patient will consistently reach into back seat of car without increased pain Ongoing  5) Improve functional score to > 75% Ongoing  6) Patient will be independent in HEP Ongoing    PLAN     POC: 2 times weekly for 6 weeks to include the following interventions: Electrical Stimulation IFC/TENS, Manual Therapy, Moist Heat/ Ice, Patient Education, Therapeutic Exercise, and Functional Dry Needling .    The patient is to be progressed within the established plan of care as tolerated in order to accomplish goals as stated above and increase function. Plan to incorporate resisted shoulder abduction to promote increased strength and dropping of glenohumeral head in joint, as well as adding Therabar exercises for increased strength and range of motion. Will provide formal written home exercise program in subsequent session(s).    Ирина Jimenez, PTA

## 2023-08-31 ENCOUNTER — PATIENT MESSAGE (OUTPATIENT)
Dept: ADMINISTRATIVE | Facility: HOSPITAL | Age: 68
End: 2023-08-31
Payer: COMMERCIAL

## 2023-08-31 ENCOUNTER — CLINICAL SUPPORT (OUTPATIENT)
Dept: REHABILITATION | Facility: HOSPITAL | Age: 68
End: 2023-08-31
Payer: COMMERCIAL

## 2023-08-31 DIAGNOSIS — R29.898 WEAKNESS OF SHOULDER: ICD-10-CM

## 2023-08-31 DIAGNOSIS — M25.511 CHRONIC RIGHT SHOULDER PAIN: ICD-10-CM

## 2023-08-31 DIAGNOSIS — R68.89 ACTIVITY INTOLERANCE: Primary | ICD-10-CM

## 2023-08-31 DIAGNOSIS — G89.29 CHRONIC RIGHT SHOULDER PAIN: ICD-10-CM

## 2023-08-31 DIAGNOSIS — M25.611 DECREASED ROM OF RIGHT SHOULDER: ICD-10-CM

## 2023-08-31 PROCEDURE — 97140 MANUAL THERAPY 1/> REGIONS: CPT | Mod: PN,CQ

## 2023-08-31 PROCEDURE — 97110 THERAPEUTIC EXERCISES: CPT | Mod: PN,CQ

## 2023-08-31 NOTE — PROGRESS NOTES
"[facility]   Physical Therapy Treatment Note     Name: Yanci Coats  Clinic Number: [id number]    Therapy Diagnosis:   Encounter Diagnoses   Name Primary?    Activity intolerance Yes    Decreased ROM of right shoulder     Weakness of shoulder     Chronic right shoulder pain      Physician: Anurag Nye MD    Visit Date: 8/31/2023    Physician Orders: PT Eval and Treat shoulder  Medical Diagnosis from Referral: Chronic right shoulder pain   Evaluation Date: 8/18/2023  Authorization Period Expiration: 08/07/2024   Plan of Care Expiration: 09/29/2023  Progress Note Due: 09/20/2023  Visit # / Visits authorized: 2/ 20 (3 total)   FOTO: 1/ 3              Next survey due session 5     Precautions: Diabetes     PTA Visit #: 2/5     Time In: 0903  Time Out: 0952  Total Billable Time: 49 minutes    SUBJECTIVE     Pt reports: "I feel okay this morning; it isn't bad."  She was not compliant with home exercise program because one has not been provided at this time.  Response to previous treatment: "It felt about the same pain wise in the shoulder, but I had slight pain into the arm and tingling/numbness in the arm and hand."  Functional change: N/A at this time    Pain: 2/10  Location: right shoulder "into the hand"    OBJECTIVE     Objective Measures updated at progress report unless specified.     Treatment     Yanci received the treatments listed below:      therapeutic exercises to develop strength, endurance, ROM, flexibility, and posture for 40 minutes including:  UBE lvl1.5 x6' (reversing midway)  Seated upper trapezius stretches 3x30s ea   Levator scapulae stretches 3x30s ea  Standing pectoral stretch 3x30s   Sleeper stretch 10x5s   B shoulder flexion using 1# Therabar x10     press x10   Red theraband exercises x15 ea    Rows with scapular retractions    B shoulder extension with scapular retractions    R shoulder adduction    R shoulder internal rotation    R shoulder " external rotation    R shoulder abduction to 45*    To be added:   Prone shoulder AROM; KT tape for GH positioning; Review positioning for laying in R side-lying; Nerve glides as needed    manual therapy techniques: Joint mobilizations and Soft tissue Mobilization were applied to the: Right shoulder for 9 minutes, including:  STM: Strumming to deltoids    Scapular scouring in prone and supine              Grade II-III superior to inferior and anterior to posterior glide   Grade III GH jt distraction              Grade II rotation to R AC joint    Patient Education and Home Exercises     Home Exercises Provided and Patient Education Provided    Education provided:   - The patient was instructed in additional therapeutic exercise as stated above in bold print. Patient was advised to be mindfully aware of where she experiences radiculopathy symptoms, so that it can be addressed.    Written Home Exercises Provided: yes. Exercises were reviewed and Yanci was able to demonstrate them prior to the end of the session.  Yanci demonstrated good  understanding of the education provided. See EMR under Patient Instructions for exercises provided during therapy sessions    ASSESSMENT     The patient reported to physical therapy with continued pain reported in affected upper extremity and additional report of radicular symptoms, but the patient was not able to recall which area of the fore-arm and hand she experienced symptoms in. This is why nerve glides were not demonstrated yet on this date. Yanci Coats was progressed with increased strengthening as stated above. Following UBE the patient reported pain in affected upper extremity down radial side that shortly subsided. Jorge L continues to demonstrate trigger-point in bicep, but she was able to tolerate joint mobilizations better today sings of pain/discomfort. Dyskinesia was noted with retraction, during scapular scouring. The patient deferred modalities post, stating  she would utilize at home if needed.    Yanci Is slowly progressing towards her goals.   Pt prognosis is Good.     Pt will continue to benefit from skilled outpatient physical therapy to address the deficits listed in the problem list box on initial evaluation, provide pt/family education and to maximize pt's level of independence in the home and community environment.     Pt's spiritual, cultural and educational needs considered and pt agreeable to plan of care and goals.     Anticipated barriers to physical therapy: None    Goals:  Short Term Goals: 3 weeks   1) Facilitate normalized joint play of R GH joint Gradually progressing  2) Facilitate improved upper quadrant flexibility Slow progress  3) Patient will lalo pull-over shirt with no more than minimal discomfort 8 of 10 trials Minimal progress  4) Patient will consistently drive without increased shoulder discomfort Minimal progress     Long Term Goals: 6 weeks   1) Patient will resume sleeping in R side lying without increased shoulder pain Ongoing  2) Patient will consistently reach into overhead cabinet to retrieve/put away dishes using R UE without assisting with L UE or increased pain Initiated  3) Patient will lift at least 20# using B UE without R shoulder pain at least 7 of 10 trials Ongoing  4) Patient will consistently reach into back seat of car without increased pain Ongoing  5) Improve functional score to > 75% Ongoing  6) Patient will be independent in HEP Initiated    PLAN     POC: 2 times weekly for 6 weeks to include the following interventions: Electrical Stimulation IFC/TENS, Manual Therapy, Moist Heat/ Ice, Patient Education, Therapeutic Exercise, and Functional Dry Needling .    The patient is to be progressed within the established plan of care as tolerated in order to accomplish goals as stated above and increase function. Plan to incorporate prone shoulder range of motion in subsequent session, and review positioning for right  side-lying to reduce symptoms. Can incorporate nerve glides and kinesiology tape for glenohumeral positioning as needed.    Ирина Jimenez, PTA

## 2023-09-01 NOTE — TELEPHONE ENCOUNTER
----- Message from Mateo Bassett sent at 9/14/2022  7:27 AM CDT -----  Type: Needs Medical Advice  Who Called: Pt   Symptoms (please be specific):   How long has patient had these symptoms:    Pharmacy name and phone #:    Best Call Back Number: [phone]  Additional Information: Pt requesting a call back concerning if she can have a flu shot on her appt date with Dr Nye.       
Left message for patient to return call to clinic    
1 person assist

## 2023-09-06 ENCOUNTER — CLINICAL SUPPORT (OUTPATIENT)
Dept: REHABILITATION | Facility: HOSPITAL | Age: 68
End: 2023-09-06
Payer: COMMERCIAL

## 2023-09-06 DIAGNOSIS — R29.898 WEAKNESS OF SHOULDER: ICD-10-CM

## 2023-09-06 DIAGNOSIS — M25.511 CHRONIC RIGHT SHOULDER PAIN: ICD-10-CM

## 2023-09-06 DIAGNOSIS — R68.89 ACTIVITY INTOLERANCE: Primary | ICD-10-CM

## 2023-09-06 DIAGNOSIS — M25.611 DECREASED ROM OF RIGHT SHOULDER: ICD-10-CM

## 2023-09-06 DIAGNOSIS — G89.29 CHRONIC RIGHT SHOULDER PAIN: ICD-10-CM

## 2023-09-06 PROCEDURE — 97140 MANUAL THERAPY 1/> REGIONS: CPT | Mod: PN,CQ

## 2023-09-06 PROCEDURE — 97110 THERAPEUTIC EXERCISES: CPT | Mod: PN,CQ

## 2023-09-06 NOTE — PROGRESS NOTES
"[facility]   Physical Therapy Treatment Note     Name: Yanci Coats  Clinic Number: [id number]    Therapy Diagnosis:   Encounter Diagnoses   Name Primary?    Activity intolerance Yes    Decreased ROM of right shoulder     Weakness of shoulder     Chronic right shoulder pain      Physician: Anurag Nye MD    Visit Date: 9/6/2023    Physician Orders: PT Eval and Treat shoulder  Medical Diagnosis from Referral: Chronic right shoulder pain   Evaluation Date: 8/18/2023  Authorization Period Expiration: 08/07/2024   Plan of Care Expiration: 09/29/2023  Progress Note Due: 09/20/2023  Visit # / Visits authorized: 3/ 20 (4 total)   FOTO: 1/ 3              Next survey due session 5     Precautions: Diabetes     PTA Visit #: 3/5     Time In: 0903  Time Out: 0959  Total Billable Time: 56 minutes    SUBJECTIVE     Pt reports: "I actually feel better. I was surprised I hurt for a couple of days after last time, but then it got better. I did use some Bengay to help with the soreness. I have had minimal tingling in the pinky and ring finger since."  She was somewhat compliant with home exercise program.  Response to previous treatment: "I hurt for a couple of days, but then it got better."  Functional change: N/A at this time    Pain: 0/10  Location: right shoulder "into the hand"    OBJECTIVE     Objective Measures updated at progress report unless specified.     Treatment     Yanci received the treatments listed below:      therapeutic exercises to develop strength, endurance, ROM, flexibility, and posture for 46 minutes including:  UBE lvl1 x8' (reversing midway)  Seated upper trapezius stretches 3x30s ea   Levator scapulae stretches 3x30s ea  Standing pectoral stretch 3x30s   Sleeper stretch 10x5s   B shoulder flexion using 1# Therabar x10     press x10   Red theraband exercises x15 ea    Rows with scapular retractions    B shoulder extension with scapular retractions    R " shoulder adduction    R shoulder internal rotation    R shoulder external rotation    R shoulder abduction to 45*  Prone R shoulder AROM x10 ea   Flexion   Abduction   Extension    *reviewed Ulnar nerve glides/Stick'em ups*    To be added:   KT tape for GH positioning; Review positioning for laying in R side-lying    manual therapy techniques: Joint mobilizations and Soft tissue Mobilization were applied to the: Right shoulder for 10 minutes, including:  STM: Strumming to deltoids, Upper and Middle trapezius    Scapular scouring in prone and supine              Grade II-III superior to inferior and anterior to posterior glide   Grade III GH jt distraction              Grade II rotation to R AC joint    Patient Education and Home Exercises     Home Exercises Provided and Patient Education Provided    Education provided:   - The patient was instructed in additional therapeutic exercise and reviewed nerve glides as needed to address radiculopathy symptoms.    Written Home Exercises Provided: Patient instructed to cont prior HEP. Exercises were reviewed and Yanci was able to demonstrate them prior to the end of the session.  Yanci demonstrated good  understanding of the education provided. See EMR under Patient Instructions for exercises provided during therapy sessions.    ASSESSMENT     The patient reported to physical therapy with ceased pain, after experiencing increased soreness following previous session. Patient reports reduced radicular symptoms only in right upper extremity, pointing to ulnar nerve. Nerve glides were reviewed with the patient to address symptoms as needed. No radicular symptoms reported during treatment. Yanci Coats was progressed with additional therapeutic exercise focusing on range of motion, scapulothoracic rhythm, and glenohumeral joint decompression. The patient demonstrates discomfort with anterior to posterior joint mobilizations, and experiences trigger-point in posterior upper  and middle trapezii. Crepitus and slight dyskinesia noted with scapular scouring.    Yanci Is slowly progressing towards her goals.   Pt prognosis is Good.     Pt will continue to benefit from skilled outpatient physical therapy to address the deficits listed in the problem list box on initial evaluation, provide pt/family education and to maximize pt's level of independence in the home and community environment.     Pt's spiritual, cultural and educational needs considered and pt agreeable to plan of care and goals.     Anticipated barriers to physical therapy: None    Goals:  Short Term Goals: 3 weeks   1) Facilitate normalized joint play of R GH joint Slow progressing  2) Facilitate improved upper quadrant flexibility Slow progress  3) Patient will lalo pull-over shirt with no more than minimal discomfort 8 of 10 trials Slow progress  4) Patient will consistently drive without increased shoulder discomfort Slow progress     Long Term Goals: 6 weeks   1) Patient will resume sleeping in R side lying without increased shoulder pain Ongoing  2) Patient will consistently reach into overhead cabinet to retrieve/put away dishes using R UE without assisting with L UE or increased pain Slow progress  3) Patient will lift at least 20# using B UE without R shoulder pain at least 7 of 10 trials Ongoing  4) Patient will consistently reach into back seat of car without increased pain Ongoing  5) Improve functional score to > 75% Ongoing  6) Patient will be independent in HEP Gradually progressing    PLAN     POC: 2 times weekly for 6 weeks to include the following interventions: Electrical Stimulation IFC/TENS, Manual Therapy, Moist Heat/ Ice, Patient Education, Therapeutic Exercise, and Functional Dry Needling .    The patient is to be progressed within the established plan of care as tolerated in order to accomplish goals as stated above and increase function. Plan to review positioning for right side-lying to reduce  symptoms to allow for increased . Can incorporate kinesiology tape for glenohumeral positioning as needed.    Ирина Jimenez, PTA

## 2023-09-08 ENCOUNTER — TELEPHONE (OUTPATIENT)
Dept: OPTOMETRY | Facility: CLINIC | Age: 68
End: 2023-09-08
Payer: COMMERCIAL

## 2023-09-08 DIAGNOSIS — E11.65 TYPE 2 DIABETES MELLITUS WITH HYPERGLYCEMIA, WITHOUT LONG-TERM CURRENT USE OF INSULIN: ICD-10-CM

## 2023-09-08 DIAGNOSIS — E11.59 HYPERTENSION ASSOCIATED WITH DIABETES: ICD-10-CM

## 2023-09-08 DIAGNOSIS — I15.2 HYPERTENSION ASSOCIATED WITH DIABETES: ICD-10-CM

## 2023-09-08 DIAGNOSIS — E78.5 HYPERLIPIDEMIA ASSOCIATED WITH TYPE 2 DIABETES MELLITUS: ICD-10-CM

## 2023-09-08 DIAGNOSIS — E11.69 HYPERLIPIDEMIA ASSOCIATED WITH TYPE 2 DIABETES MELLITUS: ICD-10-CM

## 2023-09-08 RX ORDER — TELMISARTAN AND HYDROCHLORTHIAZIDE 80; 25 MG/1; MG/1
1 TABLET ORAL DAILY
Qty: 90 TABLET | Refills: 3 | Status: SHIPPED | OUTPATIENT
Start: 2023-09-08

## 2023-09-08 RX ORDER — ROSUVASTATIN CALCIUM 5 MG/1
5 TABLET, COATED ORAL DAILY
Qty: 90 TABLET | Refills: 3 | Status: SHIPPED | OUTPATIENT
Start: 2023-09-08

## 2023-09-08 RX ORDER — CARVEDILOL 12.5 MG/1
12.5 TABLET ORAL DAILY
Qty: 90 TABLET | Refills: 3 | Status: SHIPPED | OUTPATIENT
Start: 2023-09-08 | End: 2024-01-19 | Stop reason: SDUPTHER

## 2023-09-08 RX ORDER — DULAGLUTIDE 1.5 MG/.5ML
1.5 INJECTION, SOLUTION SUBCUTANEOUS
Qty: 12 PEN | Refills: 3 | Status: SHIPPED | OUTPATIENT
Start: 2023-09-08 | End: 2024-09-07

## 2023-09-08 RX ORDER — SITAGLIPTIN AND METFORMIN HYDROCHLORIDE 1000; 50 MG/1; MG/1
1 TABLET, FILM COATED ORAL 2 TIMES DAILY WITH MEALS
Qty: 180 TABLET | Refills: 0 | Status: SHIPPED | OUTPATIENT
Start: 2023-09-08 | End: 2023-11-20

## 2023-09-08 RX ORDER — AMLODIPINE BESYLATE 10 MG/1
10 TABLET ORAL DAILY
Qty: 90 TABLET | Refills: 3 | Status: SHIPPED | OUTPATIENT
Start: 2023-09-08

## 2023-09-08 NOTE — TELEPHONE ENCOUNTER
----- Message from Ely Cordova sent at 9/8/2023 12:38 PM CDT -----  Pt called about having flashes of white in her left eye vision that started 2 weeks ago.     Please call back to further [phone]

## 2023-09-08 NOTE — TELEPHONE ENCOUNTER
No care due was identified.  [facility] Embedded Care Due Messages. Reference number: [id number].   9/08/2023 8:40:31 AM CDT

## 2023-09-08 NOTE — TELEPHONE ENCOUNTER
----- Message from Stevie Donohue sent at 9/8/2023  7:35 AM CDT -----  Type:  RX Refill Request    Who Called:  pt  Refill or New Rx:  refill and new rx  RX Name and Strength:  JANUMET 50-1,000 mg per tablet  dulaglutide (TRULICITY) 1.5 mg/0.5 mL pen injector  How is the patient currently taking it? (ex. 1XDay):  as directed  Is this a 30 day or 90 day RX:  90  [facility] - [address] [facility] to [facility]  [place] [address]  Phone: [phone] Fax: [phone]      Local or Mail Order:  mail  Ordering Provider:  Anurag Atkinson Call Back Number:  [phone]    Additional Information: pt said please look at her med list and refill anything that may need to be refilled-- said she think they maybe others need to be refilled-- please call and advise--thank you

## 2023-09-13 ENCOUNTER — CLINICAL SUPPORT (OUTPATIENT)
Dept: REHABILITATION | Facility: HOSPITAL | Age: 68
End: 2023-09-13
Payer: COMMERCIAL

## 2023-09-13 DIAGNOSIS — G89.29 CHRONIC RIGHT SHOULDER PAIN: ICD-10-CM

## 2023-09-13 DIAGNOSIS — M25.611 DECREASED ROM OF RIGHT SHOULDER: ICD-10-CM

## 2023-09-13 DIAGNOSIS — M25.511 CHRONIC RIGHT SHOULDER PAIN: ICD-10-CM

## 2023-09-13 DIAGNOSIS — R68.89 ACTIVITY INTOLERANCE: Primary | ICD-10-CM

## 2023-09-13 DIAGNOSIS — R29.898 WEAKNESS OF SHOULDER: ICD-10-CM

## 2023-09-13 PROCEDURE — 97110 THERAPEUTIC EXERCISES: CPT | Mod: PN,CQ

## 2023-09-13 PROCEDURE — 97140 MANUAL THERAPY 1/> REGIONS: CPT | Mod: PN,CQ

## 2023-09-13 NOTE — PROGRESS NOTES
"[facility]   Physical Therapy Treatment Note/Supervised PT-PTA Visit    Name: Yanci Coats  Clinic Number: [id number]    Therapy Diagnosis:   Encounter Diagnoses   Name Primary?    Activity intolerance Yes    Decreased ROM of right shoulder     Weakness of shoulder     Chronic right shoulder pain      Physician: Anurag Nye MD    Visit Date: 9/13/2023    Physician Orders: PT Eval and Treat shoulder  Medical Diagnosis from Referral: Chronic right shoulder pain   Evaluation Date: 8/18/2023  Authorization Period Expiration: 08/07/2024   Plan of Care Expiration: 09/29/2023  Progress Note Due: 09/20/2023  Visit # / Visits authorized: 4/ 20 (5 total)   FOTO: Completed 9/13/2023 57% Functional Score; DASH 27.5/ 100 Higher Score = Greater Disability              Next survey due the 9/29/2023     Precautions: Diabetes     PTA Visit #: 4/5 (Supervised PT/PTA)    Time In: 0906  Time Out: 1000  Total Billable Time: 53 minutes    SUBJECTIVE     Pt reports: "It doesn't hurt constantly only with certain movements, like if I am lifting something even to the top shelf in the fridge." Reports symptoms lifting forward and overhead. "I have a eye doctor's appointment on Friday, and I am usually done for the day once I leave here. So I don't know, but it is not till one so hopefully I will have recuperated."  She was compliant with home exercise program.  Response to previous treatment: reports soreness  Functional change: N/A at this time    Pain: 0/10  Location: right shoulder    OBJECTIVE     Objective Measures updated at progress report unless specified.     Treatment     Yanci received the treatments listed below:      therapeutic exercises to develop strength, endurance, ROM, flexibility, and posture for 43 minutes including:  UBE lvl1.5 x8' (reversing midway)  Seated upper trapezius stretches 3x30s ea   Levator scapulae stretches 3x30s ea  Standing pectoral stretch 3x30s   R Sleeper " stretch 10x5s   R Posterior capsule stretch 10x5s   B shoulder flexion using 2# Therabar x15 (audible crepitus w/eccentric lowering)     press x15   Red theraband exercises x15 ea     Rows with scapular retractions (increase)    B shoulder extension with scapular retractions (increase)    R shoulder adduction x20    R shoulder internal rotation x20    R shoulder external rotation x20    R shoulder abduction to 45* x20  Prone R shoulder AROM x15 ea (increase)   Flexion   Abduction   Extension    *reviewed side-lying positioning*    To be added:   KT tape for GH positioning; Working on overhead lifting w/proper mechanics; Shoulder PNF patterns    manual therapy techniques: Joint mobilizations and Soft tissue Mobilization were applied to the: Right shoulder for 10 minutes, including:  STM: Strumming & Trigger-point release to Biceps & Brachialis    Scapular scouring in prone and supine              Grade II-III superior to inferior and anterior to posterior glide   Grade III GH jt distraction              Grade II rotation to R AC joint    Patient Education and Home Exercises     Home Exercises Provided and Patient Education Provided    Education provided:   - The patient was instructed in increased complexity as stated above. Side-lying positioning was reviewed with the patient.    Written Home Exercises Provided: Patient instructed to cont prior HEP. Exercises were reviewed and Yanci was able to demonstrate them prior to the end of the session.  Irenecamila demonstrated good  understanding of the education provided. See EMR under Patient Instructions for exercises provided during therapy sessions.    ASSESSMENT     The patient reported to physical therapy denying pain, but stating pain with lifting forward and overhead. Yanci Coats was slightly progressed with increased exercise complexity as stated above. The patient demonstrated audible crepitus with bilateral shoulder flexion with Therabar, and patient  reports discomfort and fatigue following Therabar exercises. Yanci experiences increased soreness following each session, so gradual progression has been the case. Increased tension noted in right bicep and brachialis with trigger-points noted; decreased following manual techniques. Patient tolerated joint mobilizations better than previous session, without wincing. Scapulothoracic rhythm gradually progressing, slight dyskinsia noted and minimal crepitus.    Yanci Is slowly progressing towards her goals.   Pt prognosis is Good.     Pt will continue to benefit from skilled outpatient physical therapy to address the deficits listed in the problem list box on initial evaluation, provide pt/family education and to maximize pt's level of independence in the home and community environment.     Pt's spiritual, cultural and educational needs considered and pt agreeable to plan of care and goals.     Anticipated barriers to physical therapy: None    Goals:  Short Term Goals: 3 weeks   1) Facilitate normalized joint play of R GH joint Slow progressing  2) Facilitate improved upper quadrant flexibility Slow progress  3) Patient will lalo pull-over shirt with no more than minimal discomfort 8 of 10 trials Slow progress  4) Patient will consistently drive without increased shoulder discomfort Slow progress     Long Term Goals: 6 weeks   1) Patient will resume sleeping in R side lying without increased shoulder pain Initiated  2) Patient will consistently reach into overhead cabinet to retrieve/put away dishes using R UE without assisting with L UE or increased pain Slow progress  3) Patient will lift at least 20# using B UE without R shoulder pain at least 7 of 10 trials Slow progress  4) Patient will consistently reach into back seat of car without increased pain Ongoing  5) Improve functional score to > 75% Gradually progressing  6) Patient will be independent in HEP Progressing    PLAN     POC: 2 times weekly for 6 weeks  to include the following interventions: Electrical Stimulation IFC/TENS, Manual Therapy, Moist Heat/ Ice, Patient Education, Therapeutic Exercise, and Functional Dry Needling.    The patient is to be progressed within the established plan of care as tolerated in order to accomplish goals as stated above and increase function. Plan to increase postural stability in subsequent session. Incorporate overhead lifting with proper body mechanics training as tolerated. Can incorporate kinesiology tape for glenohumeral positioning as needed.    Ирина Jimenez, PTA

## 2023-09-14 ENCOUNTER — PATIENT MESSAGE (OUTPATIENT)
Dept: ADMINISTRATIVE | Facility: HOSPITAL | Age: 68
End: 2023-09-14
Payer: COMMERCIAL

## 2023-09-15 ENCOUNTER — OFFICE VISIT (OUTPATIENT)
Dept: OPHTHALMOLOGY | Facility: CLINIC | Age: 68
End: 2023-09-15
Payer: COMMERCIAL

## 2023-09-15 ENCOUNTER — OFFICE VISIT (OUTPATIENT)
Dept: OPTOMETRY | Facility: CLINIC | Age: 68
End: 2023-09-15
Payer: COMMERCIAL

## 2023-09-15 DIAGNOSIS — H33.002 MACULA-ON RHEGMATOGENOUS RETINAL DETACHMENT, LEFT: Primary | ICD-10-CM

## 2023-09-15 DIAGNOSIS — H33.002 RETINAL DETACHMENT OF LEFT EYE WITH RETINAL BREAK: Primary | ICD-10-CM

## 2023-09-15 DIAGNOSIS — H35.371 EPIRETINAL MEMBRANE, RIGHT: ICD-10-CM

## 2023-09-15 DIAGNOSIS — H25.13 AGE-RELATED NUCLEAR CATARACT OF BOTH EYES: ICD-10-CM

## 2023-09-15 PROCEDURE — 99999 PR PBB SHADOW E&M-EST. PATIENT-LVL IV: CPT | Mod: PBBFAC,,, | Performed by: OPTOMETRIST

## 2023-09-15 PROCEDURE — 3044F HG A1C LEVEL LT 7.0%: CPT | Mod: CPTII,S$GLB,, | Performed by: OPTOMETRIST

## 2023-09-15 PROCEDURE — 99214 PR OFFICE/OUTPT VISIT, EST, LEVL IV, 30-39 MIN: ICD-10-PCS | Mod: S$GLB,,, | Performed by: OPTOMETRIST

## 2023-09-15 PROCEDURE — 67110 PR REPAIR DETACH RETINA,INJECT AIR/GAS: ICD-10-PCS | Mod: LT,S$GLB,, | Performed by: OPHTHALMOLOGY

## 2023-09-15 PROCEDURE — 2022F PR DILATED RETINAL EYE EXAM WITH INTERP/REVIEW: ICD-10-PCS | Mod: CPTII,S$GLB,, | Performed by: OPHTHALMOLOGY

## 2023-09-15 PROCEDURE — 2022F DILAT RTA XM EVC RTNOPTHY: CPT | Mod: CPTII,S$GLB,, | Performed by: OPHTHALMOLOGY

## 2023-09-15 PROCEDURE — 67208 PR DESTRUC RETINAL LESN,CRYOTHERAPY: ICD-10-PCS | Mod: 51,LT,S$GLB, | Performed by: OPHTHALMOLOGY

## 2023-09-15 PROCEDURE — 92250 COLOR FUNDUS PHOTOGRAPHY - OU - BOTH EYES: ICD-10-PCS | Mod: S$GLB,,, | Performed by: OPTOMETRIST

## 2023-09-15 PROCEDURE — 92250 FUNDUS PHOTOGRAPHY W/I&R: CPT | Mod: S$GLB,,, | Performed by: OPTOMETRIST

## 2023-09-15 PROCEDURE — 3044F PR MOST RECENT HEMOGLOBIN A1C LEVEL <7.0%: ICD-10-PCS | Mod: CPTII,S$GLB,, | Performed by: OPTOMETRIST

## 2023-09-15 PROCEDURE — 1160F PR REVIEW ALL MEDS BY PRESCRIBER/CLIN PHARMACIST DOCUMENTED: ICD-10-PCS | Mod: CPTII,S$GLB,, | Performed by: OPHTHALMOLOGY

## 2023-09-15 PROCEDURE — 99205 OFFICE O/P NEW HI 60 MIN: CPT | Mod: 57,S$GLB,, | Performed by: OPHTHALMOLOGY

## 2023-09-15 PROCEDURE — 3044F HG A1C LEVEL LT 7.0%: CPT | Mod: CPTII,S$GLB,, | Performed by: OPHTHALMOLOGY

## 2023-09-15 PROCEDURE — 1159F MED LIST DOCD IN RCRD: CPT | Mod: CPTII,S$GLB,, | Performed by: OPTOMETRIST

## 2023-09-15 PROCEDURE — 67208 TREATMENT OF RETINAL LESION: CPT | Mod: 51,LT,S$GLB, | Performed by: OPHTHALMOLOGY

## 2023-09-15 PROCEDURE — 92201 PR OPHTHALMOSCOPY, EXT, W/RET DRAW/SCLERAL DEPR, I&R, UNI/BI: ICD-10-PCS | Mod: 59,S$GLB,, | Performed by: OPHTHALMOLOGY

## 2023-09-15 PROCEDURE — 99999 PR PBB SHADOW E&M-EST. PATIENT-LVL II: CPT | Mod: PBBFAC,,, | Performed by: OPHTHALMOLOGY

## 2023-09-15 PROCEDURE — 1126F AMNT PAIN NOTED NONE PRSNT: CPT | Mod: CPTII,S$GLB,, | Performed by: OPTOMETRIST

## 2023-09-15 PROCEDURE — 1160F RVW MEDS BY RX/DR IN RCRD: CPT | Mod: CPTII,S$GLB,, | Performed by: OPHTHALMOLOGY

## 2023-09-15 PROCEDURE — 67110 REPAIR DETACHED RETINA: CPT | Mod: LT,S$GLB,, | Performed by: OPHTHALMOLOGY

## 2023-09-15 PROCEDURE — 1159F MED LIST DOCD IN RCRD: CPT | Mod: CPTII,S$GLB,, | Performed by: OPHTHALMOLOGY

## 2023-09-15 PROCEDURE — 99214 OFFICE O/P EST MOD 30 MIN: CPT | Mod: S$GLB,,, | Performed by: OPTOMETRIST

## 2023-09-15 PROCEDURE — 1160F RVW MEDS BY RX/DR IN RCRD: CPT | Mod: CPTII,S$GLB,, | Performed by: OPTOMETRIST

## 2023-09-15 PROCEDURE — 1160F PR REVIEW ALL MEDS BY PRESCRIBER/CLIN PHARMACIST DOCUMENTED: ICD-10-PCS | Mod: CPTII,S$GLB,, | Performed by: OPTOMETRIST

## 2023-09-15 PROCEDURE — 2022F DILAT RTA XM EVC RTNOPTHY: CPT | Mod: CPTII,S$GLB,, | Performed by: OPTOMETRIST

## 2023-09-15 PROCEDURE — 99205 PR OFFICE/OUTPT VISIT, NEW, LEVL V, 60-74 MIN: ICD-10-PCS | Mod: 57,S$GLB,, | Performed by: OPHTHALMOLOGY

## 2023-09-15 PROCEDURE — 1159F PR MEDICATION LIST DOCUMENTED IN MEDICAL RECORD: ICD-10-PCS | Mod: CPTII,S$GLB,, | Performed by: OPHTHALMOLOGY

## 2023-09-15 PROCEDURE — 3288F PR FALLS RISK ASSESSMENT DOCUMENTED: ICD-10-PCS | Mod: CPTII,S$GLB,, | Performed by: OPTOMETRIST

## 2023-09-15 PROCEDURE — 99999 PR PBB SHADOW E&M-EST. PATIENT-LVL II: ICD-10-PCS | Mod: PBBFAC,,, | Performed by: OPHTHALMOLOGY

## 2023-09-15 PROCEDURE — 99999 PR PBB SHADOW E&M-EST. PATIENT-LVL IV: ICD-10-PCS | Mod: PBBFAC,,, | Performed by: OPTOMETRIST

## 2023-09-15 PROCEDURE — 3288F FALL RISK ASSESSMENT DOCD: CPT | Mod: CPTII,S$GLB,, | Performed by: OPTOMETRIST

## 2023-09-15 PROCEDURE — 1101F PR PT FALLS ASSESS DOC 0-1 FALLS W/OUT INJ PAST YR: ICD-10-PCS | Mod: CPTII,S$GLB,, | Performed by: OPTOMETRIST

## 2023-09-15 PROCEDURE — 3044F PR MOST RECENT HEMOGLOBIN A1C LEVEL <7.0%: ICD-10-PCS | Mod: CPTII,S$GLB,, | Performed by: OPHTHALMOLOGY

## 2023-09-15 PROCEDURE — 2022F PR DILATED RETINAL EYE EXAM WITH INTERP/REVIEW: ICD-10-PCS | Mod: CPTII,S$GLB,, | Performed by: OPTOMETRIST

## 2023-09-15 PROCEDURE — 92201 OPSCPY EXTND RTA DRAW UNI/BI: CPT | Mod: 59,S$GLB,, | Performed by: OPHTHALMOLOGY

## 2023-09-15 PROCEDURE — 1101F PT FALLS ASSESS-DOCD LE1/YR: CPT | Mod: CPTII,S$GLB,, | Performed by: OPTOMETRIST

## 2023-09-15 PROCEDURE — 1159F PR MEDICATION LIST DOCUMENTED IN MEDICAL RECORD: ICD-10-PCS | Mod: CPTII,S$GLB,, | Performed by: OPTOMETRIST

## 2023-09-15 PROCEDURE — 1126F PR PAIN SEVERITY QUANTIFIED, NO PAIN PRESENT: ICD-10-PCS | Mod: CPTII,S$GLB,, | Performed by: OPTOMETRIST

## 2023-09-15 RX ORDER — DORZOLAMIDE HYDROCHLORIDE AND TIMOLOL MALEATE 20; 5 MG/ML; MG/ML
1 SOLUTION/ DROPS OPHTHALMIC 2 TIMES DAILY
Qty: 10 ML | Refills: 1 | Status: SHIPPED | OUTPATIENT
Start: 2023-09-15 | End: 2024-09-14

## 2023-09-15 RX ORDER — OFLOXACIN 3 MG/ML
1 SOLUTION/ DROPS OPHTHALMIC EVERY 4 HOURS
Qty: 5 ML | Refills: 0 | Status: SHIPPED | OUTPATIENT
Start: 2023-09-15 | End: 2023-09-25

## 2023-09-15 RX ORDER — PREDNISOLONE ACETATE 10 MG/ML
1 SUSPENSION/ DROPS OPHTHALMIC EVERY 4 HOURS
Qty: 5 ML | Refills: 0 | Status: SHIPPED | OUTPATIENT
Start: 2023-09-15 | End: 2024-09-14

## 2023-09-15 NOTE — PROGRESS NOTES
HPI    68 YO female presents today for FOL OU. Patient states that she has been   noticing flashes OS>OD x2 weeks. Patient notes a spot that comes into her   vision nasally often. Notes floaters OU that come and go. Denies any HA or   change in vision.     Flashing light in left eye - nasal, takes up half of vision for a second,   solid white, then it goes away  Multiple episodes over past 2 weeks  Two episodes today   BP controlled   Notices mostly when sitting and looking at phone  Last edited by Leonides Moraes, OD on 9/15/2023  1:47 PM.            Assessment /Plan     For exam results, see Encounter Report.    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

## 2023-09-15 NOTE — PROGRESS NOTES
HPI    Referral for retina eval, noted vision changes 2 weeks ago  Last edited by Americo Dolan MD on 9/15/2023  3:25 PM.         A/P    ICD-10-CM ICD-9-CM   1. 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 Age-related nuclear cataract of both eyes  H25.13 366.16   3. Epiretinal membrane, right  H35.371 362.56       1. 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 Moraes for RD eval   Pt noted flash in vision over past 2 weeks    Today has mac on RD with focal break at 1 oclock with detachment from 12-3 with   Plan: discussed options of PPV vs cryoppexy/pneumatic retinopexy, pt prefers to have cryopexy/pneumatic retinopexy    Based on todays exam, diagnostic studies, and review of records, the determination was made for treatment today.  Schedule cryopexy/pneumatic retinopexy today Left Eye Patient chooses to proceed R/B/A discussed patient elects to proceed    Procedure Note: cryopexy/pneumatic retinopexy, Left eye  Time out performed, site and patient identified    Preoperative Diagnosis: Rhegmatogenous Retinal Detachment Left Eye  Anesthesia: topical  Prep: Topical properacaine then 10% Betadine to lids, lashes, conjunctiva  Subconjunctival Lidocaine 2%  Cryotherapy applied to breaks under indirect ophthalmoscope visualization  A/C paracentesis performed with Tb syringe and 30g needle.  Then 100% c3f8 gas, 0.4 cc, injected 3.5-4.0 mm posterior to limbus inferotemporally.  IOP acceptable, central retinal artery patent, ONH perfused after procedure.  Post op diagnosis: same  Complications: none  Follow-up MONDAY    FACE DOWN UNTIL MONDAY    Start PF/Ket QID      2. Age-related nuclear cataract of both eyes  Mild NS, NVS  Plan: Observation Update Mrx prn    3. Epiretinal membrane, right  Mod ERM, NVS per pt  Plan: Observation    RTC Monday AM DFE/OCTm OS with Dr Dolan       I saw and examined the patient and reviewed in detail the findings documented. The  final examination findings, image interpretations, and plan as documented in the record represent my personal judgment and conclusions.    Americo Dolan MD  Vitreoretinal Surgery   [facility]

## 2023-09-18 ENCOUNTER — OFFICE VISIT (OUTPATIENT)
Dept: OPHTHALMOLOGY | Facility: CLINIC | Age: 68
End: 2023-09-18
Payer: COMMERCIAL

## 2023-09-18 DIAGNOSIS — H33.002 RETINAL DETACHMENT OF LEFT EYE WITH RETINAL BREAK: ICD-10-CM

## 2023-09-18 PROCEDURE — 99024 POSTOP FOLLOW-UP VISIT: CPT | Mod: S$GLB,,, | Performed by: OPHTHALMOLOGY

## 2023-09-18 PROCEDURE — 99024 PR POST-OP FOLLOW-UP VISIT: ICD-10-PCS | Mod: S$GLB,,, | Performed by: OPHTHALMOLOGY

## 2023-09-18 PROCEDURE — 3044F PR MOST RECENT HEMOGLOBIN A1C LEVEL <7.0%: ICD-10-PCS | Mod: CPTII,S$GLB,, | Performed by: OPHTHALMOLOGY

## 2023-09-18 PROCEDURE — 3044F HG A1C LEVEL LT 7.0%: CPT | Mod: CPTII,S$GLB,, | Performed by: OPHTHALMOLOGY

## 2023-09-18 PROCEDURE — 99999 PR PBB SHADOW E&M-EST. PATIENT-LVL II: CPT | Mod: PBBFAC,,, | Performed by: OPHTHALMOLOGY

## 2023-09-18 PROCEDURE — 99999 PR PBB SHADOW E&M-EST. PATIENT-LVL II: ICD-10-PCS | Mod: PBBFAC,,, | Performed by: OPHTHALMOLOGY

## 2023-09-18 NOTE — PROGRESS NOTES
HPI    OS Maybe more bleeds since last visit. VA OS maybe better.  Eye meds: PF QID OS            Cospot BID OS            Oxifloxcin QID OS  Last edited by Kamryn Holcomb on 9/18/2023  9:10 AM.         A/P    ICD-10-CM ICD-9-CM   1. Retinal detachment of left eye with retinal break  H33.002 361.00       1. 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 Moraes for RD eval   Pt noted flash in vision over past 2 weeks    Initial visit has mac on RD with focal break at 1 oclock with detachment from 12-3 with     S/p cryo/pneumo 9/15/23    Today break flat with cryo, would benefit from supplemental laser retinopexy given how bullous RD was, displaced loculated fluid inf, no new RT/RD  Plan: recommend laser retinopexy OS, can d/c drops    Based on todays exam, diagnostic studies, and review of records, the determination was made for treatment today.  Schedule  laser retinopexy today Left Eye Patient chooses to proceed with laser R/B/A discussed patient elects to proceed    Patient identified.  Timeout performed.    Laser Procedure Note   laser retinopexy laser Left Eye  Anesthesia: topical Proparacaine  Complications: none  Size: 200 microns  Duration: 80 ms  Power: 300  Number: 254  Good laser uptake, no complications  F/u as above, RTC sooner PRN     Head Up Position    2. Age-related nuclear cataract of both eyes  Mild NS, NVS  Plan: Observation Update Mrx prn    3. Epiretinal membrane, right  Mod ERM, NVS per pt  Plan: Observation    RTC Wed [place]  DFE/OCTm OS with Dr Dolan       I saw and examined the patient and reviewed in detail the findings documented. The final examination findings, image interpretations, and plan as documented in the record represent my personal judgment and conclusions.    Americo Dolan MD  Vitreoretinal Surgery   [facility]

## 2023-09-20 ENCOUNTER — OFFICE VISIT (OUTPATIENT)
Dept: OPHTHALMOLOGY | Facility: CLINIC | Age: 68
End: 2023-09-20
Payer: COMMERCIAL

## 2023-09-20 DIAGNOSIS — H33.002 RETINAL DETACHMENT OF LEFT EYE WITH RETINAL BREAK: Primary | ICD-10-CM

## 2023-09-20 DIAGNOSIS — Z78.0 MENOPAUSE: ICD-10-CM

## 2023-09-20 PROCEDURE — 3288F PR FALLS RISK ASSESSMENT DOCUMENTED: ICD-10-PCS | Mod: CPTII,S$GLB,, | Performed by: OPHTHALMOLOGY

## 2023-09-20 PROCEDURE — 99999 PR PBB SHADOW E&M-EST. PATIENT-LVL II: ICD-10-PCS | Mod: PBBFAC,,, | Performed by: OPHTHALMOLOGY

## 2023-09-20 PROCEDURE — 1101F PR PT FALLS ASSESS DOC 0-1 FALLS W/OUT INJ PAST YR: ICD-10-PCS | Mod: CPTII,S$GLB,, | Performed by: OPHTHALMOLOGY

## 2023-09-20 PROCEDURE — 1160F PR REVIEW ALL MEDS BY PRESCRIBER/CLIN PHARMACIST DOCUMENTED: ICD-10-PCS | Mod: CPTII,S$GLB,, | Performed by: OPHTHALMOLOGY

## 2023-09-20 PROCEDURE — 1159F PR MEDICATION LIST DOCUMENTED IN MEDICAL RECORD: ICD-10-PCS | Mod: CPTII,S$GLB,, | Performed by: OPHTHALMOLOGY

## 2023-09-20 PROCEDURE — 1126F AMNT PAIN NOTED NONE PRSNT: CPT | Mod: CPTII,S$GLB,, | Performed by: OPHTHALMOLOGY

## 2023-09-20 PROCEDURE — 92134 CPTRZ OPH DX IMG PST SGM RTA: CPT | Mod: S$GLB,,, | Performed by: OPHTHALMOLOGY

## 2023-09-20 PROCEDURE — 3044F PR MOST RECENT HEMOGLOBIN A1C LEVEL <7.0%: ICD-10-PCS | Mod: CPTII,S$GLB,, | Performed by: OPHTHALMOLOGY

## 2023-09-20 PROCEDURE — 99999 PR PBB SHADOW E&M-EST. PATIENT-LVL II: CPT | Mod: PBBFAC,,, | Performed by: OPHTHALMOLOGY

## 2023-09-20 PROCEDURE — 1159F MED LIST DOCD IN RCRD: CPT | Mod: CPTII,S$GLB,, | Performed by: OPHTHALMOLOGY

## 2023-09-20 PROCEDURE — 1101F PT FALLS ASSESS-DOCD LE1/YR: CPT | Mod: CPTII,S$GLB,, | Performed by: OPHTHALMOLOGY

## 2023-09-20 PROCEDURE — 99024 POSTOP FOLLOW-UP VISIT: CPT | Mod: S$GLB,,, | Performed by: OPHTHALMOLOGY

## 2023-09-20 PROCEDURE — 1126F PR PAIN SEVERITY QUANTIFIED, NO PAIN PRESENT: ICD-10-PCS | Mod: CPTII,S$GLB,, | Performed by: OPHTHALMOLOGY

## 2023-09-20 PROCEDURE — 92134 OCT, RETINA - OU - BOTH EYES: ICD-10-PCS | Mod: S$GLB,,, | Performed by: OPHTHALMOLOGY

## 2023-09-20 PROCEDURE — 99024 PR POST-OP FOLLOW-UP VISIT: ICD-10-PCS | Mod: S$GLB,,, | Performed by: OPHTHALMOLOGY

## 2023-09-20 PROCEDURE — 1160F RVW MEDS BY RX/DR IN RCRD: CPT | Mod: CPTII,S$GLB,, | Performed by: OPHTHALMOLOGY

## 2023-09-20 PROCEDURE — 3044F HG A1C LEVEL LT 7.0%: CPT | Mod: CPTII,S$GLB,, | Performed by: OPHTHALMOLOGY

## 2023-09-20 PROCEDURE — 3288F FALL RISK ASSESSMENT DOCD: CPT | Mod: CPTII,S$GLB,, | Performed by: OPHTHALMOLOGY

## 2023-09-20 NOTE — PROGRESS NOTES
HPI    DLS 9/18/2023  pt states no new complaints. Pain has gone away. States   seeing spots in the vision OS. States comes and goes. Seeing bubble in OS   still.   Denies FOL.     No gtts.     Last edited by Abbey Pabon on 9/20/2023  9:54 AM.         A/P    ICD-10-CM ICD-9-CM   1. Retinal detachment of left eye with retinal break  H33.002 361.00         1. 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 Moraes for RD eval   Pt noted flash in vision over past 2 weeks    Initial visit has mac on RD with focal break at 1 oclock with detachment from 12-3 with     S/p cryo/pneumo 9/15/23  S/p Lrx 9/18/23    Today break flat with cryo and laser, inf displaced SRF nearly resolved, good gas still    Plan:   observe, attached, improving SRF    Head Up Position tilted to right slightly    2. Age-related nuclear cataract of both eyes  Mild NS, NVS  Plan: Observation Update Mrx prn    3. Epiretinal membrane, right  Mod ERM, NVS per pt  Plan: Observation    RTC 1 week [place]  DFE/OCTm OS        I saw and examined the patient and reviewed in detail the findings documented. The final examination findings, image interpretations, and plan as documented in the record represent my personal judgment and conclusions.    Americo Dolan MD  Vitreoretinal Surgery   [facility]

## 2023-09-25 ENCOUNTER — NURSE TRIAGE (OUTPATIENT)
Dept: ADMINISTRATIVE | Facility: CLINIC | Age: 68
End: 2023-09-25
Payer: COMMERCIAL

## 2023-09-25 ENCOUNTER — TELEPHONE (OUTPATIENT)
Dept: OPHTHALMOLOGY | Facility: CLINIC | Age: 68
End: 2023-09-25
Payer: COMMERCIAL

## 2023-09-25 NOTE — TELEPHONE ENCOUNTER
"OOC RN  Dr. Dolan s/p 2 week ago procedure.   Patient reports had surgery on eye.  Now left eye swollen new minimal swollen,  over the weekend,   draining  clear drainage.  [place]key eye.    Intermittent pain,   3/4/23  new pain.  Wjould like to be seen by dr. Dolan,  Has  appt    on 9/27/23   Wants to know if you can see her today?  Please reach out to [facility].  Care advise patient wants to be seen.   For any new or worsening symptoms to callback OOC RN  or go to .  Patient vu.   Reason for Disposition   Patient wants to be seen    Additional Information   Negative: Unresponsive, passed out or very weak   Negative: Difficulty breathing or wheezing   Negative: Difficulty swallowing or slurred speech and sudden onset   Negative: Sounds like a life-threatening emergency to the triager   Negative: SEVERE eyelid swelling (i.e., shut or almost) and fever   Negative: Eyelid (outer) is very red and fever   Negative: Pregnant 20 or more weeks and sudden weight gain (e.g., more than 3 lbs or 1.4 kg in one week)   Negative: Postpartum (from 0 to 6 weeks after delivery) and sudden weight gain (e.g., more than 3 lbs or 1.4 kg in one week)   Negative: Patient sounds very sick or weak to the triager   Negative: SEVERE eyelid swelling (i.e., shut or almost) and involves both eyes   Negative: SEVERE eyelid swelling and taking an ACE Inhibitor medicine (e.g., benazepril/LOTENSIN, captopril/CAPOTEN, enalapril/VASOTEC, lisinopril/ZESTRIL)   Negative: SEVERE eyelid swelling on one side that is red and painful (or tender to touch)   Negative: SEVERE eyelid swelling on one side and sinus pain or pressure   Negative: Fever   Negative: Painful rash and multiple small blisters grouped together (i.e., dermatomal distribution or "band" or "stripe")   Negative: MODERATE to SEVERE eyelid swelling on one side  (Exception: Due to a mosquito bite.)   Negative: Eyelid is red and painful (or tender to touch)   Negative: Swelling of both lower legs " (i.e., bilateral pedal edema)   Negative: MILD eyelid swelling (puffiness) and sinus pain or pressure    Protocols used: Eyelid Swelling-A-[place]

## 2023-09-25 NOTE — TELEPHONE ENCOUNTER
Pt called with concerns of discharge and uncomfortable eye. Advised use antibiotic gtts and scheduled appt. If she got worse in the meantime to follow up with the ED.

## 2023-09-26 ENCOUNTER — OFFICE VISIT (OUTPATIENT)
Dept: OPHTHALMOLOGY | Facility: CLINIC | Age: 68
End: 2023-09-26
Payer: COMMERCIAL

## 2023-09-26 DIAGNOSIS — H35.371 EPIRETINAL MEMBRANE, RIGHT: ICD-10-CM

## 2023-09-26 DIAGNOSIS — H33.002 MACULA-ON RHEGMATOGENOUS RETINAL DETACHMENT, LEFT: Primary | ICD-10-CM

## 2023-09-26 DIAGNOSIS — H25.13 AGE-RELATED NUCLEAR CATARACT OF BOTH EYES: ICD-10-CM

## 2023-09-26 PROCEDURE — 3044F HG A1C LEVEL LT 7.0%: CPT | Mod: CPTII,S$GLB,, | Performed by: OPHTHALMOLOGY

## 2023-09-26 PROCEDURE — 1159F MED LIST DOCD IN RCRD: CPT | Mod: CPTII,S$GLB,, | Performed by: OPHTHALMOLOGY

## 2023-09-26 PROCEDURE — 99024 PR POST-OP FOLLOW-UP VISIT: ICD-10-PCS | Mod: S$GLB,,, | Performed by: OPHTHALMOLOGY

## 2023-09-26 PROCEDURE — 99999 PR PBB SHADOW E&M-EST. PATIENT-LVL III: ICD-10-PCS | Mod: PBBFAC,,, | Performed by: OPHTHALMOLOGY

## 2023-09-26 PROCEDURE — 3044F PR MOST RECENT HEMOGLOBIN A1C LEVEL <7.0%: ICD-10-PCS | Mod: CPTII,S$GLB,, | Performed by: OPHTHALMOLOGY

## 2023-09-26 PROCEDURE — 99999 PR PBB SHADOW E&M-EST. PATIENT-LVL III: CPT | Mod: PBBFAC,,, | Performed by: OPHTHALMOLOGY

## 2023-09-26 PROCEDURE — 1159F PR MEDICATION LIST DOCUMENTED IN MEDICAL RECORD: ICD-10-PCS | Mod: CPTII,S$GLB,, | Performed by: OPHTHALMOLOGY

## 2023-09-26 PROCEDURE — 99024 POSTOP FOLLOW-UP VISIT: CPT | Mod: S$GLB,,, | Performed by: OPHTHALMOLOGY

## 2023-09-26 NOTE — PROGRESS NOTES
HPI    Patient here for post op eye problems   Pt sts over the weekend OS became swollen and red within the eye and very   watery/discharge coming from OS.  Patient is not in any severe pain but has random shooting pains.   VA unchanged.     S/p cryo/pneumo 9/15/23  S/p Lrx 9/18/23    Ofloxacin QID OS  Last edited by Theresa Zamora on 9/26/2023  9:27 AM.          A/P    ICD-10-CM ICD-9-CM   1. 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 Age-related nuclear cataract of both eyes  H25.13 366.16   3. Epiretinal membrane, right  H35.371 362.56       1. 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 Moraes for RD eval   Pt noted flash in vision over past 2 weeks    Initial visit has mac on RD with focal break at 1 oclock with detachment from 12-3 with     S/p cryo/pneumo 9/15/23  S/p Lrx 9/18/23    Today urgent visit for eye discomfort. Patient has significant sinus issues that been acting up since being face down and positioning. Noticed incr discomfort to left eye on Friday. Today has mild chemosis, vision good 20/25, no cell in AC or vitreous, break flat with cryo and laser, inf displaced SRF nearly resolved, good gas still    Plan:   observe, attached, nearly resolved inf SRF, can start PF and Ocuflox again QID and do warm compresses for sinuses and cool compresses to eye     Head Up Position tilted to right slightly    2. Age-related nuclear cataract of both eyes  Mild NS, NVS  Plan: Observation Update Mrx prn    3. Epiretinal membrane, right  Mod ERM, NVS per pt  Plan: Observation    RTC 1 week [place]  DFE/OCTm OS        I saw and examined the patient and reviewed in detail the findings documented. The final examination findings, image interpretations, and plan as documented in the record represent my personal judgment and conclusions.    Americo Dolan MD  Vitreoretinal Surgery   [facility]

## 2023-09-27 ENCOUNTER — TELEPHONE (OUTPATIENT)
Dept: FAMILY MEDICINE | Facility: CLINIC | Age: 68
End: 2023-09-27
Payer: COMMERCIAL

## 2023-09-27 DIAGNOSIS — K21.9 LPRD (LARYNGOPHARYNGEAL REFLUX DISEASE): ICD-10-CM

## 2023-09-27 NOTE — TELEPHONE ENCOUNTER
----- Message from Lalit Patricio sent at 9/27/2023  8:03 AM CDT -----  Contact: pt at  [phone]  Type: Needs Medical Advice  Who Called:  pt  Best Call Back Number: [phone]  Additional Information: pt is calling the office requesting a call back from the nurse.

## 2023-09-27 NOTE — TELEPHONE ENCOUNTER
Patient states her ophthalmologist advised she has a detached retina that could possibly be related to diabetes or another condition. Patient states she also has an infection in the affected eye that may be related to a sinus infection.  Patient states her ophthalmologist advised for her to follow up with primary care regarding. Offered appointment on today's date with NABILA Calderon; patient requested appointment with NP Enrique Bear, as appointment with Dr. Nye was unavailable. Appointment scheduled on the date of 9-29-23 using [facility] appointment type, and auto search feature to secure appropriate date, time, and length of appointment.   Patient agreed to appointment date, time, and location.

## 2023-09-29 ENCOUNTER — OFFICE VISIT (OUTPATIENT)
Dept: FAMILY MEDICINE | Facility: CLINIC | Age: 68
End: 2023-09-29
Payer: COMMERCIAL

## 2023-09-29 VITALS
TEMPERATURE: 98 F | OXYGEN SATURATION: 97 % | WEIGHT: 202.63 LBS | HEART RATE: 84 BPM | DIASTOLIC BLOOD PRESSURE: 66 MMHG | HEIGHT: 68 IN | SYSTOLIC BLOOD PRESSURE: 138 MMHG | BODY MASS INDEX: 30.71 KG/M2

## 2023-09-29 DIAGNOSIS — H33.22 RETINAL DETACHMENT, LEFT: ICD-10-CM

## 2023-09-29 DIAGNOSIS — J01.10 ACUTE NON-RECURRENT FRONTAL SINUSITIS: Primary | ICD-10-CM

## 2023-09-29 PROCEDURE — 3075F PR MOST RECENT SYSTOLIC BLOOD PRESS GE 130-139MM HG: ICD-10-PCS | Mod: CPTII,S$GLB,, | Performed by: NURSE PRACTITIONER

## 2023-09-29 PROCEDURE — 1126F AMNT PAIN NOTED NONE PRSNT: CPT | Mod: CPTII,S$GLB,, | Performed by: NURSE PRACTITIONER

## 2023-09-29 PROCEDURE — 99214 OFFICE O/P EST MOD 30 MIN: CPT | Mod: S$GLB,,, | Performed by: NURSE PRACTITIONER

## 2023-09-29 PROCEDURE — 3008F BODY MASS INDEX DOCD: CPT | Mod: CPTII,S$GLB,, | Performed by: NURSE PRACTITIONER

## 2023-09-29 PROCEDURE — 3044F HG A1C LEVEL LT 7.0%: CPT | Mod: CPTII,S$GLB,, | Performed by: NURSE PRACTITIONER

## 2023-09-29 PROCEDURE — 1126F PR PAIN SEVERITY QUANTIFIED, NO PAIN PRESENT: ICD-10-PCS | Mod: CPTII,S$GLB,, | Performed by: NURSE PRACTITIONER

## 2023-09-29 PROCEDURE — 1101F PT FALLS ASSESS-DOCD LE1/YR: CPT | Mod: CPTII,S$GLB,, | Performed by: NURSE PRACTITIONER

## 2023-09-29 PROCEDURE — 3288F PR FALLS RISK ASSESSMENT DOCUMENTED: ICD-10-PCS | Mod: CPTII,S$GLB,, | Performed by: NURSE PRACTITIONER

## 2023-09-29 PROCEDURE — 3044F PR MOST RECENT HEMOGLOBIN A1C LEVEL <7.0%: ICD-10-PCS | Mod: CPTII,S$GLB,, | Performed by: NURSE PRACTITIONER

## 2023-09-29 PROCEDURE — 3075F SYST BP GE 130 - 139MM HG: CPT | Mod: CPTII,S$GLB,, | Performed by: NURSE PRACTITIONER

## 2023-09-29 PROCEDURE — 99999 PR PBB SHADOW E&M-EST. PATIENT-LVL IV: ICD-10-PCS | Mod: PBBFAC,,, | Performed by: NURSE PRACTITIONER

## 2023-09-29 PROCEDURE — 3288F FALL RISK ASSESSMENT DOCD: CPT | Mod: CPTII,S$GLB,, | Performed by: NURSE PRACTITIONER

## 2023-09-29 PROCEDURE — 99214 PR OFFICE/OUTPT VISIT, EST, LEVL IV, 30-39 MIN: ICD-10-PCS | Mod: S$GLB,,, | Performed by: NURSE PRACTITIONER

## 2023-09-29 PROCEDURE — 99999 PR PBB SHADOW E&M-EST. PATIENT-LVL IV: CPT | Mod: PBBFAC,,, | Performed by: NURSE PRACTITIONER

## 2023-09-29 PROCEDURE — 1159F PR MEDICATION LIST DOCUMENTED IN MEDICAL RECORD: ICD-10-PCS | Mod: CPTII,S$GLB,, | Performed by: NURSE PRACTITIONER

## 2023-09-29 PROCEDURE — 3008F PR BODY MASS INDEX (BMI) DOCUMENTED: ICD-10-PCS | Mod: CPTII,S$GLB,, | Performed by: NURSE PRACTITIONER

## 2023-09-29 PROCEDURE — 1159F MED LIST DOCD IN RCRD: CPT | Mod: CPTII,S$GLB,, | Performed by: NURSE PRACTITIONER

## 2023-09-29 PROCEDURE — 1101F PR PT FALLS ASSESS DOC 0-1 FALLS W/OUT INJ PAST YR: ICD-10-PCS | Mod: CPTII,S$GLB,, | Performed by: NURSE PRACTITIONER

## 2023-09-29 PROCEDURE — 3078F PR MOST RECENT DIASTOLIC BLOOD PRESSURE < 80 MM HG: ICD-10-PCS | Mod: CPTII,S$GLB,, | Performed by: NURSE PRACTITIONER

## 2023-09-29 PROCEDURE — 3078F DIAST BP <80 MM HG: CPT | Mod: CPTII,S$GLB,, | Performed by: NURSE PRACTITIONER

## 2023-09-29 RX ORDER — OMEPRAZOLE 20 MG/1
40 CAPSULE, DELAYED RELEASE ORAL 2 TIMES DAILY
Qty: 360 CAPSULE | Refills: 1 | Status: SHIPPED | OUTPATIENT
Start: 2023-09-29 | End: 2024-03-27

## 2023-09-29 RX ORDER — DOXYCYCLINE HYCLATE 100 MG
100 TABLET ORAL 2 TIMES DAILY
Qty: 20 TABLET | Refills: 0 | Status: SHIPPED | OUTPATIENT
Start: 2023-09-29

## 2023-09-29 NOTE — PATIENT INSTRUCTIONS
Saline nasal spray multiple times a day for sinus congestion.  Stop all other nasal sprays until eyes improved.

## 2023-09-29 NOTE — PROGRESS NOTES
This dictation has been generated using Modal Fluency Dictation some phonetic errors may occur. Please contact author for clarification if needed.     Problem List Items Addressed This Visit    None  Visit Diagnoses       Acute non-recurrent frontal sinusitis    -  Primary    Retinal detachment, left        Relevant Orders    US Carotid Bilateral            Orders Placed This Encounter    US Carotid Bilateral    doxycycline (VIBRA-TABS) 100 MG tablet       Frontal sinusitis treat with meds as above   Retinal detachment patient states ophthalmology concerned about carotid disease and requested ultrasound    Follow up if symptoms worsen or fail to improve.    ________________________________________________________________  ________________________________________________________________      Chief Complaint   Patient presents with    Eye Problem     History of present illness  This 68 y.o. presents today for complaint of  sinus issues and I issues.  Patient notes sinus congestion for over a week.  She has been trying over-the-counter therapies.  Apparently had a nasal steroid.  She has been seeing ophthalmology for a detached retina on the left.  There was no history of trauma.  Ophthalmology at 1 point  had mentioned getting a carotid ultrasound.  No known vascular disease.    Past Medical History:   Diagnosis Date    Allergy history unknown     Arthritis     DDD    Back pain     Cataract     OU    Colon polyps 01/31/2019    Diabetes mellitus type II     GERD (gastroesophageal reflux disease)     Hypertension     LPRD (laryngopharyngeal reflux disease)     Nuclear sclerosis - Both Eyes 7/9/2013       Past Surgical History:   Procedure Laterality Date    COLONOSCOPY N/A 1/31/2019    Procedure: COLONOSCOPY;  Surgeon: Ronak Palacios MD;  Location: [facility];  Service: Endoscopy;  Laterality: N/A;       Family History   Problem Relation Age of Onset    ANNA disease Father     Heart disease Father     Heart disease  Mother     Diabetes Mother     Blindness Mother     Cataracts Mother     Hypertension Mother     Cholelithiasis Sister     ANNA disease Sister     Blindness Maternal Aunt     Diabetes Maternal Aunt     Blindness Paternal Aunt     Stroke Paternal Grandmother     Glaucoma Paternal Grandmother     Cervical cancer Unknown         aunt    Retinal detachment Brother     Celiac disease Neg Hx     Cirrhosis Neg Hx     Colon cancer Neg Hx     Colon polyps Neg Hx     Crohn's disease Neg Hx     Cystic fibrosis Neg Hx     Esophageal cancer Neg Hx     Hemochromatosis Neg Hx     Inflammatory bowel disease Neg Hx     Irritable bowel syndrome Neg Hx     Liver cancer Neg Hx     Liver disease Neg Hx     Rectal cancer Neg Hx     Stomach cancer Neg Hx     Ulcerative colitis Neg Hx     Clive's disease Neg Hx     Melanoma Neg Hx     Amblyopia Neg Hx     Cancer Neg Hx     Macular degeneration Neg Hx     Strabismus Neg Hx     Thyroid disease Neg Hx     Psoriasis Neg Hx     Lupus Neg Hx     Eczema Neg Hx     Breast cancer Neg Hx     Ovarian cancer Neg Hx        Social History     Socioeconomic History    Marital status: Significant Other   Occupational History     Employer: [employer]   Tobacco Use    Smoking status: Never    Smokeless tobacco: Never   Substance and Sexual Activity    Alcohol use: Yes     Comment: occ    Drug use: No    Sexual activity: Not Currently     Partners: Male     Birth control/protection: Post-menopausal   Other Topics Concern    Are you pregnant or think you may be? No    Breast-feeding No   Social History Narrative    Lives with significant other.    No kids.    3 dogs and cat.    No exercise.    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     Social Determinants of Health     Stress: No Stress Concern Present (11/22/2019)    [employer] [place] [employer] - Occupational Stress Questionnaire     Feeling of Stress : Only a little       Current Outpatient Medications   Medication Sig Dispense Refill     "amLODIPine (NORVASC) 10 MG tablet Take 1 tablet (10 mg total) by mouth once daily. 90 tablet 3    aspirin 81 MG Chew Take 81 mg by mouth once daily.      azelastine (ASTELIN) 137 mcg (0.1 %) nasal spray 1 spray (137 mcg total) by Nasal route 2 (two) times daily. 30 mL 2    carvediloL (COREG) 12.5 MG tablet Take 1 tablet (12.5 mg total) by mouth once daily. 90 tablet 3    dorzolamide-timolol 2-0.5% (COSOPT) 22.3-6.8 mg/mL ophthalmic solution Place 1 drop into the left eye 2 (two) times daily. 10 mL 1    dulaglutide (TRULICITY) 1.5 mg/0.5 mL pen injector Inject 1.5 mg into the skin every 7 days. 12 pen 3    gabapentin (NEURONTIN) 300 MG capsule TAKE 1 CAPSULE 3 TIMES     DAILY AS NEEDED 90 capsule 11    glipiZIDE (GLUCOTROL) 10 MG tablet TAKE 1 TABLET TWICE A  tablet 0    ketoconazole (NIZORAL) 2 % shampoo Lather scalp, let sit 5 min before rinsing. Use 2-3 times per week. 360 mL 5    lancets Misc To check BG 1 times daily, to use with insurance preferred meter 100 each 11    multivitamin capsule Take 1 capsule by mouth once daily.      omeprazole (PRILOSEC) 20 MG capsule Take 2 capsules (40 mg total) by mouth 2 (two) times daily. 360 capsule 1    pen needle, diabetic (BD ULTRA-FINE ANTONY PEN NEEDLES) 32 gauge x 5/32" Ndle 1 Device by Misc.(Non-Drug; Combo Route) route 4 (four) times daily with meals and nightly. 100 each 11    prednisoLONE acetate (PRED FORTE) 1 % DrpS Place 1 drop into the left eye every 4 (four) hours. 5 mL 0    rosuvastatin (CRESTOR) 5 MG tablet Take 1 tablet (5 mg total) by mouth once daily. 90 tablet 3    SITagliptan-metformin (JANUMET) 50-1,000 mg per tablet Take 1 tablet by mouth 2 (two) times daily with meals. 180 tablet 0    telmisartan-hydrochlorothiazide (MICARDIS HCT) 80-25 mg per tablet Take 1 tablet by mouth once daily. 90 tablet 3    doxycycline (VIBRA-TABS) 100 MG tablet Take 1 tablet (100 mg total) by mouth 2 (two) times daily. 20 tablet 0     Current Facility-Administered " Medications   Medication Dose Route Frequency Provider Last Rate Last Admin    methylPREDNISolone acetate injection 40 mg  40 mg Intramuscular Once Chaitanya Mc MD           Review of patient's allergies indicates:   Allergen Reactions    Lipitor [atorvastatin] Other (See Comments)     Muscle pain    Penicillins Other (See Comments)     Unknown reaction       Physical examination  Vitals Reviewed\  Vitals:    09/29/23 0903   BP: 138/66   Pulse: 84   Temp: 98.4 °F (36.9 °C)     Body mass index is 30.81 kg/m².   Gen. Well-dressed well-nourished   Skin warm dry and intact.  No rashes noted.  HEENT.  TM intact bilateral with normal light reflex.  No mastoid tenderness during percussion.  Nares patent bilateral.  Pharynx is unremarkable.  ++ frontal sinus tenderness when percussed.    Neck is supple without adenopathy  Chest.  Respirations are even unlabored.  Lungs are clear to auscultation.  Cardiac regular rate and rhythm.  No chest wall adenopathy noted.  Neuro. Awake alert oriented x4.  Normal judgment and cognition noted.  Extremities no clubbing cyanosis or edema noted.     Call or return to clinic prn if these symptoms worsen or fail to improve as anticipated.

## 2023-10-02 ENCOUNTER — HOSPITAL ENCOUNTER (OUTPATIENT)
Dept: RADIOLOGY | Facility: HOSPITAL | Age: 68
Discharge: HOME | End: 2023-10-02
Attending: NURSE PRACTITIONER
Payer: COMMERCIAL

## 2023-10-02 DIAGNOSIS — H33.22 RETINAL DETACHMENT, LEFT: ICD-10-CM

## 2023-10-02 PROCEDURE — 93880 EXTRACRANIAL BILAT STUDY: CPT | Mod: 26,,, | Performed by: RADIOLOGY

## 2023-10-02 PROCEDURE — 93880 EXTRACRANIAL BILAT STUDY: CPT | Mod: TC

## 2023-10-02 PROCEDURE — 93880 US CAROTID BILATERAL: ICD-10-PCS | Mod: 26,,, | Performed by: RADIOLOGY

## 2023-10-03 ENCOUNTER — TELEPHONE (OUTPATIENT)
Dept: FAMILY MEDICINE | Facility: CLINIC | Age: 68
End: 2023-10-03
Payer: COMMERCIAL

## 2023-10-03 NOTE — TELEPHONE ENCOUNTER
Pt needs to see the eye doctor urgently for the eye symptoms. Eye discharge and Eye pressure.   Doxy may require more time to address sinus related symptoms.

## 2023-10-03 NOTE — TELEPHONE ENCOUNTER
Spoke to patient.   Instruction given per Mauricio Bear.   She does not think her eye problems are related to her detached retina surgery.  She has post op appt with Dr Dolan tomorrow.   She will continue with Doxy through tomorrow she will call back if not improving.   Instructed if any eye problem call Dr Dolan

## 2023-10-03 NOTE — TELEPHONE ENCOUNTER
----- Message from Lavonne Argueta sent at 10/3/2023  7:45 AM CDT -----  Regarding: advice  Contact: patient  Type: Needs Medical Advice  Who Called:  patient  Symptoms (please be specific):    How long has patient had these symptoms:    Pharmacy name and phone #:   [facility] - [place], [place] - [address]  [address]  [place] [address]  Phone: [phone] Fax: [phone]  Best Call Back Number: [phone] (mobile)    Additional Information: Patient states the doxycycline has not helped much for her sinus infection. She is still having eye discharge, scratchy throat, and eye pressure. Please call patient to advise.Thanks!

## 2023-10-04 ENCOUNTER — OFFICE VISIT (OUTPATIENT)
Dept: OPHTHALMOLOGY | Facility: CLINIC | Age: 68
End: 2023-10-04
Payer: COMMERCIAL

## 2023-10-04 DIAGNOSIS — H35.371 EPIRETINAL MEMBRANE, RIGHT: ICD-10-CM

## 2023-10-04 DIAGNOSIS — H33.002 MACULA-ON RHEGMATOGENOUS RETINAL DETACHMENT, LEFT: Primary | ICD-10-CM

## 2023-10-04 DIAGNOSIS — H25.13 AGE-RELATED NUCLEAR CATARACT OF BOTH EYES: ICD-10-CM

## 2023-10-04 PROCEDURE — 3288F PR FALLS RISK ASSESSMENT DOCUMENTED: ICD-10-PCS | Mod: CPTII,S$GLB,, | Performed by: OPHTHALMOLOGY

## 2023-10-04 PROCEDURE — 1126F AMNT PAIN NOTED NONE PRSNT: CPT | Mod: CPTII,S$GLB,, | Performed by: OPHTHALMOLOGY

## 2023-10-04 PROCEDURE — 99999 PR PBB SHADOW E&M-EST. PATIENT-LVL III: CPT | Mod: PBBFAC,,, | Performed by: OPHTHALMOLOGY

## 2023-10-04 PROCEDURE — 1160F RVW MEDS BY RX/DR IN RCRD: CPT | Mod: CPTII,S$GLB,, | Performed by: OPHTHALMOLOGY

## 2023-10-04 PROCEDURE — 3044F HG A1C LEVEL LT 7.0%: CPT | Mod: CPTII,S$GLB,, | Performed by: OPHTHALMOLOGY

## 2023-10-04 PROCEDURE — 1126F PR PAIN SEVERITY QUANTIFIED, NO PAIN PRESENT: ICD-10-PCS | Mod: CPTII,S$GLB,, | Performed by: OPHTHALMOLOGY

## 2023-10-04 PROCEDURE — 99999 PR PBB SHADOW E&M-EST. PATIENT-LVL III: ICD-10-PCS | Mod: PBBFAC,,, | Performed by: OPHTHALMOLOGY

## 2023-10-04 PROCEDURE — 3044F PR MOST RECENT HEMOGLOBIN A1C LEVEL <7.0%: ICD-10-PCS | Mod: CPTII,S$GLB,, | Performed by: OPHTHALMOLOGY

## 2023-10-04 PROCEDURE — 1159F PR MEDICATION LIST DOCUMENTED IN MEDICAL RECORD: ICD-10-PCS | Mod: CPTII,S$GLB,, | Performed by: OPHTHALMOLOGY

## 2023-10-04 PROCEDURE — 92134 OCT, RETINA - OU - BOTH EYES: ICD-10-PCS | Mod: S$GLB,,, | Performed by: OPHTHALMOLOGY

## 2023-10-04 PROCEDURE — 1160F PR REVIEW ALL MEDS BY PRESCRIBER/CLIN PHARMACIST DOCUMENTED: ICD-10-PCS | Mod: CPTII,S$GLB,, | Performed by: OPHTHALMOLOGY

## 2023-10-04 PROCEDURE — 1159F MED LIST DOCD IN RCRD: CPT | Mod: CPTII,S$GLB,, | Performed by: OPHTHALMOLOGY

## 2023-10-04 PROCEDURE — 1101F PT FALLS ASSESS-DOCD LE1/YR: CPT | Mod: CPTII,S$GLB,, | Performed by: OPHTHALMOLOGY

## 2023-10-04 PROCEDURE — 3288F FALL RISK ASSESSMENT DOCD: CPT | Mod: CPTII,S$GLB,, | Performed by: OPHTHALMOLOGY

## 2023-10-04 PROCEDURE — 92134 CPTRZ OPH DX IMG PST SGM RTA: CPT | Mod: S$GLB,,, | Performed by: OPHTHALMOLOGY

## 2023-10-04 PROCEDURE — 99024 PR POST-OP FOLLOW-UP VISIT: ICD-10-PCS | Mod: S$GLB,,, | Performed by: OPHTHALMOLOGY

## 2023-10-04 PROCEDURE — 1101F PR PT FALLS ASSESS DOC 0-1 FALLS W/OUT INJ PAST YR: ICD-10-PCS | Mod: CPTII,S$GLB,, | Performed by: OPHTHALMOLOGY

## 2023-10-04 PROCEDURE — 99024 POSTOP FOLLOW-UP VISIT: CPT | Mod: S$GLB,,, | Performed by: OPHTHALMOLOGY

## 2023-10-04 NOTE — PROGRESS NOTES
HPI    DLS: 9/26/2023-     Pt states still seeing bubble lower part of OS.  makes depth perception   feel off.  States seems to be the same size. States moves up when she   starts any activity like walking. Closes OS to do things.     DROPS: PF 3 / ocuflox 3     States warm compress on sinuses and cool compress on eyes (cool compress   made sinuses feel worse)    States started ABX for sunuses on Thursday. Taking doxy BID x 10 days.   Some discharge in OS still.       Denies pain/ FOL. Small floaters OS.   Last edited by Abbey Pabon on 10/4/2023  9:57 AM.          A/P    ICD-10-CM ICD-9-CM   1. 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 Age-related nuclear cataract of both eyes  H25.13 366.16   3. Epiretinal membrane, right  H35.371 362.56         1. 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 Moraes for RD eval   Pt noted flash in vision over past 2 weeks    Initial visit has mac on RD with focal break at 1 oclock with detachment from 12-3 with     S/p cryo/pneumo 9/15/23  S/p Lrx 9/18/23    Today injection improved,, break flat with cryo and laser, inf displaced SRF resolved, gas about 15%    Plan:   observe, attached, can d/c PF/Ocuflox PF    Head Up Position, sleep right side     2. Age-related nuclear cataract of both eyes  Mild NS, NVS  Plan: Observation Update Mrx prn    3. Epiretinal membrane, right  Mod ERM, NVS per pt  Plan: Observation    RTC 1 mo [place]  DFE/OCTm OS        I saw and examined the patient and reviewed in detail the findings documented. The final examination findings, image interpretations, and plan as documented in the record represent my personal judgment and conclusions.    Americo Dolan MD  Vitreoretinal Surgery   [facility]

## 2023-10-20 ENCOUNTER — TELEPHONE (OUTPATIENT)
Dept: FAMILY MEDICINE | Facility: CLINIC | Age: 68
End: 2023-10-20
Payer: COMMERCIAL

## 2023-10-20 NOTE — TELEPHONE ENCOUNTER
----- Message from Claire Greer sent at 10/20/2023  1:03 PM CDT -----  Regarding: RX Refill Request  Contact: patient at [phone]  Type:  RX Refill Request    Who Called:  patient at [phone]    Preferred Pharmacy with phone number:    [facility] - [place], [place] - [address]  [address]  [place] [address]  Phone: [phone] Fax: [phone]    Additional Information:  patient is requesting a stronger dosage of the medication below for her ear pain, watery eyes. Please call and advise. Thank you    doxycycline (VIBRA-TABS) 100 MG tablet 20 tablet 0 9/29/2023 -   Sig - Route: Take 1 tablet (100 mg total) by mouth 2 (two) times daily. - Oral   Sent to pharmacy as: doxycycline (VIBRA-TABS) 100 MG tablet   E-Prescribing Status: Receipt confirmed by pharmacy (9/29/2023  9:29 AM CDT)

## 2023-10-20 NOTE — TELEPHONE ENCOUNTER
Pt needs further assessment. She may go to [facility] for testing and treatment.     Recent doxy use. VV or evisit not appropriate.

## 2023-10-23 NOTE — TELEPHONE ENCOUNTER
Returned patients call in regards to symptoms she reported. Patient  stated she is feeling better and she will call us if she needed anything. Verbalized understanding.

## 2023-11-01 ENCOUNTER — PATIENT MESSAGE (OUTPATIENT)
Dept: ADMINISTRATIVE | Facility: HOSPITAL | Age: 68
End: 2023-11-01
Payer: COMMERCIAL

## 2023-11-08 ENCOUNTER — TELEPHONE (OUTPATIENT)
Dept: OPHTHALMOLOGY | Facility: CLINIC | Age: 68
End: 2023-11-08
Payer: COMMERCIAL

## 2023-11-14 ENCOUNTER — PATIENT MESSAGE (OUTPATIENT)
Dept: INTERNAL MEDICINE | Facility: CLINIC | Age: 68
End: 2023-11-14
Payer: COMMERCIAL

## 2023-11-15 ENCOUNTER — TELEPHONE (OUTPATIENT)
Dept: OPHTHALMOLOGY | Facility: CLINIC | Age: 68
End: 2023-11-15
Payer: COMMERCIAL

## 2023-11-17 ENCOUNTER — OFFICE VISIT (OUTPATIENT)
Dept: FAMILY MEDICINE | Facility: CLINIC | Age: 68
End: 2023-11-17
Payer: COMMERCIAL

## 2023-11-17 VITALS
BODY MASS INDEX: 31.04 KG/M2 | WEIGHT: 204.81 LBS | TEMPERATURE: 98 F | OXYGEN SATURATION: 97 % | HEART RATE: 86 BPM | HEIGHT: 68 IN

## 2023-11-17 DIAGNOSIS — R09.81 NASAL CONGESTION: ICD-10-CM

## 2023-11-17 DIAGNOSIS — J06.9 VIRAL URI: Primary | ICD-10-CM

## 2023-11-17 DIAGNOSIS — E11.65 TYPE 2 DIABETES MELLITUS WITH HYPERGLYCEMIA, WITHOUT LONG-TERM CURRENT USE OF INSULIN: ICD-10-CM

## 2023-11-17 DIAGNOSIS — R50.9 FEVER, UNSPECIFIED FEVER CAUSE: ICD-10-CM

## 2023-11-17 LAB
CTP QC/QA: YES
SARS-COV-2 RDRP RESP QL NAA+PROBE: NEGATIVE

## 2023-11-17 PROCEDURE — 1125F PR PAIN SEVERITY QUANTIFIED, PAIN PRESENT: ICD-10-PCS | Mod: CPTII,S$GLB,,

## 2023-11-17 PROCEDURE — 3288F PR FALLS RISK ASSESSMENT DOCUMENTED: ICD-10-PCS | Mod: CPTII,S$GLB,,

## 2023-11-17 PROCEDURE — 1101F PR PT FALLS ASSESS DOC 0-1 FALLS W/OUT INJ PAST YR: ICD-10-PCS | Mod: CPTII,S$GLB,,

## 2023-11-17 PROCEDURE — 1159F MED LIST DOCD IN RCRD: CPT | Mod: CPTII,S$GLB,,

## 2023-11-17 PROCEDURE — 3044F PR MOST RECENT HEMOGLOBIN A1C LEVEL <7.0%: ICD-10-PCS | Mod: CPTII,S$GLB,,

## 2023-11-17 PROCEDURE — 3008F PR BODY MASS INDEX (BMI) DOCUMENTED: ICD-10-PCS | Mod: CPTII,S$GLB,,

## 2023-11-17 PROCEDURE — 1159F PR MEDICATION LIST DOCUMENTED IN MEDICAL RECORD: ICD-10-PCS | Mod: CPTII,S$GLB,,

## 2023-11-17 PROCEDURE — 1160F PR REVIEW ALL MEDS BY PRESCRIBER/CLIN PHARMACIST DOCUMENTED: ICD-10-PCS | Mod: CPTII,S$GLB,,

## 2023-11-17 PROCEDURE — 1160F RVW MEDS BY RX/DR IN RCRD: CPT | Mod: CPTII,S$GLB,,

## 2023-11-17 PROCEDURE — 87635 SARS-COV-2 COVID-19 AMP PRB: CPT | Mod: QW,S$GLB,,

## 2023-11-17 PROCEDURE — 1125F AMNT PAIN NOTED PAIN PRSNT: CPT | Mod: CPTII,S$GLB,,

## 2023-11-17 PROCEDURE — 99999 PR PBB SHADOW E&M-EST. PATIENT-LVL V: CPT | Mod: PBBFAC,,,

## 2023-11-17 PROCEDURE — 87635: ICD-10-PCS | Mod: QW,S$GLB,,

## 2023-11-17 PROCEDURE — 99999 PR PBB SHADOW E&M-EST. PATIENT-LVL V: ICD-10-PCS | Mod: PBBFAC,,,

## 2023-11-17 PROCEDURE — 1101F PT FALLS ASSESS-DOCD LE1/YR: CPT | Mod: CPTII,S$GLB,,

## 2023-11-17 PROCEDURE — 99213 OFFICE O/P EST LOW 20 MIN: CPT | Mod: S$GLB,,,

## 2023-11-17 PROCEDURE — 3044F HG A1C LEVEL LT 7.0%: CPT | Mod: CPTII,S$GLB,,

## 2023-11-17 PROCEDURE — 3288F FALL RISK ASSESSMENT DOCD: CPT | Mod: CPTII,S$GLB,,

## 2023-11-17 PROCEDURE — 3008F BODY MASS INDEX DOCD: CPT | Mod: CPTII,S$GLB,,

## 2023-11-17 PROCEDURE — 99213 PR OFFICE/OUTPT VISIT, EST, LEVL III, 20-29 MIN: ICD-10-PCS | Mod: S$GLB,,,

## 2023-11-17 RX ORDER — GLIPIZIDE 10 MG/1
10 TABLET ORAL 2 TIMES DAILY
Qty: 180 TABLET | Refills: 3 | Status: SHIPPED | OUTPATIENT
Start: 2023-11-17

## 2023-11-17 NOTE — PROGRESS NOTES
Subjective:       Patient ID: Yanci Coats is a 68 y.o. female.    Chief Complaint: Nasal Congestion      Yanci Coats is a 68 y.o. female with history of DM2, HTN, AR who presents to clinic for evaluation of congestion x 7 days. Associated symptoms include chills, fever, rhinorrhea, sinus pain, sore throat, hoarseness, mild dyspnea, myalgias and headache. Cough has resolved. She has been taking OTC sinus medication, Afrin, and Benadryl as needed for her symptoms. She attended a wedding last Saturday, and she was recently informed many of the guests now have Covid. Pt states she feels as if her symptoms are improving.    DM2. She also requests a refill of her Glipizide today. She has routine f/u with Dr. Nye in December.         Review of Systems   Constitutional:  Positive for chills, fatigue and fever. Negative for appetite change.   HENT:  Positive for congestion, postnasal drip, rhinorrhea, sinus pain, sore throat and voice change. Negative for ear pain.    Respiratory:  Positive for shortness of breath. Negative for cough and wheezing.    Cardiovascular:  Negative for chest pain, palpitations and leg swelling.   Gastrointestinal:  Negative for abdominal pain, constipation, diarrhea, nausea and vomiting.   Musculoskeletal:  Positive for myalgias.   Skin:  Negative for rash.   Neurological:  Positive for headaches. Negative for dizziness and light-headedness.   Psychiatric/Behavioral:  Negative for dysphoric mood. The patient is not nervous/anxious.          Past Medical History:   Diagnosis Date    Allergy history unknown     Arthritis     DDD    Back pain     Cataract     OU    Colon polyps 01/31/2019    Diabetes mellitus type II     GERD (gastroesophageal reflux disease)     Hypertension     LPRD (laryngopharyngeal reflux disease)     Nuclear sclerosis - Both Eyes 7/9/2013       Review of patient's allergies indicates:   Allergen Reactions    Lipitor [atorvastatin] Other (See Comments)      "Muscle pain    Penicillins Other (See Comments)     Unknown reaction         Current Outpatient Medications:     amLODIPine (NORVASC) 10 MG tablet, Take 1 tablet (10 mg total) by mouth once daily., Disp: 90 tablet, Rfl: 3    aspirin 81 MG Chew, Take 81 mg by mouth once daily., Disp: , Rfl:     azelastine (ASTELIN) 137 mcg (0.1 %) nasal spray, 1 spray (137 mcg total) by Nasal route 2 (two) times daily., Disp: 30 mL, Rfl: 2    carvediloL (COREG) 12.5 MG tablet, Take 1 tablet (12.5 mg total) by mouth once daily., Disp: 90 tablet, Rfl: 3    dorzolamide-timolol 2-0.5% (COSOPT) 22.3-6.8 mg/mL ophthalmic solution, Place 1 drop into the left eye 2 (two) times daily., Disp: 10 mL, Rfl: 1    dulaglutide (TRULICITY) 1.5 mg/0.5 mL pen injector, Inject 1.5 mg into the skin every 7 days., Disp: 12 pen , Rfl: 3    gabapentin (NEURONTIN) 300 MG capsule, TAKE 1 CAPSULE 3 TIMES     DAILY AS NEEDED, Disp: 90 capsule, Rfl: 11    ketoconazole (NIZORAL) 2 % shampoo, Lather scalp, let sit 5 min before rinsing. Use 2-3 times per week., Disp: 360 mL, Rfl: 5    lancets Misc, To check BG 1 times daily, to use with insurance preferred meter, Disp: 100 each, Rfl: 11    multivitamin capsule, Take 1 capsule by mouth once daily., Disp: , Rfl:     omeprazole (PRILOSEC) 20 MG capsule, Take 2 capsules (40 mg total) by mouth 2 (two) times daily., Disp: 360 capsule, Rfl: 1    pen needle, diabetic (BD ULTRA-FINE ANTONY PEN NEEDLES) 32 gauge x 5/32" Ndle, 1 Device by Misc.(Non-Drug; Combo Route) route 4 (four) times daily with meals and nightly., Disp: 100 each, Rfl: 11    prednisoLONE acetate (PRED FORTE) 1 % DrpS, Place 1 drop into the left eye every 4 (four) hours., Disp: 5 mL, Rfl: 0    rosuvastatin (CRESTOR) 5 MG tablet, Take 1 tablet (5 mg total) by mouth once daily., Disp: 90 tablet, Rfl: 3    SITagliptan-metformin (JANUMET) 50-1,000 mg per tablet, Take 1 tablet by mouth 2 (two) times daily with meals., Disp: 180 tablet, Rfl: 0    " "telmisartan-hydrochlorothiazide (MICARDIS HCT) 80-25 mg per tablet, Take 1 tablet by mouth once daily., Disp: 90 tablet, Rfl: 3    doxycycline (VIBRA-TABS) 100 MG tablet, Take 1 tablet (100 mg total) by mouth 2 (two) times daily. (Patient not taking: Reported on 11/17/2023), Disp: 20 tablet, Rfl: 0    glipiZIDE (GLUCOTROL) 10 MG tablet, Take 1 tablet (10 mg total) by mouth 2 (two) times daily., Disp: 180 tablet, Rfl: 3    Current Facility-Administered Medications:     methylPREDNISolone acetate injection 40 mg, 40 mg, Intramuscular, Once, Chaitanya Mc MD    Objective:        Physical Exam  Vitals reviewed.   Constitutional:       General: She is not in acute distress.     Appearance: Normal appearance.   HENT:      Head: Normocephalic and atraumatic.      Right Ear: Tympanic membrane and ear canal normal.      Left Ear: Tympanic membrane and ear canal normal.      Nose: Nose normal.      Mouth/Throat:      Pharynx: Oropharynx is clear. Posterior oropharyngeal erythema present. No oropharyngeal exudate.   Eyes:      Conjunctiva/sclera: Conjunctivae normal.   Cardiovascular:      Rate and Rhythm: Normal rate and regular rhythm.      Heart sounds: Normal heart sounds.   Pulmonary:      Effort: Pulmonary effort is normal.      Breath sounds: Normal breath sounds. No wheezing, rhonchi or rales.   Musculoskeletal:         General: Normal range of motion.      Cervical back: Normal range of motion and neck supple.   Skin:     General: Skin is warm and dry.   Neurological:      Mental Status: She is alert and oriented to person, place, and time.   Psychiatric:         Mood and Affect: Mood normal.         Behavior: Behavior normal.         Thought Content: Thought content normal.         Judgment: Judgment normal.           Visit Vitals  BP (P) 136/86   Pulse 86   Temp 97.7 °F (36.5 °C)   Ht 5' 8" (1.727 m)   Wt 92.9 kg (204 lb 12.9 oz)   LMP  (LMP Unknown)   SpO2 97%   BMI 31.14 kg/m²      Assessment:         1. " Viral URI    2. Type 2 diabetes mellitus with hyperglycemia, without long-term current use of insulin    3. Nasal congestion    4. Fever, unspecified fever cause        Plan:     Yanci was seen today for nasal congestion.    Diagnoses and all orders for this visit:    Viral URI      -    Continue symptomatic treatment.       -    Recommend hot tea/ honey/ throat lozenges as needed for throat pain or cough       -    Tylenol 500 mg or Ibuprofen 200 mg 2 tabs every 6 hours as needed for fever, headache, body aches, throat pain, etc      Type 2 diabetes mellitus with hyperglycemia, without long-term current use of insulin  -     glipiZIDE (GLUCOTROL) 10 MG tablet; Take 1 tablet (10 mg total) by mouth 2 (two) times daily.    Nasal congestion  -     POCT COVID-19 Rapid Screening    Fever, unspecified fever cause  -     POCT COVID-19 Rapid Screening     Follow up if symptoms worsen or fail to improve.         Family Medicine Physician Assistant     Future Appointments       Date Provider Specialty Appt Notes    11/22/2023 Americo Dolan MD Ophthalmology 1 mon  DFe/ OCT OS    12/1/2023 Anurag Nye MD Family Medicine 6 month    12/5/2023 Leonides Moraes, YULIYA Optometry Comprehensive eye exam             Tests to Keep You Healthy    Mammogram: ORDERED BUT NOT SCHEDULED  Eye Exam: Met on 9/15/2023  Colon Cancer Screening: DUE  Last Blood Pressure <= 139/89 (11/17/2023): Yes  Last HbA1c < 8 (08/08/2023): Yes       I spent a total of 25 minutes on the day of the visit.This includes face to face time and non-face to face time preparing to see the patient (eg, review of tests), obtaining and/or reviewing separately obtained history, documenting clinical information in the electronic or other health record, independently interpreting results and communicating results to the patient/family/caregiver, or care coordinator.    We have addressed [3] Low: 2 or more self-limited or minor problems / 1 stable chronic illness / 1  acute, uncomplicated illness or injury  The complexity of the data reviewed and analyzed for this visit was [3] Limited (Reviewed prior external note, ordered unique testing or reviewed the results of each unique test)   The risk of complications and/or morbidity or mortality are [3] Low risk   The level of Medical Decision Making for this visit is [3] Low

## 2023-11-22 ENCOUNTER — OFFICE VISIT (OUTPATIENT)
Dept: OPHTHALMOLOGY | Facility: CLINIC | Age: 68
End: 2023-11-22
Payer: COMMERCIAL

## 2023-11-22 DIAGNOSIS — H33.002 MACULA-ON RHEGMATOGENOUS RETINAL DETACHMENT, LEFT: Primary | ICD-10-CM

## 2023-11-22 PROCEDURE — 99999 PR PBB SHADOW E&M-EST. PATIENT-LVL III: ICD-10-PCS | Mod: PBBFAC,,, | Performed by: OPHTHALMOLOGY

## 2023-11-22 PROCEDURE — 1159F MED LIST DOCD IN RCRD: CPT | Mod: CPTII,S$GLB,, | Performed by: OPHTHALMOLOGY

## 2023-11-22 PROCEDURE — 1160F PR REVIEW ALL MEDS BY PRESCRIBER/CLIN PHARMACIST DOCUMENTED: ICD-10-PCS | Mod: CPTII,S$GLB,, | Performed by: OPHTHALMOLOGY

## 2023-11-22 PROCEDURE — 3288F PR FALLS RISK ASSESSMENT DOCUMENTED: ICD-10-PCS | Mod: CPTII,S$GLB,, | Performed by: OPHTHALMOLOGY

## 2023-11-22 PROCEDURE — 3288F FALL RISK ASSESSMENT DOCD: CPT | Mod: CPTII,S$GLB,, | Performed by: OPHTHALMOLOGY

## 2023-11-22 PROCEDURE — 1101F PR PT FALLS ASSESS DOC 0-1 FALLS W/OUT INJ PAST YR: ICD-10-PCS | Mod: CPTII,S$GLB,, | Performed by: OPHTHALMOLOGY

## 2023-11-22 PROCEDURE — 92134 CPTRZ OPH DX IMG PST SGM RTA: CPT | Mod: S$GLB,,, | Performed by: OPHTHALMOLOGY

## 2023-11-22 PROCEDURE — 1126F AMNT PAIN NOTED NONE PRSNT: CPT | Mod: CPTII,S$GLB,, | Performed by: OPHTHALMOLOGY

## 2023-11-22 PROCEDURE — 92134 OCT, RETINA - OU - BOTH EYES: ICD-10-PCS | Mod: S$GLB,,, | Performed by: OPHTHALMOLOGY

## 2023-11-22 PROCEDURE — 3044F HG A1C LEVEL LT 7.0%: CPT | Mod: CPTII,S$GLB,, | Performed by: OPHTHALMOLOGY

## 2023-11-22 PROCEDURE — 3044F PR MOST RECENT HEMOGLOBIN A1C LEVEL <7.0%: ICD-10-PCS | Mod: CPTII,S$GLB,, | Performed by: OPHTHALMOLOGY

## 2023-11-22 PROCEDURE — 1159F PR MEDICATION LIST DOCUMENTED IN MEDICAL RECORD: ICD-10-PCS | Mod: CPTII,S$GLB,, | Performed by: OPHTHALMOLOGY

## 2023-11-22 PROCEDURE — 1126F PR PAIN SEVERITY QUANTIFIED, NO PAIN PRESENT: ICD-10-PCS | Mod: CPTII,S$GLB,, | Performed by: OPHTHALMOLOGY

## 2023-11-22 PROCEDURE — 99024 POSTOP FOLLOW-UP VISIT: CPT | Mod: S$GLB,,, | Performed by: OPHTHALMOLOGY

## 2023-11-22 PROCEDURE — 1101F PT FALLS ASSESS-DOCD LE1/YR: CPT | Mod: CPTII,S$GLB,, | Performed by: OPHTHALMOLOGY

## 2023-11-22 PROCEDURE — 99999 PR PBB SHADOW E&M-EST. PATIENT-LVL III: CPT | Mod: PBBFAC,,, | Performed by: OPHTHALMOLOGY

## 2023-11-22 PROCEDURE — 99024 PR POST-OP FOLLOW-UP VISIT: ICD-10-PCS | Mod: S$GLB,,, | Performed by: OPHTHALMOLOGY

## 2023-11-22 PROCEDURE — 1160F RVW MEDS BY RX/DR IN RCRD: CPT | Mod: CPTII,S$GLB,, | Performed by: OPHTHALMOLOGY

## 2023-11-22 NOTE — PROGRESS NOTES
HPI    Pt presents for one month dfe/oct OS only     States OS is doing okay, but still sometimes hurts a bit.     No ocular meds  Last edited by Ely Cordova on 11/22/2023  9:30 AM.          A/P    ICD-10-CM ICD-9-CM   1. 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 Moraes for RD eval   Pt noted flash in vision over past 2 weeks    Initial visit has mac on RD with focal break at 1 oclock with detachment from 12-3 with     S/p cryo/pneumo 9/15/23  S/p Lrx 9/18/23    Today break flat with cryo and laser, inf displaced SRF resolved, gas resolved    Plan:   observe, attached,    2. Age-related nuclear cataract of both eyes  Mild NS, NVS  Plan: Observation Update Mrx prn    3. Epiretinal membrane, right  Mod ERM, NVS per pt  Plan: Observation    RTC 3-4mo [place]  DFE/OCTm OU      I saw and examined the patient and reviewed in detail the findings documented. The final examination findings, image interpretations, and plan as documented in the record represent my personal judgment and conclusions.    Americo Dolan MD  Vitreoretinal Surgery   [facility]

## 2023-11-28 ENCOUNTER — TELEPHONE (OUTPATIENT)
Dept: OPHTHALMOLOGY | Facility: CLINIC | Age: 68
End: 2023-11-28
Payer: COMMERCIAL

## 2023-11-28 NOTE — TELEPHONE ENCOUNTER
Pt has out of network eyemed plan. Spoke to pt she does not need glasses recently got new ones wants medical comprehensive exam

## 2023-12-13 ENCOUNTER — OFFICE VISIT (OUTPATIENT)
Dept: FAMILY MEDICINE | Facility: CLINIC | Age: 68
End: 2023-12-13
Payer: COMMERCIAL

## 2023-12-13 VITALS
SYSTOLIC BLOOD PRESSURE: 130 MMHG | TEMPERATURE: 98 F | OXYGEN SATURATION: 99 % | DIASTOLIC BLOOD PRESSURE: 88 MMHG | HEART RATE: 83 BPM | WEIGHT: 206.13 LBS | HEIGHT: 68 IN | BODY MASS INDEX: 31.24 KG/M2

## 2023-12-13 DIAGNOSIS — Z12.31 ENCOUNTER FOR SCREENING MAMMOGRAM FOR MALIGNANT NEOPLASM OF BREAST: ICD-10-CM

## 2023-12-13 DIAGNOSIS — E11.65 TYPE 2 DIABETES MELLITUS WITH HYPERGLYCEMIA, UNSPECIFIED WHETHER LONG TERM INSULIN USE: Primary | ICD-10-CM

## 2023-12-13 DIAGNOSIS — J31.0 CHRONIC RHINITIS: ICD-10-CM

## 2023-12-13 DIAGNOSIS — R09.89 LYMPH NODE SYMPTOM: ICD-10-CM

## 2023-12-13 PROCEDURE — 3288F PR FALLS RISK ASSESSMENT DOCUMENTED: ICD-10-PCS | Mod: CPTII,S$GLB,, | Performed by: FAMILY MEDICINE

## 2023-12-13 PROCEDURE — 3044F HG A1C LEVEL LT 7.0%: CPT | Mod: CPTII,S$GLB,, | Performed by: FAMILY MEDICINE

## 2023-12-13 PROCEDURE — 1101F PT FALLS ASSESS-DOCD LE1/YR: CPT | Mod: CPTII,S$GLB,, | Performed by: FAMILY MEDICINE

## 2023-12-13 PROCEDURE — 1126F PR PAIN SEVERITY QUANTIFIED, NO PAIN PRESENT: ICD-10-PCS | Mod: CPTII,S$GLB,, | Performed by: FAMILY MEDICINE

## 2023-12-13 PROCEDURE — 3008F BODY MASS INDEX DOCD: CPT | Mod: CPTII,S$GLB,, | Performed by: FAMILY MEDICINE

## 2023-12-13 PROCEDURE — 3288F FALL RISK ASSESSMENT DOCD: CPT | Mod: CPTII,S$GLB,, | Performed by: FAMILY MEDICINE

## 2023-12-13 PROCEDURE — 1159F PR MEDICATION LIST DOCUMENTED IN MEDICAL RECORD: ICD-10-PCS | Mod: CPTII,S$GLB,, | Performed by: FAMILY MEDICINE

## 2023-12-13 PROCEDURE — 99397 PER PM REEVAL EST PAT 65+ YR: CPT | Mod: S$GLB,,, | Performed by: FAMILY MEDICINE

## 2023-12-13 PROCEDURE — 3075F SYST BP GE 130 - 139MM HG: CPT | Mod: CPTII,S$GLB,, | Performed by: FAMILY MEDICINE

## 2023-12-13 PROCEDURE — 3079F PR MOST RECENT DIASTOLIC BLOOD PRESSURE 80-89 MM HG: ICD-10-PCS | Mod: CPTII,S$GLB,, | Performed by: FAMILY MEDICINE

## 2023-12-13 PROCEDURE — 3044F PR MOST RECENT HEMOGLOBIN A1C LEVEL <7.0%: ICD-10-PCS | Mod: CPTII,S$GLB,, | Performed by: FAMILY MEDICINE

## 2023-12-13 PROCEDURE — 99397 PR PREVENTIVE VISIT,EST,65 & OVER: ICD-10-PCS | Mod: S$GLB,,, | Performed by: FAMILY MEDICINE

## 2023-12-13 PROCEDURE — 1126F AMNT PAIN NOTED NONE PRSNT: CPT | Mod: CPTII,S$GLB,, | Performed by: FAMILY MEDICINE

## 2023-12-13 PROCEDURE — 99999 PR PBB SHADOW E&M-EST. PATIENT-LVL V: ICD-10-PCS | Mod: PBBFAC,,, | Performed by: FAMILY MEDICINE

## 2023-12-13 PROCEDURE — 3079F DIAST BP 80-89 MM HG: CPT | Mod: CPTII,S$GLB,, | Performed by: FAMILY MEDICINE

## 2023-12-13 PROCEDURE — 3075F PR MOST RECENT SYSTOLIC BLOOD PRESS GE 130-139MM HG: ICD-10-PCS | Mod: CPTII,S$GLB,, | Performed by: FAMILY MEDICINE

## 2023-12-13 PROCEDURE — 99999 PR PBB SHADOW E&M-EST. PATIENT-LVL V: CPT | Mod: PBBFAC,,, | Performed by: FAMILY MEDICINE

## 2023-12-13 PROCEDURE — 3008F PR BODY MASS INDEX (BMI) DOCUMENTED: ICD-10-PCS | Mod: CPTII,S$GLB,, | Performed by: FAMILY MEDICINE

## 2023-12-13 PROCEDURE — 1159F MED LIST DOCD IN RCRD: CPT | Mod: CPTII,S$GLB,, | Performed by: FAMILY MEDICINE

## 2023-12-13 PROCEDURE — 1101F PR PT FALLS ASSESS DOC 0-1 FALLS W/OUT INJ PAST YR: ICD-10-PCS | Mod: CPTII,S$GLB,, | Performed by: FAMILY MEDICINE

## 2023-12-13 RX ORDER — FLUTICASONE PROPIONATE 50 MCG
1 SPRAY, SUSPENSION (ML) NASAL DAILY
Qty: 16 G | Refills: 11 | Status: SHIPPED | OUTPATIENT
Start: 2023-12-13 | End: 2024-12-12

## 2023-12-13 NOTE — PATIENT INSTRUCTIONS
Andi Pete,     If you are due for any health screening(s) below please notify me so we can arrange them to be ordered and scheduled to maintain your health. Most healthy patients complete it. Don't lose out on improving your health.     Tests to Keep You Healthy    Mammogram: ORDERED BUT NOT SCHEDULED  Eye Exam: Met on 9/15/2023  Colon Cancer Screening: DUE  Last Blood Pressure <= 139/89 (12/13/2023): Yes  Last HbA1c < 8 (08/08/2023): Yes      Breast Cancer Screening    Breast cancer is the second most common cancer in women after skin cancer, and the second leading cause of death from cancer after lung cancer. Mammograms can detect breast cancer early, which significantly increases the chances of curing the cancer.      A screening mammogram is an x-ray image of the breasts used for early breast cancer detection. It can help reduce the number of deaths from breast cancer among women. To get a clear image, the breast is placed between two plastic plates to make it flat. How often a mammogram is needed depends on your age and your breast cancer risk.    Colon Cancer Screening    Of cancers affecting both men and women, colorectal cancer is the third leading cancer killer in the [place]. But it doesnt have to be. Screening can prevent colorectal cancer or find it at an early stage when treatment often leads to a cure.    A colonoscopy is the preferred test for detecting colon cancer. It is needed only once every 10 years if results are negative. While sedated, a flexible, lighted tube with a tiny camera is inserted into the rectum and advanced through the colon to look for cancers. An alternative screening test that is used at home and returned to the lab may also be used. It detects hidden blood in bowel movements which could indicate cancer in the colon. If results are positive, you will need a colonoscopy to determine if the blood is a sign of cancer. This type of follow up (diagnostic) colonoscopy usually  requires additional copays as required by your insurance provider. Please contact your PCP if you have any questions.

## 2023-12-13 NOTE — PROGRESS NOTES
Subjective:   Patient ID: Yanci Coats is a 68 y.o. female     Chief Complaint:Follow-up      Here for checkup. Doing well. Notes recognizing a finding in her neck similar to lymph node.    Follow-up  Pertinent negatives include no abdominal pain or chest pain.   Review of Systems   Respiratory:  Negative for shortness of breath.    Cardiovascular:  Negative for chest pain.   Gastrointestinal:  Negative for abdominal pain.   Genitourinary:  Negative for dysuria.     Past Medical History:   Diagnosis Date    Allergy history unknown     Arthritis     DDD    Back pain     Cataract     OU    Colon polyps 01/31/2019    Diabetes mellitus type II     GERD (gastroesophageal reflux disease)     Hypertension     LPRD (laryngopharyngeal reflux disease)     Nuclear sclerosis - Both Eyes 7/9/2013     Past Surgical History:   Procedure Laterality Date    COLONOSCOPY N/A 1/31/2019    Procedure: COLONOSCOPY;  Surgeon: Ronak Palacios MD;  Location: [facility];  Service: Endoscopy;  Laterality: N/A;     Objective:     Vitals:    12/13/23 0730   BP: 130/88   Pulse: 83   Temp: 98 °F (36.7 °C)     Body mass index is 31.34 kg/m².  Physical Exam  Vitals and nursing note reviewed.   Constitutional:       Appearance: She is well-developed.   HENT:      Head: Normocephalic and atraumatic.   Eyes:      General: No scleral icterus.     Conjunctiva/sclera: Conjunctivae normal.   Cardiovascular:      Pulses:           Dorsalis pedis pulses are 2+ on the right side and 2+ on the left side.        Posterior tibial pulses are 2+ on the right side and 2+ on the left side.      Heart sounds: No murmur heard.  Pulmonary:      Effort: Pulmonary effort is normal. No respiratory distress.   Musculoskeletal:         General: No deformity. Normal range of motion.      Cervical back: Normal range of motion and neck supple.      Right foot: Normal range of motion.      Left foot: Normal range of motion.   Feet:      Right foot:      Protective  Sensation: 4 sites tested.  4 sites sensed.      Skin integrity: Skin integrity normal.      Left foot:      Protective Sensation: 4 sites tested.  4 sites sensed.      Skin integrity: Skin integrity normal.   Skin:     Coloration: Skin is not pale.      Findings: No rash.   Neurological:      Mental Status: She is alert and oriented to person, place, and time.   Psychiatric:         Behavior: Behavior normal.         Thought Content: Thought content normal.         Judgment: Judgment normal.         1. Type 2 diabetes mellitus with hyperglycemia, unspecified whether long term insulin use    2. Chronic rhinitis    3. Encounter for screening mammogram for malignant neoplasm of breast    4. Lymph node symptom      Plan:   Type 2 diabetes mellitus with hyperglycemia, unspecified whether long term insulin use  -     Comprehensive Metabolic Panel; Future; Expected date: 12/13/2023  -     Hemoglobin A1C; Future; Expected date: 12/13/2023  -     Hemoglobin; Future; Expected date: 12/13/2023  -     WBC; Future; Expected date: 12/13/2023  -     Platelet Count, Blue Top; Future; Expected date: 12/13/2023  -     Lipid Panel; Future; Expected date: 12/13/2023    Chronic rhinitis  -     fluticasone propionate (FLONASE) 50 mcg/actuation nasal spray; 1 spray (50 mcg total) by Each Nostril route once daily.  Dispense: 16 g; Refill: 11    Encounter for screening mammogram for malignant neoplasm of breast  -     Mammo Digital Screening Bilat w/ Stanley; Future; Expected date: 12/13/2023    Lymph node symptom  Submandibular lymph node of few millimeters in size with normal elasticity. Counseled on expectations and when to follow up for more concerning findings.        Established patient with me has been instructed that must see me at least 1 time yearly (every 365 days) for refills of medications. Seeing other providers in this clinic is fine but expectation is to see me yearly.    Anurag Nye MD  12/13/2023    Portions of this  note have been dictated with M Modal.         normal (ped)...

## 2024-01-05 ENCOUNTER — PATIENT MESSAGE (OUTPATIENT)
Dept: ADMINISTRATIVE | Facility: HOSPITAL | Age: 69
End: 2024-01-05
Payer: COMMERCIAL

## 2024-01-10 ENCOUNTER — OFFICE VISIT (OUTPATIENT)
Dept: OPHTHALMOLOGY | Facility: CLINIC | Age: 69
End: 2024-01-10
Payer: COMMERCIAL

## 2024-01-10 DIAGNOSIS — E11.9 TYPE 2 DIABETES MELLITUS WITHOUT COMPLICATION: ICD-10-CM

## 2024-01-10 DIAGNOSIS — H25.13 AGE-RELATED NUCLEAR CATARACT OF BOTH EYES: ICD-10-CM

## 2024-01-10 DIAGNOSIS — H33.002 MACULA-ON RHEGMATOGENOUS RETINAL DETACHMENT, LEFT: Primary | ICD-10-CM

## 2024-01-10 DIAGNOSIS — H35.371 EPIRETINAL MEMBRANE, RIGHT: ICD-10-CM

## 2024-01-10 PROCEDURE — 1126F AMNT PAIN NOTED NONE PRSNT: CPT | Mod: CPTII,S$GLB,, | Performed by: OPHTHALMOLOGY

## 2024-01-10 PROCEDURE — 99999 PR PBB SHADOW E&M-EST. PATIENT-LVL III: CPT | Mod: PBBFAC,,, | Performed by: OPHTHALMOLOGY

## 2024-01-10 PROCEDURE — 1159F MED LIST DOCD IN RCRD: CPT | Mod: CPTII,S$GLB,, | Performed by: OPHTHALMOLOGY

## 2024-01-10 PROCEDURE — 92201 OPSCPY EXTND RTA DRAW UNI/BI: CPT | Mod: S$GLB,,, | Performed by: OPHTHALMOLOGY

## 2024-01-10 PROCEDURE — 2022F DILAT RTA XM EVC RTNOPTHY: CPT | Mod: CPTII,S$GLB,, | Performed by: OPHTHALMOLOGY

## 2024-01-10 PROCEDURE — 1101F PT FALLS ASSESS-DOCD LE1/YR: CPT | Mod: CPTII,S$GLB,, | Performed by: OPHTHALMOLOGY

## 2024-01-10 PROCEDURE — 92014 COMPRE OPH EXAM EST PT 1/>: CPT | Mod: S$GLB,,, | Performed by: OPHTHALMOLOGY

## 2024-01-10 PROCEDURE — 92134 CPTRZ OPH DX IMG PST SGM RTA: CPT | Mod: S$GLB,,, | Performed by: OPHTHALMOLOGY

## 2024-01-10 PROCEDURE — 1160F RVW MEDS BY RX/DR IN RCRD: CPT | Mod: CPTII,S$GLB,, | Performed by: OPHTHALMOLOGY

## 2024-01-10 PROCEDURE — 3288F FALL RISK ASSESSMENT DOCD: CPT | Mod: CPTII,S$GLB,, | Performed by: OPHTHALMOLOGY

## 2024-01-10 NOTE — PROGRESS NOTES
HPI    Pt presents for 4 mo dfe/oct     States no new ocular complaints. Sees occasional floater OS with pain.     No eye meds     Last edited by Ely Cordova on 1/10/2024 11:17 AM.           A/P    ICD-10-CM ICD-9-CM   1. 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 Age-related nuclear cataract of both eyes  H25.13 366.16   3. Epiretinal membrane, right  H35.371 362.56       1. 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 Age-related nuclear cataract of both eyes  Mild NS, NVS  Plan: Observation Update Mrx prn    3. Epiretinal membrane, right  Mod ERM, NVS per pt  Plan: Observation, do not recommend PPV/MP at this time     RTC  6 mo [place]  DFE/OCTm OU      I saw and examined the patient and reviewed in detail the findings documented. The final examination findings, image interpretations, and plan as documented in the record represent my personal judgment and conclusions.    Americo Dolan MD  Vitreoretinal Surgery   [facility]

## 2024-01-19 ENCOUNTER — TELEPHONE (OUTPATIENT)
Dept: FAMILY MEDICINE | Facility: CLINIC | Age: 69
End: 2024-01-19
Payer: COMMERCIAL

## 2024-01-19 DIAGNOSIS — E11.59 HYPERTENSION ASSOCIATED WITH DIABETES: ICD-10-CM

## 2024-01-19 DIAGNOSIS — I15.2 HYPERTENSION ASSOCIATED WITH DIABETES: ICD-10-CM

## 2024-01-19 NOTE — TELEPHONE ENCOUNTER
Care Due:                  Date            Visit Type   Department     Provider  --------------------------------------------------------------------------------                                EP -                              PRIMARY      [facility]  Last Visit: 12-      CARE ([facility])   NIKI Nye                              EP -                              PRIMARY      [facility]  Next Visit: 12-      CARE ([facility])   NIKI Nye                                                            Last  Test          Frequency    Reason                     Performed    Due Date  --------------------------------------------------------------------------------    HBA1C.......  6 months...  JANUMET, dulaglutide.....  08- 02-    [facility] Embedded Care Due Messages. Reference number: [id number].   1/19/2024 1:09:49 PM CST

## 2024-01-19 NOTE — TELEPHONE ENCOUNTER
Spoke with patient she has plenty of refills    Instructed to call back if problems    ----- Message from Flora Meehan sent at 1/19/2024  9:02 AM CST -----  Contact: Self  Type:  RX Refill Request    Who Called:  Patient  Refill or New Rx:  New Rx  RX Name and Strength:  carvediloL (COREG) 12.5 MG tablet and JANUMET 50-1,000 mg per tablet  How is the patient currently taking it? (ex. 1XDay):  As Directed  Is this a 30 day or 90 day RX:  90  Preferred Pharmacy with phone number:    [facility] [address] - [facility] AT Portal to Registered [facility]  [place] [address]  Phone: [phone] Fax: [phone]  Local or Mail Order:  [facility]  Ordering Provider:  Dr Parris Atkinson Call Back Number:  [phone]  Additional Information:  Pt stated she is almost out and needs this to be sent today please. T

## 2024-01-19 NOTE — TELEPHONE ENCOUNTER
Spoke with patient She needs a prescription for Coreg locally   The prescription from [facility] got lost in the mail.  Medication pended      ----- Message from Mercy Rivers sent at 1/19/2024 11:55 AM CST -----  Regarding: return call  Contact: patient  Type:  Patient Returning Call    Who Called:  Patient  Who Left Message for Patient:  Gretchen  Does the patient know what this is regarding?:  medication  Best Call Back Number:  [phone]    Additional Information:  Patient states she has a problem with her medication.  Please call patient to advise.  Thanks!

## 2024-01-22 RX ORDER — CARVEDILOL 12.5 MG/1
12.5 TABLET ORAL DAILY
Qty: 90 TABLET | Refills: 0 | Status: SHIPPED | OUTPATIENT
Start: 2024-01-22 | End: 2024-04-16

## 2024-01-23 ENCOUNTER — TELEPHONE (OUTPATIENT)
Dept: FAMILY MEDICINE | Facility: CLINIC | Age: 69
End: 2024-01-23
Payer: COMMERCIAL

## 2024-01-30 ENCOUNTER — HOSPITAL ENCOUNTER (OUTPATIENT)
Dept: RADIOLOGY | Facility: CLINIC | Age: 69
Discharge: HOME | End: 2024-01-30
Attending: FAMILY MEDICINE
Payer: COMMERCIAL

## 2024-01-30 DIAGNOSIS — Z12.31 ENCOUNTER FOR SCREENING MAMMOGRAM FOR MALIGNANT NEOPLASM OF BREAST: ICD-10-CM

## 2024-01-30 PROCEDURE — 77063 BREAST TOMOSYNTHESIS BI: CPT | Mod: 26,,, | Performed by: RADIOLOGY

## 2024-01-30 PROCEDURE — 77067 SCR MAMMO BI INCL CAD: CPT | Mod: TC,PO

## 2024-01-30 PROCEDURE — 77067 SCR MAMMO BI INCL CAD: CPT | Mod: 26,,, | Performed by: RADIOLOGY

## 2024-02-10 DIAGNOSIS — E11.65 TYPE 2 DIABETES MELLITUS WITH HYPERGLYCEMIA, WITHOUT LONG-TERM CURRENT USE OF INSULIN: ICD-10-CM

## 2024-02-10 NOTE — TELEPHONE ENCOUNTER
No care due was identified.  [facility] Embedded Care Due Messages. Reference number: [id number].   2/10/2024 1:46:31 PM CST

## 2024-02-11 NOTE — TELEPHONE ENCOUNTER
Refill Routing Note   Medication(s) are not appropriate for processing by [facility] for the following reason(s):        Required labs outdated    [facility] action(s):  Defer               Appointments  past 12m or future 3m with PCP    Date Provider   Last Visit   12/13/2023 Anurag Nye MD   Next Visit   Visit date not found Anurag Nye MD   ED visits in past 90 days: 0        Note composed:2:45 PM 02/11/2024

## 2024-02-12 RX ORDER — SITAGLIPTIN AND METFORMIN HYDROCHLORIDE 1000; 50 MG/1; MG/1
1 TABLET, FILM COATED ORAL 2 TIMES DAILY WITH MEALS
Qty: 180 TABLET | Refills: 0 | Status: SHIPPED | OUTPATIENT
Start: 2024-02-12 | End: 2024-05-07

## 2024-03-27 ENCOUNTER — TELEPHONE (OUTPATIENT)
Dept: FAMILY MEDICINE | Facility: CLINIC | Age: 69
End: 2024-03-27

## 2024-03-27 NOTE — TELEPHONE ENCOUNTER
Trulicity is back ordered from the manufacture   Needs to change medication   Please advise    ----- Message from Sierra Whitney sent at 3/27/2024 10:20 AM CDT -----  Type:  Pharmacy Calling to Clarify an RX    Name of Caller:  Gloria  Pharmacy Name:    [facility] - [place] [address] - [facility] AT Portal to Registered [facility]  [place] [address]  Phone: [phone] Fax: [phone]  Prescription Name:  dulaglutide (TRULICITY) 1.5 mg/0.5 mL pen injector  What do they need to clarify?:  medication is on backorder  Best Call Back Number:  [phone] ref# [id number]  Additional Information:  needs a different medication bc they don't know when they will be able to get it

## 2024-04-03 ENCOUNTER — LAB VISIT (OUTPATIENT)
Dept: LAB | Facility: HOSPITAL | Age: 69
End: 2024-04-03
Attending: FAMILY MEDICINE
Payer: COMMERCIAL

## 2024-04-03 DIAGNOSIS — E11.65 TYPE 2 DIABETES MELLITUS WITH HYPERGLYCEMIA, UNSPECIFIED WHETHER LONG TERM INSULIN USE: ICD-10-CM

## 2024-04-03 LAB
ALBUMIN SERPL BCP-MCNC: 3.9 G/DL (ref 3.5–5.2)
ALP SERPL-CCNC: 66 U/L (ref 55–135)
ALT SERPL W/O P-5'-P-CCNC: 23 U/L (ref 10–44)
ANION GAP SERPL CALC-SCNC: 9 MMOL/L (ref 8–16)
AST SERPL-CCNC: 21 U/L (ref 10–40)
BILIRUB SERPL-MCNC: 0.7 MG/DL (ref 0.1–1)
BUN SERPL-MCNC: 27 MG/DL (ref 8–23)
CALCIUM SERPL-MCNC: 10.3 MG/DL (ref 8.7–10.5)
CHLORIDE SERPL-SCNC: 107 MMOL/L (ref 95–110)
CHOLEST SERPL-MCNC: 160 MG/DL (ref 120–199)
CHOLEST/HDLC SERPL: 3 {RATIO} (ref 2–5)
CO2 SERPL-SCNC: 24 MMOL/L (ref 23–29)
CREAT SERPL-MCNC: 1.2 MG/DL (ref 0.5–1.4)
EST. GFR  (NO RACE VARIABLE): 49.6 ML/MIN/1.73 M^2
ESTIMATED AVG GLUCOSE: 148 MG/DL (ref 68–131)
GLUCOSE SERPL-MCNC: 125 MG/DL (ref 70–110)
HBA1C MFR BLD: 6.8 % (ref 4–5.6)
HDLC SERPL-MCNC: 54 MG/DL (ref 40–75)
HDLC SERPL: 33.8 % (ref 20–50)
HGB BLD-MCNC: 11.9 G/DL (ref 12–16)
LDLC SERPL CALC-MCNC: 82 MG/DL (ref 63–159)
MPV, BLUE TOP: 9.2 FL (ref 9.2–12.9)
NONHDLC SERPL-MCNC: 106 MG/DL
PLATELET, BLUE TOP: 294 K/UL (ref 150–450)
POTASSIUM SERPL-SCNC: 4.5 MMOL/L (ref 3.5–5.1)
PROT SERPL-MCNC: 8.6 G/DL (ref 6–8.4)
SODIUM SERPL-SCNC: 140 MMOL/L (ref 136–145)
TRIGL SERPL-MCNC: 120 MG/DL (ref 30–150)
WBC # BLD AUTO: 8.3 K/UL (ref 3.9–12.7)

## 2024-04-03 PROCEDURE — 85048 AUTOMATED LEUKOCYTE COUNT: CPT | Performed by: FAMILY MEDICINE

## 2024-04-03 PROCEDURE — 80061 LIPID PANEL: CPT | Performed by: FAMILY MEDICINE

## 2024-04-03 PROCEDURE — 36415 COLL VENOUS BLD VENIPUNCTURE: CPT | Mod: PO | Performed by: FAMILY MEDICINE

## 2024-04-03 PROCEDURE — 80053 COMPREHEN METABOLIC PANEL: CPT | Performed by: FAMILY MEDICINE

## 2024-04-03 PROCEDURE — 83036 HEMOGLOBIN GLYCOSYLATED A1C: CPT | Performed by: FAMILY MEDICINE

## 2024-04-03 PROCEDURE — 85018 HEMOGLOBIN: CPT | Performed by: FAMILY MEDICINE

## 2024-04-03 PROCEDURE — 85049 AUTOMATED PLATELET COUNT: CPT | Performed by: FAMILY MEDICINE

## 2024-04-15 DIAGNOSIS — E11.59 HYPERTENSION ASSOCIATED WITH DIABETES: ICD-10-CM

## 2024-04-15 DIAGNOSIS — I15.2 HYPERTENSION ASSOCIATED WITH DIABETES: ICD-10-CM

## 2024-04-15 NOTE — TELEPHONE ENCOUNTER
No care due was identified.  [facility] Embedded Care Due Messages. Reference number: [id number].   4/15/2024 2:03:27 PM CDT

## 2024-04-16 RX ORDER — CARVEDILOL 12.5 MG/1
12.5 TABLET ORAL DAILY
Qty: 90 TABLET | Refills: 2 | Status: SHIPPED | OUTPATIENT
Start: 2024-04-16

## 2024-04-16 NOTE — TELEPHONE ENCOUNTER
Yanci Coats  is requesting a refill authorization.  Brief Assessment and Rationale for Refill:  Approve     Medication Therapy Plan:         Comments:     Note composed:4:54 AM 04/16/2024

## 2024-04-17 ENCOUNTER — OFFICE VISIT (OUTPATIENT)
Dept: FAMILY MEDICINE | Facility: CLINIC | Age: 69
End: 2024-04-17
Payer: COMMERCIAL

## 2024-04-17 VITALS
TEMPERATURE: 98 F | HEART RATE: 82 BPM | HEIGHT: 68 IN | DIASTOLIC BLOOD PRESSURE: 70 MMHG | BODY MASS INDEX: 30.98 KG/M2 | OXYGEN SATURATION: 96 % | WEIGHT: 204.38 LBS | RESPIRATION RATE: 16 BRPM | SYSTOLIC BLOOD PRESSURE: 118 MMHG

## 2024-04-17 DIAGNOSIS — K21.9 GASTROESOPHAGEAL REFLUX DISEASE, UNSPECIFIED WHETHER ESOPHAGITIS PRESENT: Primary | ICD-10-CM

## 2024-04-17 DIAGNOSIS — N28.9 KIDNEY DYSFUNCTION: ICD-10-CM

## 2024-04-17 DIAGNOSIS — H57.11 PAIN OF RIGHT EYE: ICD-10-CM

## 2024-04-17 DIAGNOSIS — E11.65 TYPE 2 DIABETES MELLITUS WITH HYPERGLYCEMIA, WITHOUT LONG-TERM CURRENT USE OF INSULIN: ICD-10-CM

## 2024-04-17 DIAGNOSIS — L21.9 SEBORRHEIC DERMATITIS OF SCALP: ICD-10-CM

## 2024-04-17 DIAGNOSIS — G89.29 CHRONIC BILATERAL LOW BACK PAIN WITHOUT SCIATICA: ICD-10-CM

## 2024-04-17 DIAGNOSIS — M25.552 LEFT HIP PAIN: ICD-10-CM

## 2024-04-17 DIAGNOSIS — J31.0 CHRONIC RHINITIS: ICD-10-CM

## 2024-04-17 DIAGNOSIS — M54.50 CHRONIC BILATERAL LOW BACK PAIN WITHOUT SCIATICA: ICD-10-CM

## 2024-04-17 PROCEDURE — 3288F FALL RISK ASSESSMENT DOCD: CPT | Mod: CPTII,S$GLB,, | Performed by: FAMILY MEDICINE

## 2024-04-17 PROCEDURE — 1101F PT FALLS ASSESS-DOCD LE1/YR: CPT | Mod: CPTII,S$GLB,, | Performed by: FAMILY MEDICINE

## 2024-04-17 PROCEDURE — 99214 OFFICE O/P EST MOD 30 MIN: CPT | Mod: S$GLB,,, | Performed by: FAMILY MEDICINE

## 2024-04-17 PROCEDURE — 99999 PR PBB SHADOW E&M-EST. PATIENT-LVL V: CPT | Mod: PBBFAC,,, | Performed by: FAMILY MEDICINE

## 2024-04-17 PROCEDURE — 3044F HG A1C LEVEL LT 7.0%: CPT | Mod: CPTII,S$GLB,, | Performed by: FAMILY MEDICINE

## 2024-04-17 PROCEDURE — 1126F AMNT PAIN NOTED NONE PRSNT: CPT | Mod: CPTII,S$GLB,, | Performed by: FAMILY MEDICINE

## 2024-04-17 PROCEDURE — 3074F SYST BP LT 130 MM HG: CPT | Mod: CPTII,S$GLB,, | Performed by: FAMILY MEDICINE

## 2024-04-17 PROCEDURE — 3078F DIAST BP <80 MM HG: CPT | Mod: CPTII,S$GLB,, | Performed by: FAMILY MEDICINE

## 2024-04-17 PROCEDURE — 3008F BODY MASS INDEX DOCD: CPT | Mod: CPTII,S$GLB,, | Performed by: FAMILY MEDICINE

## 2024-04-17 PROCEDURE — 1159F MED LIST DOCD IN RCRD: CPT | Mod: CPTII,S$GLB,, | Performed by: FAMILY MEDICINE

## 2024-04-17 RX ORDER — PANTOPRAZOLE SODIUM 40 MG/1
40 TABLET, DELAYED RELEASE ORAL DAILY
Qty: 90 TABLET | Refills: 3 | Status: SHIPPED | OUTPATIENT
Start: 2024-04-17 | End: 2025-04-17

## 2024-04-17 RX ORDER — KETOCONAZOLE 20 MG/ML
SHAMPOO, SUSPENSION TOPICAL
Qty: 360 ML | Refills: 5 | Status: SHIPPED | OUTPATIENT
Start: 2024-04-17

## 2024-04-17 RX ORDER — FLUTICASONE PROPIONATE 50 MCG
1 SPRAY, SUSPENSION (ML) NASAL DAILY
Qty: 16 G | Refills: 11 | Status: SHIPPED | OUTPATIENT
Start: 2024-04-17 | End: 2025-04-17

## 2024-04-17 RX ORDER — GABAPENTIN 300 MG/1
300 CAPSULE ORAL 2 TIMES DAILY
Qty: 90 CAPSULE | Refills: 11 | Status: SHIPPED | OUTPATIENT
Start: 2024-04-17

## 2024-04-17 RX ORDER — DULAGLUTIDE 1.5 MG/.5ML
1.5 INJECTION, SOLUTION SUBCUTANEOUS
Qty: 12 PEN | Refills: 3 | Status: SHIPPED | OUTPATIENT
Start: 2024-04-17 | End: 2025-04-17

## 2024-04-17 NOTE — PROGRESS NOTES
Subjective:   Patient ID: Yanci Coats is a 68 y.o. female     Chief Complaint:Hip Pain (Left ) and Sinusitis      Here for checkup    Hip Pain     Sinusitis  Pertinent negatives include no shortness of breath.     Review of Systems   Respiratory:  Negative for shortness of breath.    Cardiovascular:  Negative for chest pain.   Gastrointestinal:  Negative for abdominal pain.   Genitourinary:  Negative for dysuria.     Past Medical History:   Diagnosis Date    Allergy history unknown     Arthritis     DDD    Back pain     Cataract     OU    Colon polyps 01/31/2019    Diabetes mellitus type II     GERD (gastroesophageal reflux disease)     Hypertension     LPRD (laryngopharyngeal reflux disease)     Nuclear sclerosis - Both Eyes 7/9/2013     Past Surgical History:   Procedure Laterality Date    COLONOSCOPY N/A 1/31/2019    Procedure: COLONOSCOPY;  Surgeon: Ronak Palacios MD;  Location: [facility];  Service: Endoscopy;  Laterality: N/A;     Objective:     Vitals:    04/17/24 1002   BP: 118/70   Pulse: 82   Resp: 16   Temp: 98 °F (36.7 °C)     Body mass index is 31.07 kg/m².  Physical Exam  Vitals and nursing note reviewed.   Constitutional:       Appearance: She is well-developed.   HENT:      Head: Normocephalic and atraumatic.   Eyes:      General: No scleral icterus.     Conjunctiva/sclera: Conjunctivae normal.   Cardiovascular:      Heart sounds: No murmur heard.  Pulmonary:      Effort: Pulmonary effort is normal. No respiratory distress.   Musculoskeletal:         General: No deformity. Normal range of motion.      Cervical back: Normal range of motion and neck supple.   Skin:     Coloration: Skin is not pale.      Findings: No rash.   Neurological:      Mental Status: She is alert and oriented to person, place, and time.   Psychiatric:         Behavior: Behavior normal.         Thought Content: Thought content normal.         Judgment: Judgment normal.       Assessment:     1. Gastroesophageal reflux  disease, unspecified whether esophagitis present    2. Seborrheic dermatitis of scalp    3. Chronic bilateral low back pain without sciatica    4. Type 2 diabetes mellitus with hyperglycemia, without long-term current use of insulin    5. Chronic rhinitis    6. Kidney dysfunction    7. Left hip pain    8. Pain of right eye      Plan:   Gastroesophageal reflux disease, unspecified whether esophagitis present  -     pantoprazole (PROTONIX) 40 MG tablet; Take 1 tablet (40 mg total) by mouth once daily.  Dispense: 90 tablet; Refill: 3    Seborrheic dermatitis of scalp  -     ketoconazole (NIZORAL) 2 % shampoo; Lather scalp, let sit 5 min before rinsing. Use 2-3 times per week.  Dispense: 360 mL; Refill: 5    Chronic bilateral low back pain without sciatica  -     gabapentin (NEURONTIN) 300 MG capsule; Take 1 capsule (300 mg total) by mouth 2 (two) times daily.  Dispense: 90 capsule; Refill: 11    Type 2 diabetes mellitus with hyperglycemia, without long-term current use of insulin  -     dulaglutide (TRULICITY) 1.5 mg/0.5 mL pen injector; Inject 1.5 mg into the skin every 7 days.  Dispense: 12 pen ; Refill: 3    Chronic rhinitis  -     fluticasone propionate (FLONASE) 50 mcg/actuation nasal spray; 1 spray (50 mcg total) by Each Nostril route once daily.  Dispense: 16 g; Refill: 11  Counseled. Recommended 2nd gen antihistamine  Kidney dysfunction  -     RENAL FUNCTION PANEL; Future; Expected date: 04/17/2024    Left hip pain  Counseled.  Pain of right eye  -     Ambulatory referral/consult to Optometry; Future; Expected date: 04/24/2024            Total time spent of Greater than 30 minutes minutes on the day of the visit.This includes face to face time and preparing to see the patient, obtaining and reviewing separately obtained history, documenting clinical information in the electronic or other health record, independently interpreting results and communicating results to the patient/family/caregiver, or care  coordinator.    Established patient with me has been instructed that must see me at least 1 time yearly (every 365 days) for refills of medications. Seeing other providers in this clinic is fine but expectation is to see me yearly.    Anurag Nye MD  04/17/2024    Portions of this note have been dictated with ADELAIDA Guzman

## 2024-04-17 NOTE — PATIENT INSTRUCTIONS
Andi Pete,     If you are due for any health screening(s) below please notify me so we can arrange them to be ordered and scheduled to maintain your health. Most healthy patients complete it. Don't lose out on improving your health.     Tests to Keep You Healthy    Mammogram: Met on 1/30/2024  Eye Exam: Met on 1/10/2024  Colon Cancer Screening: DUE  Last Blood Pressure <= 139/89 (4/17/2024): Yes  Last HbA1c < 8 (04/03/2024): Yes         Colon Cancer Screening    Of cancers affecting both men and women, colorectal cancer is the third leading cancer killer in the [place]. But it doesnt have to be. Screening can prevent colorectal cancer or find it at an early stage when treatment often leads to a cure.    A colonoscopy is the preferred test for detecting colon cancer. It is needed only once every 10 years if results are negative. While sedated, a flexible, lighted tube with a tiny camera is inserted into the rectum and advanced through the colon to look for cancers. An alternative screening test that is used at home and returned to the lab may also be used. It detects hidden blood in bowel movements which could indicate cancer in the colon. If results are positive, you will need a colonoscopy to determine if the blood is a sign of cancer. This type of follow up (diagnostic) colonoscopy usually requires additional copays as required by your insurance provider. Please contact your PCP if you have any questions.

## 2024-05-04 DIAGNOSIS — E11.65 TYPE 2 DIABETES MELLITUS WITH HYPERGLYCEMIA, WITHOUT LONG-TERM CURRENT USE OF INSULIN: ICD-10-CM

## 2024-05-04 NOTE — TELEPHONE ENCOUNTER
No care due was identified.  [facility] Embedded Care Due Messages. Reference number: [id number].   5/04/2024 12:01:59 PM CDT

## 2024-05-05 NOTE — TELEPHONE ENCOUNTER
Refill Routing Note   Medication(s) are not appropriate for processing by [facility] for the following reason(s):        Drug-drug interaction: Duplicate Therapy: ANTONINO PARIKH  Drug-disease interaction: : JANUMET and NAFLD (nonalcoholic fatty liver disease)    [facility] action(s):  Defer             Appointments  past 12m or future 3m with PCP    Date Provider   Last Visit   4/17/2024 Anurag Nye MD   Next Visit   Visit date not found Anurag Nye MD   ED visits in past 90 days: 0        Note composed:10:47 PM 05/04/2024                [facility]

## 2024-05-07 RX ORDER — SITAGLIPTIN AND METFORMIN HYDROCHLORIDE 1000; 50 MG/1; MG/1
1 TABLET, FILM COATED ORAL 2 TIMES DAILY WITH MEALS
Qty: 180 TABLET | Refills: 1 | Status: SHIPPED | OUTPATIENT
Start: 2024-05-07

## 2024-05-09 ENCOUNTER — TELEPHONE (OUTPATIENT)
Dept: FAMILY MEDICINE | Facility: CLINIC | Age: 69
End: 2024-05-09
Payer: COMMERCIAL

## 2024-05-09 NOTE — TELEPHONE ENCOUNTER
----- Message from Bethany Duboney sent at 5/9/2024  9:20 AM CDT -----  Regarding: Meds  Type:  Pharmacy Calling to Clarify an RX    Name of Caller:Pt    [facility]      Prescription Name:dulaglutide (TRULICITY) 1.5 mg/0.5 mL pen injector     What do they need to clarify?:Pt sts there is only a 1month prescription at the pharmacy    Best Call Back Number:[phone]    Additional Information: Thank you

## 2024-05-09 NOTE — TELEPHONE ENCOUNTER
Spoke with patient   Trulicity 1.5.mg 12 pens with 3 refills was e-scribed to [facility] on 4/17/2024   Patient states she spoke with the pharmacy and this has been resoved

## 2024-05-20 ENCOUNTER — LAB VISIT (OUTPATIENT)
Dept: LAB | Facility: HOSPITAL | Age: 69
End: 2024-05-20
Attending: FAMILY MEDICINE
Payer: COMMERCIAL

## 2024-05-20 DIAGNOSIS — N28.9 KIDNEY DYSFUNCTION: ICD-10-CM

## 2024-05-20 LAB
ALBUMIN SERPL BCP-MCNC: 3.8 G/DL (ref 3.5–5.2)
ANION GAP SERPL CALC-SCNC: 11 MMOL/L (ref 8–16)
BUN SERPL-MCNC: 23 MG/DL (ref 8–23)
CALCIUM SERPL-MCNC: 9.6 MG/DL (ref 8.7–10.5)
CHLORIDE SERPL-SCNC: 104 MMOL/L (ref 95–110)
CO2 SERPL-SCNC: 22 MMOL/L (ref 23–29)
CREAT SERPL-MCNC: 1.2 MG/DL (ref 0.5–1.4)
EST. GFR  (NO RACE VARIABLE): 49.6 ML/MIN/1.73 M^2
GLUCOSE SERPL-MCNC: 98 MG/DL (ref 70–110)
PHOSPHATE SERPL-MCNC: 3.8 MG/DL (ref 2.7–4.5)
POTASSIUM SERPL-SCNC: 4 MMOL/L (ref 3.5–5.1)
SODIUM SERPL-SCNC: 137 MMOL/L (ref 136–145)

## 2024-05-20 PROCEDURE — 80069 RENAL FUNCTION PANEL: CPT | Performed by: FAMILY MEDICINE

## 2024-05-20 PROCEDURE — 36415 COLL VENOUS BLD VENIPUNCTURE: CPT | Mod: PO | Performed by: FAMILY MEDICINE

## 2024-06-17 ENCOUNTER — OFFICE VISIT (OUTPATIENT)
Dept: SPINE | Facility: CLINIC | Age: 69
End: 2024-06-17
Payer: COMMERCIAL

## 2024-06-17 VITALS — WEIGHT: 204.38 LBS | BODY MASS INDEX: 30.98 KG/M2 | HEIGHT: 68 IN

## 2024-06-17 DIAGNOSIS — G89.29 CHRONIC LEFT-SIDED LOW BACK PAIN WITH LEFT-SIDED SCIATICA: Primary | ICD-10-CM

## 2024-06-17 DIAGNOSIS — M54.42 CHRONIC LEFT-SIDED LOW BACK PAIN WITH LEFT-SIDED SCIATICA: Primary | ICD-10-CM

## 2024-06-17 PROCEDURE — 3288F FALL RISK ASSESSMENT DOCD: CPT | Mod: CPTII,S$GLB,, | Performed by: PHYSICAL MEDICINE & REHABILITATION

## 2024-06-17 PROCEDURE — 99999 PR PBB SHADOW E&M-EST. PATIENT-LVL IV: CPT | Mod: PBBFAC,,, | Performed by: PHYSICAL MEDICINE & REHABILITATION

## 2024-06-17 PROCEDURE — 1159F MED LIST DOCD IN RCRD: CPT | Mod: CPTII,S$GLB,, | Performed by: PHYSICAL MEDICINE & REHABILITATION

## 2024-06-17 PROCEDURE — 3008F BODY MASS INDEX DOCD: CPT | Mod: CPTII,S$GLB,, | Performed by: PHYSICAL MEDICINE & REHABILITATION

## 2024-06-17 PROCEDURE — 1101F PT FALLS ASSESS-DOCD LE1/YR: CPT | Mod: CPTII,S$GLB,, | Performed by: PHYSICAL MEDICINE & REHABILITATION

## 2024-06-17 PROCEDURE — 96372 THER/PROPH/DIAG INJ SC/IM: CPT | Mod: S$GLB,,, | Performed by: PHYSICAL MEDICINE & REHABILITATION

## 2024-06-17 PROCEDURE — 1160F RVW MEDS BY RX/DR IN RCRD: CPT | Mod: CPTII,S$GLB,, | Performed by: PHYSICAL MEDICINE & REHABILITATION

## 2024-06-17 PROCEDURE — 99213 OFFICE O/P EST LOW 20 MIN: CPT | Mod: 25,S$GLB,, | Performed by: PHYSICAL MEDICINE & REHABILITATION

## 2024-06-17 PROCEDURE — 1125F AMNT PAIN NOTED PAIN PRSNT: CPT | Mod: CPTII,S$GLB,, | Performed by: PHYSICAL MEDICINE & REHABILITATION

## 2024-06-17 PROCEDURE — 3044F HG A1C LEVEL LT 7.0%: CPT | Mod: CPTII,S$GLB,, | Performed by: PHYSICAL MEDICINE & REHABILITATION

## 2024-06-17 RX ORDER — METHYLPREDNISOLONE ACETATE 40 MG/ML
40 INJECTION, SUSPENSION INTRA-ARTICULAR; INTRALESIONAL; INTRAMUSCULAR; SOFT TISSUE
Status: COMPLETED | OUTPATIENT
Start: 2024-06-17 | End: 2024-06-17

## 2024-06-17 RX ADMIN — METHYLPREDNISOLONE ACETATE 40 MG: 40 INJECTION, SUSPENSION INTRA-ARTICULAR; INTRALESIONAL; INTRAMUSCULAR; SOFT TISSUE at 12:06

## 2024-06-17 NOTE — PROGRESS NOTES
SUBJECTIVE:    Patient ID: Yanci Coats is a 68 y.o. female.    Chief Complaint: Low-back Pain    This is a 68-year-old woman I have seen in the past with complaints of low back pain.  I have not seen her since June of 2021.  Presents with complaints of left greater than right-sided low back pain at the lumbosacral junction radiating down the left leg to the foot in a nonspecific distribution.  Right now pain is radiating to the left posterolateral hip.  Bowel and bladder remained intact.  No fever chills sweats or unexpected weight loss.  This all started a couple of months ago after lifting something.  Pain level is 5/10 and interferes with quality life in terms of activities of daily living recreation and social activities.  I have no updated imaging to review          Past Medical History:   Diagnosis Date    Allergy history unknown     Arthritis     DDD    Back pain     Cataract     OU    Colon polyps 01/31/2019    Diabetes mellitus type II     GERD (gastroesophageal reflux disease)     Hypertension     LPRD (laryngopharyngeal reflux disease)     Nuclear sclerosis - Both Eyes 7/9/2013     Social History     Socioeconomic History    Marital status: Significant Other   Occupational History     Employer: [employer]   Tobacco Use    Smoking status: Never    Smokeless tobacco: Never   Substance and Sexual Activity    Alcohol use: Yes     Comment: occ    Drug use: No    Sexual activity: Not Currently     Partners: Male     Birth control/protection: Post-menopausal   Other Topics Concern    Are you pregnant or think you may be? No    Breast-feeding No   Social History Narrative    Lives with significant other.    No kids.    3 dogs and cat.    No exercise.    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     Social Determinants of Health     Stress: No Stress Concern Present (11/22/2019)    [employer] - Occupational Stress Questionnaire     Feeling of Stress : Only a little     Past Surgical  "History:   Procedure Laterality Date    COLONOSCOPY N/A 1/31/2019    Procedure: COLONOSCOPY;  Surgeon: Ronak Palacios MD;  Location: [facility];  Service: Endoscopy;  Laterality: N/A;     Family History   Problem Relation Name Age of Onset    ANNA disease Father      Heart disease Father      Heart disease Mother      Diabetes Mother      Blindness Mother      Cataracts Mother      Hypertension Mother      Cholelithiasis Sister      ANNA disease Sister      Blindness Maternal Aunt      Diabetes Maternal Aunt      Blindness Paternal Aunt      Stroke Paternal Grandmother      Glaucoma Paternal Grandmother      Cervical cancer Unknown          aunt    Retinal detachment Brother      Celiac disease Neg Hx      Cirrhosis Neg Hx      Colon cancer Neg Hx      Colon polyps Neg Hx      Crohn's disease Neg Hx      Cystic fibrosis Neg Hx      Esophageal cancer Neg Hx      Hemochromatosis Neg Hx      Inflammatory bowel disease Neg Hx      Irritable bowel syndrome Neg Hx      Liver cancer Neg Hx      Liver disease Neg Hx      Rectal cancer Neg Hx      Stomach cancer Neg Hx      Ulcerative colitis Neg Hx      Clive's disease Neg Hx      Melanoma Neg Hx      Amblyopia Neg Hx      Cancer Neg Hx      Macular degeneration Neg Hx      Strabismus Neg Hx      Thyroid disease Neg Hx      Psoriasis Neg Hx      Lupus Neg Hx      Eczema Neg Hx      Breast cancer Neg Hx      Ovarian cancer Neg Hx       Vitals:    06/17/24 1119   Weight: 92.7 kg (204 lb 5.9 oz)   Height: 5' 7.99" (1.727 m)       Review of Systems   Constitutional:  Negative for chills, diaphoresis, fatigue, fever and unexpected weight change.   HENT:  Negative for trouble swallowing.    Eyes:  Negative for visual disturbance.   Respiratory:  Negative for shortness of breath.    Cardiovascular:  Negative for chest pain.   Gastrointestinal:  Negative for abdominal pain, constipation, nausea and vomiting.   Genitourinary:  Negative for difficulty urinating.   Musculoskeletal:  " Negative for arthralgias, back pain, gait problem, joint swelling, myalgias, neck pain and neck stiffness.   Neurological:  Negative for dizziness, speech difficulty, weakness, light-headedness, numbness and headaches.          Objective:      Physical Exam  Neurological:      Mental Status: She is alert and oriented to person, place, and time.      Comments: She is awake and in no acute distress  Mild tenderness to palpation lumbar paraspinous musculature at the lumbosacral junction with no palpable masses  Forward flexion of the lumbar spine is to about 60° before complaint pain at the lumbosacral junction.  Extension at 10° causes mild pain at the lumbosacral junction  She can heel and toe walk normally  Reflexes- +1-+2 reflexes at the following:   C5-Biceps   C6-Brachioradialis   C7-Triceps   L3/4-Patellar   S1-Achilles   Alta sign negative bilaterally  Strength testing- 5/5 strength in the following muscle groups:  C5-Elbow flexion  C6-Wrist extension  C7-Elbow extension  C8-Finger flexion  T1-Finger abduction  L2-Hip flexion  L3-Knee extension  L4-Ankle dorsiflexion  L5-Great toe extension  S1-Ankle plantar flexion                    Assessment:       1. Chronic left-sided low back pain with left-sided sciatica           Plan:     She remains neurologically intact.  I suspect she has low back pain basis of degenerative disc disease and facet arthropathy plus or minus lumbar strain type of injury.  Regardless I think she can be treated conservatively.  We will get some x-rays and start her in physical therapy.  Follow up with me at the completion therapy.  She requests the shot of Depo-Medrol today.  Usually helps      Chronic left-sided low back pain with left-sided sciatica  -     X-Ray Lumbar Complete Including Flex And Ext; Future; Expected date: 06/17/2024  -     Ambulatory referral/consult to Physical/Occupational Therapy; Future; Expected date: 06/24/2024

## 2024-06-18 DIAGNOSIS — E11.69 HYPERLIPIDEMIA ASSOCIATED WITH TYPE 2 DIABETES MELLITUS: ICD-10-CM

## 2024-06-18 DIAGNOSIS — E78.5 HYPERLIPIDEMIA ASSOCIATED WITH TYPE 2 DIABETES MELLITUS: ICD-10-CM

## 2024-06-18 DIAGNOSIS — I15.2 HYPERTENSION ASSOCIATED WITH DIABETES: ICD-10-CM

## 2024-06-18 DIAGNOSIS — E11.59 HYPERTENSION ASSOCIATED WITH DIABETES: ICD-10-CM

## 2024-06-18 RX ORDER — ROSUVASTATIN CALCIUM 5 MG/1
5 TABLET, COATED ORAL
Qty: 90 TABLET | Refills: 3 | Status: SHIPPED | OUTPATIENT
Start: 2024-06-18

## 2024-06-18 RX ORDER — TELMISARTAN AND HYDROCHLORTHIAZIDE 80; 25 MG/1; MG/1
1 TABLET ORAL
Qty: 90 TABLET | Refills: 3 | Status: SHIPPED | OUTPATIENT
Start: 2024-06-18

## 2024-06-18 NOTE — TELEPHONE ENCOUNTER
No care due was identified.  [facility] Embedded Care Due Messages. Reference number: [id number].   6/18/2024 1:02:19 AM CDT

## 2024-06-18 NOTE — TELEPHONE ENCOUNTER
Refill Decision Note   Yanci Jorge L  is requesting a refill authorization.  Brief Assessment and Rationale for Refill:  Approve     Medication Therapy Plan:         Alert overridden per protocol: Yes   Comments:     Note composed:4:08 AM 06/18/2024

## 2024-06-21 ENCOUNTER — CLINICAL SUPPORT (OUTPATIENT)
Dept: REHABILITATION | Facility: HOSPITAL | Age: 69
End: 2024-06-21
Payer: COMMERCIAL

## 2024-06-21 DIAGNOSIS — M53.86 DECREASED ROM OF LUMBAR SPINE: ICD-10-CM

## 2024-06-21 DIAGNOSIS — Z74.09 IMPAIRED FUNCTIONAL MOBILITY AND ACTIVITY TOLERANCE: Primary | ICD-10-CM

## 2024-06-21 DIAGNOSIS — M54.42 CHRONIC LEFT-SIDED LOW BACK PAIN WITH LEFT-SIDED SCIATICA: ICD-10-CM

## 2024-06-21 DIAGNOSIS — G89.29 CHRONIC LEFT-SIDED LOW BACK PAIN WITH LEFT-SIDED SCIATICA: ICD-10-CM

## 2024-06-21 PROCEDURE — 97162 PT EVAL MOD COMPLEX 30 MIN: CPT | Mod: PN
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
## 2024-06-22 ENCOUNTER — PATIENT MESSAGE (OUTPATIENT)
Dept: INTERNAL MEDICINE | Facility: CLINIC | Age: 69
End: 2024-06-22
Payer: COMMERCIAL

## 2024-06-23 PROBLEM — Z74.09 IMPAIRED FUNCTIONAL MOBILITY AND ACTIVITY TOLERANCE: Status: ACTIVE | Noted: 2024-06-23

## 2024-06-23 PROBLEM — M54.42 CHRONIC LEFT-SIDED LOW BACK PAIN WITH LEFT-SIDED SCIATICA: Status: ACTIVE | Noted: 2024-06-23

## 2024-06-23 PROBLEM — M53.86 DECREASED ROM OF LUMBAR SPINE: Status: ACTIVE | Noted: 2024-06-23

## 2024-06-23 PROBLEM — G89.29 CHRONIC LEFT-SIDED LOW BACK PAIN WITH LEFT-SIDED SCIATICA: Status: ACTIVE | Noted: 2024-06-23

## 2024-06-25 ENCOUNTER — PATIENT MESSAGE (OUTPATIENT)
Dept: OPTOMETRY | Facility: CLINIC | Age: 69
End: 2024-06-25
Payer: COMMERCIAL

## 2024-06-25 ENCOUNTER — TELEPHONE (OUTPATIENT)
Dept: OPTOMETRY | Facility: CLINIC | Age: 69
End: 2024-06-25
Payer: COMMERCIAL

## 2024-06-26 ENCOUNTER — CLINICAL SUPPORT (OUTPATIENT)
Dept: REHABILITATION | Facility: HOSPITAL | Age: 69
End: 2024-06-26
Payer: COMMERCIAL

## 2024-06-26 DIAGNOSIS — Z74.09 IMPAIRED FUNCTIONAL MOBILITY AND ACTIVITY TOLERANCE: Primary | ICD-10-CM

## 2024-06-26 DIAGNOSIS — G89.29 CHRONIC LEFT-SIDED LOW BACK PAIN WITH LEFT-SIDED SCIATICA: ICD-10-CM

## 2024-06-26 DIAGNOSIS — M53.86 DECREASED ROM OF LUMBAR SPINE: ICD-10-CM

## 2024-06-26 DIAGNOSIS — M54.42 CHRONIC LEFT-SIDED LOW BACK PAIN WITH LEFT-SIDED SCIATICA: ICD-10-CM

## 2024-06-26 PROCEDURE — 97140 MANUAL THERAPY 1/> REGIONS: CPT | Mod: PN,CQ

## 2024-06-26 PROCEDURE — 97110 THERAPEUTIC EXERCISES: CPT | Mod: PN,CQ

## 2024-06-26 NOTE — PROGRESS NOTES
[facility]   Physical Therapy Treatment Note     Name: Yanci Coats  Clinic Number: [id number]    Therapy Diagnosis:   Encounter Diagnoses   Name Primary?    Impaired functional mobility and activity tolerance Yes    Decreased ROM of lumbar spine     Chronic left-sided low back pain with left-sided sciatica      Physician: Chaitanya Mc MD    Visit Date: 6/26/2024    Physician Orders: PT Eval and Treat Back  Medical Diagnosis from Referral: Chronic left-sided low back pain with left-sided sciatica  Evaluation Date: 6/21/2024  Authorization Period Expiration: 06/17/2025  Plan of Care Expiration: 8/9/2024  Progress Note Due: 8/9/2024  Date of Surgery: N/A  Visit # / Visits authorized: 1/ 15 (2 total)   FOTO: 1/ 3              Next survey due week of 7/10/2024     Precautions: Diabetes controlled with medication and diet    PTA Visit #: 1/5     Time In: 1112  Time Out: 1210  Total Billable Time: 46 minutes    SUBJECTIVE     Pt reports: pain in sacroiliac pain at times but more so in left lower extremity, at times hip, lateral thigh, or down to foot.  She was not compliant with home exercise program because one has not been issued prior to.  Response to previous treatment: No change noted post initial evaluation  Functional change: N/A at this time    Pain: 3-4/10 post medication  Location: left buttocks and lower extremity      OBJECTIVE     Objective Measures updated at progress report unless specified.     Treatment     Yanci received the treatments listed below: (activities not performed on this date in grey print, additions in bold print, and to be added/changed in blue print)    therapeutic exercises to develop endurance, ROM, flexibility, posture, and core stabilization for 38 minutes including:  Seated hamstring stretches 3x30s ea  Piriformis stretches 3x30s ea  Thoracolumbar AROM stretches 10x3s ea  Flexion physioball rollout  Sidebending  Extension  Upper trunk  rotation  Supine hip flexor stretches 3x30s ea  Double knees to chest using physioball 10x3s ea (reviewed single knee to chest for HEP)  Lower trunk rotation using physioball 10x3s ea  Posterior pelvic tilts 10x3s  Reverse crunch core physioball isometric 10x3s    *Reviewed log rolling technique followed by supine to sit transfer*    To be added:   Gastroc Stretches, IT band stretches prn, Resisted bent knee fallout for HEP, Resisted core bracing marching for HEP, Bridging for HEP, Glute stability training, Core and Postural stability training, Treadmill in multi-direction, Body mechanics training    manual therapy techniques: Joint mobilizations and Soft tissue Mobilization were applied to the: Lumbosacral region for 8 minutes, including:  Strumming and cross-hand release to lumbar paraspinals  Strumming, deep pressure, and trigger-point release to right gluteals and piriformis  Strumming to left gluteals  Grade II sacroiliac joint mobilizations  AAROM into posterior pelvic tilt followed by lower trunk rotation sacroiliac mobilization    Patient Education and Home Exercises     Home Exercises Provided and Patient Education Provided     Education provided:   - Education was provided on current condition, as well as rehabilitation process for current condition; patient was then instructed in initial treatment program as stated above.    Written Home Exercises Provided: yes. Exercises were reviewed and Yanci was able to demonstrate them prior to the end of the session. Yanci demonstrated good  understanding of the education provided. See EMR under Patient Instructions for exercises provided during therapy sessions.    ASSESSMENT     The patient reported to physical therapy stating pain in left lower extremity. Yanci Coats was instructed in initial treatment program as stated above to aide in increased flexibility, increasing mobility, reducing excess tension and stress on joints, improving sacroiliac function,  and allowing for improved form, as well as increased core stability for improved spinal support. Pain noted during bridging during mat transfers, as well as discomfort during hip flexor stretching and posterior pelvic tilts. Reviewed mat transfer training for improved form/reduced stress and risk of injury, as well as comfort, increasing quality of life. Excess tension noted over right sacroiliac region with sensitivity present. Excess anterior sacral tilt noted, reducing post manual techniques. Leg length equal prior to leaving clinic.    Yanci Is progressing slowly towards her goals.   Pt prognosis is Fair-Good.     Pt will continue to benefit from skilled outpatient physical therapy to address the deficits listed in the problem list box on initial evaluation, provide pt/family education and to maximize pt's level of independence in the home and community environment.     Pt's spiritual, cultural and educational needs considered and pt agreeable to plan of care and goals.     Anticipated barriers to physical therapy: None noted at this time    Goals:  Short Term Goals: 3 weeks   1) Decrease maxi pain to < 6/10 Initiated/Ongoing  2) Decrease tenderness/excess muscle tension to minimal Initiated/Ongoing  3) Facilitate improved posture Initiated/Ongoing  4) Increase standing/walking tolerance to > 15 minutes Ongoing     Long Term Goals: 6 weeks   1) Patient will sleep > 7 hours without interruption by LBP 5 of 7 nights per week Initiated/Ongoing  2) Increase walking tolerance to > 2 miles Ongoing  3) Increase standing tolerance to > 30 minutes Ongoing  4) Patient will safely lift up to 35# without increased LBP Ongoing  5) Improve functional score to > 65% as indicated by FOTO lumbar spine survey Initiated/Ongoing  6) Patient will be independent in HEP Initiated    PLAN   POC: 2 times weekly for 6 weeks to include the following interventions: Electrical Stimulation IFC/Pre-mod, Manual Therapy, Moist Heat/ Ice,  Neuromuscular Re-ed, Patient Education, Therapeutic Activities, Therapeutic Exercise, Ultrasound, and Functional Dry Needling, Therapeutic Taping. Yanci may be treated by PTA as part of their rehab team.     The patient is to be progressed within the established plan of care as tolerated in order to accomplish goals as stated above. Plan to incorporate additional flexibility and stability training subsequently, see above in blue print. Provide updated home exercise program as further progressed.    Ирина Gregory, PTA

## 2024-07-02 ENCOUNTER — CLINICAL SUPPORT (OUTPATIENT)
Dept: REHABILITATION | Facility: HOSPITAL | Age: 69
End: 2024-07-02
Payer: COMMERCIAL

## 2024-07-02 DIAGNOSIS — M53.86 DECREASED ROM OF LUMBAR SPINE: ICD-10-CM

## 2024-07-02 DIAGNOSIS — G89.29 CHRONIC LEFT-SIDED LOW BACK PAIN WITH LEFT-SIDED SCIATICA: ICD-10-CM

## 2024-07-02 DIAGNOSIS — Z74.09 IMPAIRED FUNCTIONAL MOBILITY AND ACTIVITY TOLERANCE: Primary | ICD-10-CM

## 2024-07-02 DIAGNOSIS — M54.42 CHRONIC LEFT-SIDED LOW BACK PAIN WITH LEFT-SIDED SCIATICA: ICD-10-CM

## 2024-07-02 PROCEDURE — 97140 MANUAL THERAPY 1/> REGIONS: CPT | Mod: PN

## 2024-07-02 PROCEDURE — 97110 THERAPEUTIC EXERCISES: CPT | Mod: PN

## 2024-07-02 NOTE — PROGRESS NOTES
"[facility]   Physical Therapy Treatment Note     Name: Yanci Coats  Clinic Number: [id number]    Therapy Diagnosis:   Encounter Diagnoses   Name Primary?    Impaired functional mobility and activity tolerance Yes    Decreased ROM of lumbar spine     Chronic left-sided low back pain with left-sided sciatica      Physician: Chaitanya Mc MD    Visit Date: 7/2/2024    Physician Orders: PT Eval and Treat Back  Medical Diagnosis from Referral: Chronic left-sided low back pain with left-sided sciatica  Evaluation Date: 6/21/2024  Authorization Period Expiration: 06/17/2025  Plan of Care Expiration: 8/9/2024  Progress Note Due: 8/9/2024  Date of Surgery: N/A  Visit # / Visits authorized: 2/ 15 (3 total)   FOTO: 1/ 3              Next survey due week of 7/10/2024     Precautions: Diabetes controlled with medication and diet    PTA Visit #: 0/5     Time In: 0850  Time Out: 0945  Total Billable Time: 50 minutes    SUBJECTIVE     Pt reports: "Different but better".  She was somewhat compliant with home exercise program.  Response to previous treatment: Felt better  Functional change: Minimally improved walking tolerance    Pain: 2.5/10 post medication; 5/10 prior to medication  Location: left buttocks and lower extremity      OBJECTIVE     Objective Measures updated at progress report unless specified.     Treatment     Yanci received the treatments listed below: (activities not performed on this date in grey print, additions in bold print, and to be added/changed in blue print)    therapeutic exercises to develop endurance, ROM, flexibility, posture, and core stabilization for 41 minutes including:  Seated hamstring stretches 3x30s ea  Piriformis stretches 3x30s ea  Thoracolumbar AROM stretches 10x3s ea  Flexion  Sidebending  Extension  Upper trunk rotation (resisted-red theraband)  Resisted march (red theraband) x 10 ea  Standing calf stretch 3x30s ea   Gluteus medius dips x 10   Mini " squat with gluteus medius bias x 10  Supine hip flexor stretches 3x30s ea  Double knees to chest using physioball 10x3s ea  Posterior pelvic tilts 10x3s  SI joint self mobilizaton 10 x 3s ea  Lower trunk rotation using physioball 10x3s ea  Reverse crunch core physioball isometric 10x3s      *Reviewed log rolling technique followed by supine to sit transfer*    To be added:   IT band stretches prn, Resisted bent knee fallout for HEP, Bridging for HEP, Glute stability training, Core and Postural stability training, Treadmill in multi-direction, Body mechanics training    manual therapy techniques: Joint mobilizations and Soft tissue Mobilization were applied to the: Lumbosacral region for 9 minutes, including:  Strumming to lumbar paraspinals  Strumming, deep pressure, and trigger-point release to right gluteals and piriformis  Strumming to left gluteals  Grade II sacroiliac joint mobilizations  Muscle energy technique in prone to facilitate R SIJ mobility  AAROM into posterior pelvic tilt followed by lower trunk rotation sacroiliac mobilization    Patient Education and Home Exercises     Home Exercises Provided and Patient Education Provided     Education provided:   - Educated in additional exercises as noted above in bold print.    Written Home Exercises Provided: Instructed patient to continue prior HEP. Exercises were reviewed and Yanci was able to demonstrate them prior to the end of the session. Yanci demonstrated good  understanding of the education provided. See EMR under Patient Instructions for exercises provided during therapy sessions.    ASSESSMENT     The patient presents to PT today reporting minimal improvement in symptoms since last visit.  She demonstrated moderate tenderness over R gluteus medius origin and R SI joint.  She reported moderate discomfort with posterior to anterior glides of R ilium on sacrum.  She struggled somewhat with R gluteus medius dips.  She reported increased soreness  following session.     Yanci Is progressing slowly towards her goals.   Pt prognosis is Fair-Good.     Pt will continue to benefit from skilled outpatient physical therapy to address the deficits listed in the problem list box on initial evaluation, provide pt/family education and to maximize pt's level of independence in the home and community environment.     Pt's spiritual, cultural and educational needs considered and pt agreeable to plan of care and goals.     Anticipated barriers to physical therapy: None noted at this time    Goals:  Short Term Goals: 3 weeks   1) Decrease maxi pain to < 6/10 Progressing slowly  2) Decrease tenderness/excess muscle tension to minimal Progressing slowly  3) Facilitate improved posture Initiated/Ongoing  4) Increase standing/walking tolerance to > 15 minutes Ongoing     Long Term Goals: 6 weeks   1) Patient will sleep > 7 hours without interruption by LBP 5 of 7 nights per week Initiated/Ongoing  2) Increase walking tolerance to > 2 miles Ongoing  3) Increase standing tolerance to > 30 minutes Ongoing  4) Patient will safely lift up to 35# without increased LBP Ongoing  5) Improve functional score to > 65% as indicated by FOTO lumbar spine survey Initiated/Ongoing  6) Patient will be independent in HEP Progressing    PLAN   POC: 2 times weekly for 6 weeks to include the following interventions: Electrical Stimulation IFC/Pre-mod, Manual Therapy, Moist Heat/ Ice, Neuromuscular Re-ed, Patient Education, Therapeutic Activities, Therapeutic Exercise, Ultrasound, and Functional Dry Needling, Therapeutic Taping. Yanci may be treated by PTA as part of their rehab team.     The patient is to be progressed within the established plan of care as tolerated in order to accomplish goals as stated above. Plan to increase flexibility and core strengthening exercises as patient tolerates.  Continue with SIJ mobilization. Trial of dry needling with electrical stimulation if patient is  agreeable. .    Nery Guerra, PT

## 2024-07-03 ENCOUNTER — TELEPHONE (OUTPATIENT)
Dept: FAMILY MEDICINE | Facility: CLINIC | Age: 69
End: 2024-07-03
Payer: COMMERCIAL

## 2024-07-11 ENCOUNTER — CLINICAL SUPPORT (OUTPATIENT)
Dept: REHABILITATION | Facility: HOSPITAL | Age: 69
End: 2024-07-11
Payer: COMMERCIAL

## 2024-07-11 ENCOUNTER — PATIENT MESSAGE (OUTPATIENT)
Dept: INTERNAL MEDICINE | Facility: CLINIC | Age: 69
End: 2024-07-11
Payer: COMMERCIAL

## 2024-07-11 DIAGNOSIS — M53.86 DECREASED ROM OF LUMBAR SPINE: ICD-10-CM

## 2024-07-11 DIAGNOSIS — G89.29 CHRONIC LEFT-SIDED LOW BACK PAIN WITH LEFT-SIDED SCIATICA: ICD-10-CM

## 2024-07-11 DIAGNOSIS — M54.42 CHRONIC LEFT-SIDED LOW BACK PAIN WITH LEFT-SIDED SCIATICA: ICD-10-CM

## 2024-07-11 DIAGNOSIS — Z74.09 IMPAIRED FUNCTIONAL MOBILITY AND ACTIVITY TOLERANCE: Primary | ICD-10-CM

## 2024-07-11 PROCEDURE — 97140 MANUAL THERAPY 1/> REGIONS: CPT | Mod: PN,CQ

## 2024-07-11 PROCEDURE — 97110 THERAPEUTIC EXERCISES: CPT | Mod: PN,CQ

## 2024-07-11 NOTE — PROGRESS NOTES
"[facility]   Physical Therapy Treatment Note     Name: Yanci Coats  Clinic Number: [id number]    Therapy Diagnosis:   Encounter Diagnoses   Name Primary?    Impaired functional mobility and activity tolerance Yes    Decreased ROM of lumbar spine     Chronic left-sided low back pain with left-sided sciatica      Physician: Chaitanya Mc MD    Visit Date: 7/11/2024    Physician Orders: PT Eval and Treat Back  Medical Diagnosis from Referral: Chronic left-sided low back pain with left-sided sciatica  Evaluation Date: 6/21/2024  Authorization Period Expiration: 06/17/2025  Plan of Care Expiration: 8/9/2024  Progress Note Due: 8/9/2024  Date of Surgery: N/A  Visit # / Visits authorized: 3/ 15 (4 total)   FOTO: 1/ 3              Next survey due session 5     Precautions: Diabetes controlled with medication and diet    PTA Visit #: 1/5     Time In: 1410  Time Out: 1457  Total Billable Time: 46 minutes    SUBJECTIVE     Pt reports: "It is hurting."  She was somewhat compliant with home exercise program.  Response to previous treatment: "I have been hurting every since last week."  Functional change: Minimally improved walking tolerance    Pain: 5-6/10  Location: left buttocks and lower extremity      OBJECTIVE     Objective Measures updated at progress report unless specified.     Treatment     Yanci received the treatments listed below: (activities not performed on this date in grey print, additions in bold print, and to be added/changed in blue print)    therapeutic exercises to develop endurance, ROM, flexibility, posture, and core stabilization for 38 minutes including:  Seated hamstring stretches 3x30s ea  Piriformis stretches 3x30s ea  Standing calf stretch 3x30s ea   IT band stretches 3x30s ea   Gluteus medius dips x10 (continue & progress)   Mini squat with gluteus medius bias x10 (continue & progress)  Supine hip flexor stretches 3x30s ea   Bent knee fallout using red " theraband 15x3s ea (alt hip abd)  Resisted march (red theraband) x15 ea  Double knees to chest using physioball 10x3s ea  Lower trunk rotation using physioball 10x3s ea  Reverse crunch core physioball isometric 15x3s    To be added:   Core and Postural stability training, Treadmill in multi-direction, Quadruped exercises, Body mechanics training    manual therapy techniques: Joint mobilizations and Soft tissue Mobilization were applied to the: Lumbosacral region for 8 minutes, including:  Strumming and cross-hand release to lumbar paraspinals  Strumming, deep pressure, and trigger-point release to bilateral gluteals and piriformis  Grade II posterior-anterior sacroiliac joint mobilizations  Muscle energy technique in prone to facilitate R SIJ mobility  AAROM into posterior pelvic tilt followed by lower trunk rotation sacroiliac mobilization    Patient Education and Home Exercises     Home Exercises Provided and Patient Education Provided     Education provided:   - Education was provided on progressions as noted above in bold print.    Written Home Exercises Provided: Instructed patient to continue prior HEP. Exercises were reviewed and Yanci was able to demonstrate them prior to the end of the session. Yanci demonstrated good  understanding of the education provided. See EMR under Patient Instructions for exercises provided during therapy sessions.    ASSESSMENT     The patient reported to physical therapy stating increased pain, stating pain since previous session. Yanci Coats was progressed with additional flexibility and stability training, while addressing sacroiliac function. Due to pain, the treatment program was not completed in its entirety. Patient demonstrates bilateral excess lumbosacral tension; left sacroiliac region more restricted than right. Sacrum slightly anteriorly tilted, leveling with mobilizations.    Yanci Is progressing slowly towards her goals.   Pt prognosis is Fair-Good.     Pt  will continue to benefit from skilled outpatient physical therapy to address the deficits listed in the problem list box on initial evaluation, provide pt/family education and to maximize pt's level of independence in the home and community environment.     Pt's spiritual, cultural and educational needs considered and pt agreeable to plan of care and goals.     Anticipated barriers to physical therapy: None noted at this time    Goals:  Short Term Goals: 3 weeks   1) Decrease maxi pain to < 6/10 Progressing slowly  2) Decrease tenderness/excess muscle tension to minimal Progressing slowly  3) Facilitate improved posture Initiated/Ongoing  4) Increase standing/walking tolerance to > 15 minutes Ongoing     Long Term Goals: 6 weeks   1) Patient will sleep > 7 hours without interruption by LBP 5 of 7 nights per week Initiated/Ongoing  2) Increase walking tolerance to > 2 miles Ongoing  3) Increase standing tolerance to > 30 minutes Slow progress  4) Patient will safely lift up to 35# without increased LBP Ongoing  5) Improve functional score to > 65% as indicated by FOTO lumbar spine survey Initiated/Ongoing  6) Patient will be independent in HEP Progressing    PLAN   POC: 2 times weekly for 6 weeks to include the following interventions: Electrical Stimulation IFC/Pre-mod, Manual Therapy, Moist Heat/ Ice, Neuromuscular Re-ed, Patient Education, Therapeutic Activities, Therapeutic Exercise, Ultrasound, and Functional Dry Needling, Therapeutic Taping. Yanci may be treated by PTA as part of their rehab team.     The patient is to be progressed within the established plan of care as tolerated in order to accomplish goals as stated above. Plan to incorporate treadmill activity and postural stability training subsequently. Trial of dry needling with PT if patient is agreeable to further address soft tissue restrictions.    Ирина Gregory, PTA

## 2024-07-15 ENCOUNTER — CLINICAL SUPPORT (OUTPATIENT)
Dept: REHABILITATION | Facility: HOSPITAL | Age: 69
End: 2024-07-15
Attending: PHYSICAL MEDICINE & REHABILITATION
Payer: COMMERCIAL

## 2024-07-15 DIAGNOSIS — Z74.09 IMPAIRED FUNCTIONAL MOBILITY AND ACTIVITY TOLERANCE: Primary | ICD-10-CM

## 2024-07-15 DIAGNOSIS — M53.86 DECREASED ROM OF LUMBAR SPINE: ICD-10-CM

## 2024-07-15 DIAGNOSIS — G89.29 CHRONIC LEFT-SIDED LOW BACK PAIN WITH LEFT-SIDED SCIATICA: ICD-10-CM

## 2024-07-15 DIAGNOSIS — M54.42 CHRONIC LEFT-SIDED LOW BACK PAIN WITH LEFT-SIDED SCIATICA: ICD-10-CM

## 2024-07-15 PROCEDURE — 97530 THERAPEUTIC ACTIVITIES: CPT | Mod: PN,CQ

## 2024-07-15 PROCEDURE — 97110 THERAPEUTIC EXERCISES: CPT | Mod: PN,CQ

## 2024-07-15 PROCEDURE — 97140 MANUAL THERAPY 1/> REGIONS: CPT | Mod: PN,CQ

## 2024-07-15 NOTE — PROGRESS NOTES
"[facility]   Physical Therapy Treatment Note     Name: Yanci Coats  Clinic Number: [id number]    Therapy Diagnosis:   Encounter Diagnoses   Name Primary?    Impaired functional mobility and activity tolerance Yes    Decreased ROM of lumbar spine     Chronic left-sided low back pain with left-sided sciatica      Physician: Chaitanya Mc MD    Visit Date: 7/15/2024    Physician Orders: PT Eval and Treat Back  Medical Diagnosis from Referral: Chronic left-sided low back pain with left-sided sciatica  Evaluation Date: 6/21/2024  Authorization Period Expiration: 06/17/2025  Plan of Care Expiration: 8/9/2024  Progress Note Due: 8/9/2024  Date of Surgery: N/A  Visit # / Visits authorized: 4/ 15 (5 total)   FOTO: 2/ 3 Completed 7/15/2024 with a Functional score of 60%  Modified Oswestry LBP Disability Ques 18/100 Higher Score = Greater Disability              Next survey due session 10     Precautions: Diabetes controlled with medication and diet    PTA Visit #: 2/5     Time In: 0936  Time Out: 1023  Total Billable Time: 47 minutes    SUBJECTIVE     Pt reports: "It is not as bad."  She was not compliant with home exercise program.  Response to previous treatment: "Better. I was not in as much pain."  Functional change: Minimally improved walking tolerance    Pain: 1/10 "depending on how I sit it would be a 4"  Location: left buttocks and lower extremity      OBJECTIVE     Objective Measures updated at progress report unless specified.     Treatment     Yanci received the treatments listed below: (activities not performed on this date in grey print, additions in bold print, and to be added/changed in blue print)    therapeutic exercises to develop endurance, ROM, flexibility, posture, and core stabilization for 30 minutes including:  Seated hamstring stretches 3x30s ea  Piriformis stretches 3x30s ea  Standing calf stretch 3x30s ea   IT band stretches 3x30s ea (continue)   Gluteus " medius dips x15   Mini squat with gluteus medius bias x20   Multifidus walkout using green theraband x7 bouts ea  Supine hip flexor stretches 3x30s ea   Bent knee fallout using red theraband 15x3s ea (alt hip abd) (can continue & progress or D/C to HEP prn)  Resisted march (red theraband) x15 ea (can continue & progress or D/C to HEP prn)  Double knees to chest using physioball 10x3s ea (D/C to HEP prn)  Lower trunk rotation using physioball 10x3s ea (D/C to HEP prn)  Reverse crunch core physioball isometric 15x3s    therapeutic activities to improve functional performance for 9 minutes, including:  Treadmill incline 1% @2.0-2.5mph x6' & retro @1.0mph x3' (x9' total)    To be added:   Core and Postural stability training, Quadruped exercises, Body mechanics training    manual therapy techniques: Joint mobilizations and Soft tissue Mobilization were applied to the: Lumbosacral region for 8 minutes, including:  Strumming and cross-hand release to lumbar paraspinals  Strumming, deep pressure, and trigger-point release to bilateral gluteals and piriformis  Grade II posterior-anterior sacroiliac joint mobilizations  Muscle energy technique in prone to facilitate R SIJ mobility  AAROM into posterior pelvic tilt followed by lower trunk rotation sacroiliac mobilization    Patient Education and Home Exercises     Home Exercises Provided and Patient Education Provided     Education provided:   - Education was provided on importance of postural correction.    Written Home Exercises Provided: Instructed patient to continue prior HEP. Exercises were reviewed and Yanci was able to demonstrate them prior to the end of the session. Yanci demonstrated good  understanding of the education provided. See EMR under Patient Instructions for exercises provided during therapy sessions.    ASSESSMENT     The patient reported to physical therapy stating decreased discomfort post previous session. Yanci Coats was progressed with  increased stability and endurance training as stated above in bold print. Patient demonstrates slightly rounded shoulders. Excess tension noted bilateral lateral sides of sacrum, although gradually reducing.    Yanci Is progressing slowly towards her goals.   Pt prognosis is Fair-Good.     Pt will continue to benefit from skilled outpatient physical therapy to address the deficits listed in the problem list box on initial evaluation, provide pt/family education and to maximize pt's level of independence in the home and community environment.     Pt's spiritual, cultural and educational needs considered and pt agreeable to plan of care and goals.     Anticipated barriers to physical therapy: None noted at this time    Goals:  Short Term Goals: 3 weeks   1) Decrease maxi pain to < 6/10 Progressing  2) Decrease tenderness/excess muscle tension to minimal Progressing slowly  3) Facilitate improved posture Slow progress  4) Increase standing/walking tolerance to > 15 minutes Progressing     Long Term Goals: 6 weeks   1) Patient will sleep > 7 hours without interruption by LBP 5 of 7 nights per week Initiated/Ongoing  2) Increase walking tolerance to > 2 miles Slowly progressing  3) Increase standing tolerance to > 30 minutes Slowly progressing  4) Patient will safely lift up to 35# without increased LBP Ongoing  5) Improve functional score to > 65% as indicated by FOTO lumbar spine survey Progressing  6) Patient will be independent in HEP Progressing    PLAN   POC: 2 times weekly for 6 weeks to include the following interventions: Electrical Stimulation IFC/Pre-mod, Manual Therapy, Moist Heat/ Ice, Neuromuscular Re-ed, Patient Education, Therapeutic Activities, Therapeutic Exercise, Ultrasound, and Functional Dry Needling, Therapeutic Taping. Yanci may be treated by PTA as part of their rehab team.     The patient is to be progressed within the established plan of care as tolerated in order to accomplish goals as  stated above. Plan to incorporate quadruped stability training and postural stability training subsequently. Trial of dry needling with PT if patient is agreeable to further address soft tissue restrictions.    Ирина Gregory, PTA

## 2024-07-16 DIAGNOSIS — E11.59 HYPERTENSION ASSOCIATED WITH DIABETES: ICD-10-CM

## 2024-07-16 DIAGNOSIS — I15.2 HYPERTENSION ASSOCIATED WITH DIABETES: ICD-10-CM

## 2024-07-16 RX ORDER — AMLODIPINE BESYLATE 10 MG/1
10 TABLET ORAL
Qty: 90 TABLET | Refills: 3 | Status: SHIPPED | OUTPATIENT
Start: 2024-07-16

## 2024-07-17 ENCOUNTER — TELEPHONE (OUTPATIENT)
Dept: OPTOMETRY | Facility: CLINIC | Age: 69
End: 2024-07-17
Payer: COMMERCIAL

## 2024-07-17 NOTE — TELEPHONE ENCOUNTER
Provider Staff:  Action required for this patient    Requires labs      Please see care gap opportunities below in Care Due Message.    Thanks!  [facility]     Appointments      Date Provider   Last Visit   4/17/2024 Anurag Nye MD   Next Visit   12/16/2024 Anurag Nye MD     Refill Decision Note   Yanci Coats  is requesting a refill authorization.  Brief Assessment and Rationale for Refill:  Approve     Medication Therapy Plan:         Comments:     Note composed:9:37 PM 07/16/2024

## 2024-07-17 NOTE — TELEPHONE ENCOUNTER
----- Message from Carrie Alas sent at 7/17/2024 10:37 AM CDT -----  Contact: Patient  Type:  Appointment Request    Caller is requesting a sooner appointment.  Caller declined first available appointment listed below.  Caller will not accept being placed on the waitlist and is requesting a message be sent to doctor.    Name of Caller:  Patient  When is the first available appointment?  N/A    Would the patient rather a call back or a response via [facility]?   Call back  Best Call Back Number:  [phone]    Additional Information:   States she would like to speak with someone - states she does NOT want to reschedule her appointment on 7/24 at 9:00 - states she hit the wrong button on the phone and is very upset - states she does not want to lose this appointment - please call - thank you

## 2024-07-17 NOTE — TELEPHONE ENCOUNTER
Returned call, no answer.   LVM for pt that her appt has been rescheduled back on 7-24-24 at 9:00am per pt request.

## 2024-07-17 NOTE — TELEPHONE ENCOUNTER
Care Due:                  Date            Visit Type   Department     Provider  --------------------------------------------------------------------------------                                SAME DAY -                              ESTABLISHED   [facility]  Last Visit: 04-      PATIENT      MEDICINE       Anurag Nye                              EP -                              PRIMARY      [facility]  Next Visit: 12-      CARE ([facility])   MEDICINE       Anurag Nye                                                            Last  Test          Frequency    Reason                     Performed    Due Date  --------------------------------------------------------------------------------    HBA1C.......  6 months...  KATI dulmaxwell.....  04- 09-    [facility] Embedded Care Due Messages. Reference number: [id number].   7/16/2024 9:31:52 PM CDT

## 2024-07-25 ENCOUNTER — OFFICE VISIT (OUTPATIENT)
Dept: FAMILY MEDICINE | Facility: CLINIC | Age: 69
End: 2024-07-25
Payer: COMMERCIAL

## 2024-07-25 VITALS
BODY MASS INDEX: 30.97 KG/M2 | SYSTOLIC BLOOD PRESSURE: 146 MMHG | TEMPERATURE: 98 F | WEIGHT: 197.31 LBS | HEIGHT: 67 IN | DIASTOLIC BLOOD PRESSURE: 60 MMHG | OXYGEN SATURATION: 97 % | RESPIRATION RATE: 16 BRPM | HEART RATE: 67 BPM

## 2024-07-25 DIAGNOSIS — J06.9 ACUTE URI: Primary | ICD-10-CM

## 2024-07-25 DIAGNOSIS — Z20.822 EXPOSURE TO COVID-19 VIRUS: ICD-10-CM

## 2024-07-25 LAB
CTP QC/QA: YES
CTP QC/QA: YES
POC MOLECULAR INFLUENZA A AGN: NEGATIVE
POC MOLECULAR INFLUENZA B AGN: NEGATIVE
SARS-COV-2 RDRP RESP QL NAA+PROBE: NEGATIVE

## 2024-07-25 PROCEDURE — 99999 PR PBB SHADOW E&M-EST. PATIENT-LVL V: CPT | Mod: PBBFAC,,, | Performed by: FAMILY MEDICINE

## 2024-07-25 NOTE — PATIENT INSTRUCTIONS
Push fluids intake.  Drink plenty of water.     Vitamin C 1,000 mg three times a day     Contact your PCP if any worsening or for any new concerns as we discussed.

## 2024-07-25 NOTE — PROGRESS NOTES
Subjective:       Patient ID: Yanci Coats is a 68 y.o. female.    Chief Complaint: No chief complaint on file.    New to me patient here for UC visit.  Onset 3 days of HA, hoarseness, ST and possible low grade fever.  No cough. No pleuritic pain.  One episode of chest tightness - resolved after going outside for fresh air.  Loss of smell and a change of taste.   Exposed to person 5 days ago who was Dx'ed with Covid; but was outside and only 10' exposure.  Flu negative and Covid negative here today.           Review of Systems   Constitutional:  Negative for fever.   HENT:  Positive for congestion, sore throat and voice change.    Respiratory:  Negative for cough and shortness of breath.    Cardiovascular:  Negative for chest pain.   Gastrointestinal:  Negative for abdominal pain and nausea.   Skin:  Negative for rash.   Neurological:  Positive for headaches.   All other systems reviewed and are negative.      Objective:      Physical Exam  Vitals reviewed.   Constitutional:       General: She is not in acute distress.     Appearance: She is well-developed.   HENT:      Right Ear: Tympanic membrane normal. Tympanic membrane is not erythematous.      Left Ear: Tympanic membrane normal. Tympanic membrane is not erythematous.      Nose: Mucosal edema present.      Right Sinus: No maxillary sinus tenderness.      Left Sinus: No maxillary sinus tenderness.      Mouth/Throat:      Pharynx: Posterior oropharyngeal erythema present. No oropharyngeal exudate.   Cardiovascular:      Rate and Rhythm: Normal rate and regular rhythm.      Heart sounds: No murmur heard.  Pulmonary:      Effort: Pulmonary effort is normal.      Breath sounds: Normal breath sounds.   Musculoskeletal:      Cervical back: Neck supple.   Lymphadenopathy:      Cervical: No cervical adenopathy.   Skin:     General: Skin is warm and dry.   Neurological:      Mental Status: She is alert.         Assessment:       1. Acute URI    2. Exposure to  COVID-19 virus        Plan:       Acute URI  -     POCT COVID-19 Rapid Screening  -     POCT Influenza A/B Molecular    Exposure to COVID-19 virus      Patient Instructions   Push fluids intake.  Drink plenty of water.     Vitamin C 1,000 mg three times a day     Contact your PCP if any worsening or for any new concerns as we discussed.

## 2024-07-31 ENCOUNTER — CLINICAL SUPPORT (OUTPATIENT)
Dept: REHABILITATION | Facility: HOSPITAL | Age: 69
End: 2024-07-31
Attending: PHYSICAL MEDICINE & REHABILITATION
Payer: COMMERCIAL

## 2024-07-31 DIAGNOSIS — M54.42 CHRONIC LEFT-SIDED LOW BACK PAIN WITH LEFT-SIDED SCIATICA: ICD-10-CM

## 2024-07-31 DIAGNOSIS — G89.29 CHRONIC LEFT-SIDED LOW BACK PAIN WITH LEFT-SIDED SCIATICA: ICD-10-CM

## 2024-07-31 DIAGNOSIS — Z74.09 IMPAIRED FUNCTIONAL MOBILITY AND ACTIVITY TOLERANCE: Primary | ICD-10-CM

## 2024-07-31 DIAGNOSIS — M53.86 DECREASED ROM OF LUMBAR SPINE: ICD-10-CM

## 2024-07-31 PROCEDURE — 97530 THERAPEUTIC ACTIVITIES: CPT | Mod: PN

## 2024-07-31 PROCEDURE — 97110 THERAPEUTIC EXERCISES: CPT | Mod: PN

## 2024-07-31 NOTE — PROGRESS NOTES
"[facility]   Physical Therapy Treatment Note     Name: Yanci Coats  Clinic Number: [id number]    Therapy Diagnosis:   Encounter Diagnoses   Name Primary?    Impaired functional mobility and activity tolerance Yes    Decreased ROM of lumbar spine     Chronic left-sided low back pain with left-sided sciatica      Physician: Chaitanya Mc MD    Visit Date: 7/31/2024    Physician Orders: PT Eval and Treat Back  Medical Diagnosis from Referral: Chronic left-sided low back pain with left-sided sciatica  Evaluation Date: 6/21/2024  Authorization Period Expiration: 06/17/2025  Plan of Care Expiration: 8/9/2024  Progress Note Due: 8/9/2024  Date of Surgery: N/A  Visit # / Visits authorized: 5/ 15 (6 total)   FOTO: 2/ 3 Completed 7/15/2024 with a Functional score of 60%  Modified Oswestry LBP Disability Ques 18/100 Higher Score = Greater Disability              Next survey due session 10     Precautions: Diabetes controlled with medication and diet    PTA Visit #: 0/5     Time In: 0809  Time Out: 0900  Total Billable Time: 45 minutes    SUBJECTIVE     Pt reports: "Since the last time I was here, I haven't been having as much pain as I had been."  She was somewhat compliant with home exercise program.  Response to previous treatment: "I wasn't too bad.  I had some pain but it didn't last."  Functional change: Increased tolerance for standing/walking    Pain: 1/10   Location: left buttocks and lower extremity      OBJECTIVE     Objective Measures updated at progress report unless specified.     Treatment     Yanci received the treatments listed below: (activities not performed on this date in grey print, additions in bold print, and to be added/changed in blue print)    therapeutic exercises to develop endurance, ROM, flexibility, posture, and core stabilization for 30 minutes including:  Seated hamstring stretches 3x30s ea  Piriformis stretches 3x30s ea  Standing calf stretch 3x30s " ea   IT band stretches 3x30s ea   Gluteus medius dips x15   Mini squat with gluteus medius bias x20   Multifidus walkout using green theraband x10 bouts ea  Supine hip flexor stretches 3x30s ea   Bent knee fallout using red theraband 15x3s ea (alt hip abd) (can continue & progress or D/C to HEP prn)  Resisted march (red theraband) x15 ea (can continue & progress or D/C to HEP prn)  Double knees to chest using physioball 10x3s ea (D/C to HEP prn)  Lower trunk rotation using physioball 10x3s ea (D/C to HEP prn)  Reverse crunch core physioball isometric 15x3s  Reverse isometric oblique crunch 10 x 3s ea  Quadruped bird dog 10 x 3s ea    therapeutic activities to improve functional performance for 9 minutes, including:  Treadmill incline 1% @2.0-2.5mph x6' & retro @1.0mph x3' (x9' total)    To be added:   Core and Postural stability training, Quadruped cat/camel, Body mechanics training    manual therapy techniques: Joint mobilizations and Soft tissue Mobilization were applied to the: Lumbosacral region for 6 minutes, including:  Strumming, deep pressure, and trigger-point release to L gluteals and piriformis  Grade II posterior-anterior sacroiliac joint mobilizations  Strumming and cross-hand release to lumbar paraspinals  Muscle energy technique in prone to facilitate R SIJ mobility  AAROM into posterior pelvic tilt followed by lower trunk rotation sacroiliac mobilization    Patient Education and Home Exercises     Home Exercises Provided and Patient Education Provided     Education provided:   - Educated in additional exercises as indicated above in bold print.     Written Home Exercises Provided: Instructed patient to continue prior HEP. Exercises were reviewed and Yanci was able to demonstrate them prior to the end of the session. Yanci demonstrated good  understanding of the education provided. See EMR under Patient Instructions for exercises provided during therapy sessions.    ASSESSMENT     The patient  presents to PT reporting little to no pain; however, during exercise program patient reported increased L buttock/hip pain.  She exhibited significant tenderness/muscle tension in L gluteus melissa, L piriformis.  SI joint mobilization resulted in no increase in pain.  Tenderness decreased somewhat following soft tissue mobilization.  Patient is nearing discharge.     Yanci Is progressing slowly towards her goals.   Pt prognosis is Fair-Good.     Pt will continue to benefit from skilled outpatient physical therapy to address the deficits listed in the problem list box on initial evaluation, provide pt/family education and to maximize pt's level of independence in the home and community environment.     Pt's spiritual, cultural and educational needs considered and pt agreeable to plan of care and goals.     Anticipated barriers to physical therapy: None noted at this time    Goals:   Short Term Goals: 3 weeks   1) Decrease maxi pain to < 6/10 Progressing  2) Decrease tenderness/excess muscle tension to minimal Progressing slowly  3) Facilitate improved posture Progressing slowly  4) Increase standing/walking tolerance to > 15 minutes Progressing     Long Term Goals: 6 weeks   1) Patient will sleep > 7 hours without interruption by LBP 5 of 7 nights per week Progressing slowly  2) Increase walking tolerance to > 2 miles Slowly progressing  3) Increase standing tolerance to > 30 minutes Slowly progressing  4) Patient will safely lift up to 35# without increased LBP Ongoing  5) Improve functional score to > 65% as indicated by FOTO lumbar spine survey Progressing  6) Patient will be independent in HEP Progressing    PLAN   POC: 2 times weekly for 6 weeks to include the following interventions: Electrical Stimulation IFC/Pre-mod, Manual Therapy, Moist Heat/ Ice, Neuromuscular Re-ed, Patient Education, Therapeutic Activities, Therapeutic Exercise, Ultrasound, and Functional Dry Needling, Therapeutic Taping. Yanci may  be treated by PTA as part of their rehab team.     The patient is to be progressed within the established plan of care as tolerated in order to accomplish goals as stated above.  Plan to incorporate quadruped stability training and postural stability training subsequently. Trial of dry needling with PT if patient is agreeable to further address soft tissue restrictions.  Instruct patient in body mechanics for lifting/carrying.     Nery Guerra, PT

## 2024-08-30 DIAGNOSIS — E11.65 TYPE 2 DIABETES MELLITUS WITH HYPERGLYCEMIA, WITHOUT LONG-TERM CURRENT USE OF INSULIN: ICD-10-CM

## 2024-08-30 RX ORDER — GLIPIZIDE 10 MG/1
10 TABLET ORAL 2 TIMES DAILY
Qty: 180 TABLET | Refills: 3 | Status: SHIPPED | OUTPATIENT
Start: 2024-08-30

## 2024-08-30 NOTE — TELEPHONE ENCOUNTER
eMrcy Rivers Staff     ----- Message from Mercy Rivers sent at 8/30/2024 11:58 AM CDT -----  Regarding: refill  Contact: Bolivar with [facility]  Type:  RX Refill Request    Who Called:  Bolivar with [facility]  Refill or New Rx:  refill  RX Name and Strength:  glipiZIDE (GLUCOTROL) 10 MG tablet  How is the patient currently taking it? (ex. 1XDay):  as directed  Is this a 30 day or 90 day RX:  90  Preferred Pharmacy with phone number:      [facility] - [address] - [facility] AT [address] [facility]  [place] [address]  Phone: [phone] Fax: [phone]  Local or Mail Order:  mail order  Ordering Provider:  Radha CHU  Best Call Back Number:  [phone] opt [phone] ref [phone]  Additional Information:  Please call Bolivar to advise.  Thanks!

## 2024-09-13 ENCOUNTER — TELEPHONE (OUTPATIENT)
Dept: OPHTHALMOLOGY | Facility: CLINIC | Age: 69
End: 2024-09-13
Payer: COMMERCIAL

## 2024-09-13 NOTE — TELEPHONE ENCOUNTER
Americo Dolan MD Innerarity, Gwen; Kamryn Holcomb  Caller: self (3 days ago,  7:03 AM)  Can you forward this to my staff in the future, I can't make appointments          Previous Messages       ----- Message -----  From: Lupe Freitas  Sent: 9/10/2024   7:06 AM CDT  To: Americo Dolan MD    Type:  Sooner Appointment Request    Caller is requesting a sooner appointment.  Caller declined first available appointment listed below.  Caller will not accept being placed on the waitlist and is requesting a message be sent to doctor.    Name of Caller:  pt  When is the first available appointment?  Tomorrow 9/11/24  Symptoms:  retina ckup  Would the patient rather a call back or a response via [facility]? Call back  Best Call Back Number:  [phone]  Additional Information:  pt wants to know if you are going to see patients tomorrow and if so she will keep her appt for tomorrow, because of the weather but if not please call back to felisa and thanks

## 2024-09-26 DIAGNOSIS — Z78.0 MENOPAUSE: ICD-10-CM

## 2024-09-29 DIAGNOSIS — E11.65 TYPE 2 DIABETES MELLITUS WITH HYPERGLYCEMIA, WITHOUT LONG-TERM CURRENT USE OF INSULIN: ICD-10-CM

## 2024-09-29 NOTE — TELEPHONE ENCOUNTER
Refill Routing Note   Medication(s) are not appropriate for processing by [facility] for the following reason(s):        Drug-drug interaction  Drug-disease interaction    [facility] action(s):  Defer   Requires labs : Yes      Medication Therapy Plan: NAFLD (nonalcoholic fatty liver disease); TRULICITY      Appointments  past 12m or future 3m with PCP    Date Provider   Last Visit   4/17/2024 Anurag Nye MD   Next Visit   12/16/2024 Anurag Nye MD   ED visits in past 90 days: 0        Note composed:12:04 PM 09/29/2024

## 2024-09-29 NOTE — TELEPHONE ENCOUNTER
Care Due:                  Date            Visit Type   Department     Provider  --------------------------------------------------------------------------------                                SAME DAY -                              ESTABLISHED   [facility]  Last Visit: 07-      PATIENT      MEDICINE       Mahesh Kevin                              EP -                              PRIMARY      [facility]  Next Visit: 12-      CARE ([facility])   MEDICINE       Anurag Nye                                                            Last  Test          Frequency    Reason                     Performed    Due Date  --------------------------------------------------------------------------------    HBA1C.......  6 months...  alejandra PARIKH.....  04- 09-    [facility] Embedded Care Due Messages. Reference number: [id number].   9/29/2024 11:47:24 AM CDT

## 2024-09-30 RX ORDER — SITAGLIPTIN AND METFORMIN HYDROCHLORIDE 1000; 50 MG/1; MG/1
1 TABLET, FILM COATED ORAL 2 TIMES DAILY WITH MEALS
Qty: 180 TABLET | Refills: 0 | Status: SHIPPED | OUTPATIENT
Start: 2024-09-30

## 2024-10-07 ENCOUNTER — PATIENT OUTREACH (OUTPATIENT)
Dept: ADMINISTRATIVE | Facility: HOSPITAL | Age: 69
End: 2024-10-07
Payer: COMMERCIAL

## 2024-10-07 ENCOUNTER — PATIENT MESSAGE (OUTPATIENT)
Dept: ADMINISTRATIVE | Facility: HOSPITAL | Age: 69
End: 2024-10-07
Payer: COMMERCIAL

## 2024-10-07 NOTE — PROGRESS NOTES
Osteoporosis screening (DEXA SCAN)     Non-compliant report chart audits for Osteoporosis screening (DEXA SCAN)     Outreach to patient in reference to SCHEDULING A Dexa Scan      ORDERED PLACED

## 2024-10-10 DIAGNOSIS — E11.9 TYPE 2 DIABETES MELLITUS WITHOUT COMPLICATION: ICD-10-CM

## 2024-10-14 ENCOUNTER — PATIENT OUTREACH (OUTPATIENT)
Dept: ADMINISTRATIVE | Facility: HOSPITAL | Age: 69
End: 2024-10-14
Payer: COMMERCIAL

## 2024-10-14 NOTE — PROGRESS NOTES
Population Health Chart Review & Patient Outreach Details      Additional Pop Health Notes:               Updates Requested / Reviewed:      Updated Care Coordination Note         Health Maintenance Topics Overdue:      VBHM Score: 4     Colon Cancer Screening  Urine Screening  Hemoglobin A1c  Uncontrolled BP    Influenza Vaccine  Tetanus Vaccine  Shingles/Zoster Vaccine  RSV Vaccine                  Health Maintenance Topic(s) Outreach Outcomes & Actions Taken:    Colorectal Cancer Screening - Outreach Outcomes & Actions Taken  : msg    Lab(s) - Outreach Outcomes & Actions Taken  : Overdue Lab(s) Ordered    Eye Exam - Outreach Outcomes & Actions Taken  : Eye Camera Scheduled or Optometry/Ophthalmology Referral Placed/Appt Scheduled    Osteoporosis Screening - Outreach Outcomes & Actions Taken  : Dexa Appointment Scheduled

## 2024-10-14 NOTE — ADDENDUM NOTE
Addended by: FRANCA TORRES on: 7/13/2020 04:12 PM     Modules accepted: Orders    
To PACU; for Discharge.

## 2024-10-15 ENCOUNTER — HOSPITAL ENCOUNTER (OUTPATIENT)
Dept: RADIOLOGY | Facility: CLINIC | Age: 69
Discharge: HOME | End: 2024-10-15
Attending: FAMILY MEDICINE
Payer: COMMERCIAL

## 2024-10-15 DIAGNOSIS — Z78.0 MENOPAUSE: ICD-10-CM

## 2024-10-15 PROCEDURE — 77080 DXA BONE DENSITY AXIAL: CPT | Mod: 26,,, | Performed by: RADIOLOGY

## 2024-10-15 PROCEDURE — 77080 DXA BONE DENSITY AXIAL: CPT | Mod: TC,PO

## 2024-10-16 ENCOUNTER — OFFICE VISIT (OUTPATIENT)
Dept: OPHTHALMOLOGY | Facility: CLINIC | Age: 69
End: 2024-10-16
Payer: COMMERCIAL

## 2024-10-16 DIAGNOSIS — H25.13 AGE-RELATED NUCLEAR CATARACT OF BOTH EYES: ICD-10-CM

## 2024-10-16 DIAGNOSIS — H33.002 MACULA-ON RHEGMATOGENOUS RETINAL DETACHMENT, LEFT: Primary | ICD-10-CM

## 2024-10-16 DIAGNOSIS — H35.371 EPIRETINAL MEMBRANE, RIGHT: ICD-10-CM

## 2024-10-16 PROCEDURE — 1160F RVW MEDS BY RX/DR IN RCRD: CPT | Mod: CPTII,S$GLB,, | Performed by: OPHTHALMOLOGY

## 2024-10-16 PROCEDURE — 1159F MED LIST DOCD IN RCRD: CPT | Mod: CPTII,S$GLB,, | Performed by: OPHTHALMOLOGY

## 2024-10-16 PROCEDURE — 99999 PR PBB SHADOW E&M-EST. PATIENT-LVL III: CPT | Mod: PBBFAC,,, | Performed by: OPHTHALMOLOGY

## 2024-10-16 PROCEDURE — 92134 CPTRZ OPH DX IMG PST SGM RTA: CPT | Mod: S$GLB,,, | Performed by: OPHTHALMOLOGY

## 2024-10-16 PROCEDURE — 3044F HG A1C LEVEL LT 7.0%: CPT | Mod: CPTII,S$GLB,, | Performed by: OPHTHALMOLOGY

## 2024-10-16 PROCEDURE — 1101F PT FALLS ASSESS-DOCD LE1/YR: CPT | Mod: CPTII,S$GLB,, | Performed by: OPHTHALMOLOGY

## 2024-10-16 PROCEDURE — 1126F AMNT PAIN NOTED NONE PRSNT: CPT | Mod: CPTII,S$GLB,, | Performed by: OPHTHALMOLOGY

## 2024-10-16 PROCEDURE — 92202 OPSCPY EXTND ON/MAC DRAW: CPT | Mod: 59,S$GLB,, | Performed by: OPHTHALMOLOGY

## 2024-10-16 PROCEDURE — 92014 COMPRE OPH EXAM EST PT 1/>: CPT | Mod: S$GLB,,, | Performed by: OPHTHALMOLOGY

## 2024-10-16 PROCEDURE — 3288F FALL RISK ASSESSMENT DOCD: CPT | Mod: CPTII,S$GLB,, | Performed by: OPHTHALMOLOGY

## 2024-10-16 NOTE — PROGRESS NOTES
HPI    [facility] OU no changes since last visit 1/10/24.  Eye meds:None  Last edited by Kamryn Holcomb on 10/16/2024  8:26 AM.            A/P    ICD-10-CM ICD-9-CM   1. 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 Age-related nuclear cataract of both eyes  H25.13 366.16   3. Epiretinal membrane, right  H35.371 362.56       1. 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 Age-related nuclear cataract of both eyes  Mild NS, NVS  Plan: Observation Update Mrx prn    3. Epiretinal membrane, right  Mod ERM, mild metamorphopsia   Plan: Observation, do not recommend PPV/MP at this time   Amsler precautions discussed     RTC  12 mo   DFE/OCTm OU      I saw and examined the patient and reviewed in detail the findings documented. The final examination findings, image interpretations, and plan as documented in the record represent my personal judgment and conclusions.    Americo Dolan MD  Vitreoretinal Surgery   [facility]

## 2024-10-17 ENCOUNTER — TELEPHONE (OUTPATIENT)
Dept: FAMILY MEDICINE | Facility: CLINIC | Age: 69
End: 2024-10-17

## 2024-10-17 ENCOUNTER — LAB VISIT (OUTPATIENT)
Dept: LAB | Facility: HOSPITAL | Age: 69
End: 2024-10-17
Payer: COMMERCIAL

## 2024-10-17 ENCOUNTER — OFFICE VISIT (OUTPATIENT)
Dept: FAMILY MEDICINE | Facility: CLINIC | Age: 69
End: 2024-10-17
Payer: COMMERCIAL

## 2024-10-17 VITALS
WEIGHT: 198.19 LBS | HEART RATE: 82 BPM | SYSTOLIC BLOOD PRESSURE: 130 MMHG | BODY MASS INDEX: 31.11 KG/M2 | DIASTOLIC BLOOD PRESSURE: 72 MMHG | HEIGHT: 67 IN | OXYGEN SATURATION: 100 % | TEMPERATURE: 98 F

## 2024-10-17 DIAGNOSIS — R25.2 LEG CRAMPS: ICD-10-CM

## 2024-10-17 DIAGNOSIS — E11.65 TYPE 2 DIABETES MELLITUS WITH HYPERGLYCEMIA, WITHOUT LONG-TERM CURRENT USE OF INSULIN: ICD-10-CM

## 2024-10-17 DIAGNOSIS — I15.2 HYPERTENSION ASSOCIATED WITH DIABETES: ICD-10-CM

## 2024-10-17 DIAGNOSIS — E11.65 TYPE 2 DIABETES MELLITUS WITH HYPERGLYCEMIA, WITHOUT LONG-TERM CURRENT USE OF INSULIN: Primary | ICD-10-CM

## 2024-10-17 DIAGNOSIS — Z78.0 POSTMENOPAUSAL: ICD-10-CM

## 2024-10-17 DIAGNOSIS — E11.59 HYPERTENSION ASSOCIATED WITH DIABETES: ICD-10-CM

## 2024-10-17 DIAGNOSIS — E11.69 HYPERLIPIDEMIA ASSOCIATED WITH TYPE 2 DIABETES MELLITUS: ICD-10-CM

## 2024-10-17 DIAGNOSIS — J30.9 ALLERGIC RHINITIS, UNSPECIFIED SEASONALITY, UNSPECIFIED TRIGGER: ICD-10-CM

## 2024-10-17 DIAGNOSIS — R45.86 MOOD SWINGS: ICD-10-CM

## 2024-10-17 DIAGNOSIS — E66.811 OBESITY, CLASS I, BMI 30-34.9: ICD-10-CM

## 2024-10-17 DIAGNOSIS — Z12.11 COLON CANCER SCREENING: ICD-10-CM

## 2024-10-17 DIAGNOSIS — E78.5 HYPERLIPIDEMIA ASSOCIATED WITH TYPE 2 DIABETES MELLITUS: ICD-10-CM

## 2024-10-17 LAB
ALBUMIN SERPL BCP-MCNC: 3.8 G/DL (ref 3.5–5.2)
ALP SERPL-CCNC: 55 U/L (ref 40–150)
ALT SERPL W/O P-5'-P-CCNC: 17 U/L (ref 10–44)
ANION GAP SERPL CALC-SCNC: 12 MMOL/L (ref 8–16)
AST SERPL-CCNC: 20 U/L (ref 10–40)
BILIRUB SERPL-MCNC: 0.5 MG/DL (ref 0.1–1)
BUN SERPL-MCNC: 21 MG/DL (ref 8–23)
CALCIUM SERPL-MCNC: 10.1 MG/DL (ref 8.7–10.5)
CHLORIDE SERPL-SCNC: 107 MMOL/L (ref 95–110)
CO2 SERPL-SCNC: 23 MMOL/L (ref 23–29)
CREAT SERPL-MCNC: 1.1 MG/DL (ref 0.5–1.4)
EST. GFR  (NO RACE VARIABLE): 54.7 ML/MIN/1.73 M^2
ESTIMATED AVG GLUCOSE: 120 MG/DL (ref 68–131)
GLUCOSE SERPL-MCNC: 133 MG/DL (ref 70–110)
HBA1C MFR BLD: 5.8 % (ref 4–5.6)
MAGNESIUM SERPL-MCNC: 2 MG/DL (ref 1.6–2.6)
POTASSIUM SERPL-SCNC: 4.6 MMOL/L (ref 3.5–5.1)
PROT SERPL-MCNC: 8.3 G/DL (ref 6–8.4)
SODIUM SERPL-SCNC: 142 MMOL/L (ref 136–145)
TSH SERPL DL<=0.005 MIU/L-ACNC: 0.7 UIU/ML (ref 0.4–4)

## 2024-10-17 PROCEDURE — 83735 ASSAY OF MAGNESIUM: CPT

## 2024-10-17 PROCEDURE — 1160F RVW MEDS BY RX/DR IN RCRD: CPT | Mod: CPTII,S$GLB,,

## 2024-10-17 PROCEDURE — 3075F SYST BP GE 130 - 139MM HG: CPT | Mod: CPTII,S$GLB,,

## 2024-10-17 PROCEDURE — 99999 PR PBB SHADOW E&M-EST. PATIENT-LVL V: CPT | Mod: PBBFAC,,,

## 2024-10-17 PROCEDURE — 84443 ASSAY THYROID STIM HORMONE: CPT

## 2024-10-17 PROCEDURE — 1159F MED LIST DOCD IN RCRD: CPT | Mod: CPTII,S$GLB,,

## 2024-10-17 PROCEDURE — 80053 COMPREHEN METABOLIC PANEL: CPT

## 2024-10-17 PROCEDURE — 3044F HG A1C LEVEL LT 7.0%: CPT | Mod: CPTII,S$GLB,,

## 2024-10-17 PROCEDURE — 82652 VIT D 1 25-DIHYDROXY: CPT

## 2024-10-17 PROCEDURE — 83036 HEMOGLOBIN GLYCOSYLATED A1C: CPT

## 2024-10-17 PROCEDURE — 1126F AMNT PAIN NOTED NONE PRSNT: CPT | Mod: CPTII,S$GLB,,

## 2024-10-17 PROCEDURE — 3078F DIAST BP <80 MM HG: CPT | Mod: CPTII,S$GLB,,

## 2024-10-17 PROCEDURE — 3008F BODY MASS INDEX DOCD: CPT | Mod: CPTII,S$GLB,,

## 2024-10-17 PROCEDURE — 99214 OFFICE O/P EST MOD 30 MIN: CPT | Mod: S$GLB,,,

## 2024-10-17 RX ORDER — BLOOD-GLUCOSE,RECEIVER,CONT
1 EACH MISCELLANEOUS CONTINUOUS
Qty: 1 EACH | Refills: 0 | Status: SHIPPED | OUTPATIENT
Start: 2024-10-17 | End: 2024-10-18 | Stop reason: SDUPTHER

## 2024-10-17 RX ORDER — BLOOD-GLUCOSE SENSOR
3 EACH MISCELLANEOUS
Qty: 3 EACH | Refills: 11 | Status: SHIPPED | OUTPATIENT
Start: 2024-10-17 | End: 2024-10-18 | Stop reason: SDUPTHER

## 2024-10-17 RX ORDER — BLOOD-GLUCOSE TRANSMITTER
1 EACH MISCELLANEOUS CONTINUOUS
Qty: 1 EACH | Refills: 3 | Status: SHIPPED | OUTPATIENT
Start: 2024-10-17 | End: 2024-10-18 | Stop reason: SDUPTHER

## 2024-10-17 RX ORDER — TIRZEPATIDE 2.5 MG/.5ML
2.5 INJECTION, SOLUTION SUBCUTANEOUS
Qty: 2 ML | Refills: 0 | Status: SHIPPED | OUTPATIENT
Start: 2024-10-17 | End: 2024-10-17

## 2024-10-17 RX ORDER — TIRZEPATIDE 2.5 MG/.5ML
2.5 INJECTION, SOLUTION SUBCUTANEOUS
Qty: 2 ML | Refills: 0 | Status: SHIPPED | OUTPATIENT
Start: 2024-10-17 | End: 2024-11-16

## 2024-10-17 NOTE — PROGRESS NOTES
Subjective:       Patient ID: Yanci Coats is a 69 y.o. female.    Chief Complaint: 6 month follow up       Yanci Coats is a 69 y.o. female with DM2, HTN, HLD who presents to clinic for 6 month follow up. She inquires about medication to help with weight loss. She states she would like to discontinue some of her diabetic medications. Walking 3-4 times weekly. She states she loses some weight, then gains it back soon after. Her blood sugar is well controlled. She reports mood swings, noting she has a short fuse with her . She wonders if this could be due to her blood sugar. Inquires about CGM to keep a closer eye on her blood sugar trends. She is due for updated labs and microalbumin. Her colonoscopy is due as well.         Review of Systems   Constitutional:  Negative for activity change, appetite change and fatigue.   Respiratory:  Negative for cough and shortness of breath.    Cardiovascular:  Negative for chest pain, palpitations and leg swelling.   Gastrointestinal:  Negative for abdominal pain, constipation and diarrhea.   Musculoskeletal:  Negative for arthralgias.   Neurological:  Negative for dizziness, light-headedness and headaches.         Past Medical History:   Diagnosis Date    Allergy history unknown     Arthritis     DDD    Back pain     Cataract     OU    Colon polyps 01/31/2019    Diabetes mellitus type II     GERD (gastroesophageal reflux disease)     Hypertension     LPRD (laryngopharyngeal reflux disease)     Nuclear sclerosis - Both Eyes 7/9/2013       Review of patient's allergies indicates:   Allergen Reactions    Lipitor [atorvastatin] Other (See Comments)     Muscle pain    Penicillins Other (See Comments)     Unknown reaction         Current Outpatient Medications:     amLODIPine (NORVASC) 10 MG tablet, TAKE 1 TABLET ONCE DAILY, Disp: 90 tablet, Rfl: 3    aspirin 81 MG Chew, Take 81 mg by mouth once daily., Disp: , Rfl:     carvediloL (COREG) 12.5 MG tablet, Take 1 tablet  "(12.5 mg total) by mouth once daily., Disp: 90 tablet, Rfl: 2    fluticasone propionate (FLONASE) 50 mcg/actuation nasal spray, 1 spray (50 mcg total) by Each Nostril route once daily., Disp: 16 g, Rfl: 11    gabapentin (NEURONTIN) 300 MG capsule, Take 1 capsule (300 mg total) by mouth 2 (two) times daily., Disp: 90 capsule, Rfl: 11    glipiZIDE (GLUCOTROL) 10 MG tablet, Take 1 tablet (10 mg total) by mouth 2 (two) times daily., Disp: 180 tablet, Rfl: 3    JANUMET 50-1,000 mg per tablet, TAKE 1 TABLET TWICE DAILY  WITH MEALS, Disp: 180 tablet, Rfl: 0    ketoconazole (NIZORAL) 2 % shampoo, Lather scalp, let sit 5 min before rinsing. Use 2-3 times per week., Disp: 360 mL, Rfl: 5    lancets Misc, To check BG 1 times daily, to use with insurance preferred meter, Disp: 100 each, Rfl: 11    multivitamin capsule, Take 1 capsule by mouth once daily., Disp: , Rfl:     pantoprazole (PROTONIX) 40 MG tablet, Take 1 tablet (40 mg total) by mouth once daily., Disp: 90 tablet, Rfl: 3    pen needle, diabetic (BD ULTRA-FINE ANTONY PEN NEEDLES) 32 gauge x 5/32" Ndle, 1 Device by Misc.(Non-Drug; Combo Route) route 4 (four) times daily with meals and nightly., Disp: 100 each, Rfl: 11    rosuvastatin (CRESTOR) 5 MG tablet, TAKE 1 TABLET ONCE DAILY, Disp: 90 tablet, Rfl: 3    telmisartan-hydrochlorothiazide (MICARDIS HCT) 80-25 mg per tablet, TAKE 1 TABLET ONCE DAILY, Disp: 90 tablet, Rfl: 3    azelastine (ASTELIN) 137 mcg (0.1 %) nasal spray, 1 spray (137 mcg total) by Nasal route 2 (two) times daily., Disp: 30 mL, Rfl: 2    blood-glucose meter,continuous (DEXCOM G6 ) Misc, 1 Units by Misc.(Non-Drug; Combo Route) route continuous., Disp: 1 each, Rfl: 0    blood-glucose sensor (DEXCOM G6 SENSOR) Nicci, 3 Devices by Misc.(Non-Drug; Combo Route) route every 10 days., Disp: 3 each, Rfl: 11    blood-glucose transmitter (DEXCOM G6 TRANSMITTER) Nicci, 1 Device by Misc.(Non-Drug; Combo Route) route continuous., Disp: 1 each, Rfl: 3    " "tirzepatide (MOUNJARO) 2.5 mg/0.5 mL PnIj, Inject 2.5 mg into the skin every 7 days., Disp: 2 mL, Rfl: 0    Current Facility-Administered Medications:     methylPREDNISolone acetate injection 40 mg, 40 mg, Intramuscular, Once, Chaitanya Mc MD    Objective:        Physical Exam  Vitals reviewed.   Constitutional:       Appearance: Normal appearance.   HENT:      Head: Normocephalic and atraumatic.   Eyes:      Conjunctiva/sclera: Conjunctivae normal.   Cardiovascular:      Rate and Rhythm: Normal rate and regular rhythm.      Heart sounds: Normal heart sounds.   Pulmonary:      Effort: Pulmonary effort is normal.      Breath sounds: Normal breath sounds.   Musculoskeletal:         General: Normal range of motion.      Cervical back: Normal range of motion.   Skin:     General: Skin is warm and dry.   Neurological:      Mental Status: She is alert and oriented to person, place, and time.   Psychiatric:         Behavior: Behavior normal.           Visit Vitals  /72   Pulse 82   Temp 98 °F (36.7 °C) (Oral)   Ht 5' 7" (1.702 m)   Wt 89.9 kg (198 lb 3.1 oz)   LMP  (LMP Unknown)   SpO2 100%   BMI 31.04 kg/m²      Assessment:         1. Type 2 diabetes mellitus with hyperglycemia, without long-term current use of insulin    2. Hypertension associated with diabetes    3. Hyperlipidemia associated with type 2 diabetes mellitus    4. Obesity, Class I, BMI 30-34.9    5. Mood swings    6. Postmenopausal    7. Colon cancer screening    8. Allergic rhinitis, unspecified seasonality, unspecified trigger    9. Leg cramps        Plan:         Diagnoses and all orders for this visit:    Type 2 diabetes mellitus with hyperglycemia, without long-term current use of insulin  -     blood-glucose sensor (DEXCOM G6 SENSOR) Nicci; 3 Devices by Misc.(Non-Drug; Combo Route) route every 10 days.  -     blood-glucose transmitter (DEXCOM G6 TRANSMITTER) Nicci; 1 Device by Misc.(Non-Drug; Combo Route) route continuous.  -     " blood-glucose meter,continuous (DEXCOM G6 ) Misc; 1 Units by Misc.(Non-Drug; Combo Route) route continuous.  -     Discussed insurance may not cover CGM. Pt demonstrates understanding.   -     HEMOGLOBIN A1C; Future  -     Microalbumin/Creatinine Ratio, Urine; Future  -     tirzepatide (MOUNJARO) 2.5 mg/0.5 mL PnIj; Inject 2.5 mg into the skin every 7 days.  -     Stop Trulicity. Start Mounjaro 2.5 mg. Discussed side effects of GLP-1. Pt demonstrates understanding. Consider increasing dose in 4 weeks if pt tolerates well.     Hypertension associated with diabetes  -     Comprehensive Metabolic Panel; Future  -     Well controlled, continue to monitor.     Hyperlipidemia associated with type 2 diabetes mellitus        -   Well controlled on Crestor.     Obesity, Class I, BMI 30-34.9  -     tirzepatide (MOUNJARO) 2.5 mg/0.5 mL PnIj; Inject 2.5 mg into the skin every 7 days.    Mood swings  -     blood-glucose sensor (DEXCOM G6 SENSOR) Nicci; 3 Devices by Misc.(Non-Drug; Combo Route) route every 10 days.  -     blood-glucose transmitter (DEXCOM G6 TRANSMITTER) Nicci; 1 Device by Misc.(Non-Drug; Combo Route) route continuous.  -     blood-glucose meter,continuous (DEXCOM G6 ) Misc; 1 Units by Misc.(Non-Drug; Combo Route) route continuous.  -     TSH; Future    Postmenopausal  -     Calcitriol; Future    Colon cancer screening  -     Case Request Endoscopy: COLONOSCOPY    Allergic rhinitis, unspecified seasonality, unspecified trigger        -     Continue Astelin nasal spray.     Leg cramps  -     Magnesium; Future    Labs and microalbumin today. Follow up as scheduled with PCP.         Family Medicine Physician Assistant     Future Appointments       Date Provider Specialty Appt Notes    12/16/2024 Anurag Nye MD Family Medicine annual    4/28/2025 Anurag Nye MD Family Medicine Annual             Tests to Keep You Healthy    Mammogram: Met on 1/30/2024  Eye Exam: Met on  10/16/2024  Colon Cancer Screening: ORDERED BUT NOT SCHEDULED  Last Blood Pressure <= 139/89 (10/17/2024): Yes  Last HbA1c < 8 (04/03/2024): Yes       I spent a total of 30 minutes on the day of the visit.This includes face to face time and non-face to face time preparing to see the patient (eg, review of tests), obtaining and/or reviewing separately obtained history, documenting clinical information in the electronic or other health record, independently interpreting results and communicating results to the patient/family/caregiver, or care coordinator.

## 2024-10-17 NOTE — TELEPHONE ENCOUNTER
Spoke w/CovermyMeds rep regarding not being able to E-prescribe Mounjaro. Error message received requiring PA prior. States that it is an error between Epic & CoverMyMeds. He is reporting problem to address. Advised to call insurance co for PA. Will fax Rx to pharmacy. Spoke to [employer] for PA. PA approved. They do not generate approval #'s. Will fax our office notice of approval in 24 hrs. LVM for patient making aware of same.

## 2024-10-18 ENCOUNTER — TELEPHONE (OUTPATIENT)
Dept: FAMILY MEDICINE | Facility: CLINIC | Age: 69
End: 2024-10-18
Payer: COMMERCIAL

## 2024-10-18 ENCOUNTER — PATIENT MESSAGE (OUTPATIENT)
Dept: GASTROENTEROLOGY | Facility: CLINIC | Age: 69
End: 2024-10-18
Payer: COMMERCIAL

## 2024-10-18 DIAGNOSIS — R45.86 MOOD SWINGS: ICD-10-CM

## 2024-10-18 DIAGNOSIS — E11.65 TYPE 2 DIABETES MELLITUS WITH HYPERGLYCEMIA, WITHOUT LONG-TERM CURRENT USE OF INSULIN: ICD-10-CM

## 2024-10-18 RX ORDER — BLOOD-GLUCOSE TRANSMITTER
1 EACH MISCELLANEOUS CONTINUOUS
Qty: 1 EACH | Refills: 3 | Status: SHIPPED | OUTPATIENT
Start: 2024-10-18 | End: 2025-10-18

## 2024-10-18 RX ORDER — BLOOD-GLUCOSE SENSOR
3 EACH MISCELLANEOUS
Qty: 3 EACH | Refills: 11 | Status: SHIPPED | OUTPATIENT
Start: 2024-10-18 | End: 2025-10-18

## 2024-10-18 RX ORDER — BLOOD-GLUCOSE,RECEIVER,CONT
1 EACH MISCELLANEOUS CONTINUOUS
Qty: 1 EACH | Refills: 0 | Status: SHIPPED | OUTPATIENT
Start: 2024-10-18 | End: 2025-10-18

## 2024-10-18 NOTE — TELEPHONE ENCOUNTER
----- Message from Emery Araujo sent at 10/18/2024 11:12 AM CDT -----  Pt stated the pharmacy said they were not going to fill/ give her the glucose monitor?

## 2024-10-21 DIAGNOSIS — E66.811 OBESITY, CLASS I, BMI 30-34.9: ICD-10-CM

## 2024-10-21 DIAGNOSIS — E11.65 TYPE 2 DIABETES MELLITUS WITH HYPERGLYCEMIA, WITHOUT LONG-TERM CURRENT USE OF INSULIN: ICD-10-CM

## 2024-10-21 LAB — 1,25(OH)2D3 SERPL-MCNC: 44 PG/ML (ref 20–79)

## 2024-10-21 RX ORDER — TIRZEPATIDE 2.5 MG/.5ML
2.5 INJECTION, SOLUTION SUBCUTANEOUS
Qty: 2 ML | Refills: 0 | Status: SHIPPED | OUTPATIENT
Start: 2024-10-21 | End: 2024-11-20

## 2024-10-21 NOTE — TELEPHONE ENCOUNTER
----- Message from Med Assistant Crowell sent at 10/21/2024 10:49 AM CDT -----   verified - results reviewed - states understanding - would like for glucose monitor and mounjaro to be sent to [facility] [place]

## 2024-10-21 NOTE — TELEPHONE ENCOUNTER
----- Message from Shane sent at 10/19/2024  9:29 AM CDT -----  Regarding: Rx question  Contact: eric at [phone]  Type:  Pharmacy Calling to Clarify an RX    Name of Caller:  [place]    Pharmacy Name:    [facility] [place] [place] - [address]  [address]  [place] [address]  Phone: [phone] Fax: [phone]    Prescription Name:    tirzepatide (MOUNJARO) 2.5 mg/0.5 mL PnIj 2 mL 0 10/17/2024 11/16/2024   Sig - Route: Inject 2.5 mg into the skin every 7 days. - Subcutaneous   Class: Print   No prior authorization was found for this prescription.   Found prior authorization for another prescription for the same medication: Waiting for Auth Details   What do they need to clarify?:  need NEW Rx sent to pharm above.    Best Call Back Number:  [phone]    Additional Information:  [facility] does not deliver this med.

## 2024-10-21 NOTE — TELEPHONE ENCOUNTER
No care due was identified.  [facility] Embedded Care Due Messages. Reference number: [id number].   10/21/2024 7:46:30 AM CDT

## 2024-10-23 ENCOUNTER — TELEPHONE (OUTPATIENT)
Dept: FAMILY MEDICINE | Facility: CLINIC | Age: 69
End: 2024-10-23
Payer: COMMERCIAL

## 2024-10-23 NOTE — TELEPHONE ENCOUNTER
"Call placed to patient who advised she was unable to hear writer due to bad connection.  Patient states she will call back "when she gets out of the car."    "

## 2024-10-23 NOTE — TELEPHONE ENCOUNTER
Fred Piña Staff     ----- Message from Fred sent at 10/23/2024 12:23 PM CDT -----  Regarding: Returning call  Type:  Patient Returning Call    Who Called:PT    Who Left Message for Patient:Allum    Does the patient know what this is regarding?:labs    Would the patient rather a call back or a response via [email or web address]? Call back    Best Call Back Number:[phone]      Additional Information:   Please advise -- Thank you

## 2024-10-24 ENCOUNTER — TELEPHONE (OUTPATIENT)
Dept: FAMILY MEDICINE | Facility: CLINIC | Age: 69
End: 2024-10-24
Payer: COMMERCIAL

## 2024-10-24 NOTE — TELEPHONE ENCOUNTER
----- Message from Radha Sheets PA-C sent at 10/22/2024  8:03 AM CDT -----  Vitamin D within normal limits.

## 2024-11-05 ENCOUNTER — TELEPHONE (OUTPATIENT)
Dept: FAMILY MEDICINE | Facility: CLINIC | Age: 69
End: 2024-11-05
Payer: COMMERCIAL

## 2024-11-05 NOTE — TELEPHONE ENCOUNTER
Patient reports taking Mounjaro 2.5 mg, states has taken 3 weekly injections with no change in weight.  Denies any side effects.  Patient is requesting to send message to provider for further guidance.  Please advise. Thank you.

## 2024-11-05 NOTE — TELEPHONE ENCOUNTER
Carrie Alas Staff     ----- Message from Carrie sent at 11/5/2024  2:24 PM CST -----  Contact: Patient  Type:  Needs Medical Advice    Who Called:   Patient    Pharmacy name and phone #:        [id number] - [place], [place] - [address]  [address]  [place] [address]  Phone: [phone] Fax: [phone]    Would the patient rather a call back or a response via [facility]?   Call back  Best Call Back Number:   [phone]    Additional Information:   States she would like to speak with Radha Sheets - states the Mounjaro is not working - states she has had no symptoms but it's not working for what y'all had discussed - please call - thank you

## 2024-11-11 ENCOUNTER — TELEPHONE (OUTPATIENT)
Dept: FAMILY MEDICINE | Facility: CLINIC | Age: 69
End: 2024-11-11
Payer: COMMERCIAL

## 2024-11-11 DIAGNOSIS — E11.65 TYPE 2 DIABETES MELLITUS WITH HYPERGLYCEMIA, WITHOUT LONG-TERM CURRENT USE OF INSULIN: ICD-10-CM

## 2024-11-11 DIAGNOSIS — E66.811 OBESITY, CLASS I, BMI 30-34.9: ICD-10-CM

## 2024-11-11 RX ORDER — TIRZEPATIDE 2.5 MG/.5ML
2.5 INJECTION, SOLUTION SUBCUTANEOUS
Qty: 2 ML | Refills: 1 | Status: SHIPPED | OUTPATIENT
Start: 2024-11-11 | End: 2024-11-13

## 2024-11-11 NOTE — TELEPHONE ENCOUNTER
----- Message from Jose Luisshelby sent at 11/11/2024 12:42 PM CST -----  Regarding: return call  Type:  Patient Returning Call    Who Called:patient  Who Left Message for Patient:staff  Does the patient know what this is regarding?:tirzepatide (MOUNJARO) 2.5 mg/0.5 mL PnIj  Would the patient rather a call back or a response via [facility]? Medication called in is wrong/  Best Call Back Number:[phone]  Additional Information:     [facility] [address] [address]  Phone: [phone] Fax: [phone]

## 2024-11-13 ENCOUNTER — OFFICE VISIT (OUTPATIENT)
Dept: FAMILY MEDICINE | Facility: CLINIC | Age: 69
End: 2024-11-13
Payer: COMMERCIAL

## 2024-11-13 VITALS
BODY MASS INDEX: 30.56 KG/M2 | TEMPERATURE: 98 F | HEART RATE: 78 BPM | SYSTOLIC BLOOD PRESSURE: 134 MMHG | HEIGHT: 67 IN | OXYGEN SATURATION: 98 % | DIASTOLIC BLOOD PRESSURE: 68 MMHG | WEIGHT: 194.69 LBS

## 2024-11-13 DIAGNOSIS — E11.59 HYPERTENSION ASSOCIATED WITH DIABETES: ICD-10-CM

## 2024-11-13 DIAGNOSIS — E66.811 OBESITY, CLASS I, BMI 30-34.9: ICD-10-CM

## 2024-11-13 DIAGNOSIS — I15.2 HYPERTENSION ASSOCIATED WITH DIABETES: ICD-10-CM

## 2024-11-13 DIAGNOSIS — E55.9 HYPOVITAMINOSIS D: Primary | ICD-10-CM

## 2024-11-13 DIAGNOSIS — E11.65 TYPE 2 DIABETES MELLITUS WITH HYPERGLYCEMIA, WITHOUT LONG-TERM CURRENT USE OF INSULIN: ICD-10-CM

## 2024-11-13 PROCEDURE — 1126F AMNT PAIN NOTED NONE PRSNT: CPT | Mod: CPTII,S$GLB,,

## 2024-11-13 PROCEDURE — 3044F HG A1C LEVEL LT 7.0%: CPT | Mod: CPTII,S$GLB,,

## 2024-11-13 PROCEDURE — 99999 PR PBB SHADOW E&M-EST. PATIENT-LVL V: CPT | Mod: PBBFAC,,,

## 2024-11-13 PROCEDURE — 1160F RVW MEDS BY RX/DR IN RCRD: CPT | Mod: CPTII,S$GLB,,

## 2024-11-13 PROCEDURE — 3078F DIAST BP <80 MM HG: CPT | Mod: CPTII,S$GLB,,

## 2024-11-13 PROCEDURE — 3008F BODY MASS INDEX DOCD: CPT | Mod: CPTII,S$GLB,,

## 2024-11-13 PROCEDURE — 3062F POS MACROALBUMINURIA REV: CPT | Mod: CPTII,S$GLB,,

## 2024-11-13 PROCEDURE — 3066F NEPHROPATHY DOC TX: CPT | Mod: CPTII,S$GLB,,

## 2024-11-13 PROCEDURE — 99214 OFFICE O/P EST MOD 30 MIN: CPT | Mod: S$GLB,,,

## 2024-11-13 PROCEDURE — 1159F MED LIST DOCD IN RCRD: CPT | Mod: CPTII,S$GLB,,

## 2024-11-13 PROCEDURE — 3075F SYST BP GE 130 - 139MM HG: CPT | Mod: CPTII,S$GLB,,

## 2024-11-13 RX ORDER — TIRZEPATIDE 5 MG/.5ML
5 INJECTION, SOLUTION SUBCUTANEOUS
Qty: 7 ML | Refills: 1 | Status: SHIPPED | OUTPATIENT
Start: 2024-11-13

## 2024-11-13 NOTE — PROGRESS NOTES
Subjective:       Patient ID: Yanci Coats is a 69 y.o. female.    Chief Complaint: discuss Mounjaro       Yanci Coats is a 69 y.o. female with HTN, DM2 who presents to clinic to discuss Mounjaro. She has been taking Mounjaro 2.5 mg for four weeks. She denies any adverse effects to the Mounjaro. Her blood sugar is very well controlled, last A1c 5.8. She note she has not achieved any weight loss on the Mounjaro, and she is interested in increasing the dose. On our scale, she has lost about 4 lbs. She would also like to discuss her labs today. Microalbumin positive.         Review of Systems   Respiratory:  Negative for shortness of breath.    Cardiovascular:  Negative for chest pain.   Gastrointestinal:  Negative for abdominal pain, constipation, diarrhea, nausea and vomiting.   Neurological:  Negative for dizziness and headaches.         Past Medical History:   Diagnosis Date    Allergy history unknown     Arthritis     DDD    Back pain     Cataract     OU    Colon polyps 01/31/2019    Diabetes mellitus type II     GERD (gastroesophageal reflux disease)     Hypertension     LPRD (laryngopharyngeal reflux disease)     Nuclear sclerosis - Both Eyes 7/9/2013       Review of patient's allergies indicates:   Allergen Reactions    Lipitor [atorvastatin] Other (See Comments)     Muscle pain    Penicillins Other (See Comments)     Unknown reaction         Current Outpatient Medications:     amLODIPine (NORVASC) 10 MG tablet, TAKE 1 TABLET ONCE DAILY, Disp: 90 tablet, Rfl: 3    aspirin 81 MG Chew, Take 81 mg by mouth once daily., Disp: , Rfl:     blood-glucose meter,continuous (DEXCOM G6 ) Misc, 1 Units by Misc.(Non-Drug; Combo Route) route continuous., Disp: 1 each, Rfl: 0    blood-glucose sensor (DEXCOM G6 SENSOR) Nicci, 3 Devices by Misc.(Non-Drug; Combo Route) route every 10 days., Disp: 3 each, Rfl: 11    blood-glucose transmitter (DEXCOM G6 TRANSMITTER) Nicci, 1 Device by Misc.(Non-Drug; Combo Route)  "route continuous., Disp: 1 each, Rfl: 3    carvediloL (COREG) 12.5 MG tablet, Take 1 tablet (12.5 mg total) by mouth once daily., Disp: 90 tablet, Rfl: 2    fluticasone propionate (FLONASE) 50 mcg/actuation nasal spray, 1 spray (50 mcg total) by Each Nostril route once daily., Disp: 16 g, Rfl: 11    gabapentin (NEURONTIN) 300 MG capsule, Take 1 capsule (300 mg total) by mouth 2 (two) times daily., Disp: 90 capsule, Rfl: 11    glipiZIDE (GLUCOTROL) 10 MG tablet, Take 1 tablet (10 mg total) by mouth 2 (two) times daily., Disp: 180 tablet, Rfl: 3    ketoconazole (NIZORAL) 2 % shampoo, Lather scalp, let sit 5 min before rinsing. Use 2-3 times per week., Disp: 360 mL, Rfl: 5    lancets Misc, To check BG 1 times daily, to use with insurance preferred meter, Disp: 100 each, Rfl: 11    multivitamin capsule, Take 1 capsule by mouth once daily., Disp: , Rfl:     pantoprazole (PROTONIX) 40 MG tablet, Take 1 tablet (40 mg total) by mouth once daily., Disp: 90 tablet, Rfl: 3    pen needle, diabetic (BD ULTRA-FINE ANTONY PEN NEEDLES) 32 gauge x 5/32" Ndle, 1 Device by Misc.(Non-Drug; Combo Route) route 4 (four) times daily with meals and nightly., Disp: 100 each, Rfl: 11    rosuvastatin (CRESTOR) 5 MG tablet, TAKE 1 TABLET ONCE DAILY, Disp: 90 tablet, Rfl: 3    telmisartan-hydrochlorothiazide (MICARDIS HCT) 80-25 mg per tablet, TAKE 1 TABLET ONCE DAILY, Disp: 90 tablet, Rfl: 3    azelastine (ASTELIN) 137 mcg (0.1 %) nasal spray, 1 spray (137 mcg total) by Nasal route 2 (two) times daily., Disp: 30 mL, Rfl: 2    tirzepatide (MOUNJARO) 5 mg/0.5 mL PnIj, Inject 5 mg into the skin every 7 days., Disp: 7 mL, Rfl: 1    Current Facility-Administered Medications:     methylPREDNISolone acetate injection 40 mg, 40 mg, Intramuscular, Once, Chaitanya Mc MD    Objective:        Physical Exam  Vitals reviewed.   Constitutional:       Appearance: Normal appearance.   HENT:      Head: Normocephalic and atraumatic.   Eyes:      " "Conjunctiva/sclera: Conjunctivae normal.   Cardiovascular:      Rate and Rhythm: Normal rate.   Pulmonary:      Effort: Pulmonary effort is normal.   Musculoskeletal:         General: Normal range of motion.      Cervical back: Normal range of motion.   Neurological:      Mental Status: She is alert and oriented to person, place, and time.   Psychiatric:         Behavior: Behavior normal.           Visit Vitals  /68 (BP Location: Right arm, Patient Position: Sitting)   Pulse 78   Temp 98.2 °F (36.8 °C) (Oral)   Ht 5' 7" (1.702 m)   Wt 88.3 kg (194 lb 10.7 oz)   LMP  (LMP Unknown)   SpO2 98%   BMI 30.49 kg/m²      Assessment:         1. Hypovitaminosis D    2. Type 2 diabetes mellitus with hyperglycemia, without long-term current use of insulin    3. Obesity, Class I, BMI 30-34.9    4. Hypertension associated with diabetes        Plan:         Diagnoses and all orders for this visit:    Hypovitaminosis D       -    Vitamin D within normal limits.    Type 2 diabetes mellitus with hyperglycemia, without long-term current use of insulin  -     tirzepatide (MOUNJARO) 5 mg/0.5 mL PnIj; Inject 5 mg into the skin every 7 days.  -     HEMOGLOBIN A1C; Future - prior to next appointment  -     Well controlled. Hold Janumet. Continue Glipizide for now. Increase Mounjaro to 5 mg.     Obesity, Class I, BMI 30-34.9  -     tirzepatide (MOUNJARO) 5 mg/0.5 mL PnIj; Inject 5 mg into the skin every 7 days.    Hypertension associated with diabetes          -   Well controlled on Norvasc, Coreg and Micardis HCT. Continue to monitor.     Follow up as scheduled with PCP next month with A1c before.         Family Medicine Physician Assistant     Future Appointments       Date Provider Specialty Appt Notes    12/9/2024  Lab labs    12/16/2024 Anurag Nye MD Family Medicine annual    4/28/2025 Anurag Nye MD Family Medicine Annual             Tests to Keep You Healthy    Mammogram: Met on 1/30/2024  Eye Exam: Met on " 10/16/2024  Colon Cancer Screening: DUE  Last Blood Pressure <= 139/89 (11/13/2024): Yes  Last HbA1c < 8 (10/17/2024): Yes       I spent a total of 30 minutes on the day of the visit.This includes face to face time and non-face to face time preparing to see the patient (eg, review of tests), obtaining and/or reviewing separately obtained history, documenting clinical information in the electronic or other health record, independently interpreting results and communicating results to the patient/family/caregiver, or care coordinator.

## 2024-11-14 ENCOUNTER — TELEPHONE (OUTPATIENT)
Dept: FAMILY MEDICINE | Facility: CLINIC | Age: 69
End: 2024-11-14
Payer: COMMERCIAL

## 2024-11-14 NOTE — TELEPHONE ENCOUNTER
Hermelinda Luis Staff     ----- Message from Hermelinda sent at 11/14/2024 11:11 AM CST -----  Type: Needs Medical Advice  Who Called:  pt    Best Call Back Number: [phone] (home)     Additional Information: pt requesting call back in regards to her medication , pt says the first medication that was sent was incorrect and her pharmacy charged her credit card . Her correct script was sent in and pharmacy will not refill/ accept it due to the charge for her incorrect script please advise     [facility] [address] [address]  Phone: [phone] Fax: [phone]

## 2024-11-14 NOTE — TELEPHONE ENCOUNTER
Spoke with patient regarding her issue with her medication (Mounjaro) that was sent to the pharmacy with the wrong dosage and was charged. I contacted the pharmacy and spoke with AARON(pharmacist) she stated she did speak with the patient and informed her that when she receive the medication in the mail on today (3 month supply), she can bring it back and they will replace it with the correct dose. The patient was fine with that but was concerned about the Quantity, at first she was prescribed a three month supply but the new prescription was going to be for 1 month and the $284 was already charged to her card for a 3 month dose and now she is only getting one month worth of medication. I informed Mrs. Coats that I will contact the pharmacy again regarding that matter and call her back. I spoke with MK again regarding the price and Quantity, she stated the insurance company did something weird in the computer when she ran the correct medication through in which it only excepted a 1 month dose, AARON said she would try and do some different things to try and see if she can by pass the issue and get the 3month to go through then she will contact the patient and give her an update. I told Mrs. Coats to be expecting a call from AARON and that I will call her back to make sure she received that call, patient was pleased.    Spoke with patient again and she stated she had not receive a call yet so I contacted the pharmacy again, and spoke with AARON she was still working on the patient issue so she didn't have a chance to call her back, how ever, she refunded the first transaction on Mrs. Coats's card and she was looking for a coupon for that medication which will cost her $105, she is also trying to get the patient a 2 month supply, I asked AARON (pharmacist) if she can still placed the call to explain everything she is doing and she said yes. I called the patient back and she did receive the call and was very pleased with the  outcome.

## 2024-11-14 NOTE — TELEPHONE ENCOUNTER
Patient states incorrect dosage of medication was sent to pharmacy.  States the error was discovered at yesterday's appointment when discussing matter with KIMBERLEY Sheets.  Patient states Avelina Sudeep suggested for her to discuss this matter with management.  Patient is requesting call from management regarding this issue.  Writer reached out to supervisor Keely Thomas for further assistance with this matter.

## 2024-12-03 ENCOUNTER — OFFICE VISIT (OUTPATIENT)
Dept: FAMILY MEDICINE | Facility: CLINIC | Age: 69
End: 2024-12-03
Payer: COMMERCIAL

## 2024-12-03 VITALS
BODY MASS INDEX: 30.79 KG/M2 | SYSTOLIC BLOOD PRESSURE: 120 MMHG | HEART RATE: 72 BPM | WEIGHT: 196.19 LBS | HEIGHT: 67 IN | DIASTOLIC BLOOD PRESSURE: 62 MMHG | TEMPERATURE: 99 F | OXYGEN SATURATION: 96 % | RESPIRATION RATE: 18 BRPM

## 2024-12-03 DIAGNOSIS — J01.40 ACUTE NON-RECURRENT PANSINUSITIS: Primary | ICD-10-CM

## 2024-12-03 DIAGNOSIS — J06.9 ACUTE URI: ICD-10-CM

## 2024-12-03 DIAGNOSIS — B00.1 HERPES LABIALIS: ICD-10-CM

## 2024-12-03 PROCEDURE — 99999 PR PBB SHADOW E&M-EST. PATIENT-LVL V: CPT | Mod: PBBFAC,,, | Performed by: FAMILY MEDICINE

## 2024-12-03 RX ORDER — PREDNISONE 20 MG/1
TABLET ORAL
Qty: 10 TABLET | Refills: 0 | Status: SHIPPED | OUTPATIENT
Start: 2024-12-03

## 2024-12-03 RX ORDER — VALACYCLOVIR HYDROCHLORIDE 1 G/1
2000 TABLET, FILM COATED ORAL EVERY 12 HOURS
Qty: 4 TABLET | Refills: 1 | Status: SHIPPED | OUTPATIENT
Start: 2024-12-03

## 2024-12-03 RX ORDER — DOXYCYCLINE 100 MG/1
100 CAPSULE ORAL 2 TIMES DAILY
Qty: 20 CAPSULE | Refills: 0 | Status: SHIPPED | OUTPATIENT
Start: 2024-12-03

## 2024-12-03 NOTE — PROGRESS NOTES
Subjective:       Patient ID: Yanci Coats is a 69 y.o. female.    Chief Complaint: No chief complaint on file.    Patient here for UC visit.  1 wk of C&C; HA and fever with fever blisters on top lip.  Cough assoc w off-white sputum and HA's.  Some ACCP but no SOB    Flu and Covid negative here.    Review of Systems   Constitutional:  Negative for fever.   HENT:  Positive for congestion and sinus pressure.    Respiratory:  Positive for cough. Negative for shortness of breath.    Cardiovascular:  Negative for chest pain.   Gastrointestinal:  Negative for abdominal pain and nausea.   Skin:  Negative for rash.   Neurological:  Positive for headaches.   All other systems reviewed and are negative.      Objective:      Physical Exam  Vitals reviewed.   Constitutional:       General: She is not in acute distress.     Appearance: Normal appearance. She is well-developed. She is not ill-appearing.   HENT:      Right Ear: Tympanic membrane is not erythematous.      Left Ear: Tympanic membrane is not erythematous.      Nose: Mucosal edema present.      Right Sinus: Maxillary sinus tenderness present.      Left Sinus: Maxillary sinus tenderness present.      Mouth/Throat:      Pharynx: Posterior oropharyngeal erythema present.        Comments: Mult fever blisters and papules noted.  Cardiovascular:      Rate and Rhythm: Normal rate and regular rhythm.      Heart sounds: No murmur heard.  Pulmonary:      Effort: Pulmonary effort is normal.      Breath sounds: Normal breath sounds.   Musculoskeletal:      Cervical back: Neck supple.   Lymphadenopathy:      Cervical: No cervical adenopathy.   Neurological:      Mental Status: She is alert.         Assessment:       1. Acute URI    2. Acute non-recurrent pansinusitis    3. Herpes labialis        Plan:       Acute URI  -     POCT COVID-19 Rapid Screening  -     POCT Influenza A/B Molecular    Acute non-recurrent pansinusitis  -     predniSONE (DELTASONE) 20 MG tablet; One BID  for 3 days then once a day orally  Dispense: 10 tablet; Refill: 0  -     doxycycline (MONODOX) 100 MG capsule; Take 1 capsule (100 mg total) by mouth 2 (two) times daily.  Dispense: 20 capsule; Refill: 0    Herpes labialis  -     valACYclovir (VALTREX) 1000 MG tablet; Take 2 tablets (2,000 mg total) by mouth every 12 (twelve) hours.  Dispense: 4 tablet; Refill: 1      Patient Instructions   Push fluids intake.  Drink plenty of water.     Contact your PCP if any worsening or for any new concerns as we discussed.

## 2024-12-10 ENCOUNTER — OFFICE VISIT (OUTPATIENT)
Dept: FAMILY MEDICINE | Facility: CLINIC | Age: 69
End: 2024-12-10
Payer: COMMERCIAL

## 2024-12-10 ENCOUNTER — LAB VISIT (OUTPATIENT)
Dept: LAB | Facility: HOSPITAL | Age: 69
End: 2024-12-10
Payer: COMMERCIAL

## 2024-12-10 VITALS
OXYGEN SATURATION: 99 % | DIASTOLIC BLOOD PRESSURE: 70 MMHG | BODY MASS INDEX: 30.69 KG/M2 | HEART RATE: 62 BPM | SYSTOLIC BLOOD PRESSURE: 150 MMHG | HEIGHT: 67 IN | TEMPERATURE: 98 F | WEIGHT: 195.56 LBS

## 2024-12-10 DIAGNOSIS — E11.65 TYPE 2 DIABETES MELLITUS WITH HYPERGLYCEMIA, WITHOUT LONG-TERM CURRENT USE OF INSULIN: ICD-10-CM

## 2024-12-10 DIAGNOSIS — J32.0 MAXILLARY SINUSITIS, UNSPECIFIED CHRONICITY: Primary | ICD-10-CM

## 2024-12-10 LAB
ESTIMATED AVG GLUCOSE: 137 MG/DL (ref 68–131)
HBA1C MFR BLD: 6.4 % (ref 4–5.6)

## 2024-12-10 PROCEDURE — 83036 HEMOGLOBIN GLYCOSYLATED A1C: CPT

## 2024-12-10 PROCEDURE — 99214 OFFICE O/P EST MOD 30 MIN: CPT | Mod: S$GLB,,, | Performed by: NURSE PRACTITIONER

## 2024-12-10 PROCEDURE — 3288F FALL RISK ASSESSMENT DOCD: CPT | Mod: CPTII,S$GLB,, | Performed by: NURSE PRACTITIONER

## 2024-12-10 PROCEDURE — 99999 PR PBB SHADOW E&M-EST. PATIENT-LVL V: CPT | Mod: PBBFAC,,, | Performed by: NURSE PRACTITIONER

## 2024-12-10 PROCEDURE — 36415 COLL VENOUS BLD VENIPUNCTURE: CPT | Mod: PO

## 2024-12-10 PROCEDURE — 3066F NEPHROPATHY DOC TX: CPT | Mod: CPTII,S$GLB,, | Performed by: NURSE PRACTITIONER

## 2024-12-10 PROCEDURE — 3078F DIAST BP <80 MM HG: CPT | Mod: CPTII,S$GLB,, | Performed by: NURSE PRACTITIONER

## 2024-12-10 PROCEDURE — 3062F POS MACROALBUMINURIA REV: CPT | Mod: CPTII,S$GLB,, | Performed by: NURSE PRACTITIONER

## 2024-12-10 PROCEDURE — 1101F PT FALLS ASSESS-DOCD LE1/YR: CPT | Mod: CPTII,S$GLB,, | Performed by: NURSE PRACTITIONER

## 2024-12-10 PROCEDURE — 3044F HG A1C LEVEL LT 7.0%: CPT | Mod: CPTII,S$GLB,, | Performed by: NURSE PRACTITIONER

## 2024-12-10 PROCEDURE — 1125F AMNT PAIN NOTED PAIN PRSNT: CPT | Mod: CPTII,S$GLB,, | Performed by: NURSE PRACTITIONER

## 2024-12-10 PROCEDURE — 1159F MED LIST DOCD IN RCRD: CPT | Mod: CPTII,S$GLB,, | Performed by: NURSE PRACTITIONER

## 2024-12-10 PROCEDURE — 3077F SYST BP >= 140 MM HG: CPT | Mod: CPTII,S$GLB,, | Performed by: NURSE PRACTITIONER

## 2024-12-10 PROCEDURE — 3008F BODY MASS INDEX DOCD: CPT | Mod: CPTII,S$GLB,, | Performed by: NURSE PRACTITIONER

## 2024-12-10 PROCEDURE — 1160F RVW MEDS BY RX/DR IN RCRD: CPT | Mod: CPTII,S$GLB,, | Performed by: NURSE PRACTITIONER

## 2024-12-10 RX ORDER — FLUTICASONE PROPIONATE 50 MCG
1 SPRAY, SUSPENSION (ML) NASAL DAILY
Qty: 16 G | Refills: 0 | Status: SHIPPED | OUTPATIENT
Start: 2024-12-10

## 2024-12-10 RX ORDER — LEVOFLOXACIN 500 MG/1
500 TABLET, FILM COATED ORAL DAILY
Qty: 7 TABLET | Refills: 0 | Status: SHIPPED | OUTPATIENT
Start: 2024-12-10 | End: 2024-12-17

## 2024-12-10 NOTE — PROGRESS NOTES
Subjective:       Patient ID: Yanci Coats is a 69 y.o. female.    Chief Complaint: Sinus Problem, Cough, and Nasal Congestion     HPI   68 y/o female patient with medical problems listed below presents for sinus congestion with thick mucus and cough for 3 weeks. Patient was seen in an urgent clinic by Dr. Kevin on 12/3/2024 for the problem. She was tested negative for Flu and Covid and provided prednisone and doxycycline. States doxycycline did not work. She takes benadryl and robitussin. States cough got better. Denies fever, chills.     Patient Active Problem List   Diagnosis    Rhinitis, allergic    GERD (gastroesophageal reflux disease)    Nuclear sclerosis - Both Eyes    Cortical cataract - Right Eye    Blurred vision    Posterior vitreous detachment, right eye    Hyperlipidemia LDL goal <70    Neuropathy    Diabetes mellitus type 2, uncontrolled, with complications    Hypertension associated with diabetes    Hyperlipidemia associated with type 2 diabetes mellitus    NAFLD (nonalcoholic fatty liver disease)    Uterine fibroid    Renal cyst, left    Obesity, Class I, BMI 30-34.9    BMI 32.0-32.9,adult    Postmenopausal    Type 2 diabetes mellitus with hyperglycemia    Hypercholesterolemia    Nodular thyroid disease    Hypovitaminosis D    Proteinuria    History of colon polyps    Chronic bilateral low back pain without sciatica    Chronic rhinitis    Bilateral carpal tunnel syndrome    Chronic right shoulder pain    Activity intolerance    Decreased ROM of right shoulder    Weakness of shoulder    Impaired functional mobility and activity tolerance    Decreased ROM of lumbar spine    Chronic left-sided low back pain with left-sided sciatica      Review of patient's allergies indicates:   Allergen Reactions    Lipitor [atorvastatin] Other (See Comments)     Muscle pain    Penicillins Other (See Comments)     Unknown reaction     Past Surgical History:   Procedure Laterality Date    COLONOSCOPY N/A  1/31/2019    Procedure: COLONOSCOPY;  Surgeon: Ronak Palacios MD;  Location: [facility];  Service: Endoscopy;  Laterality: N/A;        Current Outpatient Medications:     amLODIPine (NORVASC) 10 MG tablet, TAKE 1 TABLET ONCE DAILY, Disp: 90 tablet, Rfl: 3    aspirin 81 MG Chew, Take 81 mg by mouth once daily., Disp: , Rfl:     carvediloL (COREG) 12.5 MG tablet, Take 1 tablet (12.5 mg total) by mouth once daily., Disp: 90 tablet, Rfl: 2    doxycycline (MONODOX) 100 MG capsule, Take 1 capsule (100 mg total) by mouth 2 (two) times daily., Disp: 20 capsule, Rfl: 0    gabapentin (NEURONTIN) 300 MG capsule, Take 1 capsule (300 mg total) by mouth 2 (two) times daily., Disp: 90 capsule, Rfl: 11    glipiZIDE (GLUCOTROL) 10 MG tablet, Take 1 tablet (10 mg total) by mouth 2 (two) times daily., Disp: 180 tablet, Rfl: 3    ketoconazole (NIZORAL) 2 % shampoo, Lather scalp, let sit 5 min before rinsing. Use 2-3 times per week., Disp: 360 mL, Rfl: 5    multivitamin capsule, Take 1 capsule by mouth once daily., Disp: , Rfl:     pantoprazole (PROTONIX) 40 MG tablet, Take 1 tablet (40 mg total) by mouth once daily., Disp: 90 tablet, Rfl: 3    predniSONE (DELTASONE) 20 MG tablet, One BID for 3 days then once a day orally, Disp: 10 tablet, Rfl: 0    rosuvastatin (CRESTOR) 5 MG tablet, TAKE 1 TABLET ONCE DAILY, Disp: 90 tablet, Rfl: 3    telmisartan-hydrochlorothiazide (MICARDIS HCT) 80-25 mg per tablet, TAKE 1 TABLET ONCE DAILY, Disp: 90 tablet, Rfl: 3    tirzepatide (MOUNJARO) 5 mg/0.5 mL PnIj, Inject 5 mg into the skin every 7 days., Disp: 7 mL, Rfl: 1    valACYclovir (VALTREX) 1000 MG tablet, Take 2 tablets (2,000 mg total) by mouth every 12 (twelve) hours., Disp: 4 tablet, Rfl: 1    azelastine (ASTELIN) 137 mcg (0.1 %) nasal spray, 1 spray (137 mcg total) by Nasal route 2 (two) times daily., Disp: 30 mL, Rfl: 2    fluticasone propionate (FLONASE) 50 mcg/actuation nasal spray, 1 spray (50 mcg total) by Each Nostril route once  "daily., Disp: 16 g, Rfl: 0    levoFLOXacin (LEVAQUIN) 500 MG tablet, Take 1 tablet (500 mg total) by mouth once daily. for 7 days, Disp: 7 tablet, Rfl: 0    Review of Systems   Constitutional:  Negative for chills and fever.   HENT:  Positive for congestion, sinus pressure and sinus pain. Negative for sore throat.    Respiratory:  Positive for cough. Negative for shortness of breath.    Cardiovascular:  Negative for chest pain and palpitations.   Gastrointestinal:  Negative for abdominal pain.   Neurological:  Negative for dizziness and headaches.       Objective:   BP (!) 150/70 (BP Location: Right arm, Patient Position: Sitting)   Pulse 62   Temp 98.3 °F (36.8 °C) (Oral)   Ht 5' 7" (1.702 m)   Wt 88.7 kg (195 lb 8.8 oz)   LMP  (LMP Unknown)   SpO2 99%   BMI 30.63 kg/m²         Physical Exam  Constitutional:       General: She is not in acute distress.     Appearance: Normal appearance.   HENT:      Head: Atraumatic.      Nose: Congestion present.      Right Sinus: Maxillary sinus tenderness and frontal sinus tenderness present.      Left Sinus: Maxillary sinus tenderness and frontal sinus tenderness present.   Cardiovascular:      Rate and Rhythm: Normal rate and regular rhythm.      Pulses: Normal pulses.      Heart sounds: Normal heart sounds.   Pulmonary:      Effort: Pulmonary effort is normal.      Breath sounds: Normal breath sounds.   Abdominal:      General: Abdomen is flat. Bowel sounds are normal.      Palpations: Abdomen is soft.   Neurological:      Mental Status: She is oriented to person, place, and time.         Assessment:       1. Maxillary sinusitis, unspecified chronicity        Plan:       1. Maxillary sinusitis, unspecified chronicity (Primary)  - levoFLOXacin (LEVAQUIN) 500 MG tablet; Take 1 tablet (500 mg total) by mouth once daily. for 7 days  Dispense: 7 tablet; Refill: 0  - Advised to use saline nasal spray to rinse the nose and flonase nasal spray   - fluticasone propionate " (FLONASE) 50 mcg/actuation nasal spray; 1 spray (50 mcg total) by Each Nostril route once daily.  Dispense: 16 g; Refill: 0     Patient with be reevaluated in  follow up with pcp  or sooner kobe Luna NP

## 2024-12-16 ENCOUNTER — HOSPITAL ENCOUNTER (OUTPATIENT)
Dept: RADIOLOGY | Facility: CLINIC | Age: 69
Discharge: HOME | End: 2024-12-16
Attending: PHYSICAL MEDICINE & REHABILITATION
Payer: COMMERCIAL

## 2024-12-16 ENCOUNTER — OFFICE VISIT (OUTPATIENT)
Dept: FAMILY MEDICINE | Facility: CLINIC | Age: 69
End: 2024-12-16
Payer: COMMERCIAL

## 2024-12-16 VITALS
SYSTOLIC BLOOD PRESSURE: 132 MMHG | TEMPERATURE: 98 F | HEART RATE: 74 BPM | BODY MASS INDEX: 31.04 KG/M2 | DIASTOLIC BLOOD PRESSURE: 64 MMHG | HEIGHT: 67 IN | WEIGHT: 197.75 LBS | OXYGEN SATURATION: 99 % | RESPIRATION RATE: 16 BRPM

## 2024-12-16 DIAGNOSIS — E78.5 HYPERLIPIDEMIA LDL GOAL <70: ICD-10-CM

## 2024-12-16 DIAGNOSIS — M54.42 CHRONIC LEFT-SIDED LOW BACK PAIN WITH LEFT-SIDED SCIATICA: ICD-10-CM

## 2024-12-16 DIAGNOSIS — M54.9 DORSALGIA: ICD-10-CM

## 2024-12-16 DIAGNOSIS — E11.65 TYPE 2 DIABETES MELLITUS WITH HYPERGLYCEMIA, WITHOUT LONG-TERM CURRENT USE OF INSULIN: Primary | ICD-10-CM

## 2024-12-16 DIAGNOSIS — I15.2 HYPERTENSION ASSOCIATED WITH DIABETES: ICD-10-CM

## 2024-12-16 DIAGNOSIS — E11.59 HYPERTENSION ASSOCIATED WITH DIABETES: ICD-10-CM

## 2024-12-16 DIAGNOSIS — G89.29 CHRONIC LEFT-SIDED LOW BACK PAIN WITH LEFT-SIDED SCIATICA: ICD-10-CM

## 2024-12-16 PROCEDURE — 3008F BODY MASS INDEX DOCD: CPT | Mod: CPTII,S$GLB,, | Performed by: FAMILY MEDICINE

## 2024-12-16 PROCEDURE — 3044F HG A1C LEVEL LT 7.0%: CPT | Mod: CPTII,S$GLB,, | Performed by: FAMILY MEDICINE

## 2024-12-16 PROCEDURE — 3288F FALL RISK ASSESSMENT DOCD: CPT | Mod: CPTII,S$GLB,, | Performed by: FAMILY MEDICINE

## 2024-12-16 PROCEDURE — 3075F SYST BP GE 130 - 139MM HG: CPT | Mod: CPTII,S$GLB,, | Performed by: FAMILY MEDICINE

## 2024-12-16 PROCEDURE — 3078F DIAST BP <80 MM HG: CPT | Mod: CPTII,S$GLB,, | Performed by: FAMILY MEDICINE

## 2024-12-16 PROCEDURE — 3066F NEPHROPATHY DOC TX: CPT | Mod: CPTII,S$GLB,, | Performed by: FAMILY MEDICINE

## 2024-12-16 PROCEDURE — 1101F PT FALLS ASSESS-DOCD LE1/YR: CPT | Mod: CPTII,S$GLB,, | Performed by: FAMILY MEDICINE

## 2024-12-16 PROCEDURE — 1126F AMNT PAIN NOTED NONE PRSNT: CPT | Mod: CPTII,S$GLB,, | Performed by: FAMILY MEDICINE

## 2024-12-16 PROCEDURE — 1159F MED LIST DOCD IN RCRD: CPT | Mod: CPTII,S$GLB,, | Performed by: FAMILY MEDICINE

## 2024-12-16 PROCEDURE — 99397 PER PM REEVAL EST PAT 65+ YR: CPT | Mod: S$GLB,,, | Performed by: FAMILY MEDICINE

## 2024-12-16 PROCEDURE — 72114 X-RAY EXAM L-S SPINE BENDING: CPT | Mod: 26,,, | Performed by: RADIOLOGY

## 2024-12-16 PROCEDURE — 72114 X-RAY EXAM L-S SPINE BENDING: CPT | Mod: TC,FY,PO

## 2024-12-16 PROCEDURE — 3062F POS MACROALBUMINURIA REV: CPT | Mod: CPTII,S$GLB,, | Performed by: FAMILY MEDICINE

## 2024-12-16 PROCEDURE — 99999 PR PBB SHADOW E&M-EST. PATIENT-LVL V: CPT | Mod: PBBFAC,,, | Performed by: FAMILY MEDICINE

## 2024-12-16 NOTE — PROGRESS NOTES
Subjective:   Patient ID: Yanci Coats is a 69 y.o. female     Chief Complaint:Annual Exam      Here for checkup      Review of Systems   Constitutional:  Negative for chills and fever.   HENT:  Negative for sore throat and trouble swallowing.    Respiratory:  Negative for cough and shortness of breath.    Cardiovascular:  Negative for chest pain and leg swelling.   Gastrointestinal:  Negative for abdominal distention and abdominal pain.   Genitourinary:  Negative for dysuria and flank pain.   Musculoskeletal:  Negative for arthralgias and back pain.   Skin:  Negative for color change and pallor.   Neurological:  Negative for weakness and headaches.   Psychiatric/Behavioral:  Negative for agitation and confusion.      Past Medical History:   Diagnosis Date    Allergy history unknown     Arthritis     DDD    Back pain     Cataract     OU    Colon polyps 01/31/2019    Diabetes mellitus type II     GERD (gastroesophageal reflux disease)     Hypertension     LPRD (laryngopharyngeal reflux disease)     Nuclear sclerosis - Both Eyes 7/9/2013     Past Surgical History:   Procedure Laterality Date    COLONOSCOPY N/A 1/31/2019    Procedure: COLONOSCOPY;  Surgeon: Ronak Palacios MD;  Location: [facility];  Service: Endoscopy;  Laterality: N/A;     Objective:     Vitals:    12/16/24 0819   BP: 132/64   Pulse: 74   Resp: 16   Temp: 98.1 °F (36.7 °C)     Body mass index is 30.97 kg/m².  Physical Exam  Vitals and nursing note reviewed.   Constitutional:       Appearance: She is well-developed.   HENT:      Head: Normocephalic and atraumatic.   Eyes:      General: No scleral icterus.     Conjunctiva/sclera: Conjunctivae normal.   Cardiovascular:      Pulses:           Dorsalis pedis pulses are 2+ on the right side and 2+ on the left side.        Posterior tibial pulses are 2+ on the right side and 2+ on the left side.      Heart sounds: No murmur heard.  Pulmonary:      Effort: Pulmonary effort is normal. No respiratory  distress.   Musculoskeletal:         General: No deformity. Normal range of motion.      Cervical back: Normal range of motion and neck supple.      Right foot: Normal range of motion.      Left foot: Normal range of motion.   Feet:      Right foot:      Protective Sensation: 4 sites tested.  4 sites sensed.      Skin integrity: Skin integrity normal.      Left foot:      Protective Sensation: 4 sites tested.  4 sites sensed.      Skin integrity: Skin integrity normal.   Skin:     Coloration: Skin is not pale.      Findings: No rash.   Neurological:      Mental Status: She is alert and oriented to person, place, and time.   Psychiatric:         Behavior: Behavior normal.         Thought Content: Thought content normal.         Judgment: Judgment normal.         1. Type 2 diabetes mellitus with hyperglycemia, without long-term current use of insulin    2. Hypertension associated with diabetes    3. Hyperlipidemia LDL goal <70    4. Dorsalgia      Plan:   Type 2 diabetes mellitus with hyperglycemia, without long-term current use of insulin  -     tirzepatide 7.5 mg/0.5 mL PnIj; Inject 7.5 mg into the skin every 7 days.  Dispense: 6 mL; Refill: 0  -     Hemoglobin A1C; Future; Expected date: 06/16/2025  -     Ambulatory Referral/Consult to Lifestyle Nutrition; Future; Expected date: 12/23/2024    Hypertension associated with diabetes  -     Comprehensive Metabolic Panel; Future; Expected date: 06/16/2025    Hyperlipidemia LDL goal <70  -     Lipid Panel; Future; Expected date: 06/16/2025    Dorsalgia  Counseled. Discussed seeing a physical therapist. Declined. Will assist with scheduling xray ordered by Dr Mc office.        Established patient with me has been instructed that must see me at least 1 time yearly (every 365 days) for refills of medications. Seeing other providers in this clinic is fine but expectation is to see me yearly.    Anurag Nye MD  12/16/2024    Portions of this note have been dictated  with M Modal.

## 2024-12-17 ENCOUNTER — PATIENT MESSAGE (OUTPATIENT)
Dept: SPINE | Facility: CLINIC | Age: 69
End: 2024-12-17
Payer: COMMERCIAL

## 2025-01-03 DIAGNOSIS — E11.65 TYPE 2 DIABETES MELLITUS WITH HYPERGLYCEMIA, WITHOUT LONG-TERM CURRENT USE OF INSULIN: ICD-10-CM

## 2025-01-03 RX ORDER — SITAGLIPTIN AND METFORMIN HYDROCHLORIDE 1000; 50 MG/1; MG/1
1 TABLET, FILM COATED ORAL 2 TIMES DAILY WITH MEALS
Qty: 180 TABLET | Refills: 0 | OUTPATIENT
Start: 2025-01-03

## 2025-01-03 NOTE — TELEPHONE ENCOUNTER
Care Due:                  Date            Visit Type   Department     Provider  --------------------------------------------------------------------------------                                EP -                              PRIMARY      [facility]  Last Visit: 12-      CARE ([facility])   NIKI Nye                              EP -                              PRIMARY      [facility]  Next Visit: 04-      CARE ([facility])   NIKI Nye                                                            Last  Test          Frequency    Reason                     Performed    Due Date  --------------------------------------------------------------------------------    CBC.........  12 months..  valACYclovir.............  Not Found    Overdue    Lipid Panel.  12 months..  rosuvastatin.............  04- 03-    Health [facility] Embedded Care Due Messages. Reference number: [id number].   1/03/2025 5:46:25 AM CST

## 2025-01-04 NOTE — TELEPHONE ENCOUNTER
Refill Decision Note   Yanci Coats  is requesting a refill authorization.  Brief Assessment and Rationale for Refill:  Quick Discontinue     Medication Therapy Plan:  discontinued on 11/13/2024 by Radha Sheets -FLOS    Medication Reconciliation Completed: No   Comments:     No Care Gaps recommended.     Note composed:7:28 PM 01/03/2025

## 2025-01-07 ENCOUNTER — OFFICE VISIT (OUTPATIENT)
Dept: FAMILY MEDICINE | Facility: CLINIC | Age: 70
End: 2025-01-07
Payer: COMMERCIAL

## 2025-01-07 VITALS
TEMPERATURE: 99 F | RESPIRATION RATE: 16 BRPM | DIASTOLIC BLOOD PRESSURE: 72 MMHG | SYSTOLIC BLOOD PRESSURE: 136 MMHG | BODY MASS INDEX: 32.18 KG/M2 | OXYGEN SATURATION: 98 % | WEIGHT: 205 LBS | HEART RATE: 82 BPM | HEIGHT: 67 IN

## 2025-01-07 DIAGNOSIS — E11.59 HYPERTENSION ASSOCIATED WITH DIABETES: ICD-10-CM

## 2025-01-07 DIAGNOSIS — E11.65 TYPE 2 DIABETES MELLITUS WITH HYPERGLYCEMIA, WITHOUT LONG-TERM CURRENT USE OF INSULIN: Primary | ICD-10-CM

## 2025-01-07 DIAGNOSIS — I15.2 HYPERTENSION ASSOCIATED WITH DIABETES: ICD-10-CM

## 2025-01-07 DIAGNOSIS — E78.5 HYPERLIPIDEMIA LDL GOAL <70: ICD-10-CM

## 2025-01-07 PROCEDURE — 99214 OFFICE O/P EST MOD 30 MIN: CPT | Mod: S$GLB,,, | Performed by: FAMILY MEDICINE

## 2025-01-07 PROCEDURE — 3288F FALL RISK ASSESSMENT DOCD: CPT | Mod: CPTII,S$GLB,, | Performed by: FAMILY MEDICINE

## 2025-01-07 PROCEDURE — 99999 PR PBB SHADOW E&M-EST. PATIENT-LVL III: CPT | Mod: PBBFAC,,, | Performed by: FAMILY MEDICINE

## 2025-01-07 PROCEDURE — 1101F PT FALLS ASSESS-DOCD LE1/YR: CPT | Mod: CPTII,S$GLB,, | Performed by: FAMILY MEDICINE

## 2025-01-07 PROCEDURE — 3078F DIAST BP <80 MM HG: CPT | Mod: CPTII,S$GLB,, | Performed by: FAMILY MEDICINE

## 2025-01-07 PROCEDURE — G2211 COMPLEX E/M VISIT ADD ON: HCPCS | Mod: S$GLB,,, | Performed by: FAMILY MEDICINE

## 2025-01-07 PROCEDURE — 3075F SYST BP GE 130 - 139MM HG: CPT | Mod: CPTII,S$GLB,, | Performed by: FAMILY MEDICINE

## 2025-01-07 PROCEDURE — 1126F AMNT PAIN NOTED NONE PRSNT: CPT | Mod: CPTII,S$GLB,, | Performed by: FAMILY MEDICINE

## 2025-01-07 PROCEDURE — 3008F BODY MASS INDEX DOCD: CPT | Mod: CPTII,S$GLB,, | Performed by: FAMILY MEDICINE

## 2025-01-07 RX ORDER — DULAGLUTIDE 1.5 MG/.5ML
1.5 INJECTION, SOLUTION SUBCUTANEOUS
Qty: 4 PEN | Refills: 11 | Status: SHIPPED | OUTPATIENT
Start: 2025-01-07 | End: 2025-01-07 | Stop reason: SDUPTHER

## 2025-01-07 RX ORDER — DULAGLUTIDE 1.5 MG/.5ML
1.5 INJECTION, SOLUTION SUBCUTANEOUS
Qty: 4 PEN | Refills: 11 | Status: SHIPPED | OUTPATIENT
Start: 2025-01-07 | End: 2026-01-07

## 2025-01-07 RX ORDER — SITAGLIPTIN AND METFORMIN HYDROCHLORIDE 1000; 50 MG/1; MG/1
1 TABLET, FILM COATED, EXTENDED RELEASE ORAL DAILY
Qty: 90 TABLET | Refills: 3 | Status: SHIPPED | OUTPATIENT
Start: 2025-01-07 | End: 2026-01-07

## 2025-01-07 NOTE — PROGRESS NOTES
Subjective:   Patient ID: Yanci Coats is a 69 y.o. female     Chief Complaint:medication check      Here for checkup      Review of Systems   Respiratory:  Negative for shortness of breath.    Cardiovascular:  Negative for chest pain.   Gastrointestinal:  Negative for abdominal pain.   Genitourinary:  Negative for dysuria.     Past Medical History:   Diagnosis Date    Allergy history unknown     Arthritis     DDD    Back pain     Cataract     OU    Colon polyps 01/31/2019    Diabetes mellitus type II     GERD (gastroesophageal reflux disease)     Hypertension     LPRD (laryngopharyngeal reflux disease)     Nuclear sclerosis - Both Eyes 7/9/2013     Past Surgical History:   Procedure Laterality Date    COLONOSCOPY N/A 1/31/2019    Procedure: COLONOSCOPY;  Surgeon: Ronak Palcaios MD;  Location: [facility];  Service: Endoscopy;  Laterality: N/A;     Objective:     Vitals:    01/07/25 1142   BP: 136/72   Pulse: 82   Resp: 16   Temp: 98.6 °F (37 °C)     Body mass index is 32.11 kg/m².  Physical Exam  Vitals and nursing note reviewed.   Constitutional:       Appearance: She is well-developed.   HENT:      Head: Normocephalic and atraumatic.   Eyes:      General: No scleral icterus.     Conjunctiva/sclera: Conjunctivae normal.   Cardiovascular:      Heart sounds: No murmur heard.  Pulmonary:      Effort: Pulmonary effort is normal. No respiratory distress.   Musculoskeletal:         General: No deformity. Normal range of motion.      Cervical back: Normal range of motion and neck supple.   Skin:     Coloration: Skin is not pale.      Findings: No rash.   Neurological:      Mental Status: She is alert and oriented to person, place, and time.   Psychiatric:         Behavior: Behavior normal.         Thought Content: Thought content normal.         Judgment: Judgment normal.       Assessment:     1. Type 2 diabetes mellitus with hyperglycemia, without long-term current use of insulin    2. Hypertension associated  with diabetes    3. Hyperlipidemia LDL goal <70      Plan:   Type 2 diabetes mellitus with hyperglycemia, without long-term current use of insulin  -     Discontinue: dulaglutide (TRULICITY) 1.5 mg/0.5 mL pen injector; Inject 1.5 mg into the skin every 7 days.  Dispense: 4 pen ; Refill: 11  -     dulaglutide (TRULICITY) 1.5 mg/0.5 mL pen injector; Inject 1.5 mg into the skin every 7 days.  Dispense: 4 pen ; Refill: 11  -     SITagliptan-metformin (JANUMET XR) 50-1,000 mg TM24; Take 1 tablet by mouth once daily.  Dispense: 90 tablet; Refill: 3    Hypertension associated with diabetes  Well controlled on oral meds  Hyperlipidemia LDL goal <70  Review of labs from 4/2024 shows ldl cholesterol 80 which is at goal          Total time spent of Greater than 30 minutes minutes on the day of the visit.This includes face to face time and preparing to see the patient, obtaining and reviewing separately obtained history, documenting clinical information in the electronic or other health record, independently interpreting results and communicating results to the patient/family/caregiver, or care coordinator.    Established patient with me has been instructed that must see me at least 1 time yearly (every 365 days) for refills of medications. Seeing other providers in this clinic is fine but expectation is to see me yearly.    Anurga Nye MD  01/07/2025    Portions of this note have been dictated with ADELAIDA Guzman

## 2025-01-16 DIAGNOSIS — I15.2 HYPERTENSION ASSOCIATED WITH DIABETES: ICD-10-CM

## 2025-01-16 DIAGNOSIS — E11.59 HYPERTENSION ASSOCIATED WITH DIABETES: ICD-10-CM

## 2025-01-16 RX ORDER — CARVEDILOL 12.5 MG/1
12.5 TABLET ORAL
Qty: 90 TABLET | Refills: 3 | Status: SHIPPED | OUTPATIENT
Start: 2025-01-16

## 2025-01-16 NOTE — TELEPHONE ENCOUNTER
Refill Decision Note   Irenecamila Jorge L  is requesting a refill authorization.  Brief Assessment and Rationale for Refill:  Approve     Medication Therapy Plan:        Comments:     Note composed:10:34 AM 01/16/2025

## 2025-01-16 NOTE — TELEPHONE ENCOUNTER
No care due was identified.  Health [facility] Embedded Care Due Messages. Reference number: [id number].   1/16/2025 7:03:29 AM CST

## 2025-02-06 DIAGNOSIS — K21.9 GASTROESOPHAGEAL REFLUX DISEASE, UNSPECIFIED WHETHER ESOPHAGITIS PRESENT: ICD-10-CM

## 2025-02-06 RX ORDER — PANTOPRAZOLE SODIUM 40 MG/1
40 TABLET, DELAYED RELEASE ORAL
Qty: 90 TABLET | Refills: 3 | Status: SHIPPED | OUTPATIENT
Start: 2025-02-06

## 2025-02-07 ENCOUNTER — TELEPHONE (OUTPATIENT)
Dept: FAMILY MEDICINE | Facility: CLINIC | Age: 70
End: 2025-02-07
Payer: COMMERCIAL

## 2025-02-07 NOTE — TELEPHONE ENCOUNTER
Prescription e-scribed to pharmacy on 1/16/25, pharmacy confirmed receipt of prescription electronically on 1/16/25 at 10:35 AM.  Call placed to patient for notification. Patient verbalized understanding.        Outpatient Medication Detail     Disp Refills Start End BETZY   carvediloL (COREG) 12.5 MG tablet 90 tablet 3 1/16/2025 -- No   Sig - Route: Take 1 tablet by mouth once daily - Oral   Sent to pharmacy as: carvediloL (COREG) 12.5 MG tablet   Class: Normal   Notes to Pharmacy: .   Order: [id number]   Cosign for Ordering: Accepted by Anurag Nye MD on 1/17/2025  9:30 AM   Date/Time Signed: 1/16/2025 10:34       E-Prescribing Status: Receipt confirmed by pharmacy (1/16/2025 10:35 AM CST)     Pharmacy    [facility] [place] - [address]

## 2025-02-07 NOTE — TELEPHONE ENCOUNTER
Bethany De Luna Anurag Staff                  ----- Message from Bethany sent at 2/7/2025  9:02 AM CST -----  Regarding: Refill  Type:  RX Refill Request    Who Called: Pt    Refill or New Rx:Refill    RX Name and Strength:carvediloL (COREG) 12.5 MG tablet     How is the patient currently taking it? (ex. 1XDay):1xday    Is this a 30 day or 90 day RX:90    Preferred Pharmacy with phone number:  [facility] - [place], [place] - [address]  [address]  [place] [address]  Phone: [phone] Fax: [phone]      Local or Mail Order:Local    Ordering Provider:.    Would the patient rather a call back or a response via [facility]? Call    Best Call Back Number:[phone]    Additional Information: Thanks

## 2025-02-07 NOTE — TELEPHONE ENCOUNTER
Refill Decision Note   Irenecamila Jorge L  is requesting a refill authorization.    Brief Assessment and Rationale for Refill:   Approve       Medication Therapy Plan:         Comments:     Note composed:10:53 PM 02/06/2025

## 2025-02-07 NOTE — TELEPHONE ENCOUNTER
No care due was identified.  Health [facility] Embedded Care Due Messages. Reference number: [id number].   2/06/2025 10:46:07 PM CST

## 2025-04-07 DIAGNOSIS — E11.69 HYPERLIPIDEMIA ASSOCIATED WITH TYPE 2 DIABETES MELLITUS: ICD-10-CM

## 2025-04-07 DIAGNOSIS — E78.5 HYPERLIPIDEMIA ASSOCIATED WITH TYPE 2 DIABETES MELLITUS: ICD-10-CM

## 2025-04-07 RX ORDER — ROSUVASTATIN CALCIUM 5 MG/1
5 TABLET, COATED ORAL
Qty: 90 TABLET | Refills: 0 | Status: SHIPPED | OUTPATIENT
Start: 2025-04-07

## 2025-04-07 NOTE — TELEPHONE ENCOUNTER
No care due was identified.  [facility] Embedded Care Due Messages. Reference number: [id number].   4/07/2025 7:17:13 AM CDT

## 2025-04-07 NOTE — TELEPHONE ENCOUNTER
Refill Routing Note   Medication(s) are not appropriate for processing by [facility] for the following reason(s):        Required labs outdated    [facility] action(s):  Defer             Appointments  past 12m or future 3m with PCP    Date Provider   Last Visit   1/7/2025 Anurag Nye MD   Next Visit   4/28/2025 Anurag Nye MD   ED visits in past 90 days: 0        Note composed:7:27 AM 04/07/2025

## 2025-04-22 ENCOUNTER — TELEPHONE (OUTPATIENT)
Dept: FAMILY MEDICINE | Facility: CLINIC | Age: 70
End: 2025-04-22
Payer: COMMERCIAL

## 2025-04-30 ENCOUNTER — DOCUMENTATION ONLY (OUTPATIENT)
Dept: REHABILITATION | Facility: HOSPITAL | Age: 70
End: 2025-04-30
Payer: COMMERCIAL

## 2025-04-30 DIAGNOSIS — M25.611 DECREASED ROM OF RIGHT SHOULDER: ICD-10-CM

## 2025-04-30 DIAGNOSIS — R29.898 WEAKNESS OF SHOULDER: ICD-10-CM

## 2025-04-30 DIAGNOSIS — G89.29 CHRONIC RIGHT SHOULDER PAIN: Primary | ICD-10-CM

## 2025-04-30 DIAGNOSIS — R68.89 ACTIVITY INTOLERANCE: ICD-10-CM

## 2025-04-30 DIAGNOSIS — G89.29 CHRONIC LEFT-SIDED LOW BACK PAIN WITH LEFT-SIDED SCIATICA: ICD-10-CM

## 2025-04-30 DIAGNOSIS — M54.42 CHRONIC LEFT-SIDED LOW BACK PAIN WITH LEFT-SIDED SCIATICA: ICD-10-CM

## 2025-04-30 DIAGNOSIS — M25.511 CHRONIC RIGHT SHOULDER PAIN: Primary | ICD-10-CM

## 2025-04-30 DIAGNOSIS — Z74.09 IMPAIRED FUNCTIONAL MOBILITY AND ACTIVITY TOLERANCE: Primary | ICD-10-CM

## 2025-04-30 DIAGNOSIS — M53.86 DECREASED ROM OF LUMBAR SPINE: ICD-10-CM

## 2025-04-30 NOTE — PROGRESS NOTES
[facility]  PT Discharge Note    Name: Yanci Coats  Clinic Number: [id number]    Therapy Diagnosis:   Encounter Diagnoses   Name Primary?    Impaired functional mobility and activity tolerance Yes    Decreased ROM of lumbar spine     Chronic left-sided low back pain with left-sided sciatica      Physician: Chaitanya Mc MD     Physician Orders: PT Eval and Treat Back  Medical Diagnosis from Referral: Chronic left-sided low back pain with left-sided sciatica  Evaluation Date: 6/21/2024    Date of Last visit:  7/31/2024  Total Visits Received: 6  Number of canceled visits: 5  Number of no show visits: 3    ASSESSMENT      Patient was making slow but steady progress in PT but failed to complete established POC.  See treatment notes for details.     Discharge reason: Patient has not attended therapy since  7/31/2024    Discharge FOTO Score: 60%    Goals: as documented in final treatment note  Short Term Goals: 3 weeks   1) Decrease maxi pain to < 6/10 Progressing  2) Decrease tenderness/excess muscle tension to minimal Progressing slowly  3) Facilitate improved posture Progressing slowly  4) Increase standing/walking tolerance to > 15 minutes Progressing     Long Term Goals: 6 weeks   1) Patient will sleep > 7 hours without interruption by LBP 5 of 7 nights per week Progressing slowly  2) Increase walking tolerance to > 2 miles Slowly progressing  3) Increase standing tolerance to > 30 minutes Slowly progressing  4) Patient will safely lift up to 35# without increased LBP Ongoing  5) Improve functional score to > 65% as indicated by FOTO lumbar spine survey Progressing  6) Patient will be independent in HEP Progressing    PLAN   This patient is discharged from Physical Therapy      Nery Guerra, PT

## 2025-04-30 NOTE — PROGRESS NOTES
[facility]  PT Discharge Note    Name: Yanci Coats  Clinic Number: [id number]    Therapy Diagnosis:   Encounter Diagnoses   Name Primary?    Chronic right shoulder pain Yes    Activity intolerance     Decreased ROM of right shoulder     Weakness of shoulder      Physician: Enrique Bear, NP     Physician Orders: PT Eval and Treat shoulder  Medical Diagnosis from Referral: Chronic right shoulder pain   Evaluation Date: 8/18/2023      Date of Last visit: 9/13/2023  Total Visits Received: 5  Number of canceled visits: 5  Number of no show visits: 0    ASSESSMENT      Patient was making fair progress in PT but did not complete established POC. See daily notes for details.    Discharge reason: Patient has not attended therapy since 9/13/2023    Discharge FOTO Score: 57%    Goals: As documented in final treatment note.   Short Term Goals: 3 weeks   1) Facilitate normalized joint play of R GH joint Slow progressing  2) Facilitate improved upper quadrant flexibility Slow progress  3) Patient will lalo pull-over shirt with no more than minimal discomfort 8 of 10 trials Slow progress  4) Patient will consistently drive without increased shoulder discomfort Slow progress     Long Term Goals: 6 weeks   1) Patient will resume sleeping in R side lying without increased shoulder pain Initiated  2) Patient will consistently reach into overhead cabinet to retrieve/put away dishes using R UE without assisting with L UE or increased pain Slow progress  3) Patient will lift at least 20# using B UE without R shoulder pain at least 7 of 10 trials Slow progress  4) Patient will consistently reach into back seat of car without increased pain Ongoing  5) Improve functional score to > 75% Gradually progressing  6) Patient will be independent in HEP Progressing    PLAN   This patient is discharged from Physical Therapy      Nery Guerra, PT

## 2025-05-04 DIAGNOSIS — I15.2 HYPERTENSION ASSOCIATED WITH DIABETES: ICD-10-CM

## 2025-05-04 DIAGNOSIS — E11.59 HYPERTENSION ASSOCIATED WITH DIABETES: ICD-10-CM

## 2025-05-04 RX ORDER — AMLODIPINE BESYLATE 10 MG/1
10 TABLET ORAL
Qty: 90 TABLET | Refills: 2 | Status: SHIPPED | OUTPATIENT
Start: 2025-05-04

## 2025-05-04 NOTE — TELEPHONE ENCOUNTER
No care due was identified.  [facility] Embedded Care Due Messages. Reference number: [id number].   5/04/2025 1:01:59 PM CDT

## 2025-05-05 NOTE — TELEPHONE ENCOUNTER
Refill Decision Note   Irenecamila Jorge L  is requesting a refill authorization.  Brief Assessment and Rationale for Refill:  Approve     Medication Therapy Plan:         Comments:     Note composed:10:16 PM 05/04/2025

## 2025-05-15 ENCOUNTER — TELEPHONE (OUTPATIENT)
Dept: FAMILY MEDICINE | Facility: CLINIC | Age: 70
End: 2025-05-15
Payer: COMMERCIAL

## 2025-05-21 ENCOUNTER — OFFICE VISIT (OUTPATIENT)
Dept: FAMILY MEDICINE | Facility: CLINIC | Age: 70
End: 2025-05-21
Payer: COMMERCIAL

## 2025-05-21 VITALS
RESPIRATION RATE: 18 BRPM | HEIGHT: 67 IN | SYSTOLIC BLOOD PRESSURE: 134 MMHG | DIASTOLIC BLOOD PRESSURE: 70 MMHG | BODY MASS INDEX: 31.63 KG/M2 | TEMPERATURE: 98 F | OXYGEN SATURATION: 98 % | HEART RATE: 80 BPM | WEIGHT: 201.5 LBS

## 2025-05-21 DIAGNOSIS — Z12.39 ENCOUNTER FOR SCREENING FOR MALIGNANT NEOPLASM OF BREAST, UNSPECIFIED SCREENING MODALITY: ICD-10-CM

## 2025-05-21 DIAGNOSIS — E11.65 TYPE 2 DIABETES MELLITUS WITH HYPERGLYCEMIA, WITHOUT LONG-TERM CURRENT USE OF INSULIN: Primary | ICD-10-CM

## 2025-05-21 DIAGNOSIS — Z12.11 SCREENING FOR COLON CANCER: ICD-10-CM

## 2025-05-21 DIAGNOSIS — Z78.9 IMMUNE TO MEASLES: ICD-10-CM

## 2025-05-21 DIAGNOSIS — J31.0 CHRONIC RHINITIS: ICD-10-CM

## 2025-05-21 PROCEDURE — 99999 PR PBB SHADOW E&M-EST. PATIENT-LVL V: CPT | Mod: PBBFAC,,, | Performed by: FAMILY MEDICINE

## 2025-05-22 ENCOUNTER — PATIENT MESSAGE (OUTPATIENT)
Dept: GASTROENTEROLOGY | Facility: CLINIC | Age: 70
End: 2025-05-22
Payer: COMMERCIAL

## 2025-05-22 NOTE — PROGRESS NOTES
Subjective:   Patient ID: Yanci Coats is a 69 y.o. female     Chief Complaint:Annual Exam      Here for checkup      Review of Systems   Constitutional:  Negative for chills and fever.   HENT:  Negative for sore throat and trouble swallowing.    Respiratory:  Negative for cough and shortness of breath.    Cardiovascular:  Negative for chest pain and leg swelling.   Gastrointestinal:  Negative for abdominal distention and abdominal pain.   Genitourinary:  Negative for dysuria and flank pain.   Musculoskeletal:  Negative for arthralgias and back pain.   Skin:  Negative for color change and pallor.   Neurological:  Negative for weakness and headaches.   Psychiatric/Behavioral:  Negative for agitation and confusion.      Past Medical History:   Diagnosis Date    Allergy history unknown     Arthritis     DDD    Back pain     Cataract     OU    Colon polyps 01/31/2019    Diabetes mellitus type II     GERD (gastroesophageal reflux disease)     Hypertension     LPRD (laryngopharyngeal reflux disease)     Nuclear sclerosis - Both Eyes 7/9/2013     Past Surgical History:   Procedure Laterality Date    COLONOSCOPY N/A 1/31/2019    Procedure: COLONOSCOPY;  Surgeon: Ronak Palacios MD;  Location: [facility];  Service: Endoscopy;  Laterality: N/A;     Objective:     Vitals:    05/21/25 1050   BP: 134/70   Pulse: 80   Resp: 18   Temp: 97.8 °F (36.6 °C)     Body mass index is 31.56 kg/m².  Physical Exam  Vitals and nursing note reviewed.   Constitutional:       Appearance: She is well-developed.   HENT:      Head: Normocephalic and atraumatic.   Eyes:      General: No scleral icterus.     Conjunctiva/sclera: Conjunctivae normal.   Cardiovascular:      Heart sounds: No murmur heard.  Pulmonary:      Effort: Pulmonary effort is normal. No respiratory distress.   Musculoskeletal:         General: No deformity. Normal range of motion.      Cervical back: Normal range of motion and neck supple.   Skin:     Coloration: Skin is  not pale.      Findings: No rash.   Neurological:      Mental Status: She is alert and oriented to person, place, and time.   Psychiatric:         Behavior: Behavior normal.         Thought Content: Thought content normal.         Judgment: Judgment normal.       Assessment:     1. Type 2 diabetes mellitus with hyperglycemia, without long-term current use of insulin    2. Immune to measles    3. Encounter for screening for malignant neoplasm of breast, unspecified screening modality    4. Screening for colon cancer    5. Chronic rhinitis      Plan:   Type 2 diabetes mellitus with hyperglycemia, without long-term current use of insulin  -     CBC Auto Differential; Future; Expected date: 05/21/2025  -     Comprehensive Metabolic Panel; Future; Expected date: 05/21/2025  -     Hemoglobin A1C; Future; Expected date: 05/21/2025  -     Lipid Panel; Future; Expected date: 05/21/2025  -     TSH; Future; Expected date: 05/21/2025  -     Ambulatory referral/consult to Diabetes Education; Future; Expected date: 05/28/2025    Immune to measles  -     Rubeola antibody IgG; Future; Expected date: 05/21/2025    Encounter for screening for malignant neoplasm of breast, unspecified screening modality  -     Mammo Digital Screening Bilat w/ Stanley (XPD); Future; Expected date: 05/21/2025    Screening for colon cancer  -     Ambulatory referral/consult to Gastroenterology; Future; Expected date: 05/28/2025    Chronic rhinitis  -     Ambulatory referral/consult to Allergy; Future; Expected date: 05/28/2025      Chronic condition not otherwise mentioned is stable        Established patient with me has been instructed that must see me at least 1 time yearly (every 365 days) for refills of medications. Seeing other providers in this clinic is fine but expectation is to see me yearly.    Anurag Nye MD  05/22/2025    Portions of this note have been dictated with ADELAIDA Guzman

## 2025-06-06 NOTE — LETTER
December 21, 2018      KIMBERLEY Davis  [address] [place] [address]           [facility]  [address] [place] [address]  Phone: [phone]  Fax: [phone]          Patient: Yanci Coats   MR Number: [id number]   YOB: 1955   Date of Visit: 12/21/2018       Dear Arabella Lawson:    Thank you for referring Yanci Coats to me for evaluation. Attached you will find relevant portions of my assessment and plan of care.    If you have questions, please do not hesitate to call me. I look forward to following Yanci Coats along with you.    Sincerely,    Elvira Mclaughlin, NP    Enclosure  CC:  No Recipients    If you would like to receive this communication electronically, please contact [email or web address] or [phone] to request more information on [employer] Link access.    For providers and/or their staff who would like to refer a patient to [facility], please contact us through our one-stop-shop provider referral line, [facility] , at [phone].    If you feel you have received this communication in error or would no longer like to receive these types of communications, please e-mail [email or web address]          77-year-old male with past medical history HTN, HLD, DM, reflux presents with complaint of dizziness and presyncope.  Reports at 1 PM today he got up from laying down and felt like he would pass out.  States he felt afterwards like he was weak and had chest pain while he was standing up.  Daughter Daniella at bedside states that patient has not felt well since the presyncopal episode.  States that patient reports he did not fully lose consciousness at any time.  Reports he had a headache earlier that is now resolved without intervention.  Denies focal numbness/weakness, abdominal pain, nausea/vomiting/diarrhea, change in bowel/bladder habits.

## 2025-06-09 ENCOUNTER — CLINICAL SUPPORT (OUTPATIENT)
Dept: DIABETES | Facility: CLINIC | Age: 70
End: 2025-06-09
Payer: COMMERCIAL

## 2025-06-09 VITALS — BODY MASS INDEX: 31.49 KG/M2 | HEIGHT: 67 IN | WEIGHT: 200.63 LBS

## 2025-06-09 DIAGNOSIS — E11.65 TYPE 2 DIABETES MELLITUS WITH HYPERGLYCEMIA, WITHOUT LONG-TERM CURRENT USE OF INSULIN: ICD-10-CM

## 2025-06-09 PROCEDURE — 99999 PR PBB SHADOW E&M-EST. PATIENT-LVL II: CPT | Mod: PBBFAC,,, | Performed by: DIETITIAN, REGISTERED

## 2025-06-09 PROCEDURE — G0108 DIAB MANAGE TRN  PER INDIV: HCPCS | Mod: S$GLB,,, | Performed by: DIETITIAN, REGISTERED

## 2025-06-09 NOTE — PROGRESS NOTES
"Diabetes Care Specialist Progress Note  Author: Yanira Wan RD, [facility]  Date: 6/9/2025    Intake    Program Intake  Reason for Diabetes Program Visit:: Initial Diabetes Assessment  Current diabetes risk level:: low (Per patient's A1C of 6.4%)  In the last month, have you used the ER or been admitted to the hospital: No  Permission to speak with others about care:: no    Current Diabetes Treatment: Oral Medications, DM Injectables  Oral Medication Type/Dose: Janumet QD, Glipizide 10 mg BID  DM Injectables Type/Dose: Trulicity 1.5 mg/week Fridays    Continuous Glucose Monitoring  Patient has CGM: No    Lab Results   Component Value Date    HGBA1C 6.4 (H) 12/10/2024       Weight: 91 kg (200 lb 9.9 oz)   Height: 5' 7" (170.2 cm)   Body mass index is 31.42 kg/m².    Lifestyle Coping Support & Clinical    Lifestyle/Coping/Support  List anything about Diabetes that causes you stress?: no  Learning Barriers:: None  Culture or Hinduism beliefs that may impact ability to access healthcare: No  Psychosocial/Coping Skills Assessment Completed: : Yes  Assessment indicates:: Adequate understanding  Area of need?: No    Problem Review  Active Comorbidities: Neuropathy, Hyperlipidemia/Dyslipidemia, Hypertension, Obesity    Diabetes Self-Management Skills Assessment    Medication Skills Assessment  Patient is able to identify current diabetes medications, dosages, and appropriate timing of medications.: no  Patient reports problems or concerns with current medication regimen.: no  Patient is  aware that some diabetes medications can cause low blood sugar?: No  Medication Skills Assessment Completed:: Yes  Assessment indicates:: Instruction Needed  Area of need?: Yes    Diabetes Disease Process/Treatment Options  Diabetes Type?: Type II  When were you diagnosed?: 4/24/25  What are your goals for this education session?: Eating to promote BG management  Is patient aware of what causes diabetes?: No  Does patient understand the " pathophysiology of diabetes?: No  Diabetes Disease Process/Treatment Options: Skills Assessment Completed: Yes  Assessment indicates:: Instruction Needed  Area of need?: Yes    Nutrition/Healthy Eating  Meal Plan 24 Hour Recall - Breakfast: leftovers of protein + vegetable  Meal Plan 24 Hour Recall - Lunch: if exercising she will eat first meal after exercising which would be the same foods as breakfast: grits + sausage + milk or juice, oatmeal + nuts + apple( blueberries, blackberries) + milk, egg + bowen + fruit + water, egg + sausage + slice of toast + juice  Meal Plan 24 Hour Recall - Dinner: protein + vegetable + side dish, greens + cornbread  Meal Plan 24 Hour Recall - Beverage: ater, tea with honey or artificial sweetener,  Who shops/cooks?: patient  Patient can identify foods that impact blood sugar.: yes  Challenges to healthy eating:: portion control  Nutrition/Healthy Eating Skills Assessment Completed:: Yes  Assessment indicates:: Instruction Needed  Area of need?: Yes    Physical Activity/Exercise  Patient's daily activity level:: moderately active  Patient formally exercises outside of work.: yes  Frequency: four or more times a week  Patient can identify forms of physical activity.: yes  Physical Activity/Exercise Skills Assessment Completed: : Yes  Assessment indicates:: Adequate understanding  Area of need?: No    Home Blood Glucose Monitoring  Patient states that blood sugar is checked at home daily.: no  What is your A1c Target?: less than 6.5%  Home Blood Glucose Monitoring Skills Assessment Completed: : Yes  Assessment indicates:: Adequate understanding  Area of need?: No (Not required to monitor glucose levels per PCP)    Acute Complications  Have you ever had hypoglycemia (low BG 70 or less)?: no  Have you ever had hyperglycemia (high  or more)?: no  Have you ever had DKA?: no  Do you ever test for ketones?: no  Do you have a sick day plan?: no  Acute Complications Skills Assessment  Completed: : Yes  Assessment indicates:: Adequate understanding  Area of need?: No    Chronic Complications  Chronic Complications Skills Assessment Completed: : No  Deferred due to:: Time      Assessment Summary and Plan    Based on today's diabetes care assessment, the following areas of need were identified:      Identified Areas of Need      Medication/Current Diabetes Treatment: Yes, take Glipizide before breakfast as close to 30 minutes before meal as possible that is not inhibited by N/V.   Lifestyle Coping/Support: No   Diabetes Disease Process/Treatment Options: Yes, see care plan.   Nutrition/Healthy Eating: Yes , see care plan. Pair protein and fiber with carbohydrate to decrease post prandial glucose excursion.   Physical Activity/Exercise: Exercise decreases insulin resistance and helps manage BG.   Home Blood Glucose Monitoring: No (Not required to monitor glucose levels per PCP)    Acute Complications: Reviewed S/S hypoglycemia which patient denies.   Chronic Complications:         Today's interventions were provided through individual discussion, instruction, and written materials were provided.      Patient verbalized understanding of instruction and written materials.  Pt was able to return back demonstration of instructions today. Patient understood key points, needs reinforcement and further instruction.     Diabetes Self-Management Care Plan:    Today's Diabetes Self-Management Care Plan was developed with Yanci's input. Yanci has agreed to work toward the following goal(s) to improve his/her overall diabetes control.      Care Plan: Diabetes Management   Updates made since 6/9/2024 12:00 AM        Problem: Disease Process         Goal: Patient agrees to take steps toward understanding the diabetes disease process and treatment options by taking medications as prescribed and following the Plate Method.    Start Date: 6/9/2025   Priority: Low   Barriers: No Barriers Identified        Task:  Provided a basic introduction of the diabetes disease process, diagnosis, progression, and how diabetes can be successfully managed. Completed 6/9/2025        Task: Emphasized on the importance of controlling diabetes to prevent complications and reviewed both the short-term and long-term effects of uncontrolled diabetes. Completed 6/9/2025        Problem: Medications         Goal: Patient agrees to take Glipizide before breakfast instead of after breakfast.    Start Date: 6/9/2025   Priority: High   Barriers: Other (comments)   Note:    Patient takes medication after breakfast. Discussed MOA and the need to take before.  She reports she is too nauseated in the morning to take it 30 minutes ahead of breakfast. Advised to take as soon as possible even if it is with the first bite.    Reviewed S/S of hypoglycemia that she denies.       Task: Reviewed with patient all current diabetes medications and provided basic review of the purpose, dosage, frequency, side effects, and storage of both oral and injectable diabetes medications. Completed 6/9/2025        Task: Discussed guidelines for preventing, detecting and treating hypoglycemia and hyperglycemia and reviewed the importance of meal and medication timing with diabetes mediations for prevention of hypoglycemia and maximum drug benefit. Completed 6/9/2025        Problem: Healthy Eating         Goal: Eat 3 meals daily with 30-45g/2-3 servings of Carbohydrate per meal + protein.    Start Date: 6/9/2025   Priority: Medium   Barriers: No Barriers Identified   Note:    Patient eats two meals daily when she exercises and three meals when she does not. Discussed importance of pairing all meals and snacks with protein and preferable fiber to slow down post prandial spike. Discussed protein's role in weight management.     Since patient drinks orange juice and milk explained that these items make it easy to surpass the  45 g recommended carbohydrates per meal.       Task:  Reviewed the sources and role of Carbohydrate, Protein, and Fat and how each nutrient impacts blood sugar. Completed 6/9/2025        Task: Provided visual examples using dry measuring cups, food models, and other familiar objects such as computer mouse, deck or cards, tennis ball etc. to help with visualization of portions. Completed 6/9/2025        Task: Review the importance of balancing carbohydrates with each meal using portion control techniques to count servings of carbohydrate and label reading to identify serving size and amount of total carbs per serving. Completed 6/9/2025        Task: Provided Sample plate method and reviewed the use of the plate to estimate amounts of carbohydrate per meal. Completed 6/9/2025          Follow Up Plan     Follow up if symptoms worsen or fail to improve, for further diabetes education or questions pertaining to diabetes management..  Patient who was diagnosed with T2DM last year presents for diabetes education.  Her goal is to decrease the amount of medication she must take to manage her diabetes.  We discussed portion sizing the amount of carbohydrates at a meal and pairing with protein to decrease post prandial glucose excursions. Explained exercise's role in BG management.    Today's care plan and follow up schedule was discussed with patient.  Yanci verbalized understanding of the care plan, goals, and agrees to follow up plan.        The patient was encouraged to communicate with his/her health care provider/physician and care team regarding his/her condition(s) and treatment.  I provided the patient with my contact information today and encouraged to contact me via phone or [facility] as needed.     Length of Visit   Total Time: 60 Minutes

## 2025-06-11 ENCOUNTER — HOSPITAL ENCOUNTER (OUTPATIENT)
Dept: RADIOLOGY | Facility: CLINIC | Age: 70
Discharge: HOME | End: 2025-06-11
Attending: FAMILY MEDICINE
Payer: COMMERCIAL

## 2025-06-11 DIAGNOSIS — Z12.39 ENCOUNTER FOR SCREENING FOR MALIGNANT NEOPLASM OF BREAST, UNSPECIFIED SCREENING MODALITY: ICD-10-CM

## 2025-06-11 PROCEDURE — 77067 SCR MAMMO BI INCL CAD: CPT | Mod: TC,PO

## 2025-06-11 PROCEDURE — 77063 BREAST TOMOSYNTHESIS BI: CPT | Mod: 26,,, | Performed by: RADIOLOGY

## 2025-06-11 PROCEDURE — 77067 SCR MAMMO BI INCL CAD: CPT | Mod: 26,,, | Performed by: RADIOLOGY

## 2025-06-12 ENCOUNTER — RESULTS FOLLOW-UP (OUTPATIENT)
Dept: FAMILY MEDICINE | Facility: CLINIC | Age: 70
End: 2025-06-12

## 2025-06-20 DIAGNOSIS — E11.69 HYPERLIPIDEMIA ASSOCIATED WITH TYPE 2 DIABETES MELLITUS: ICD-10-CM

## 2025-06-20 DIAGNOSIS — E78.5 HYPERLIPIDEMIA ASSOCIATED WITH TYPE 2 DIABETES MELLITUS: ICD-10-CM

## 2025-06-20 NOTE — TELEPHONE ENCOUNTER
Care Due:                  Date            Visit Type   Department     Provider  --------------------------------------------------------------------------------                                EP -                              PRIMARY      [facility]  Last Visit: 05-      CARE ([facility])   NIKI Nye                              EP -                              PRIMARY      [facility]  Next Visit: 05-      CARE ([facility])   NIKI Nye                                                            Last  Test          Frequency    Reason                     Performed    Due Date  --------------------------------------------------------------------------------    CBC.........  12 months..  valACYclovir.............  Not Found    Overdue    HBA1C.......  6 months...  SITagliptan-metformin,     12-   06-                             dulaglutide..............    Lipid Panel.  12 months..  rosuvastatin.............  04- 03-    [facility] Embedded Care Due Messages. Reference number: [id number].   6/20/2025 4:27:26 PM CDT

## 2025-06-21 NOTE — TELEPHONE ENCOUNTER
Refill Routing Note   Medication(s) are not appropriate for processing by [facility] for the following reason(s):        Required labs outdated    [facility] action(s):  Defer     Requires labs : Yes             Appointments  past 12m or future 3m with PCP    Date Provider   Last Visit   5/21/2025 Anurag Nye MD   Next Visit   Visit date not found Anurag Nye MD   ED visits in past 90 days: 0        Note composed:8:58 AM 06/21/2025

## 2025-06-23 RX ORDER — ROSUVASTATIN CALCIUM 5 MG/1
5 TABLET, COATED ORAL
Qty: 90 TABLET | Refills: 0 | Status: SHIPPED | OUTPATIENT
Start: 2025-06-23

## 2025-06-25 ENCOUNTER — LAB VISIT (OUTPATIENT)
Dept: LAB | Facility: HOSPITAL | Age: 70
End: 2025-06-25
Attending: FAMILY MEDICINE
Payer: COMMERCIAL

## 2025-06-25 DIAGNOSIS — E11.65 TYPE 2 DIABETES MELLITUS WITH HYPERGLYCEMIA, WITHOUT LONG-TERM CURRENT USE OF INSULIN: ICD-10-CM

## 2025-06-25 DIAGNOSIS — Z78.9 IMMUNE TO MEASLES: ICD-10-CM

## 2025-06-25 LAB
ABSOLUTE EOSINOPHIL (OHS): 0.43 K/UL
ABSOLUTE MONOCYTE (OHS): 0.47 K/UL (ref 0.3–1)
ABSOLUTE NEUTROPHIL COUNT (OHS): 5 K/UL (ref 1.8–7.7)
ALBUMIN SERPL BCP-MCNC: 4 G/DL (ref 3.5–5.2)
ALP SERPL-CCNC: 69 UNIT/L (ref 40–150)
ALT SERPL W/O P-5'-P-CCNC: 22 UNIT/L (ref 10–44)
ANION GAP (OHS): 11 MMOL/L (ref 8–16)
AST SERPL-CCNC: 21 UNIT/L (ref 11–45)
BASOPHILS # BLD AUTO: 0.04 K/UL
BASOPHILS NFR BLD AUTO: 0.5 %
BILIRUB SERPL-MCNC: 0.7 MG/DL (ref 0.1–1)
BUN SERPL-MCNC: 25 MG/DL (ref 8–23)
CALCIUM SERPL-MCNC: 10.2 MG/DL (ref 8.7–10.5)
CHLORIDE SERPL-SCNC: 106 MMOL/L (ref 95–110)
CHOLEST SERPL-MCNC: 190 MG/DL (ref 120–199)
CHOLEST/HDLC SERPL: 3.5 {RATIO} (ref 2–5)
CO2 SERPL-SCNC: 22 MMOL/L (ref 23–29)
CREAT SERPL-MCNC: 1.3 MG/DL (ref 0.5–1.4)
EAG (OHS): 146 MG/DL (ref 68–131)
ERYTHROCYTE [DISTWIDTH] IN BLOOD BY AUTOMATED COUNT: 13.3 % (ref 11.5–14.5)
GFR SERPLBLD CREATININE-BSD FMLA CKD-EPI: 45 ML/MIN/1.73/M2
GLUCOSE SERPL-MCNC: 128 MG/DL (ref 70–110)
HBA1C MFR BLD: 6.7 % (ref 4–5.6)
HCT VFR BLD AUTO: 36.2 % (ref 37–48.5)
HDLC SERPL-MCNC: 54 MG/DL (ref 40–75)
HDLC SERPL: 28.4 % (ref 20–50)
HGB BLD-MCNC: 11.7 GM/DL (ref 12–16)
IMM GRANULOCYTES # BLD AUTO: 0.02 K/UL (ref 0–0.04)
IMM GRANULOCYTES NFR BLD AUTO: 0.2 % (ref 0–0.5)
LDLC SERPL CALC-MCNC: 109.8 MG/DL (ref 63–159)
LYMPHOCYTES # BLD AUTO: 2.68 K/UL (ref 1–4.8)
MCH RBC QN AUTO: 28.2 PG (ref 27–31)
MCHC RBC AUTO-ENTMCNC: 32.3 G/DL (ref 32–36)
MCV RBC AUTO: 87 FL (ref 82–98)
NONHDLC SERPL-MCNC: 136 MG/DL
NUCLEATED RBC (/100WBC) (OHS): 0 /100 WBC
PLATELET # BLD AUTO: 340 K/UL (ref 150–450)
PMV BLD AUTO: 8.9 FL (ref 9.2–12.9)
POTASSIUM SERPL-SCNC: 3.9 MMOL/L (ref 3.5–5.1)
PROT SERPL-MCNC: 9.2 GM/DL (ref 6–8.4)
RBC # BLD AUTO: 4.15 M/UL (ref 4–5.4)
RELATIVE EOSINOPHIL (OHS): 5 %
RELATIVE LYMPHOCYTE (OHS): 31 % (ref 18–48)
RELATIVE MONOCYTE (OHS): 5.4 % (ref 4–15)
RELATIVE NEUTROPHIL (OHS): 57.9 % (ref 38–73)
SODIUM SERPL-SCNC: 139 MMOL/L (ref 136–145)
TRIGL SERPL-MCNC: 131 MG/DL (ref 30–150)
TSH SERPL-ACNC: 0.6 UIU/ML (ref 0.4–4)
WBC # BLD AUTO: 8.64 K/UL (ref 3.9–12.7)

## 2025-06-25 PROCEDURE — 80053 COMPREHEN METABOLIC PANEL: CPT

## 2025-06-25 PROCEDURE — 86765 RUBEOLA ANTIBODY: CPT

## 2025-06-25 PROCEDURE — 83036 HEMOGLOBIN GLYCOSYLATED A1C: CPT

## 2025-06-25 PROCEDURE — 36415 COLL VENOUS BLD VENIPUNCTURE: CPT

## 2025-06-25 PROCEDURE — 84443 ASSAY THYROID STIM HORMONE: CPT

## 2025-06-25 PROCEDURE — 85025 COMPLETE CBC W/AUTO DIFF WBC: CPT

## 2025-06-25 PROCEDURE — 80061 LIPID PANEL: CPT

## 2025-06-26 LAB
RUBEOLA (MEASLES) IGG (OHS): <5 AU/ML
RUBEOLA IGG INTERP. (OHS): NEGATIVE

## 2025-07-03 DIAGNOSIS — G89.29 CHRONIC BILATERAL LOW BACK PAIN WITHOUT SCIATICA: ICD-10-CM

## 2025-07-03 DIAGNOSIS — L21.9 SEBORRHEIC DERMATITIS OF SCALP: ICD-10-CM

## 2025-07-03 DIAGNOSIS — M54.50 CHRONIC BILATERAL LOW BACK PAIN WITHOUT SCIATICA: ICD-10-CM

## 2025-07-03 NOTE — TELEPHONE ENCOUNTER
Patient was here for b12 injection in L deltoid. Patient tolerated well and was discharged home.  Ndc: 43135-242-31  Lot: [id number]  Ex: 05/01/22       Refill Routing Note   Medication(s) are not appropriate for processing by [facility] for the following reason(s):        Outside of protocol    [facility] action(s):  Route             Appointments  past 12m or future 3m with PCP    Date Provider   Last Visit   5/21/2025 Anurag Nye MD   Next Visit   Visit date not found Anurag Nye MD   ED visits in past 90 days: 0        Note composed:3:30 PM 07/03/2025

## 2025-07-03 NOTE — TELEPHONE ENCOUNTER
No care due was identified.  [facility] Embedded Care Due Messages. Reference number: [id number].   7/03/2025 9:32:38 AM CDT

## 2025-07-07 RX ORDER — GABAPENTIN 300 MG/1
300 CAPSULE ORAL 2 TIMES DAILY
Qty: 90 CAPSULE | Refills: 7 | Status: SHIPPED | OUTPATIENT
Start: 2025-07-07

## 2025-07-07 RX ORDER — KETOCONAZOLE 20 MG/ML
SHAMPOO, SUSPENSION TOPICAL
Qty: 360 ML | Refills: 5 | Status: SHIPPED | OUTPATIENT
Start: 2025-07-07

## 2025-07-09 ENCOUNTER — TELEPHONE (OUTPATIENT)
Dept: FAMILY MEDICINE | Facility: CLINIC | Age: 70
End: 2025-07-09
Payer: COMMERCIAL

## 2025-07-09 DIAGNOSIS — K21.9 GASTROESOPHAGEAL REFLUX DISEASE, UNSPECIFIED WHETHER ESOPHAGITIS PRESENT: ICD-10-CM

## 2025-07-24 ENCOUNTER — TELEPHONE (OUTPATIENT)
Dept: FAMILY MEDICINE | Facility: CLINIC | Age: 70
End: 2025-07-24
Payer: COMMERCIAL

## 2025-07-24 ENCOUNTER — OFFICE VISIT (OUTPATIENT)
Dept: FAMILY MEDICINE | Facility: CLINIC | Age: 70
End: 2025-07-24
Payer: COMMERCIAL

## 2025-07-24 VITALS
DIASTOLIC BLOOD PRESSURE: 64 MMHG | BODY MASS INDEX: 31.97 KG/M2 | SYSTOLIC BLOOD PRESSURE: 142 MMHG | HEART RATE: 84 BPM | HEIGHT: 67 IN | WEIGHT: 203.69 LBS | OXYGEN SATURATION: 98 % | TEMPERATURE: 98 F

## 2025-07-24 DIAGNOSIS — J06.9 UPPER RESPIRATORY TRACT INFECTION, UNSPECIFIED TYPE: Primary | ICD-10-CM

## 2025-07-24 DIAGNOSIS — E11.59 HYPERTENSION ASSOCIATED WITH DIABETES: ICD-10-CM

## 2025-07-24 DIAGNOSIS — I15.2 HYPERTENSION ASSOCIATED WITH DIABETES: ICD-10-CM

## 2025-07-24 DIAGNOSIS — E11.65 TYPE 2 DIABETES MELLITUS WITH HYPERGLYCEMIA, WITHOUT LONG-TERM CURRENT USE OF INSULIN: ICD-10-CM

## 2025-07-24 DIAGNOSIS — R05.1 ACUTE COUGH: ICD-10-CM

## 2025-07-24 DIAGNOSIS — J10.1 INFLUENZA B: ICD-10-CM

## 2025-07-24 LAB
CTP QC/QA: YES
CTP QC/QA: YES
POC MOLECULAR INFLUENZA A AGN: NEGATIVE
POC MOLECULAR INFLUENZA B AGN: POSITIVE
SARS-COV-2 RDRP RESP QL NAA+PROBE: NEGATIVE

## 2025-07-24 PROCEDURE — 99999 PR PBB SHADOW E&M-EST. PATIENT-LVL V: CPT | Mod: PBBFAC,,,

## 2025-07-24 RX ORDER — PROMETHAZINE HYDROCHLORIDE AND DEXTROMETHORPHAN HYDROBROMIDE 6.25; 15 MG/5ML; MG/5ML
5 SYRUP ORAL NIGHTLY PRN
Qty: 118 ML | Refills: 0 | Status: SHIPPED | OUTPATIENT
Start: 2025-07-24

## 2025-07-24 RX ORDER — FLUTICASONE PROPIONATE 50 MCG
1 SPRAY, SUSPENSION (ML) NASAL 2 TIMES DAILY
Qty: 16 G | Refills: 2 | Status: SHIPPED | OUTPATIENT
Start: 2025-07-24

## 2025-07-24 RX ORDER — LORATADINE 10 MG/1
10 TABLET ORAL DAILY
Qty: 30 TABLET | Refills: 0 | Status: SHIPPED | OUTPATIENT
Start: 2025-07-24 | End: 2025-08-23

## 2025-07-24 NOTE — TELEPHONE ENCOUNTER
Call placed to patient on contact numbers [phone] & [phone] . No answer received. Left voicemail requesting return call to office. Voicemail indicated date & time message left for patient. Patient noted to have an existing appointment scheduled for tomorrow's date.

## 2025-07-24 NOTE — PROGRESS NOTES
[facility]     Subjective:       Patient ID:  [id number]     Chief Complaint: Chest Congestion, Nasal Congestion (/), Cough, and Fatigue (/)    Yanci Coats is a 69 y.o. female with a past medical history significant for HTN, HLD, T2DM, and NAFLD who presents to the clinic for upper respiratory symptoms.     CHIEF COMPLAINT:  Ms. Coats presents today with symptoms that started during a cruise.     HISTORY OF PRESENT ILLNESS:  She reports symptoms initially began with sore throat followed by congestion. Current symptoms include nasal and chest congestion, fatigue, and cough. She experiences chest pain associated with coughing, described as muscular and non-cardiovascular in nature. She reports generalized weakness and becomes short of breath with activity. She has a productive cough with yellowish sputum and persistent post-nasal drip. She denies fever, chills, and acute ear pain.    CURRENT MEDICATIONS:  She is taking Excedrin for symptomatic relief and sports drinks for electrolyte replacement. She has been managing symptoms by gargling with warm salt water.         Review of Systems   Constitutional:  Negative for chills and fever.   Respiratory:  Positive for cough and shortness of breath.    Cardiovascular:  Positive for chest pain (with coughing).   Gastrointestinal:  Negative for abdominal pain, diarrhea, nausea and vomiting.        Past Medical History:   Diagnosis Date    Allergy history unknown     Arthritis     DDD    Back pain     Cataract     OU    Colon polyps 01/31/2019    Diabetes mellitus type II     GERD (gastroesophageal reflux disease)     Hypertension     LPRD (laryngopharyngeal reflux disease)     Nuclear sclerosis - Both Eyes 7/9/2013        Active Problem List with Overview Notes    Diagnosis Date Noted    Bilateral carpal tunnel syndrome 02/05/2020    Chronic bilateral low back pain without sciatica 11/22/2019    Chronic rhinitis 11/22/2019    History of colon polyps  01/31/2019    Type 2 diabetes mellitus with hyperglycemia 06/01/2018    Hypercholesterolemia 06/01/2018    Nodular thyroid disease 06/01/2018    Hypovitaminosis D 06/01/2018    Proteinuria 06/01/2018    Postmenopausal 03/23/2018    Hypertension associated with diabetes 07/25/2016    Hyperlipidemia associated with type 2 diabetes mellitus 07/25/2016    NAFLD (nonalcoholic fatty liver disease) 07/25/2016    Uterine fibroid 07/25/2016    Renal cyst, left 07/25/2016    Obesity, Class I, BMI 30-34.9 07/25/2016    BMI 32.0-32.9,adult 07/25/2016    Hyperlipidemia LDL goal <70 01/29/2016    Neuropathy 01/29/2016    Diabetes mellitus type 2, uncontrolled, with complications 01/29/2016    Blurred vision 09/23/2013    Posterior vitreous detachment, right eye 09/23/2013    Nuclear sclerosis - Both Eyes 07/09/2013    Cortical cataract - Right Eye 07/09/2013    Rhinitis, allergic 03/27/2013    GERD (gastroesophageal reflux disease) 03/27/2013        Review of patient's allergies indicates:   Allergen Reactions    Lipitor [atorvastatin] Other (See Comments)     Muscle pain    Penicillins Other (See Comments)     Unknown reaction       Current Medications[1]    Lab Results   Component Value Date    WBC 8.64 06/25/2025    HGB 11.7 (L) 06/25/2025    HCT 36.2 (L) 06/25/2025     06/25/2025    CHOL 190 06/25/2025    TRIG 131 06/25/2025    HDL 54 06/25/2025    ALT 22 06/25/2025    AST 21 06/25/2025     06/25/2025    K 3.9 06/25/2025     06/25/2025    CREATININE 1.3 06/25/2025    BUN 25 (H) 06/25/2025    CO2 22 (L) 06/25/2025    TSH 0.605 06/25/2025    INR 1.0 01/07/2014    HGBA1C 6.7 (H) 06/25/2025           Objective:      Physical Exam  Constitutional:       General: She is not in acute distress.     Appearance: Normal appearance. She is not toxic-appearing.   HENT:      Head: Normocephalic and atraumatic.      Right Ear: Tympanic membrane is not erythematous, retracted or bulging.      Left Ear: Tympanic membrane is  not erythematous, retracted or bulging.      Nose: Nose normal.      Right Sinus: No maxillary sinus tenderness or frontal sinus tenderness.      Left Sinus: No maxillary sinus tenderness or frontal sinus tenderness.      Mouth/Throat:      Pharynx: Uvula midline. Posterior oropharyngeal erythema and postnasal drip present.      Comments: There is a small abnormality consistent with possible tonsil stone of left tonsil. There is mild swelling and erythema.   Cardiovascular:      Rate and Rhythm: Normal rate and regular rhythm.      Pulses: Normal pulses.      Heart sounds: No murmur heard.     No friction rub.   Pulmonary:      Effort: Pulmonary effort is normal. No respiratory distress.      Breath sounds: Normal breath sounds. No wheezing.   Musculoskeletal:      Cervical back: Normal range of motion.      Right lower leg: No edema.      Left lower leg: No edema.   Skin:     General: Skin is warm and dry.   Neurological:      General: No focal deficit present.      Mental Status: She is alert and oriented to person, place, and time.   Psychiatric:         Mood and Affect: Mood normal.         Assessment:       1. Upper respiratory tract infection, unspecified type    2. Influenza B    3. Acute cough    4. Type 2 diabetes mellitus with hyperglycemia, without long-term current use of insulin    5. Hypertension associated with diabetes          Plan:         Assessment & Plan    - Assessed symptoms following recent cruise travel: sore throat, congestion, fatigue, and shortness of breath.  - POCT flu positive.   - Discussed conservative treatment with patient outlined below.   - Recommended against antibiotic use at this time.   - Recommend monitoring of throat condition due to observed abnormalities, likely tonsil stone.   - Patient to follow up in 2 weeks or if symptoms are not improved.        Yanci was seen today for chest congestion, nasal congestion, cough and fatigue.    Diagnoses and all orders for this  visit:    Upper respiratory tract infection, unspecified type  -     POCT COVID-19 Rapid Screening  -     POCT Influenza A/B Molecular   -    For sinus pressure or nasal congestion, recommend Sudafed (Pseudoephedrine) from behind the pharmacy counter    -    Recommended flonase BID in addition to sinus rinse (Ander med or Neti pot) with distilled water   -    Hot tea with honey or throat lozenges as needed for sore throat/ cough   -    Tylenol 500 mg or Ibuprofen 200 mg 2 tabs every 6 hours as needed for fever, headache, body aches, throat pain, etc    -    Hydrate well to thin mucus    Influenza B  -     promethazine-dextromethorphan (PROMETHAZINE-DM) 6.25-15 mg/5 mL Syrp; Take 5 mLs by mouth nightly as needed (cough).  -     fluticasone propionate (FLONASE) 50 mcg/actuation nasal spray; 1 spray (50 mcg total) by Each Nostril route 2 (two) times daily. Point up and slightly outward toward ear when spraying to avoid irritating nasal septum.  -     loratadine (CLARITIN) 10 mg tablet; Take 1 tablet (10 mg total) by mouth once daily.    Acute cough  -     promethazine-dextromethorphan (PROMETHAZINE-DM) 6.25-15 mg/5 mL Syrp; Take 5 mLs by mouth nightly as needed (cough).    Type 2 diabetes mellitus with hyperglycemia, without long-term current use of insulin        -    Below goal.     Hypertension associated with diabetes        -    Slightly elevated likely due to Excedrin use. Will continue to monitor.        Follow up in about 2 weeks (around 8/7/2025).    Future Appointments       Date Provider Specialty Appt Notes    8/7/2025 Flora Gee PA-C Family Medicine 2 week F/U    8/11/2025 Jorge Alberto Rivas MD Allergy Chronic rhinitis    11/24/2025 Flora Gee PA-C Family Medicine 6mth f/u    5/22/2026 Anurag Nye MD Family Medicine 6mt f/u             Tests to Keep You Healthy    Mammogram: Met on 6/11/2025  Eye Exam: Met on 10/16/2024  Colon Cancer Screening: DUE  Last Blood Pressure <=  139/89 (7/24/2025): NO  Last HbA1c < 8 (06/25/2025): Yes       I spent a total of 30 minutes on the day of the visit.This includes face to face time and non-face to face time preparing to see the patient (eg, review of tests), obtaining and/or reviewing separately obtained history, documenting clinical information in the electronic or other health record, independently interpreting results and communicating results to the patient/family/caregiver, or care coordinator.    This note was generated with the assistance of ambient listening technology. Verbal consent was obtained by the patient and accompanying visitor(s) for the recording of patient appointment to facilitate this note. I attest to having reviewed and edited the generated note for accuracy, though some syntax or spelling errors may persist. Please contact the author of this note for any clarification.      Flora Gee PA-C  Family Medicine Physician Assistant            [1]   Current Outpatient Medications:     amLODIPine (NORVASC) 10 MG tablet, TAKE 1 TABLET ONCE DAILY, Disp: 90 tablet, Rfl: 2    aspirin 81 MG Chew, Take 81 mg by mouth once daily., Disp: , Rfl:     carvediloL (COREG) 12.5 MG tablet, Take 1 tablet by mouth once daily, Disp: 90 tablet, Rfl: 3    dulaglutide (TRULICITY) 1.5 mg/0.5 mL pen injector, Inject 1.5 mg into the skin every 7 days., Disp: 4 pen , Rfl: 11    gabapentin (NEURONTIN) 300 MG capsule, TAKE 1 CAPSULE TWICE DAILY, Disp: 90 capsule, Rfl: 7    glipiZIDE (GLUCOTROL) 10 MG tablet, TAKE 1 TABLET TWICE A DAY, Disp: 180 tablet, Rfl: 3    ketoconazole (NIZORAL) 2 % shampoo, LATHER SCALP, LET SIT FOR 5MINUTES BEFORE RINSING; USE2 TO 3 TIMES A WEEK, Disp: 360 mL, Rfl: 5    multivitamin capsule, Take 1 capsule by mouth once daily., Disp: , Rfl:     pantoprazole (PROTONIX) 40 MG tablet, TAKE 1 TABLET ONCE DAILY, Disp: 90 tablet, Rfl: 3    rosuvastatin (CRESTOR) 5 MG tablet, TAKE 1 TABLET ONCE DAILY, Disp: 90 tablet, Rfl: 0     SITagliptan-metformin (JANUMET XR) 50-1,000 mg TM24, Take 1 tablet by mouth once daily., Disp: 90 tablet, Rfl: 3    telmisartan-hydrochlorothiazide (MICARDIS HCT) 80-25 mg per tablet, TAKE 1 TABLET ONCE DAILY, Disp: 90 tablet, Rfl: 1    valACYclovir (VALTREX) 1000 MG tablet, Take 2 tablets (2,000 mg total) by mouth every 12 (twelve) hours., Disp: 4 tablet, Rfl: 1    azelastine (ASTELIN) 137 mcg (0.1 %) nasal spray, 1 spray (137 mcg total) by Nasal route 2 (two) times daily. (Patient not taking: Reported on 7/24/2025), Disp: 30 mL, Rfl: 2    fluticasone propionate (FLONASE) 50 mcg/actuation nasal spray, 1 spray (50 mcg total) by Each Nostril route 2 (two) times daily. Point up and slightly outward toward ear when spraying to avoid irritating nasal septum., Disp: 16 g, Rfl: 2    loratadine (CLARITIN) 10 mg tablet, Take 1 tablet (10 mg total) by mouth once daily., Disp: 30 tablet, Rfl: 0    promethazine-dextromethorphan (PROMETHAZINE-DM) 6.25-15 mg/5 mL Syrp, Take 5 mLs by mouth nightly as needed (cough)., Disp: 118 mL, Rfl: 0

## 2025-07-24 NOTE — TELEPHONE ENCOUNTER
Copied from CRM #[id number]. Topic: Appointments - Same Day Access  >> Jul 24, 2025  9:29 AM Katey wrote:  Type:  Same Day Appointment Request    Caller is requesting a same day appointment.  Caller declined first available appointment listed below.      Name of Caller:  Pt   When is the first available appointment?  Friday   Symptoms:  Sinus infection   Best Call Back Number:  [phone]    Additional Information:   [facility] call back      Routing History     From [facility]   07/24/2025 09:30 AM Katey Mariee [place] STAFF Routine

## 2025-07-24 NOTE — TELEPHONE ENCOUNTER
Patient returned call to office. Call transferred directly to writer per patient access representative Bethany Rae.  Patient has appointment scheduled on tomorrow's date but would like to be seen today.  Appointment rescheduled as per request. Patient agreed to new appointment date & time.

## 2025-08-08 ENCOUNTER — OFFICE VISIT (OUTPATIENT)
Dept: OTOLARYNGOLOGY | Facility: CLINIC | Age: 70
End: 2025-08-08
Payer: COMMERCIAL

## 2025-08-08 ENCOUNTER — OFFICE VISIT (OUTPATIENT)
Dept: FAMILY MEDICINE | Facility: CLINIC | Age: 70
End: 2025-08-08
Payer: COMMERCIAL

## 2025-08-08 VITALS — HEIGHT: 67 IN | WEIGHT: 199.5 LBS | BODY MASS INDEX: 31.31 KG/M2

## 2025-08-08 VITALS
OXYGEN SATURATION: 97 % | WEIGHT: 199.5 LBS | HEART RATE: 83 BPM | DIASTOLIC BLOOD PRESSURE: 64 MMHG | TEMPERATURE: 98 F | HEIGHT: 67 IN | BODY MASS INDEX: 31.31 KG/M2 | SYSTOLIC BLOOD PRESSURE: 124 MMHG

## 2025-08-08 DIAGNOSIS — R09.82 POST-NASAL DRIP: Primary | ICD-10-CM

## 2025-08-08 DIAGNOSIS — J35.9 LESION OF TONSIL: ICD-10-CM

## 2025-08-08 DIAGNOSIS — E11.59 HYPERTENSION ASSOCIATED WITH DIABETES: ICD-10-CM

## 2025-08-08 DIAGNOSIS — J10.1 INFLUENZA B: ICD-10-CM

## 2025-08-08 DIAGNOSIS — I15.2 HYPERTENSION ASSOCIATED WITH DIABETES: ICD-10-CM

## 2025-08-08 DIAGNOSIS — J32.9 RHINOSINUSITIS: Primary | ICD-10-CM

## 2025-08-08 DIAGNOSIS — E11.65 TYPE 2 DIABETES MELLITUS WITH HYPERGLYCEMIA, WITHOUT LONG-TERM CURRENT USE OF INSULIN: ICD-10-CM

## 2025-08-08 PROCEDURE — 99999 PR PBB SHADOW E&M-EST. PATIENT-LVL V: CPT | Mod: PBBFAC,,,

## 2025-08-08 PROCEDURE — 99999 PR PBB SHADOW E&M-EST. PATIENT-LVL V: CPT | Mod: PBBFAC,,, | Performed by: PHYSICIAN ASSISTANT

## 2025-08-08 RX ORDER — AZELASTINE 1 MG/ML
1 SPRAY, METERED NASAL 2 TIMES DAILY
Qty: 30 ML | Refills: 2 | Status: SHIPPED | OUTPATIENT
Start: 2025-08-08 | End: 2026-08-08

## 2025-08-08 RX ORDER — LORATADINE 10 MG/1
10 TABLET ORAL DAILY
Qty: 90 TABLET | Refills: 0 | Status: SHIPPED | OUTPATIENT
Start: 2025-08-08 | End: 2025-11-06

## 2025-08-08 RX ORDER — DOXYCYCLINE 100 MG/1
100 CAPSULE ORAL EVERY 12 HOURS
Qty: 10 CAPSULE | Refills: 0 | Status: SHIPPED | OUTPATIENT
Start: 2025-08-08

## 2025-08-08 NOTE — PROGRESS NOTES
[facility]    Subjective:      Patient: Yanci Coats Patient PCP: Anurag Nye MD         :  1955     Sex:  female      MRN:  [id number]          Date of Visit: 2025      Chief Complaint: Left tonsil growth    Patient ID: Yanci Coats is a 69 y.o. female who presents to office for evaluation of left tonsil growth. Patient presents today for evaluation of postnasal drip and throat lesion. She tested positive for the flu around 2 weeks ago. She recently developed sinus infection. She has started on doxycycline 100mg BID x 5 days. She reports ongoing postnasal drip with significant throat clearing throughout the day. She is currently using Flonase nasal spray twice daily and Claritin. A left tonsil lesion was identified at her family medicine visit today. She denies sore throat or dysphagia/odynophagia.     Past Medical History  She has a past medical history of Allergy history unknown, Arthritis, Back pain, Cataract, Colon polyps, Diabetes mellitus type II, GERD (gastroesophageal reflux disease), Hypertension, LPRD (laryngopharyngeal reflux disease), and Nuclear sclerosis - Both Eyes.    Family History  Her family history includes Blindness in her maternal aunt, mother, and paternal aunt; Cataracts in her mother; Cervical cancer in her unknown relative; Cholelithiasis in her sister; Diabetes in her maternal aunt and mother; ANNA disease in her father and sister; Glaucoma in her paternal grandmother; Heart disease in her father and mother; Hypertension in her mother; Retinal detachment in her brother; Stroke in her paternal grandmother.    Past Surgical History:   Procedure Laterality Date    COLONOSCOPY N/A 2019    Procedure: COLONOSCOPY;  Surgeon: Ronak Palacios MD;  Location: [facility];  Service: Endoscopy;  Laterality: N/A;     Social History     Tobacco Use    Smoking status: Never    Smokeless tobacco: Never   Substance and Sexual Activity    Alcohol use: Yes     Comment: occ  "   Drug use: No    Sexual activity: Not Currently     Partners: Male     Birth control/protection: Post-menopausal     Medications  She has a current medication list which includes the following prescription(s): amlodipine, aspirin, carvedilol, doxycycline, trulicity, fluticasone propionate, gabapentin, glipizide, ketoconazole, loratadine, multivitamin, pantoprazole, promethazine-dextromethorphan, rosuvastatin, janumet xr, telmisartan-hydrochlorothiazide, valacyclovir, diphenhydramine hcl, and azelastine.    Review of patient's allergies indicates:   Allergen Reactions    Lipitor [atorvastatin] Other (See Comments)     Muscle pain    Penicillins Other (See Comments)     Unknown reaction     All medications, allergies, and past history have been reviewed.    Objective:      Vitals:      7/24/2025     1:35 PM 8/8/2025     8:04 AM 8/8/2025     8:59 AM   Vitals - 1 value per visit   SYSTOLIC 152 124    DIASTOLIC 70 64    Pulse 84 83    Temp 98.1 °F (36.7 °C) 97.9 °F (36.6 °C)    SPO2 98 % 97 %    Weight (lb) 203.71 199.52 199.52   Weight (kg) 92.4 90.5 90.5   Height 5' 7" (1.702 m) 5' 7" (1.702 m) 5' 7" (1.702 m)   BMI (Calculated) 31.9 31.2 31.2   Pain Score Zero Zero        Body surface area is 2.07 meters squared.  Physical Exam  Constitutional:       General: She is not in acute distress.     Appearance: Normal appearance. She is not ill-appearing.   HENT:      Head: Normocephalic and atraumatic.      Right Ear: Tympanic membrane, ear canal and external ear normal.      Left Ear: Tympanic membrane, ear canal and external ear normal.      Nose: Nose normal.      Mouth/Throat:      Lips: Pink. No lesions.      Mouth: Mucous membranes are moist. No oral lesions.      Tongue: No lesions.      Palate: No lesions.      Pharynx: Oropharynx is clear. Uvula midline. No pharyngeal swelling, oropharyngeal exudate, posterior oropharyngeal erythema or uvula swelling.      Tonsils: Tonsillar exudate present. 2+ on the right.      " Comments: Tonsillar crypts of palatine tonsils without tonsilliths present.     Small benign-appearing cystic growth at superior aspect of left palatine tonsil.   Eyes:      General:         Right eye: No discharge.         Left eye: No discharge.      Extraocular Movements: Extraocular movements intact.      Conjunctiva/sclera: Conjunctivae normal.   Pulmonary:      Effort: Pulmonary effort is normal.   Neurological:      General: No focal deficit present.      Mental Status: She is alert and oriented to person, place, and time. Mental status is at baseline.   Psychiatric:         Mood and Affect: Mood normal.         Behavior: Behavior normal.         Thought Content: Thought content normal.         Judgment: Judgment normal.       Biopsy of Left Roxbury Tonsil Growth    Indication:  Left [place] Tonsil Growth, rule out neoplasm    A benign-appearing cystic growth was present on the superior aspect of the left palatine tonsil.   Informed consent was obtained prior to proceeding.  Local injection with 1% lidocaine with 1:100,000 parts epinephrine was made at the surface of the lesion. Thru-cutting forceps were then used to obtain a biopsy of the lesion.  The specimen was placed in formalin and sent to the pathologist.  Hemostasis was achieved by application of silver nitrate.    The patient tolerated the procedure well.    Labs:  WBC   Date Value Ref Range Status   06/25/2025 8.64 3.90 - 12.70 K/uL Final     Eosinophil %   Date Value Ref Range Status   06/09/2021 3.8 0.0 - 8.0 % Final     Eos #   Date Value Ref Range Status   06/25/2025 0.43 <=0.5 K/uL Final     Eos %   Date Value Ref Range Status   06/25/2025 5.0 <=8 % Final     Platelet Count   Date Value Ref Range Status   06/25/2025 340 150 - 450 K/uL Final     Glucose   Date Value Ref Range Status   06/25/2025 128 (H) 70 - 110 mg/dL Final     Total IgE   Date Value Ref Range Status   03/23/2011 86 35 - 250 IU/ml Final       All lab results, imaging results,  and data have been reviewed.    Assessment:        ICD-10-CM ICD-9-CM   1. Post-nasal drip  R09.82 784.91   2. Lesion of tonsil  J35.9 474.9            Plan:      LEFT PALATINE TONSIL GROWTH:  - Excisional biopsy completed today in office.   - Specimen to Pathology ENT  - Eat soft/bland diet for 72 hours and then progress to regular foods as tolerated.   - (Magic mouthwash) 1:1:1 diphenhydrAMINE(Benadryl) 12.5mg/5ml liq, aluminum & magnesium hydroxide-simethicone (Maalox), LIDOcaine viscous 2%; Swish and spit 10 mLs every 4 (four) hours as needed (oral pain). As needed for oral pain  Dispense: 120 mL; Refill: 0    POSTNASAL DRIP:  - Use flonase 1 spray (50mcg total) by Each Nostril route 2 (two) times daily and astelin 1 spray (137mcg total) by Nasal route (two) times daily. Point up and slightly outward toward ear when using medicated nasal sprays as to avoid irritating nasal septum. Use for 1 month and then as needed. Take claritin as needed for breakthrough allergy symptoms.     Follow up in 2 weeks.     This note was generated with the assistance of ambient listening technology. Verbal consent was obtained by the patient and accompanying visitor(s) for the recording of patient appointment to facilitate this note. I attest to having reviewed and edited the generated note for accuracy, though some syntax or spelling errors may persist. Please contact the author of this note for any clarification.

## 2025-08-08 NOTE — PATIENT INSTRUCTIONS
Disp Refills Start End BETZY   (Magic mouthwash) 1:1:1 diphenhydrAMINE(Benadryl) 12.5mg/5ml liq, aluminum & magnesium hydroxide-simethicone (Maalox), LIDOcaine viscous 2% 120 mL 0 8/8/2025 -- No   Sig - Route: Swish and spit 10 mLs every 4 (four) hours as needed (oral pain). As needed for oral pain - Swish & Spit     Use flonase 1 spray (50mcg total) by Each Nostril route 2 (two) times daily and astelin 1 spray (137mcg total) by Nasal route (two) times daily. Point up and slightly outward toward ear when using medicated nasal sprays as to avoid irritating nasal septum. Use for 1 month and then as needed. Take claritin as needed for breakthrough allergy symptoms.       Follow up in 2 weeks.

## 2025-08-08 NOTE — PROGRESS NOTES
[facility]     Subjective:       Patient ID:  [id number]     Chief Complaint: Follow-up    Yanci Coats is a 69 y.o. female with a past medical history significant for HTN, HLD, T2DM, and NAFLD who presents to the clinic for follow up.      CHIEF COMPLAINT:  Ms. Coats presents today for follow up after recent flu diagnosis.    HISTORY OF PRESENT ILLNESS:  She reports ongoing post-flu symptoms including significant fatigue, becoming tired within an hour of starting activities. She experienced an episode of shortness of breath and heaviness while walking in [employer], feeling like she might pass out. She continues to have persistent congestion with frequent mucus production and intermittent nighttime coughing. She notes a similar recovery pattern to a previous flu episode in 2008, where it took approximately one month to feel better. Current residual symptoms are less severe than initial illness but still impact daily functioning. She is uncertain if symptoms are solely flu-related or potentially complicated by allergies.    TEMPERATURE REGULATION:  She reports difficulty with temperature regulation, specifically experiencing challenges warming up in cold environments. She describes feeling cold and struggling to adjust to cold temperatures, particularly with water. Getting into cold water is difficult and she has trouble warming up afterward. Recent TSH was normal.     ALLERGIES:  She reports significant sensitivity to cologne and perfume, which can trigger allergic reactions. She has a known penicillin allergy.    CURRENT MEDICATIONS:  She takes Claritin daily for allergy management, which she finds effective. She uses saline nasal spray for congestion management. Flonase has not provided significant relief. She is open to using Mucinex for mucus management if needed.         Review of Systems   Constitutional:  Negative for chills and fever.   HENT:  Positive for congestion and postnasal drip.  Negative for sinus pressure, sinus pain, sore throat and voice change.    Respiratory:  Positive for shortness of breath.    Cardiovascular:  Negative for chest pain.   Gastrointestinal:  Negative for abdominal pain, diarrhea, nausea and vomiting.        Past Medical History:   Diagnosis Date    Allergy history unknown     Arthritis     DDD    Back pain     Cataract     OU    Colon polyps 01/31/2019    Diabetes mellitus type II     GERD (gastroesophageal reflux disease)     Hypertension     LPRD (laryngopharyngeal reflux disease)     Nuclear sclerosis - Both Eyes 7/9/2013        Active Problem List with Overview Notes    Diagnosis Date Noted    Bilateral carpal tunnel syndrome 02/05/2020    Chronic bilateral low back pain without sciatica 11/22/2019    Chronic rhinitis 11/22/2019    History of colon polyps 01/31/2019    Type 2 diabetes mellitus with hyperglycemia 06/01/2018    Hypercholesterolemia 06/01/2018    Nodular thyroid disease 06/01/2018    Hypovitaminosis D 06/01/2018    Proteinuria 06/01/2018    Postmenopausal 03/23/2018    Hypertension associated with diabetes 07/25/2016    Hyperlipidemia associated with type 2 diabetes mellitus 07/25/2016    NAFLD (nonalcoholic fatty liver disease) 07/25/2016    Uterine fibroid 07/25/2016    Renal cyst, left 07/25/2016    Obesity, Class I, BMI 30-34.9 07/25/2016    BMI 32.0-32.9,adult 07/25/2016    Hyperlipidemia LDL goal <70 01/29/2016    Neuropathy 01/29/2016    Diabetes mellitus type 2, uncontrolled, with complications 01/29/2016    Blurred vision 09/23/2013    Posterior vitreous detachment, right eye 09/23/2013    Nuclear sclerosis - Both Eyes 07/09/2013    Cortical cataract - Right Eye 07/09/2013    Rhinitis, allergic 03/27/2013    GERD (gastroesophageal reflux disease) 03/27/2013        Review of patient's allergies indicates:   Allergen Reactions    Lipitor [atorvastatin] Other (See Comments)     Muscle pain    Penicillins Other (See Comments)     Unknown reaction        Current Medications[1]    Lab Results   Component Value Date    WBC 8.64 06/25/2025    HGB 11.7 (L) 06/25/2025    HCT 36.2 (L) 06/25/2025     06/25/2025    CHOL 190 06/25/2025    TRIG 131 06/25/2025    HDL 54 06/25/2025    ALT 22 06/25/2025    AST 21 06/25/2025     06/25/2025    K 3.9 06/25/2025     06/25/2025    CREATININE 1.3 06/25/2025    BUN 25 (H) 06/25/2025    CO2 22 (L) 06/25/2025    TSH 0.605 06/25/2025    INR 1.0 01/07/2014    HGBA1C 6.7 (H) 06/25/2025           Objective:      Physical Exam  Constitutional:       General: She is not in acute distress.     Appearance: Normal appearance. She is not toxic-appearing.   HENT:      Head: Normocephalic and atraumatic.      Right Ear: Tympanic membrane is not erythematous, retracted or bulging.      Left Ear: Tympanic membrane is not erythematous, retracted or bulging.      Nose:      Right Sinus: Maxillary sinus tenderness present. No frontal sinus tenderness.      Left Sinus: Maxillary sinus tenderness present. No frontal sinus tenderness.      Mouth/Throat:     Cardiovascular:      Rate and Rhythm: Normal rate and regular rhythm.      Pulses: Normal pulses.      Heart sounds: No murmur heard.     No friction rub.   Pulmonary:      Effort: Pulmonary effort is normal. No respiratory distress.      Breath sounds: Normal breath sounds. No wheezing.   Musculoskeletal:      Cervical back: Normal range of motion.      Right lower leg: No edema.      Left lower leg: No edema.   Skin:     General: Skin is warm and dry.   Neurological:      General: No focal deficit present.      Mental Status: She is alert and oriented to person, place, and time.   Psychiatric:         Mood and Affect: Mood normal.           Assessment:       1. Rhinosinusitis    2. Lesion of tonsil    3. Influenza B    4. Hypertension associated with diabetes    5. Type 2 diabetes mellitus with hyperglycemia, without long-term current use of insulin          Plan:          Assessment & Plan    - Considered post-flu fatigue as cause of ongoing tiredness, but noted concern about exertion-related symptoms.  - Offered option of EKG and chest XR to rule out cardiac/pulmonary issues, but agreed to defer testing and monitor symptoms for now.   - Observed persistent lesion on tonsil. Will refer to ENT for assessment.   - Evaluated for possible sinus infection given ongoing congestion.        Yanci was seen today for follow-up.    Diagnoses and all orders for this visit:    Rhinosinusitis  -     doxycycline (VIBRAMYCIN) 100 MG Cap; Take 1 capsule (100 mg total) by mouth every 12 (twelve) hours.  -     Patient with maxillary tenderness. Given post viral, will treat for superimposed bacterial infection secondary to recent viral illness.   -      Continue Claritin. Recommended flonase daily and sinus rinses with distilled water.     Lesion of tonsil  -     Ambulatory referral/consult to ENT; Future  -     Unclear etiology. Unsure if this is a tonsil stone. Will refer to ENT for further evaluations and recommendations. Considered throat culture, however patient denies pain at this time.     Influenza B  -     loratadine (CLARITIN) 10 mg tablet; Take 1 tablet (10 mg total) by mouth once daily.  -     Symptoms improving.     Hypertension associated with diabetes         -    Well-controlled. Continue amlodipine and micardis-HCT as prescribed.     Type 2 diabetes mellitus with hyperglycemia, without long-term current use of insulin         -   Below goal. Continue trulicity, glipizide, and janumet as prescribed.      Follow up for scheduled appointment.    Future Appointments       Date Provider Specialty Appt Notes    8/8/2025 Yared Chung PA-C Otolaryngology Lesion of tonsil    8/11/2025 Jorge Alberto Rivas MD Allergy Chronic rhinitis    11/24/2025 Flora Gee PA-C Family Medicine 6mth f/u    5/22/2026 Anurag Nye MD Family Medicine 6mt f/u             Tests to  Keep You Healthy    Mammogram: Met on 6/11/2025  Eye Exam: Met on 10/16/2024  Colon Cancer Screening: DUE  Last Blood Pressure <= 139/89 (8/8/2025): Yes  Last HbA1c < 8 (06/25/2025): Yes       I spent a total of 30 minutes on the day of the visit.This includes face to face time and non-face to face time preparing to see the patient (eg, review of tests), obtaining and/or reviewing separately obtained history, documenting clinical information in the electronic or other health record, independently interpreting results and communicating results to the patient/family/caregiver, or care coordinator.    This note was generated with the assistance of ambient listening technology. Verbal consent was obtained by the patient and accompanying visitor(s) for the recording of patient appointment to facilitate this note. I attest to having reviewed and edited the generated note for accuracy, though some syntax or spelling errors may persist. Please contact the author of this note for any clarification.      Flora Gee PA-C  Family Medicine Physician Assistant          [1]   Current Outpatient Medications:     amLODIPine (NORVASC) 10 MG tablet, TAKE 1 TABLET ONCE DAILY, Disp: 90 tablet, Rfl: 2    aspirin 81 MG Chew, Take 81 mg by mouth once daily., Disp: , Rfl:     carvediloL (COREG) 12.5 MG tablet, Take 1 tablet by mouth once daily, Disp: 90 tablet, Rfl: 3    dulaglutide (TRULICITY) 1.5 mg/0.5 mL pen injector, Inject 1.5 mg into the skin every 7 days., Disp: 4 pen , Rfl: 11    gabapentin (NEURONTIN) 300 MG capsule, TAKE 1 CAPSULE TWICE DAILY, Disp: 90 capsule, Rfl: 7    glipiZIDE (GLUCOTROL) 10 MG tablet, TAKE 1 TABLET TWICE A DAY, Disp: 180 tablet, Rfl: 3    multivitamin capsule, Take 1 capsule by mouth once daily., Disp: , Rfl:     pantoprazole (PROTONIX) 40 MG tablet, TAKE 1 TABLET ONCE DAILY, Disp: 90 tablet, Rfl: 3    promethazine-dextromethorphan (PROMETHAZINE-DM) 6.25-15 mg/5 mL Syrp, Take 5 mLs by mouth nightly  as needed (cough)., Disp: 118 mL, Rfl: 0    rosuvastatin (CRESTOR) 5 MG tablet, TAKE 1 TABLET ONCE DAILY, Disp: 90 tablet, Rfl: 0    SITagliptan-metformin (JANUMET XR) 50-1,000 mg TM24, Take 1 tablet by mouth once daily., Disp: 90 tablet, Rfl: 3    telmisartan-hydrochlorothiazide (MICARDIS HCT) 80-25 mg per tablet, TAKE 1 TABLET ONCE DAILY, Disp: 90 tablet, Rfl: 1    doxycycline (VIBRAMYCIN) 100 MG Cap, Take 1 capsule (100 mg total) by mouth every 12 (twelve) hours., Disp: 10 capsule, Rfl: 0    fluticasone propionate (FLONASE) 50 mcg/actuation nasal spray, 1 spray (50 mcg total) by Each Nostril route 2 (two) times daily. Point up and slightly outward toward ear when spraying to avoid irritating nasal septum., Disp: 16 g, Rfl: 2    ketoconazole (NIZORAL) 2 % shampoo, LATHER SCALP, LET SIT FOR 5MINUTES BEFORE RINSING; USE2 TO 3 TIMES A WEEK, Disp: 360 mL, Rfl: 5    loratadine (CLARITIN) 10 mg tablet, Take 1 tablet (10 mg total) by mouth once daily., Disp: 90 tablet, Rfl: 0    valACYclovir (VALTREX) 1000 MG tablet, Take 2 tablets (2,000 mg total) by mouth every 12 (twelve) hours., Disp: 4 tablet, Rfl: 1

## 2025-08-18 ENCOUNTER — OFFICE VISIT (OUTPATIENT)
Dept: ALLERGY | Facility: CLINIC | Age: 70
End: 2025-08-18
Payer: COMMERCIAL

## 2025-08-18 VITALS — OXYGEN SATURATION: 99 % | HEART RATE: 88 BPM | BODY MASS INDEX: 31.49 KG/M2 | HEIGHT: 67 IN | WEIGHT: 200.63 LBS

## 2025-08-18 DIAGNOSIS — J31.0 CHRONIC RHINITIS: Primary | ICD-10-CM

## 2025-08-18 PROCEDURE — 3044F HG A1C LEVEL LT 7.0%: CPT | Mod: CPTII,S$GLB,, | Performed by: STUDENT IN AN ORGANIZED HEALTH CARE EDUCATION/TRAINING PROGRAM

## 2025-08-18 PROCEDURE — 1101F PT FALLS ASSESS-DOCD LE1/YR: CPT | Mod: CPTII,S$GLB,, | Performed by: STUDENT IN AN ORGANIZED HEALTH CARE EDUCATION/TRAINING PROGRAM

## 2025-08-18 PROCEDURE — 99999 PR PBB SHADOW E&M-EST. PATIENT-LVL V: CPT | Mod: PBBFAC,,, | Performed by: STUDENT IN AN ORGANIZED HEALTH CARE EDUCATION/TRAINING PROGRAM

## 2025-08-18 PROCEDURE — 99203 OFFICE O/P NEW LOW 30 MIN: CPT | Mod: S$GLB,,, | Performed by: STUDENT IN AN ORGANIZED HEALTH CARE EDUCATION/TRAINING PROGRAM

## 2025-08-18 PROCEDURE — 3008F BODY MASS INDEX DOCD: CPT | Mod: CPTII,S$GLB,, | Performed by: STUDENT IN AN ORGANIZED HEALTH CARE EDUCATION/TRAINING PROGRAM

## 2025-08-18 PROCEDURE — 3288F FALL RISK ASSESSMENT DOCD: CPT | Mod: CPTII,S$GLB,, | Performed by: STUDENT IN AN ORGANIZED HEALTH CARE EDUCATION/TRAINING PROGRAM

## 2025-08-18 PROCEDURE — 1159F MED LIST DOCD IN RCRD: CPT | Mod: CPTII,S$GLB,, | Performed by: STUDENT IN AN ORGANIZED HEALTH CARE EDUCATION/TRAINING PROGRAM

## 2025-08-18 PROCEDURE — 1126F AMNT PAIN NOTED NONE PRSNT: CPT | Mod: CPTII,S$GLB,, | Performed by: STUDENT IN AN ORGANIZED HEALTH CARE EDUCATION/TRAINING PROGRAM

## 2025-08-28 DIAGNOSIS — E11.59 HYPERTENSION ASSOCIATED WITH DIABETES: ICD-10-CM

## 2025-08-28 DIAGNOSIS — I15.2 HYPERTENSION ASSOCIATED WITH DIABETES: ICD-10-CM

## 2025-08-28 RX ORDER — TELMISARTAN AND HYDROCHLOROTHIAZIDE 25; 80 MG/1; MG/1
1 TABLET ORAL
Qty: 90 TABLET | Refills: 2 | Status: SHIPPED | OUTPATIENT
Start: 2025-08-28